# Patient Record
Sex: MALE | Race: ASIAN | Employment: UNEMPLOYED | URBAN - METROPOLITAN AREA
[De-identification: names, ages, dates, MRNs, and addresses within clinical notes are randomized per-mention and may not be internally consistent; named-entity substitution may affect disease eponyms.]

---

## 2017-05-19 PROBLEM — R07.9 CHEST PAIN: Status: ACTIVE | Noted: 2017-05-19

## 2017-05-19 PROBLEM — E78.5 HYPERLIPIDEMIA: Status: ACTIVE | Noted: 2017-05-19

## 2017-05-19 PROBLEM — I10 HYPERTENSION: Status: ACTIVE | Noted: 2017-05-19

## 2017-05-19 PROBLEM — E11.9 DIABETES MELLITUS (HCC): Status: ACTIVE | Noted: 2017-05-19

## 2017-05-27 ENCOUNTER — HOSPITAL ENCOUNTER (OUTPATIENT)
Dept: OTHER | Age: 72
Discharge: HOME | End: 2017-05-27
Attending: FAMILY MEDICINE | Admitting: FAMILY MEDICINE

## 2017-05-27 LAB
A/G RATIO: 1.4 (ref 1.1–2.2)
ALBUMIN SERPL-MCNC: 4.4 G/DL (ref 3.4–5)
ALP BLD-CCNC: 117 U/L (ref 40–129)
ALT SERPL-CCNC: 18 U/L (ref 10–40)
ANION GAP SERPL CALCULATED.3IONS-SCNC: 15 MMOL/L (ref 3–16)
AST SERPL-CCNC: 26 U/L (ref 15–37)
BASOPHILS ABSOLUTE: 0.1 K/UL (ref 0–0.2)
BASOPHILS RELATIVE PERCENT: 0.8 %
BILIRUB SERPL-MCNC: 1 MG/DL (ref 0–1)
BUN BLDV-MCNC: 12 MG/DL (ref 7–20)
CALCIUM SERPL-MCNC: 9.6 MG/DL (ref 8.3–10.6)
CHLORIDE BLD-SCNC: 101 MMOL/L (ref 99–110)
CHOLESTEROL, TOTAL: 127 MG/DL (ref 0–199)
CO2: 25 MMOL/L (ref 21–32)
CREAT SERPL-MCNC: 1.1 MG/DL (ref 0.8–1.3)
EOSINOPHILS ABSOLUTE: 0.2 K/UL (ref 0–0.6)
EOSINOPHILS RELATIVE PERCENT: 2.9 %
GFR AFRICAN AMERICAN: >60
GFR NON-AFRICAN AMERICAN: >60
GLOBULIN: 3.2 G/DL
GLUCOSE BLD-MCNC: 145 MG/DL (ref 70–99)
HCT VFR BLD CALC: 48.2 % (ref 40.5–52.5)
HDLC SERPL-MCNC: 36 MG/DL (ref 40–60)
HEMOGLOBIN: 16.1 G/DL (ref 13.5–17.5)
LDL CHOLESTEROL CALCULATED: 66 MG/DL
LYMPHOCYTES ABSOLUTE: 2 K/UL (ref 1–5.1)
LYMPHOCYTES RELATIVE PERCENT: 27.8 %
MCH RBC QN AUTO: 28.4 PG (ref 26–34)
MCHC RBC AUTO-ENTMCNC: 33.3 G/DL (ref 31–36)
MCV RBC AUTO: 85.2 FL (ref 80–100)
MONOCYTES ABSOLUTE: 0.4 K/UL (ref 0–1.3)
MONOCYTES RELATIVE PERCENT: 5.3 %
NEUTROPHILS ABSOLUTE: 4.5 K/UL (ref 1.7–7.7)
NEUTROPHILS RELATIVE PERCENT: 63.2 %
PDW BLD-RTO: 12.8 % (ref 12.4–15.4)
PLATELET # BLD: 246 K/UL (ref 135–450)
PMV BLD AUTO: 8.5 FL (ref 5–10.5)
POTASSIUM SERPL-SCNC: 4.5 MMOL/L (ref 3.5–5.1)
RBC # BLD: 5.66 M/UL (ref 4.2–5.9)
SODIUM BLD-SCNC: 141 MMOL/L (ref 136–145)
T4 FREE: 1.5 NG/DL (ref 0.9–1.8)
TOTAL PROTEIN: 7.6 G/DL (ref 6.4–8.2)
TRIGL SERPL-MCNC: 126 MG/DL (ref 0–150)
TSH SERPL DL<=0.05 MIU/L-ACNC: 0.96 UIU/ML (ref 0.27–4.2)
VLDLC SERPL CALC-MCNC: 25 MG/DL
WBC # BLD: 7.1 K/UL (ref 4–11)

## 2017-05-28 LAB
ESTIMATED AVERAGE GLUCOSE: 188.6 MG/DL
HBA1C MFR BLD: 8.2 %
PROSTATE SPECIFIC ANTIGEN FREE: 0.1 NG/ML
PROSTATE SPECIFIC ANTIGEN PERCENT FREE: 20 %
PROSTATE SPECIFIC ANTIGEN: 0.5 NG/ML (ref 0–4)

## 2017-06-01 ENCOUNTER — OFFICE VISIT (OUTPATIENT)
Dept: CARDIOLOGY CLINIC | Age: 72
End: 2017-06-01

## 2017-06-01 VITALS
SYSTOLIC BLOOD PRESSURE: 128 MMHG | DIASTOLIC BLOOD PRESSURE: 78 MMHG | HEART RATE: 106 BPM | WEIGHT: 114 LBS | BODY MASS INDEX: 25.56 KG/M2

## 2017-06-01 DIAGNOSIS — R94.39 EQUIVOCAL STRESS TEST: ICD-10-CM

## 2017-06-01 DIAGNOSIS — R07.9 CHEST PAIN, UNSPECIFIED TYPE: Primary | ICD-10-CM

## 2017-06-01 PROCEDURE — 99215 OFFICE O/P EST HI 40 MIN: CPT | Performed by: INTERNAL MEDICINE

## 2017-06-01 RX ORDER — METOPROLOL TARTRATE 50 MG/1
50 TABLET, FILM COATED ORAL SEE ADMIN INSTRUCTIONS
Qty: 2 TABLET | Refills: 0 | Status: SHIPPED | OUTPATIENT
Start: 2017-06-01 | End: 2017-06-01 | Stop reason: SDUPTHER

## 2017-06-01 RX ORDER — METOPROLOL TARTRATE 50 MG/1
50 TABLET, FILM COATED ORAL SEE ADMIN INSTRUCTIONS
Qty: 2 TABLET | Refills: 0 | Status: SHIPPED | OUTPATIENT
Start: 2017-06-01 | End: 2017-06-13 | Stop reason: ALTCHOICE

## 2017-06-12 ENCOUNTER — HOSPITAL ENCOUNTER (OUTPATIENT)
Dept: CT IMAGING | Age: 72
Discharge: HOME | End: 2017-06-12
Attending: INTERNAL MEDICINE | Admitting: INTERNAL MEDICINE

## 2017-06-12 VITALS
HEIGHT: 60 IN | DIASTOLIC BLOOD PRESSURE: 86 MMHG | TEMPERATURE: 97.4 F | BODY MASS INDEX: 22.38 KG/M2 | WEIGHT: 114 LBS | RESPIRATION RATE: 16 BRPM | SYSTOLIC BLOOD PRESSURE: 159 MMHG | HEART RATE: 57 BPM | OXYGEN SATURATION: 99 %

## 2017-06-12 DIAGNOSIS — R07.9 CHEST PAIN, UNSPECIFIED TYPE: ICD-10-CM

## 2017-06-12 DIAGNOSIS — R07.9 CHEST PAIN: ICD-10-CM

## 2017-06-12 DIAGNOSIS — R94.39 EQUIVOCAL STRESS TEST: ICD-10-CM

## 2017-06-12 LAB
GLUCOSE BLD-MCNC: 107 MG/DL (ref 70–99)
PERFORMED ON: ABNORMAL

## 2017-06-13 ENCOUNTER — OFFICE VISIT (OUTPATIENT)
Dept: CARDIOLOGY CLINIC | Age: 72
End: 2017-06-13

## 2017-06-13 VITALS
WEIGHT: 115 LBS | BODY MASS INDEX: 19.63 KG/M2 | SYSTOLIC BLOOD PRESSURE: 138 MMHG | DIASTOLIC BLOOD PRESSURE: 84 MMHG | HEART RATE: 66 BPM | HEIGHT: 64 IN

## 2017-06-13 DIAGNOSIS — E78.49 OTHER HYPERLIPIDEMIA: ICD-10-CM

## 2017-06-13 DIAGNOSIS — I10 ESSENTIAL HYPERTENSION: Primary | ICD-10-CM

## 2017-06-13 DIAGNOSIS — R10.9 ABDOMINAL PAIN, UNSPECIFIED LOCATION: ICD-10-CM

## 2017-06-13 DIAGNOSIS — R07.9 CHEST PAIN, UNSPECIFIED TYPE: ICD-10-CM

## 2017-06-13 PROCEDURE — 99214 OFFICE O/P EST MOD 30 MIN: CPT | Performed by: INTERNAL MEDICINE

## 2017-10-17 ENCOUNTER — HOSPITAL ENCOUNTER (OUTPATIENT)
Dept: ULTRASOUND IMAGING | Age: 72
Discharge: HOME | End: 2017-10-17
Attending: INTERNAL MEDICINE | Admitting: INTERNAL MEDICINE

## 2017-10-17 DIAGNOSIS — N28.9 DISORDER OF KIDNEY AND URETER: ICD-10-CM

## 2017-10-17 DIAGNOSIS — N28.9 RENAL INSUFFICIENCY: ICD-10-CM

## 2018-06-06 ENCOUNTER — PAT TELEPHONE (OUTPATIENT)
Dept: PREADMISSION TESTING | Age: 73
End: 2018-06-06

## 2018-06-06 VITALS — BODY MASS INDEX: 20.49 KG/M2 | WEIGHT: 120 LBS | HEIGHT: 64 IN

## 2018-06-06 RX ORDER — OMEPRAZOLE 40 MG/1
40 CAPSULE, DELAYED RELEASE ORAL DAILY
COMMUNITY
End: 2019-03-14 | Stop reason: SDUPTHER

## 2018-06-08 ENCOUNTER — HOSPITAL ENCOUNTER (OUTPATIENT)
Dept: ENDOSCOPY | Age: 73
Discharge: HOME | End: 2018-06-08
Attending: INTERNAL MEDICINE | Admitting: INTERNAL MEDICINE

## 2018-06-08 VITALS
HEART RATE: 57 BPM | TEMPERATURE: 97.3 F | HEIGHT: 64 IN | WEIGHT: 120 LBS | DIASTOLIC BLOOD PRESSURE: 85 MMHG | OXYGEN SATURATION: 97 % | BODY MASS INDEX: 20.49 KG/M2 | RESPIRATION RATE: 16 BRPM | SYSTOLIC BLOOD PRESSURE: 137 MMHG

## 2018-06-08 LAB
ANION GAP SERPL CALCULATED.3IONS-SCNC: 14 MMOL/L (ref 3–16)
BUN BLDV-MCNC: 10 MG/DL (ref 7–20)
CALCIUM SERPL-MCNC: 9 MG/DL (ref 8.3–10.6)
CHLORIDE BLD-SCNC: 102 MMOL/L (ref 99–110)
CO2: 24 MMOL/L (ref 21–32)
CREAT SERPL-MCNC: 1 MG/DL (ref 0.8–1.3)
GFR AFRICAN AMERICAN: >60
GFR NON-AFRICAN AMERICAN: >60
GLUCOSE BLD-MCNC: 154 MG/DL (ref 70–99)
GLUCOSE BLD-MCNC: 196 MG/DL (ref 70–99)
PERFORMED ON: ABNORMAL
POTASSIUM REFLEX MAGNESIUM: 5.3 MMOL/L (ref 3.5–5.1)
SODIUM BLD-SCNC: 140 MMOL/L (ref 136–145)

## 2018-06-08 RX ORDER — SODIUM CHLORIDE 0.9 % (FLUSH) 0.9 %
10 SYRINGE (ML) INJECTION EVERY 12 HOURS SCHEDULED
Status: DISCONTINUED | OUTPATIENT
Start: 2018-06-08 | End: 2018-06-09 | Stop reason: HOSPADM

## 2018-06-08 RX ORDER — SODIUM CHLORIDE 9 MG/ML
INJECTION, SOLUTION INTRAVENOUS CONTINUOUS
Status: DISCONTINUED | OUTPATIENT
Start: 2018-06-08 | End: 2018-06-09 | Stop reason: HOSPADM

## 2018-06-08 RX ORDER — SODIUM CHLORIDE 0.9 % (FLUSH) 0.9 %
10 SYRINGE (ML) INJECTION PRN
Status: DISCONTINUED | OUTPATIENT
Start: 2018-06-08 | End: 2018-06-09 | Stop reason: HOSPADM

## 2018-06-08 ASSESSMENT — PAIN SCALES - GENERAL
PAINLEVEL_OUTOF10: 0
PAINLEVEL_OUTOF10: 0

## 2018-06-08 ASSESSMENT — PAIN - FUNCTIONAL ASSESSMENT: PAIN_FUNCTIONAL_ASSESSMENT: 0-10

## 2018-06-08 ASSESSMENT — ENCOUNTER SYMPTOMS: SHORTNESS OF BREATH: 0

## 2018-06-15 ENCOUNTER — HOSPITAL ENCOUNTER (OUTPATIENT)
Dept: CT IMAGING | Age: 73
Discharge: HOME | End: 2018-06-15
Attending: INTERNAL MEDICINE | Admitting: INTERNAL MEDICINE

## 2018-06-15 DIAGNOSIS — R59.9 ENLARGED LYMPH NODES: ICD-10-CM

## 2018-06-15 DIAGNOSIS — R59.9 ENLARGED LYMPH NODE: ICD-10-CM

## 2018-06-21 ENCOUNTER — TELEPHONE (OUTPATIENT)
Dept: PULMONOLOGY | Age: 73
End: 2018-06-21

## 2018-07-09 ENCOUNTER — OFFICE VISIT (OUTPATIENT)
Dept: PULMONOLOGY | Age: 73
End: 2018-07-09

## 2018-07-09 VITALS
TEMPERATURE: 97.8 F | SYSTOLIC BLOOD PRESSURE: 136 MMHG | OXYGEN SATURATION: 97 % | WEIGHT: 122 LBS | BODY MASS INDEX: 20.83 KG/M2 | DIASTOLIC BLOOD PRESSURE: 80 MMHG | HEART RATE: 84 BPM | HEIGHT: 64 IN | RESPIRATION RATE: 16 BRPM

## 2018-07-09 DIAGNOSIS — Z22.7 TB LUNG, LATENT: ICD-10-CM

## 2018-07-09 DIAGNOSIS — R59.0 MEDIASTINAL ADENOPATHY: Primary | ICD-10-CM

## 2018-07-09 PROCEDURE — 99204 OFFICE O/P NEW MOD 45 MIN: CPT | Performed by: INTERNAL MEDICINE

## 2018-07-09 RX ORDER — TAMSULOSIN HYDROCHLORIDE 0.4 MG/1
0.4 CAPSULE ORAL NIGHTLY
Status: ON HOLD | COMMUNITY
End: 2020-10-16 | Stop reason: SDUPTHER

## 2018-07-09 NOTE — PROGRESS NOTES
PATIENT IS SEEN AT THE REQUEST OF DR. Garzon for adenopathy    Chief complaint  This is a 68y.o. year old male  who comes to see me with a chief complaint of   Chief Complaint   Patient presents with    Other     NP referred by Dr Alize Alexis for lymphadenopathy    Other     Whole body does not feel well weakness       HPI  Here with a cc of adenopathy. His son is present but I used [employer] interpretor services #[id number]    He says he is here for chest pain and his whole body does not feel well. He has not been well for over 1 year. He is vague about that and does not give details. Recently had CT Ab and EGD for GERD. CT ab showed abdominal adenopathy so dedicated CT Chest was completed last month. He is here for those findings but did not know that. He does not necessarily get SOB. Does have chest pains and GERD that gets better with PPI. Occupation:  Previous farmer    Pets: Fish    Hobbies/Exposures: Farming    TB Exposure:  + PPD when he came to [facility] 8 years ago. Treated with medication for unknown time    Lung Procedures:  None      Past Medical History:   Diagnosis Date    Diabetes mellitus ([address]     GERD (gastroesophageal reflux disease)     Hyperlipidemia     Hypertension     Kidney stone        Past Surgical History:   Procedure Laterality Date    APPENDECTOMY      CHOLECYSTECTOMY      UPPER GASTROINTESTINAL ENDOSCOPY  06/08/2018       Social History     Social History    Marital status:      Spouse name: elissa    Number of children: 8    Years of education: N/A     Occupational History    farmer      Social History Main Topics    Smoking status: Never Smoker    Smokeless tobacco: Never Used    Alcohol use No    Drug use: No    Sexual activity: Yes      Comment: frank     Other Topics Concern    Not on file     Social History Narrative    No narrative on file       History reviewed. No pertinent family history.       Current Outpatient Prescriptions:    tamsulosin (FLOMAX) 0.4 MG capsule, Take 0.4 mg by mouth daily, Disp: , Rfl:     omeprazole (PRILOSEC) 40 MG delayed release capsule, Take 40 mg by mouth daily, Disp: , Rfl:     gabapentin (NEURONTIN) 100 MG capsule, Take 100 mg by mouth 3 times daily. , Disp: , Rfl:     insulin glargine (LANTUS) 100 UNIT/ML injection vial, Inject 22 Units into the skin nightly, Disp: 1 vial, Rfl: 3    losartan (COZAAR) 25 MG tablet, Take 25 mg by mouth daily, Disp: , Rfl:     Cholecalciferol (VITAMIN D) 2000 UNITS CAPS capsule, Take by mouth, Disp: , Rfl:     simvastatin (ZOCOR) 40 MG tablet, Take 40 mg by mouth nightly, Disp: , Rfl:     insulin lispro (HUMALOG) 100 UNIT/ML injection vial, Inject 5 Units into the skin 3 times daily (before meals), Disp: 10 mL, Rfl: 0    clopidogrel (PLAVIX) 75 MG tablet, Take 1 tablet by mouth daily, Disp: 30 tablet, Rfl: 0      ROS:  GENERAL:  Fatigued, does not feel \"himself\"  HEENT:  No double vision, blurry vision, or difficulty swallowing  HEART:  Chest pain  LUNGS:  Rare SOB, cough at times  ABDOMEN:  Heart burn  GENITOURINARY:  No increased frequency, no blood in urine  EXTREMITIES:  No swelling, no lesions  NEURO:  No numbness or tingling, no seizures  SKIN:  No new rashes, no changes in hair or nails  LYMPH:  No masses, no swelling neck, armpits, or groin    PHYSICAL EXAM:  Vitals:    07/09/18 1154   BP: 136/80   Pulse: 84   Resp: 16   Temp: 97.8 °F (36.6 °C)   SpO2: 97%       GENERAL:  Well nourished, alert, appears stated age, no distress  HEENT:  No scleral icterus, no conjunctival irritation  NECK:  No thyromegaly, no bruits  LYMPH:  No cervical or supraclavicular adenopathy  HEART:  Regular rate and rhythm, no murmurs  LUNGS:  Clear bilaterally   ABDOMEN:  No distention, no organomegaly  EXTREMITIES:  No edema, no digital clubbing  NEURO:  No localizing deficits, CN II-XII intact    Pulmonary Function Testing  None    Chest imaging from 6/2018 is reviewed.   My interpretation is

## 2019-01-24 ENCOUNTER — APPOINTMENT (OUTPATIENT)
Dept: CT IMAGING | Age: 74
End: 2019-01-24
Payer: MEDICAID

## 2019-01-24 ENCOUNTER — HOSPITAL ENCOUNTER (EMERGENCY)
Age: 74
Discharge: HOME | End: 2019-01-24
Attending: EMERGENCY MEDICINE
Payer: MEDICAID

## 2019-01-24 VITALS
TEMPERATURE: 98.3 F | OXYGEN SATURATION: 97 % | WEIGHT: 126.76 LBS | BODY MASS INDEX: 21.64 KG/M2 | RESPIRATION RATE: 16 BRPM | HEIGHT: 64 IN | SYSTOLIC BLOOD PRESSURE: 114 MMHG | DIASTOLIC BLOOD PRESSURE: 77 MMHG | HEART RATE: 93 BPM

## 2019-01-24 DIAGNOSIS — K40.90 INGUINAL HERNIA WITHOUT OBSTRUCTION OR GANGRENE, RECURRENCE NOT SPECIFIED, UNSPECIFIED LATERALITY: Primary | ICD-10-CM

## 2019-01-24 DIAGNOSIS — K20.90 ESOPHAGITIS: ICD-10-CM

## 2019-01-24 LAB
ALBUMIN SERPL-MCNC: 4.1 G/DL (ref 3.4–5)
ALP BLD-CCNC: 133 U/L (ref 40–129)
ALT SERPL-CCNC: 20 U/L (ref 10–40)
ANION GAP SERPL CALCULATED.3IONS-SCNC: 14 MMOL/L (ref 3–16)
AST SERPL-CCNC: 27 U/L (ref 15–37)
BASOPHILS ABSOLUTE: 0 K/UL (ref 0–0.2)
BASOPHILS RELATIVE PERCENT: 0.3 %
BILIRUB SERPL-MCNC: 1.2 MG/DL (ref 0–1)
BILIRUBIN DIRECT: <0.2 MG/DL (ref 0–0.3)
BILIRUBIN URINE: NEGATIVE
BILIRUBIN, INDIRECT: ABNORMAL MG/DL (ref 0–1)
BLOOD, URINE: NEGATIVE
BUN BLDV-MCNC: 16 MG/DL (ref 7–20)
CALCIUM SERPL-MCNC: 9.1 MG/DL (ref 8.3–10.6)
CHLORIDE BLD-SCNC: 102 MMOL/L (ref 99–110)
CLARITY: CLEAR
CO2: 24 MMOL/L (ref 21–32)
COLOR: YELLOW
CREAT SERPL-MCNC: 1.2 MG/DL (ref 0.8–1.3)
EOSINOPHILS ABSOLUTE: 0.3 K/UL (ref 0–0.6)
EOSINOPHILS RELATIVE PERCENT: 2.5 %
GFR AFRICAN AMERICAN: >60
GFR NON-AFRICAN AMERICAN: 59
GLUCOSE BLD-MCNC: 241 MG/DL (ref 70–99)
GLUCOSE URINE: 250 MG/DL
HCT VFR BLD CALC: 44.2 % (ref 40.5–52.5)
HEMOGLOBIN: 15.1 G/DL (ref 13.5–17.5)
KETONES, URINE: ABNORMAL MG/DL
LACTIC ACID: 1.1 MMOL/L (ref 0.4–2)
LEUKOCYTE ESTERASE, URINE: NEGATIVE
LYMPHOCYTES ABSOLUTE: 1.7 K/UL (ref 1–5.1)
LYMPHOCYTES RELATIVE PERCENT: 13.2 %
MCH RBC QN AUTO: 28.2 PG (ref 26–34)
MCHC RBC AUTO-ENTMCNC: 34.2 G/DL (ref 31–36)
MCV RBC AUTO: 82.6 FL (ref 80–100)
MICROSCOPIC EXAMINATION: ABNORMAL
MONOCYTES ABSOLUTE: 0.8 K/UL (ref 0–1.3)
MONOCYTES RELATIVE PERCENT: 6.4 %
NEUTROPHILS ABSOLUTE: 9.8 K/UL (ref 1.7–7.7)
NEUTROPHILS RELATIVE PERCENT: 77.6 %
NITRITE, URINE: NEGATIVE
PDW BLD-RTO: 13.1 % (ref 12.4–15.4)
PH UA: 7
PLATELET # BLD: 187 K/UL (ref 135–450)
PMV BLD AUTO: 8.5 FL (ref 5–10.5)
POTASSIUM REFLEX MAGNESIUM: 4.3 MMOL/L (ref 3.5–5.1)
PROTEIN UA: NEGATIVE MG/DL
RBC # BLD: 5.35 M/UL (ref 4.2–5.9)
SODIUM BLD-SCNC: 140 MMOL/L (ref 136–145)
SPECIFIC GRAVITY UA: >1.03
TOTAL PROTEIN: 7.2 G/DL (ref 6.4–8.2)
TROPONIN: <0.01 NG/ML
URINE REFLEX TO CULTURE: ABNORMAL
URINE TYPE: ABNORMAL
UROBILINOGEN, URINE: 0.2 E.U./DL
WBC # BLD: 12.6 K/UL (ref 4–11)

## 2019-01-24 PROCEDURE — 85025 COMPLETE CBC W/AUTO DIFF WBC: CPT

## 2019-01-24 PROCEDURE — 96374 THER/PROPH/DIAG INJ IV PUSH: CPT

## 2019-01-24 PROCEDURE — 2580000003 HC RX 258: Performed by: EMERGENCY MEDICINE

## 2019-01-24 PROCEDURE — 71260 CT THORAX DX C+: CPT

## 2019-01-24 PROCEDURE — 80048 BASIC METABOLIC PNL TOTAL CA: CPT

## 2019-01-24 PROCEDURE — 80076 HEPATIC FUNCTION PANEL: CPT

## 2019-01-24 PROCEDURE — 96361 HYDRATE IV INFUSION ADD-ON: CPT

## 2019-01-24 PROCEDURE — 84484 ASSAY OF TROPONIN QUANT: CPT

## 2019-01-24 PROCEDURE — 96375 TX/PRO/DX INJ NEW DRUG ADDON: CPT

## 2019-01-24 PROCEDURE — 81003 URINALYSIS AUTO W/O SCOPE: CPT

## 2019-01-24 PROCEDURE — 6360000004 HC RX CONTRAST MEDICATION: Performed by: EMERGENCY MEDICINE

## 2019-01-24 PROCEDURE — 83605 ASSAY OF LACTIC ACID: CPT

## 2019-01-24 PROCEDURE — 93005 ELECTROCARDIOGRAM TRACING: CPT | Performed by: EMERGENCY MEDICINE

## 2019-01-24 PROCEDURE — 74177 CT ABD & PELVIS W/CONTRAST: CPT

## 2019-01-24 PROCEDURE — 6360000002 HC RX W HCPCS: Performed by: EMERGENCY MEDICINE

## 2019-01-24 PROCEDURE — 99284 EMERGENCY DEPT VISIT MOD MDM: CPT

## 2019-01-24 RX ORDER — MORPHINE SULFATE 2 MG/ML
4 INJECTION, SOLUTION INTRAMUSCULAR; INTRAVENOUS ONCE
Status: COMPLETED | OUTPATIENT
Start: 2019-01-24 | End: 2019-01-24

## 2019-01-24 RX ORDER — PANTOPRAZOLE SODIUM 20 MG/1
40 TABLET, DELAYED RELEASE ORAL DAILY
Qty: 30 TABLET | Refills: 0 | Status: SHIPPED | OUTPATIENT
Start: 2019-01-24 | End: 2019-02-22

## 2019-01-24 RX ORDER — 0.9 % SODIUM CHLORIDE 0.9 %
1000 INTRAVENOUS SOLUTION INTRAVENOUS ONCE
Status: COMPLETED | OUTPATIENT
Start: 2019-01-24 | End: 2019-01-24

## 2019-01-24 RX ORDER — ONDANSETRON 2 MG/ML
4 INJECTION INTRAMUSCULAR; INTRAVENOUS ONCE
Status: COMPLETED | OUTPATIENT
Start: 2019-01-24 | End: 2019-01-24

## 2019-01-24 RX ADMIN — ONDANSETRON 4 MG: 2 INJECTION INTRAMUSCULAR; INTRAVENOUS at 05:52

## 2019-01-24 RX ADMIN — IOPAMIDOL 75 ML: 755 INJECTION, SOLUTION INTRAVENOUS at 06:21

## 2019-01-24 RX ADMIN — SODIUM CHLORIDE 1000 ML: 9 INJECTION, SOLUTION INTRAVENOUS at 05:52

## 2019-01-24 RX ADMIN — MORPHINE SULFATE 4 MG: 2 INJECTION, SOLUTION INTRAMUSCULAR; INTRAVENOUS at 05:16

## 2019-01-24 ASSESSMENT — ENCOUNTER SYMPTOMS
STRIDOR: 0
SHORTNESS OF BREATH: 0
BACK PAIN: 0
VOMITING: 0
ANAL BLEEDING: 0
PHOTOPHOBIA: 0
CHEST TIGHTNESS: 0
COLOR CHANGE: 0
RECTAL PAIN: 0
ABDOMINAL DISTENTION: 0
COUGH: 0
APNEA: 0
EYE PAIN: 0
BLOOD IN STOOL: 0
NAUSEA: 0
WHEEZING: 0
ABDOMINAL PAIN: 1
CHOKING: 0
CONSTIPATION: 0
EYE REDNESS: 0
EYE ITCHING: 0
EYE DISCHARGE: 0
DIARRHEA: 0

## 2019-01-24 ASSESSMENT — PAIN SCALES - GENERAL
PAINLEVEL_OUTOF10: 10

## 2019-01-25 LAB
EKG ATRIAL RATE: 91 BPM
EKG DIAGNOSIS: NORMAL
EKG P AXIS: 56 DEGREES
EKG P-R INTERVAL: 172 MS
EKG Q-T INTERVAL: 360 MS
EKG QRS DURATION: 108 MS
EKG QTC CALCULATION (BAZETT): 442 MS
EKG R AXIS: -67 DEGREES
EKG T AXIS: 86 DEGREES
EKG VENTRICULAR RATE: 91 BPM

## 2019-01-25 PROCEDURE — 93010 ELECTROCARDIOGRAM REPORT: CPT | Performed by: INTERNAL MEDICINE

## 2019-01-29 ENCOUNTER — OFFICE VISIT (OUTPATIENT)
Dept: SURGERY | Age: 74
End: 2019-01-29
Payer: MEDICAID

## 2019-01-29 VITALS
WEIGHT: 124 LBS | BODY MASS INDEX: 21.28 KG/M2 | HEART RATE: 78 BPM | DIASTOLIC BLOOD PRESSURE: 79 MMHG | SYSTOLIC BLOOD PRESSURE: 151 MMHG

## 2019-01-29 DIAGNOSIS — K40.20 NON-RECURRENT BILATERAL INGUINAL HERNIA WITHOUT OBSTRUCTION OR GANGRENE: Primary | ICD-10-CM

## 2019-01-29 PROCEDURE — 99203 OFFICE O/P NEW LOW 30 MIN: CPT | Performed by: SURGERY

## 2019-01-31 LAB
AVERAGE GLUCOSE: NORMAL
HBA1C MFR BLD: 10.5 %

## 2019-02-22 RX ORDER — FAMOTIDINE 10 MG
TABLET ORAL
COMMUNITY
End: 2019-02-22

## 2019-02-22 RX ORDER — ATORVASTATIN CALCIUM 40 MG/1
40 TABLET, FILM COATED ORAL NIGHTLY
Status: ON HOLD | COMMUNITY
End: 2020-04-25 | Stop reason: ALTCHOICE

## 2019-02-27 ENCOUNTER — ANESTHESIA EVENT (OUTPATIENT)
Dept: ENDOSCOPY | Age: 74
End: 2019-02-27
Payer: MEDICAID

## 2019-02-28 ENCOUNTER — HOSPITAL ENCOUNTER (OUTPATIENT)
Age: 74
Setting detail: OUTPATIENT SURGERY
Discharge: HOME | End: 2019-02-28
Attending: INTERNAL MEDICINE | Admitting: INTERNAL MEDICINE
Payer: MEDICAID

## 2019-02-28 ENCOUNTER — ANESTHESIA (OUTPATIENT)
Dept: ENDOSCOPY | Age: 74
End: 2019-02-28
Payer: MEDICAID

## 2019-02-28 VITALS
WEIGHT: 124 LBS | DIASTOLIC BLOOD PRESSURE: 81 MMHG | RESPIRATION RATE: 18 BRPM | HEART RATE: 61 BPM | OXYGEN SATURATION: 98 % | BODY MASS INDEX: 21.17 KG/M2 | SYSTOLIC BLOOD PRESSURE: 152 MMHG | TEMPERATURE: 96.9 F | HEIGHT: 64 IN

## 2019-02-28 VITALS
RESPIRATION RATE: 19 BRPM | DIASTOLIC BLOOD PRESSURE: 68 MMHG | OXYGEN SATURATION: 99 % | SYSTOLIC BLOOD PRESSURE: 123 MMHG

## 2019-02-28 LAB
GLUCOSE BLD-MCNC: 108 MG/DL (ref 70–99)
GLUCOSE BLD-MCNC: 119 MG/DL (ref 70–99)
PERFORMED ON: ABNORMAL
PERFORMED ON: ABNORMAL

## 2019-02-28 PROCEDURE — 88312 SPECIAL STAINS GROUP 1: CPT

## 2019-02-28 PROCEDURE — 2500000003 HC RX 250 WO HCPCS

## 2019-02-28 PROCEDURE — 7100000001 HC PACU RECOVERY - ADDTL 15 MIN: Performed by: INTERNAL MEDICINE

## 2019-02-28 PROCEDURE — 7100000000 HC PACU RECOVERY - FIRST 15 MIN: Performed by: INTERNAL MEDICINE

## 2019-02-28 PROCEDURE — 3609020800 HC EGD W/EUS FNA: Performed by: INTERNAL MEDICINE

## 2019-02-28 PROCEDURE — 88342 IMHCHEM/IMCYTCHM 1ST ANTB: CPT

## 2019-02-28 PROCEDURE — 6360000002 HC RX W HCPCS

## 2019-02-28 PROCEDURE — 88177 CYTP FNA EVAL EA ADDL: CPT

## 2019-02-28 PROCEDURE — 2709999900 HC NON-CHARGEABLE SUPPLY: Performed by: INTERNAL MEDICINE

## 2019-02-28 PROCEDURE — 7100000011 HC PHASE II RECOVERY - ADDTL 15 MIN: Performed by: INTERNAL MEDICINE

## 2019-02-28 PROCEDURE — 7100000010 HC PHASE II RECOVERY - FIRST 15 MIN: Performed by: INTERNAL MEDICINE

## 2019-02-28 PROCEDURE — 3700000001 HC ADD 15 MINUTES (ANESTHESIA): Performed by: INTERNAL MEDICINE

## 2019-02-28 PROCEDURE — 88172 CYTP DX EVAL FNA 1ST EA SITE: CPT

## 2019-02-28 PROCEDURE — 88305 TISSUE EXAM BY PATHOLOGIST: CPT

## 2019-02-28 PROCEDURE — 88173 CYTOPATH EVAL FNA REPORT: CPT

## 2019-02-28 PROCEDURE — 3700000000 HC ANESTHESIA ATTENDED CARE: Performed by: INTERNAL MEDICINE

## 2019-02-28 PROCEDURE — 2720000010 HC SURG SUPPLY STERILE: Performed by: INTERNAL MEDICINE

## 2019-02-28 PROCEDURE — 2580000003 HC RX 258: Performed by: ANESTHESIOLOGY

## 2019-02-28 RX ORDER — SODIUM CHLORIDE 0.9 % (FLUSH) 0.9 %
10 SYRINGE (ML) INJECTION EVERY 12 HOURS SCHEDULED
Status: DISCONTINUED | OUTPATIENT
Start: 2019-02-28 | End: 2019-02-28 | Stop reason: HOSPADM

## 2019-02-28 RX ORDER — PROPOFOL 10 MG/ML
INJECTION, EMULSION INTRAVENOUS CONTINUOUS PRN
Status: DISCONTINUED | OUTPATIENT
Start: 2019-02-28 | End: 2019-02-28 | Stop reason: SDUPTHER

## 2019-02-28 RX ORDER — LIDOCAINE HYDROCHLORIDE 20 MG/ML
INJECTION, SOLUTION INFILTRATION; PERINEURAL PRN
Status: DISCONTINUED | OUTPATIENT
Start: 2019-02-28 | End: 2019-02-28 | Stop reason: SDUPTHER

## 2019-02-28 RX ORDER — OXYCODONE HYDROCHLORIDE AND ACETAMINOPHEN 5; 325 MG/1; MG/1
2 TABLET ORAL PRN
Status: DISCONTINUED | OUTPATIENT
Start: 2019-02-28 | End: 2019-02-28 | Stop reason: HOSPADM

## 2019-02-28 RX ORDER — MEPERIDINE HYDROCHLORIDE 25 MG/ML
12.5 INJECTION INTRAMUSCULAR; INTRAVENOUS; SUBCUTANEOUS EVERY 5 MIN PRN
Status: DISCONTINUED | OUTPATIENT
Start: 2019-02-28 | End: 2019-02-28 | Stop reason: HOSPADM

## 2019-02-28 RX ORDER — PROPOFOL 10 MG/ML
INJECTION, EMULSION INTRAVENOUS PRN
Status: DISCONTINUED | OUTPATIENT
Start: 2019-02-28 | End: 2019-02-28 | Stop reason: SDUPTHER

## 2019-02-28 RX ORDER — SODIUM CHLORIDE 9 MG/ML
INJECTION, SOLUTION INTRAVENOUS CONTINUOUS
Status: DISCONTINUED | OUTPATIENT
Start: 2019-02-28 | End: 2019-02-28 | Stop reason: HOSPADM

## 2019-02-28 RX ORDER — OXYCODONE HYDROCHLORIDE AND ACETAMINOPHEN 5; 325 MG/1; MG/1
1 TABLET ORAL PRN
Status: DISCONTINUED | OUTPATIENT
Start: 2019-02-28 | End: 2019-02-28 | Stop reason: HOSPADM

## 2019-02-28 RX ORDER — ONDANSETRON 2 MG/ML
4 INJECTION INTRAMUSCULAR; INTRAVENOUS
Status: DISCONTINUED | OUTPATIENT
Start: 2019-02-28 | End: 2019-02-28 | Stop reason: HOSPADM

## 2019-02-28 RX ORDER — SODIUM CHLORIDE 0.9 % (FLUSH) 0.9 %
10 SYRINGE (ML) INJECTION PRN
Status: DISCONTINUED | OUTPATIENT
Start: 2019-02-28 | End: 2019-02-28 | Stop reason: HOSPADM

## 2019-02-28 RX ORDER — FENTANYL CITRATE 50 UG/ML
50 INJECTION, SOLUTION INTRAMUSCULAR; INTRAVENOUS EVERY 5 MIN PRN
Status: DISCONTINUED | OUTPATIENT
Start: 2019-02-28 | End: 2019-02-28 | Stop reason: HOSPADM

## 2019-02-28 RX ORDER — MORPHINE SULFATE 2 MG/ML
2 INJECTION, SOLUTION INTRAMUSCULAR; INTRAVENOUS EVERY 5 MIN PRN
Status: DISCONTINUED | OUTPATIENT
Start: 2019-02-28 | End: 2019-02-28 | Stop reason: HOSPADM

## 2019-02-28 RX ORDER — FENTANYL CITRATE 50 UG/ML
25 INJECTION, SOLUTION INTRAMUSCULAR; INTRAVENOUS EVERY 5 MIN PRN
Status: DISCONTINUED | OUTPATIENT
Start: 2019-02-28 | End: 2019-02-28 | Stop reason: HOSPADM

## 2019-02-28 RX ORDER — MORPHINE SULFATE 2 MG/ML
1 INJECTION, SOLUTION INTRAMUSCULAR; INTRAVENOUS EVERY 5 MIN PRN
Status: DISCONTINUED | OUTPATIENT
Start: 2019-02-28 | End: 2019-02-28 | Stop reason: HOSPADM

## 2019-02-28 RX ADMIN — PROPOFOL 50 MG: 10 INJECTION, EMULSION INTRAVENOUS at 15:25

## 2019-02-28 RX ADMIN — SODIUM CHLORIDE: 9 INJECTION, SOLUTION INTRAVENOUS at 14:26

## 2019-02-28 RX ADMIN — LIDOCAINE HYDROCHLORIDE 40 MG: 20 INJECTION, SOLUTION INFILTRATION; PERINEURAL at 15:20

## 2019-02-28 RX ADMIN — PROPOFOL 140 MCG/KG/MIN: 10 INJECTION, EMULSION INTRAVENOUS at 15:20

## 2019-02-28 RX ADMIN — PROPOFOL 50 MG: 10 INJECTION, EMULSION INTRAVENOUS at 15:20

## 2019-02-28 ASSESSMENT — PAIN SCALES - GENERAL
PAINLEVEL_OUTOF10: 0
PAINLEVEL_OUTOF10: 0

## 2019-02-28 ASSESSMENT — PULMONARY FUNCTION TESTS
PIF_VALUE: 0
PIF_VALUE: 37
PIF_VALUE: 0
PIF_VALUE: 1
PIF_VALUE: 0
PIF_VALUE: 1
PIF_VALUE: 0

## 2019-02-28 ASSESSMENT — LIFESTYLE VARIABLES: SMOKING_STATUS: 0

## 2019-02-28 ASSESSMENT — PAIN - FUNCTIONAL ASSESSMENT: PAIN_FUNCTIONAL_ASSESSMENT: 0-10

## 2019-03-14 ENCOUNTER — APPOINTMENT (OUTPATIENT)
Dept: GENERAL RADIOLOGY | Age: 74
End: 2019-03-14
Payer: MEDICAID

## 2019-03-14 ENCOUNTER — HOSPITAL ENCOUNTER (EMERGENCY)
Age: 74
Discharge: HOME | End: 2019-03-14
Payer: MEDICAID

## 2019-03-14 ENCOUNTER — APPOINTMENT (OUTPATIENT)
Dept: CT IMAGING | Age: 74
End: 2019-03-14
Payer: MEDICAID

## 2019-03-14 VITALS
SYSTOLIC BLOOD PRESSURE: 129 MMHG | HEIGHT: 64 IN | OXYGEN SATURATION: 95 % | HEART RATE: 84 BPM | WEIGHT: 129.41 LBS | DIASTOLIC BLOOD PRESSURE: 87 MMHG | TEMPERATURE: 97.7 F | RESPIRATION RATE: 20 BRPM | BODY MASS INDEX: 22.09 KG/M2

## 2019-03-14 DIAGNOSIS — K44.9 HIATAL HERNIA WITH GERD AND ESOPHAGITIS: Primary | ICD-10-CM

## 2019-03-14 DIAGNOSIS — K21.00 HIATAL HERNIA WITH GERD AND ESOPHAGITIS: Primary | ICD-10-CM

## 2019-03-14 LAB
A/G RATIO: 1.3 (ref 1.1–2.2)
ALBUMIN SERPL-MCNC: 3.8 G/DL (ref 3.4–5)
ALP BLD-CCNC: 146 U/L (ref 40–129)
ALT SERPL-CCNC: 13 U/L (ref 10–40)
ANION GAP SERPL CALCULATED.3IONS-SCNC: 14 MMOL/L (ref 3–16)
AST SERPL-CCNC: 19 U/L (ref 15–37)
BASOPHILS ABSOLUTE: 0.1 K/UL (ref 0–0.2)
BASOPHILS RELATIVE PERCENT: 1.1 %
BILIRUB SERPL-MCNC: 1.1 MG/DL (ref 0–1)
BILIRUBIN URINE: NEGATIVE
BLOOD, URINE: NEGATIVE
BUN BLDV-MCNC: 10 MG/DL (ref 7–20)
CALCIUM SERPL-MCNC: 8.9 MG/DL (ref 8.3–10.6)
CHLORIDE BLD-SCNC: 102 MMOL/L (ref 99–110)
CLARITY: CLEAR
CO2: 22 MMOL/L (ref 21–32)
COLOR: YELLOW
CREAT SERPL-MCNC: 1.1 MG/DL (ref 0.8–1.3)
EKG ATRIAL RATE: 86 BPM
EKG DIAGNOSIS: NORMAL
EKG P AXIS: 57 DEGREES
EKG P-R INTERVAL: 180 MS
EKG Q-T INTERVAL: 362 MS
EKG QRS DURATION: 112 MS
EKG QTC CALCULATION (BAZETT): 433 MS
EKG R AXIS: -66 DEGREES
EKG T AXIS: 71 DEGREES
EKG VENTRICULAR RATE: 86 BPM
EOSINOPHILS ABSOLUTE: 0.4 K/UL (ref 0–0.6)
EOSINOPHILS RELATIVE PERCENT: 4.2 %
GFR AFRICAN AMERICAN: >60
GFR NON-AFRICAN AMERICAN: >60
GLOBULIN: 2.9 G/DL
GLUCOSE BLD-MCNC: 178 MG/DL (ref 70–99)
GLUCOSE URINE: NEGATIVE MG/DL
HCT VFR BLD CALC: 39.5 % (ref 40.5–52.5)
HEMOGLOBIN: 13.4 G/DL (ref 13.5–17.5)
KETONES, URINE: NEGATIVE MG/DL
LEUKOCYTE ESTERASE, URINE: NEGATIVE
LIPASE: 34 U/L (ref 13–60)
LYMPHOCYTES ABSOLUTE: 2.6 K/UL (ref 1–5.1)
LYMPHOCYTES RELATIVE PERCENT: 26.9 %
MCH RBC QN AUTO: 27.8 PG (ref 26–34)
MCHC RBC AUTO-ENTMCNC: 34 G/DL (ref 31–36)
MCV RBC AUTO: 81.8 FL (ref 80–100)
MICROSCOPIC EXAMINATION: NORMAL
MONOCYTES ABSOLUTE: 0.8 K/UL (ref 0–1.3)
MONOCYTES RELATIVE PERCENT: 8.5 %
NEUTROPHILS ABSOLUTE: 5.7 K/UL (ref 1.7–7.7)
NEUTROPHILS RELATIVE PERCENT: 59.3 %
NITRITE, URINE: NEGATIVE
PDW BLD-RTO: 13.5 % (ref 12.4–15.4)
PH UA: 5.5 (ref 5–8)
PLATELET # BLD: 239 K/UL (ref 135–450)
PMV BLD AUTO: 7.4 FL (ref 5–10.5)
POTASSIUM REFLEX MAGNESIUM: 4 MMOL/L (ref 3.5–5.1)
PRO-BNP: 64 PG/ML (ref 0–124)
PROTEIN UA: NEGATIVE MG/DL
RBC # BLD: 4.83 M/UL (ref 4.2–5.9)
SODIUM BLD-SCNC: 138 MMOL/L (ref 136–145)
SPECIFIC GRAVITY UA: 1.01 (ref 1–1.03)
TOTAL PROTEIN: 6.7 G/DL (ref 6.4–8.2)
TROPONIN: <0.01 NG/ML
URINE REFLEX TO CULTURE: NORMAL
URINE TYPE: NORMAL
UROBILINOGEN, URINE: 0.2 E.U./DL
WBC # BLD: 9.6 K/UL (ref 4–11)

## 2019-03-14 PROCEDURE — 96374 THER/PROPH/DIAG INJ IV PUSH: CPT

## 2019-03-14 PROCEDURE — 96375 TX/PRO/DX INJ NEW DRUG ADDON: CPT

## 2019-03-14 PROCEDURE — 93010 ELECTROCARDIOGRAM REPORT: CPT | Performed by: INTERNAL MEDICINE

## 2019-03-14 PROCEDURE — 71045 X-RAY EXAM CHEST 1 VIEW: CPT

## 2019-03-14 PROCEDURE — 96361 HYDRATE IV INFUSION ADD-ON: CPT

## 2019-03-14 PROCEDURE — 71260 CT THORAX DX C+: CPT

## 2019-03-14 PROCEDURE — 81003 URINALYSIS AUTO W/O SCOPE: CPT

## 2019-03-14 PROCEDURE — 84484 ASSAY OF TROPONIN QUANT: CPT

## 2019-03-14 PROCEDURE — 6360000004 HC RX CONTRAST MEDICATION: Performed by: NURSE PRACTITIONER

## 2019-03-14 PROCEDURE — 85025 COMPLETE CBC W/AUTO DIFF WBC: CPT

## 2019-03-14 PROCEDURE — 6360000002 HC RX W HCPCS: Performed by: EMERGENCY MEDICINE

## 2019-03-14 PROCEDURE — 83690 ASSAY OF LIPASE: CPT

## 2019-03-14 PROCEDURE — 99284 EMERGENCY DEPT VISIT MOD MDM: CPT

## 2019-03-14 PROCEDURE — 93005 ELECTROCARDIOGRAM TRACING: CPT | Performed by: EMERGENCY MEDICINE

## 2019-03-14 PROCEDURE — 2580000003 HC RX 258: Performed by: EMERGENCY MEDICINE

## 2019-03-14 PROCEDURE — 83880 ASSAY OF NATRIURETIC PEPTIDE: CPT

## 2019-03-14 PROCEDURE — 80053 COMPREHEN METABOLIC PANEL: CPT

## 2019-03-14 RX ORDER — SODIUM CHLORIDE 9 MG/ML
INJECTION, SOLUTION INTRAVENOUS CONTINUOUS
Status: DISCONTINUED | OUTPATIENT
Start: 2019-03-14 | End: 2019-03-14 | Stop reason: HOSPADM

## 2019-03-14 RX ORDER — SUCRALFATE 1 G/1
1 TABLET ORAL ONCE
Status: DISCONTINUED | OUTPATIENT
Start: 2019-03-14 | End: 2019-03-14 | Stop reason: HOSPADM

## 2019-03-14 RX ORDER — ONDANSETRON 2 MG/ML
4 INJECTION INTRAMUSCULAR; INTRAVENOUS ONCE
Status: COMPLETED | OUTPATIENT
Start: 2019-03-14 | End: 2019-03-14

## 2019-03-14 RX ORDER — SUCRALFATE 1 G/1
1 TABLET ORAL 4 TIMES DAILY
Qty: 120 TABLET | Refills: 0 | Status: ON HOLD | OUTPATIENT
Start: 2019-03-14 | End: 2020-04-25 | Stop reason: ALTCHOICE

## 2019-03-14 RX ORDER — OMEPRAZOLE 40 MG/1
40 CAPSULE, DELAYED RELEASE ORAL 2 TIMES DAILY
Qty: 60 CAPSULE | Refills: 0 | Status: ON HOLD | OUTPATIENT
Start: 2019-03-14 | End: 2020-04-25 | Stop reason: ALTCHOICE

## 2019-03-14 RX ADMIN — ONDANSETRON 4 MG: 2 INJECTION INTRAMUSCULAR; INTRAVENOUS at 05:38

## 2019-03-14 RX ADMIN — IOPAMIDOL 75 ML: 755 INJECTION, SOLUTION INTRAVENOUS at 06:45

## 2019-03-14 RX ADMIN — HYDROMORPHONE HYDROCHLORIDE 0.5 MG: 1 INJECTION, SOLUTION INTRAMUSCULAR; INTRAVENOUS; SUBCUTANEOUS at 05:38

## 2019-03-14 RX ADMIN — SODIUM CHLORIDE: 9 INJECTION, SOLUTION INTRAVENOUS at 05:38

## 2019-03-14 ASSESSMENT — PAIN DESCRIPTION - PAIN TYPE: TYPE: ACUTE PAIN

## 2019-03-14 ASSESSMENT — PAIN - FUNCTIONAL ASSESSMENT: PAIN_FUNCTIONAL_ASSESSMENT: 0-10

## 2019-03-14 ASSESSMENT — PAIN SCALES - GENERAL
PAINLEVEL_OUTOF10: 10
PAINLEVEL_OUTOF10: 10
PAINLEVEL_OUTOF10: 6
PAINLEVEL_OUTOF10: 6

## 2019-03-14 ASSESSMENT — PAIN DESCRIPTION - DESCRIPTORS: DESCRIPTORS: ACHING

## 2019-03-14 ASSESSMENT — PAIN DESCRIPTION - FREQUENCY: FREQUENCY: CONTINUOUS

## 2019-03-14 ASSESSMENT — PAIN DESCRIPTION - LOCATION: LOCATION: ABDOMEN

## 2019-03-14 ASSESSMENT — PAIN DESCRIPTION - ORIENTATION: ORIENTATION: RIGHT

## 2019-03-16 ENCOUNTER — APPOINTMENT (OUTPATIENT)
Dept: CT IMAGING | Age: 74
End: 2019-03-16
Payer: MEDICAID

## 2019-03-16 ENCOUNTER — HOSPITAL ENCOUNTER (EMERGENCY)
Age: 74
Discharge: HOME | End: 2019-03-17
Payer: MEDICAID

## 2019-03-16 DIAGNOSIS — K59.00 CONSTIPATION, UNSPECIFIED CONSTIPATION TYPE: Primary | ICD-10-CM

## 2019-03-16 PROCEDURE — 74176 CT ABD & PELVIS W/O CONTRAST: CPT

## 2019-03-16 PROCEDURE — 99284 EMERGENCY DEPT VISIT MOD MDM: CPT

## 2019-03-16 ASSESSMENT — PAIN DESCRIPTION - PAIN TYPE
TYPE: ACUTE PAIN;CHRONIC PAIN
TYPE: ACUTE PAIN

## 2019-03-16 ASSESSMENT — PAIN DESCRIPTION - DESCRIPTORS
DESCRIPTORS: SHARP
DESCRIPTORS: CRAMPING;DISCOMFORT

## 2019-03-16 ASSESSMENT — PAIN DESCRIPTION - FREQUENCY
FREQUENCY: CONTINUOUS
FREQUENCY: CONTINUOUS

## 2019-03-16 ASSESSMENT — PAIN SCALES - GENERAL
PAINLEVEL_OUTOF10: 6
PAINLEVEL_OUTOF10: 10

## 2019-03-16 ASSESSMENT — PAIN DESCRIPTION - ORIENTATION: ORIENTATION: OTHER (COMMENT)

## 2019-03-16 ASSESSMENT — PAIN DESCRIPTION - LOCATION
LOCATION: ABDOMEN
LOCATION: ABDOMEN

## 2019-03-16 ASSESSMENT — PAIN DESCRIPTION - ONSET: ONSET: ON-GOING

## 2019-03-16 ASSESSMENT — PAIN DESCRIPTION - PROGRESSION: CLINICAL_PROGRESSION: NOT CHANGED

## 2019-03-16 ASSESSMENT — PAIN - FUNCTIONAL ASSESSMENT: PAIN_FUNCTIONAL_ASSESSMENT: PREVENTS OR INTERFERES SOME ACTIVE ACTIVITIES AND ADLS

## 2019-03-17 VITALS
RESPIRATION RATE: 16 BRPM | HEIGHT: 64 IN | BODY MASS INDEX: 20.44 KG/M2 | OXYGEN SATURATION: 98 % | WEIGHT: 119.71 LBS | DIASTOLIC BLOOD PRESSURE: 84 MMHG | SYSTOLIC BLOOD PRESSURE: 129 MMHG | TEMPERATURE: 98 F | HEART RATE: 81 BPM

## 2019-03-17 LAB
A/G RATIO: 1.1 (ref 1.1–2.2)
ALBUMIN SERPL-MCNC: 4 G/DL (ref 3.4–5)
ALP BLD-CCNC: 168 U/L (ref 40–129)
ALT SERPL-CCNC: 12 U/L (ref 10–40)
ANION GAP SERPL CALCULATED.3IONS-SCNC: 15 MMOL/L (ref 3–16)
AST SERPL-CCNC: 18 U/L (ref 15–37)
BASOPHILS ABSOLUTE: 0.1 K/UL (ref 0–0.2)
BASOPHILS RELATIVE PERCENT: 0.5 %
BILIRUB SERPL-MCNC: 0.8 MG/DL (ref 0–1)
BUN BLDV-MCNC: 15 MG/DL (ref 7–20)
CALCIUM SERPL-MCNC: 9.9 MG/DL (ref 8.3–10.6)
CHLORIDE BLD-SCNC: 97 MMOL/L (ref 99–110)
CO2: 24 MMOL/L (ref 21–32)
CREAT SERPL-MCNC: 1.2 MG/DL (ref 0.8–1.3)
EOSINOPHILS ABSOLUTE: 0.1 K/UL (ref 0–0.6)
EOSINOPHILS RELATIVE PERCENT: 1.1 %
GFR AFRICAN AMERICAN: >60
GFR NON-AFRICAN AMERICAN: 59
GLOBULIN: 3.5 G/DL
GLUCOSE BLD-MCNC: 298 MG/DL (ref 70–99)
HCT VFR BLD CALC: 40 % (ref 40.5–52.5)
HEMOGLOBIN: 13.9 G/DL (ref 13.5–17.5)
LIPASE: 34 U/L (ref 13–60)
LYMPHOCYTES ABSOLUTE: 1.5 K/UL (ref 1–5.1)
LYMPHOCYTES RELATIVE PERCENT: 13.3 %
MCH RBC QN AUTO: 28 PG (ref 26–34)
MCHC RBC AUTO-ENTMCNC: 34.7 G/DL (ref 31–36)
MCV RBC AUTO: 80.8 FL (ref 80–100)
MONOCYTES ABSOLUTE: 0.6 K/UL (ref 0–1.3)
MONOCYTES RELATIVE PERCENT: 5.5 %
NEUTROPHILS ABSOLUTE: 9.1 K/UL (ref 1.7–7.7)
NEUTROPHILS RELATIVE PERCENT: 79.6 %
PDW BLD-RTO: 13.5 % (ref 12.4–15.4)
PLATELET # BLD: 276 K/UL (ref 135–450)
PMV BLD AUTO: 7.4 FL (ref 5–10.5)
POTASSIUM SERPL-SCNC: 4.2 MMOL/L (ref 3.5–5.1)
RBC # BLD: 4.95 M/UL (ref 4.2–5.9)
SODIUM BLD-SCNC: 136 MMOL/L (ref 136–145)
TOTAL PROTEIN: 7.5 G/DL (ref 6.4–8.2)
WBC # BLD: 11.5 K/UL (ref 4–11)

## 2019-03-17 PROCEDURE — 85025 COMPLETE CBC W/AUTO DIFF WBC: CPT

## 2019-03-17 PROCEDURE — 80053 COMPREHEN METABOLIC PANEL: CPT

## 2019-03-17 PROCEDURE — 83690 ASSAY OF LIPASE: CPT

## 2019-03-17 RX ORDER — POLYETHYLENE GLYCOL 3350 17 G/17G
17 POWDER, FOR SOLUTION ORAL DAILY
Qty: 1 BOTTLE | Refills: 0 | Status: SHIPPED | OUTPATIENT
Start: 2019-03-17 | End: 2019-03-22

## 2019-03-17 RX ORDER — DOCUSATE SODIUM 100 MG/1
100 CAPSULE, LIQUID FILLED ORAL 2 TIMES DAILY PRN
Qty: 20 CAPSULE | Refills: 0 | Status: SHIPPED | OUTPATIENT
Start: 2019-03-17 | End: 2019-03-27

## 2019-03-17 ASSESSMENT — ENCOUNTER SYMPTOMS
CONSTIPATION: 1
SHORTNESS OF BREATH: 0
ABDOMINAL DISTENTION: 0
COLOR CHANGE: 0
BACK PAIN: 0
VOMITING: 0
ABDOMINAL PAIN: 1
NAUSEA: 0

## 2019-03-17 ASSESSMENT — PAIN SCALES - GENERAL
PAINLEVEL_OUTOF10: 5
PAINLEVEL_OUTOF10: 6

## 2019-03-17 ASSESSMENT — PAIN DESCRIPTION - ORIENTATION: ORIENTATION: LOWER

## 2019-03-17 ASSESSMENT — PAIN DESCRIPTION - ONSET
ONSET: ON-GOING
ONSET: ON-GOING

## 2019-03-17 ASSESSMENT — PAIN DESCRIPTION - DESCRIPTORS
DESCRIPTORS: CRAMPING;DISCOMFORT
DESCRIPTORS: DISCOMFORT

## 2019-03-17 ASSESSMENT — PAIN DESCRIPTION - PAIN TYPE
TYPE: ACUTE PAIN;CHRONIC PAIN
TYPE: ACUTE PAIN;CHRONIC PAIN

## 2019-03-17 ASSESSMENT — PAIN DESCRIPTION - PROGRESSION: CLINICAL_PROGRESSION: NOT CHANGED

## 2019-03-17 ASSESSMENT — PAIN DESCRIPTION - LOCATION
LOCATION: ABDOMEN
LOCATION: ABDOMEN

## 2019-03-17 ASSESSMENT — PAIN DESCRIPTION - FREQUENCY
FREQUENCY: CONTINUOUS
FREQUENCY: CONTINUOUS

## 2019-03-17 ASSESSMENT — PAIN - FUNCTIONAL ASSESSMENT: PAIN_FUNCTIONAL_ASSESSMENT: PREVENTS OR INTERFERES SOME ACTIVE ACTIVITIES AND ADLS

## 2019-04-22 ENCOUNTER — ANESTHESIA EVENT (OUTPATIENT)
Dept: ENDOSCOPY | Age: 74
End: 2019-04-22
Payer: MEDICAID

## 2019-04-22 NOTE — PROGRESS NOTES
Pt called ,no answer, unable to leave voice message for MW PAT interview.  Electronically signed by Awilda Brito RN on 4/22/19 at 10:20 AM

## 2019-04-23 ENCOUNTER — ANESTHESIA (OUTPATIENT)
Dept: ENDOSCOPY | Age: 74
End: 2019-04-23
Payer: MEDICAID

## 2019-04-23 ENCOUNTER — HOSPITAL ENCOUNTER (OUTPATIENT)
Age: 74
Setting detail: OUTPATIENT SURGERY
Discharge: HOME | End: 2019-04-23
Attending: INTERNAL MEDICINE | Admitting: INTERNAL MEDICINE
Payer: MEDICAID

## 2019-04-23 VITALS
TEMPERATURE: 96.8 F | RESPIRATION RATE: 16 BRPM | SYSTOLIC BLOOD PRESSURE: 150 MMHG | OXYGEN SATURATION: 94 % | DIASTOLIC BLOOD PRESSURE: 92 MMHG | HEART RATE: 81 BPM

## 2019-04-23 VITALS
OXYGEN SATURATION: 99 % | SYSTOLIC BLOOD PRESSURE: 131 MMHG | RESPIRATION RATE: 20 BRPM | DIASTOLIC BLOOD PRESSURE: 76 MMHG

## 2019-04-23 DIAGNOSIS — K22.89 ESOPHAGEAL THICKENING: ICD-10-CM

## 2019-04-23 LAB
GLUCOSE BLD-MCNC: 156 MG/DL (ref 70–99)
GLUCOSE BLD-MCNC: 173 MG/DL (ref 70–99)
PERFORMED ON: ABNORMAL
PERFORMED ON: ABNORMAL

## 2019-04-23 PROCEDURE — 88341 IMHCHEM/IMCYTCHM EA ADD ANTB: CPT

## 2019-04-23 PROCEDURE — 2500000003 HC RX 250 WO HCPCS: Performed by: NURSE ANESTHETIST, CERTIFIED REGISTERED

## 2019-04-23 PROCEDURE — 88342 IMHCHEM/IMCYTCHM 1ST ANTB: CPT

## 2019-04-23 PROCEDURE — C1726 CATH, BAL DIL, NON-VASCULAR: HCPCS | Performed by: INTERNAL MEDICINE

## 2019-04-23 PROCEDURE — 7100000001 HC PACU RECOVERY - ADDTL 15 MIN: Performed by: INTERNAL MEDICINE

## 2019-04-23 PROCEDURE — 7100000010 HC PHASE II RECOVERY - FIRST 15 MIN: Performed by: INTERNAL MEDICINE

## 2019-04-23 PROCEDURE — 88173 CYTOPATH EVAL FNA REPORT: CPT

## 2019-04-23 PROCEDURE — 88172 CYTP DX EVAL FNA 1ST EA SITE: CPT

## 2019-04-23 PROCEDURE — 88177 CYTP FNA EVAL EA ADDL: CPT

## 2019-04-23 PROCEDURE — 6360000002 HC RX W HCPCS: Performed by: ANESTHESIOLOGY

## 2019-04-23 PROCEDURE — 88312 SPECIAL STAINS GROUP 1: CPT

## 2019-04-23 PROCEDURE — 2720000010 HC SURG SUPPLY STERILE: Performed by: INTERNAL MEDICINE

## 2019-04-23 PROCEDURE — 7100000011 HC PHASE II RECOVERY - ADDTL 15 MIN: Performed by: INTERNAL MEDICINE

## 2019-04-23 PROCEDURE — 2580000003 HC RX 258: Performed by: ANESTHESIOLOGY

## 2019-04-23 PROCEDURE — 6360000002 HC RX W HCPCS: Performed by: NURSE ANESTHETIST, CERTIFIED REGISTERED

## 2019-04-23 PROCEDURE — 3609020800 HC EGD W/EUS FNA: Performed by: INTERNAL MEDICINE

## 2019-04-23 PROCEDURE — 2709999900 HC NON-CHARGEABLE SUPPLY: Performed by: INTERNAL MEDICINE

## 2019-04-23 PROCEDURE — 3700000000 HC ANESTHESIA ATTENDED CARE: Performed by: INTERNAL MEDICINE

## 2019-04-23 PROCEDURE — 3609012400 HC EGD TRANSORAL BIOPSY SINGLE/MULTIPLE: Performed by: INTERNAL MEDICINE

## 2019-04-23 PROCEDURE — 3700000001 HC ADD 15 MINUTES (ANESTHESIA): Performed by: INTERNAL MEDICINE

## 2019-04-23 PROCEDURE — 88305 TISSUE EXAM BY PATHOLOGIST: CPT

## 2019-04-23 PROCEDURE — 7100000000 HC PACU RECOVERY - FIRST 15 MIN: Performed by: INTERNAL MEDICINE

## 2019-04-23 RX ORDER — FENTANYL CITRATE 50 UG/ML
50 INJECTION, SOLUTION INTRAMUSCULAR; INTRAVENOUS EVERY 5 MIN PRN
Status: DISCONTINUED | OUTPATIENT
Start: 2019-04-23 | End: 2019-04-23 | Stop reason: HOSPADM

## 2019-04-23 RX ORDER — PROPOFOL 10 MG/ML
INJECTION, EMULSION INTRAVENOUS CONTINUOUS PRN
Status: DISCONTINUED | OUTPATIENT
Start: 2019-04-23 | End: 2019-04-23 | Stop reason: SDUPTHER

## 2019-04-23 RX ORDER — LIDOCAINE HYDROCHLORIDE 20 MG/ML
INJECTION, SOLUTION EPIDURAL; INFILTRATION; INTRACAUDAL; PERINEURAL PRN
Status: DISCONTINUED | OUTPATIENT
Start: 2019-04-23 | End: 2019-04-23 | Stop reason: SDUPTHER

## 2019-04-23 RX ORDER — MORPHINE SULFATE 2 MG/ML
2 INJECTION, SOLUTION INTRAMUSCULAR; INTRAVENOUS EVERY 5 MIN PRN
Status: DISCONTINUED | OUTPATIENT
Start: 2019-04-23 | End: 2019-04-23 | Stop reason: HOSPADM

## 2019-04-23 RX ORDER — SODIUM CHLORIDE 9 MG/ML
INJECTION, SOLUTION INTRAVENOUS CONTINUOUS
Status: DISCONTINUED | OUTPATIENT
Start: 2019-04-23 | End: 2019-04-23 | Stop reason: HOSPADM

## 2019-04-23 RX ORDER — MORPHINE SULFATE 2 MG/ML
1 INJECTION, SOLUTION INTRAMUSCULAR; INTRAVENOUS EVERY 5 MIN PRN
Status: DISCONTINUED | OUTPATIENT
Start: 2019-04-23 | End: 2019-04-23 | Stop reason: HOSPADM

## 2019-04-23 RX ORDER — SODIUM CHLORIDE 0.9 % (FLUSH) 0.9 %
10 SYRINGE (ML) INJECTION EVERY 12 HOURS SCHEDULED
Status: DISCONTINUED | OUTPATIENT
Start: 2019-04-23 | End: 2019-04-23 | Stop reason: HOSPADM

## 2019-04-23 RX ORDER — MEPERIDINE HYDROCHLORIDE 25 MG/ML
12.5 INJECTION INTRAMUSCULAR; INTRAVENOUS; SUBCUTANEOUS EVERY 5 MIN PRN
Status: DISCONTINUED | OUTPATIENT
Start: 2019-04-23 | End: 2019-04-23 | Stop reason: HOSPADM

## 2019-04-23 RX ORDER — FENTANYL CITRATE 50 UG/ML
25 INJECTION, SOLUTION INTRAMUSCULAR; INTRAVENOUS EVERY 5 MIN PRN
Status: DISCONTINUED | OUTPATIENT
Start: 2019-04-23 | End: 2019-04-23 | Stop reason: HOSPADM

## 2019-04-23 RX ORDER — OXYCODONE HYDROCHLORIDE AND ACETAMINOPHEN 5; 325 MG/1; MG/1
1 TABLET ORAL PRN
Status: DISCONTINUED | OUTPATIENT
Start: 2019-04-23 | End: 2019-04-23 | Stop reason: HOSPADM

## 2019-04-23 RX ORDER — SODIUM CHLORIDE 0.9 % (FLUSH) 0.9 %
10 SYRINGE (ML) INJECTION PRN
Status: DISCONTINUED | OUTPATIENT
Start: 2019-04-23 | End: 2019-04-23 | Stop reason: HOSPADM

## 2019-04-23 RX ORDER — ONDANSETRON 2 MG/ML
4 INJECTION INTRAMUSCULAR; INTRAVENOUS
Status: DISCONTINUED | OUTPATIENT
Start: 2019-04-23 | End: 2019-04-23 | Stop reason: HOSPADM

## 2019-04-23 RX ORDER — OXYCODONE HYDROCHLORIDE AND ACETAMINOPHEN 5; 325 MG/1; MG/1
2 TABLET ORAL PRN
Status: DISCONTINUED | OUTPATIENT
Start: 2019-04-23 | End: 2019-04-23 | Stop reason: HOSPADM

## 2019-04-23 RX ADMIN — SODIUM CHLORIDE: 9 INJECTION, SOLUTION INTRAVENOUS at 15:45

## 2019-04-23 RX ADMIN — FENTANYL CITRATE 50 MCG: 50 INJECTION, SOLUTION INTRAMUSCULAR; INTRAVENOUS at 17:50

## 2019-04-23 RX ADMIN — SODIUM CHLORIDE: 9 INJECTION, SOLUTION INTRAVENOUS at 16:04

## 2019-04-23 RX ADMIN — LIDOCAINE HYDROCHLORIDE 40 MG: 20 INJECTION, SOLUTION EPIDURAL; INFILTRATION; INTRACAUDAL; PERINEURAL at 16:14

## 2019-04-23 RX ADMIN — PROPOFOL 180 MCG/KG/MIN: 10 INJECTION, EMULSION INTRAVENOUS at 16:15

## 2019-04-23 ASSESSMENT — PULMONARY FUNCTION TESTS
PIF_VALUE: 0
PIF_VALUE: 1
PIF_VALUE: 0

## 2019-04-23 ASSESSMENT — PAIN DESCRIPTION - ONSET: ONSET: AWAKENED FROM SLEEP

## 2019-04-23 ASSESSMENT — PAIN DESCRIPTION - ORIENTATION: ORIENTATION: RIGHT

## 2019-04-23 ASSESSMENT — PAIN DESCRIPTION - FREQUENCY: FREQUENCY: CONTINUOUS

## 2019-04-23 ASSESSMENT — PAIN DESCRIPTION - LOCATION: LOCATION: CHEST

## 2019-04-23 ASSESSMENT — PAIN SCALES - GENERAL
PAINLEVEL_OUTOF10: 0
PAINLEVEL_OUTOF10: 10

## 2019-04-23 ASSESSMENT — PAIN - FUNCTIONAL ASSESSMENT
PAIN_FUNCTIONAL_ASSESSMENT: FACES
PAIN_FUNCTIONAL_ASSESSMENT: PREVENTS OR INTERFERES SOME ACTIVE ACTIVITIES AND ADLS

## 2019-04-23 ASSESSMENT — PAIN DESCRIPTION - DESCRIPTORS
DESCRIPTORS: ACHING
DESCRIPTORS: CONSTANT;SHARP;ACHING

## 2019-04-23 ASSESSMENT — PAIN DESCRIPTION - PAIN TYPE: TYPE: CHRONIC PAIN

## 2019-04-23 ASSESSMENT — LIFESTYLE VARIABLES: SMOKING_STATUS: 0

## 2019-04-23 NOTE — ANESTHESIA POSTPROCEDURE EVALUATION
Department of Anesthesiology  Postprocedure Note    Patient: Jean-Paul Felder  MRN: [id number]  YOB: 1945  Date of evaluation: 4/23/2019  Time:  5:55 PM     Procedure Summary     Date:  04/23/19 Room / Location:  [facility] ENDO 04 / [facility] ENDOSCOPY    Anesthesia Start:  1604 Anesthesia Stop:  1714    Procedures:       EGD BIOPSY (N/A )      EGD W/EUS FNA (N/A ) Diagnosis:       Esophageal thickening      (ESOPHAGEAL THICKENING)    Surgeon:  Sharon Adams MD Responsible Provider:  Carlos Rivero MD    Anesthesia Type:  TIVA ASA Status:  3          Anesthesia Type: TIVA    Elizabeth Phase I: Elizabeth Score: 9    Elizabeth Phase II:      Last vitals: Reviewed and per EMR flowsheets.        Anesthesia Post Evaluation    Patient location during evaluation: bedside  Patient participation: complete - patient participated (w/ interpreteur)  Level of consciousness: awake  Airway patency: patent  Nausea & Vomiting: no nausea  Complications: no  Cardiovascular status: hemodynamically stable  Respiratory status: acceptable  Hydration status: euvolemic

## 2019-04-23 NOTE — H&P
[address] [place] [address]   Pre-operative History and Physical    Patient: Angela December  : 1945  Acct#:     HISTORY OF PRESENT ILLNESS:    The patient is a 76 y.o. male who presents with worsening esophageal thickening and dysphagia for EUS and EGD. He has a history of renal insufficiency, HTN, HLP, GERD, Gallstones, DM, stroke, cholecystectomy, appendectomy. [facility] is on Plavix.  I had done EUS 2019 for esophageal thickening and a periesophageal lymph node and performed FNA on a 9mm hypoechoic lymph node that showed benign lymphoid tissue and brown pigments in macrophages associated with fibrosis.  GMS and AFB stains negative.  Consistent with reactive lymph node.  EGD was normal without esophageal thickening.  Labs 3/2019 showed a normal renal panel, normal LFTs and lipase.  CBC 11.5, 13.9/40, 276. CT without contrast of the abdomen for Abdominal pain showed a large amount of stool in the rectal vault and stable lymph node and esophageal thickening.  He gets discomfort in the upper abdomen and lower chest.  Food feels like it gets stuck in the lower esophagus.  Water gets stuck at times as well.  Discomfort is across the upper abdomen.  Feels like a burning discomfort.  Moderate in severity.  Eating makes it worse.  Has constipation which is controlled on miralax.  No coughing with swallowing.  Has lost 5 lbs with this.  Dr. Falguni Multani had prescribed a medrol dose pack for costochondritis which resolved his symptoms completely but then the day after he came off steroids his symptoms came right back.    CT with contrast 3/14/2019 showed severe mucosal thickening of the distal esophagus with a small hiatal hernia with increasing lymphadenopathy at the GE junction.  Lungs are clear mild centrilobular emphysema.  Stable left inguinal hernia containing a small bowel loop.  Given ongoing symptoms        Past Medical History:        Diagnosis Date    Cerebral artery occlusion with cerebral infarction ([facility]     Diabetes mellitus ([facility]     Gallstone     GERD (gastroesophageal reflux disease)     Hyperlipidemia     Hypertension     Renal insufficiency       Past Surgical History:        Procedure Laterality Date    APPENDECTOMY      CHOLECYSTECTOMY      UPPER GASTROINTESTINAL ENDOSCOPY  06/08/2018    UPPER GASTROINTESTINAL ENDOSCOPY N/A 2/28/2019    EGD W/EUS FNA performed by Margot Zavala MD at [facility]      Medications Prior to Admission:   No current facility-administered medications on file prior to encounter. Current Outpatient Medications on File Prior to Encounter   Medication Sig Dispense Refill    sucralfate (CARAFATE) 1 GM tablet Take 1 tablet by mouth 4 times daily 120 tablet 0    omeprazole (PRILOSEC) 40 MG delayed release capsule Take 1 capsule by mouth 2 times daily 60 capsule 0    atorvastatin (LIPITOR) 40 MG tablet Take 40 mg by mouth nightly      tamsulosin (FLOMAX) 0.4 MG capsule Take 0.4 mg by mouth nightly       gabapentin (NEURONTIN) 100 MG capsule Take 100 mg by mouth nightly. Usman Isidroa insulin glargine (LANTUS) 100 UNIT/ML injection vial Inject 22 Units into the skin nightly 1 vial 3    losartan (COZAAR) 25 MG tablet Take 25 mg by mouth daily      Cholecalciferol (VITAMIN D) 2000 UNITS CAPS capsule Take by mouth      insulin lispro (HUMALOG) 100 UNIT/ML injection vial Inject 5 Units into the skin 3 times daily (before meals) 10 mL 0    clopidogrel (PLAVIX) 75 MG tablet Take 1 tablet by mouth daily 30 tablet 0        Allergies:  Patient has no known allergies.     Social History:   Social History     Socioeconomic History    Marital status:      Spouse name: Lynne Rae Number of children: 8    Years of education: Not on file    Highest education level: Not on file   Occupational History    Occupation: farmer   Social Needs    Financial resource strain: Not on file    Food insecurity:     Worry: Not on file     Inability: Not on file   [address] [place] [address] [place]

## 2019-04-23 NOTE — ANESTHESIA PRE PROCEDURE
Department of Anesthesiology  Preprocedure Note       Name:  Nadja Aguilar   Age:  76 y.o.  :  1945                                          MRN:  [id number]         Date:  2019      Surgeon: Jona Stuart):  Enrique Ulloa MD    Procedure: EGD ESOPHAGOGASTRODUODENOSCOPY WITH ENDOSCOPIC ULTRASOUND OF ESOPHAGUS (N/A )    Medications prior to admission:   Prior to Admission medications    Medication Sig Start Date End Date Taking? Authorizing Provider   sucralfate (CARAFATE) 1 GM tablet Take 1 tablet by mouth 4 times daily 3/14/19 4/23/19 Yes CHANDANA Silva CNP   omeprazole (PRILOSEC) 40 MG delayed release capsule Take 1 capsule by mouth 2 times daily 3/14/19 4/23/19 Yes CHANDANA Silva CNP   atorvastatin (LIPITOR) 40 MG tablet Take 40 mg by mouth nightly   Yes Historical Provider, MD   tamsulosin (FLOMAX) 0.4 MG capsule Take 0.4 mg by mouth nightly    Yes Historical Provider, MD   gabapentin (NEURONTIN) 100 MG capsule Take 100 mg by mouth nightly.  .   Yes Historical Provider, MD   insulin glargine (LANTUS) 100 UNIT/ML injection vial Inject 22 Units into the skin nightly 17  Yes Adebayo Luong MD   losartan (COZAAR) 25 MG tablet Take 25 mg by mouth daily   Yes Historical Provider, MD   Cholecalciferol (VITAMIN D) 2000 UNITS CAPS capsule Take by mouth   Yes Historical Provider, MD   insulin lispro (HUMALOG) 100 UNIT/ML injection vial Inject 5 Units into the skin 3 times daily (before meals) 5/3/16  Yes MAYO Curran   clopidogrel (PLAVIX) 75 MG tablet Take 1 tablet by mouth daily 5/3/16   MAYO Curran       Current medications:    Current Facility-Administered Medications   Medication Dose Route Frequency Provider Last Rate Last Dose    0.9 % sodium chloride infusion   Intravenous Continuous Sal Bal MD        sodium chloride flush 0.9 % injection 10 mL  10 mL Intravenous 2 times per day Sal Bal MD        sodium chloride flush 0.9 % injection 10 mL  10 CRNA.              This pre-anesthesia assessment may be used as a history and physical.    DOS STAFF ADDENDUM:    Pt seen and examined, chart reviewed (including anesthesia, drug and allergy history). No interval changes to history and physical examination. Anesthetic plan, risks, benefits, alternatives, and personnel involved discussed with patient. Patient verbalized an understanding and agrees to proceed.   Joyce Bernal MD  April 23, 2019  3:38 PM      Wm Borden MD   4/23/2019

## 2019-04-23 NOTE — PROGRESS NOTES
Pt c/o chest pain, points to the R side of chest, when asked if he had this pain before the procedure he said yes, its the same as before the procedure.  at bedside. Dr. Svetlana Salmeron notified, no new orders. Vss. Iv infusing.

## 2019-04-23 NOTE — OP NOTE
[address] [place] [address]  Endoscopy Note    Patient: Gary Prasad   : 1945  Acct#:     Procedure: Endoscopic ultrasound with FNA  EGD with biopsy    Date: 2019    Surgeon:  Marilia Moon MD    Referring Physician:  Dr. Dick Velasco    Anesthesia:  MAC per Anesthesia. Indications: This is a 76y.o. year old male who presents today with worsening esophageal thickening and dysphagia for EUS and EGD. He has a history of renal insufficiency, HTN, HLP, GERD, Gallstones, DM, stroke, cholecystectomy, appendectomy. [facility] is on Plavix.  I had done EUS 2019 for esophageal thickening and a periesophageal lymph node and performed FNA on a 9mm hypoechoic lymph node that showed benign lymphoid tissue and brown pigments in macrophages associated with fibrosis.  GMS and AFB stains negative.  Consistent with reactive lymph node.  EGD was normal without esophageal thickening.  Labs 3/2019 showed a normal renal panel, normal LFTs and lipase.  CBC 11.5, 13.9/40, 276.  CT without contrast of the abdomen for Abdominal pain showed a large amount of stool in the rectal vault and stable lymph node and esophageal thickening.  He gets discomfort in the upper abdomen and lower chest.  Food feels like it gets stuck in the lower esophagus.  Water gets stuck at times as well.  Discomfort is across the upper abdomen.  Feels like a burning discomfort.  Moderate in severity.  Eating makes it worse.  Has constipation which is controlled on miralax.  No coughing with swallowing.  Has lost 5 lbs with this.  Dr. Nimesh Alvarado had prescribed a medrol dose pack for costochondritis which resolved his symptoms completely but then the day after he came off steroids his symptoms came right back.    CT with contrast 3/14/2019 showed severe mucosal thickening of the distal esophagus with a small hiatal hernia with increasing lymphadenopathy at the GE junction.  Lungs are clear mild centrilobular emphysema.  Stable left inguinal hernia containing a small bowel loop. Given ongoing symptoms  Consent: Risks, benefits, and alternatives were explained and informed consent was obtained. Monitoring:  Patient was monitored with continuous pulse oximetry, telemetry, and intermittent blood pressures. Details of the Procedure: The patient was then taken to the endoscopy suite. A time-out was performed. The patient and staff were in agreement as to the correct patient and procedure. The above anesthesia was administered by the anesthesia department. The patient was placed in the left lateral position. The Olympus videoendoscope was placed in the patient's mouth and under direct visualization passed into the esophagus and advanced without difficulty to the 2nd portion of the duodenum. Views were good, patient toleration was good. Retroflexion was performed in the stomach. Findings:  1. The esophagus showed extrinsic compression in the distal esophagus. There was a focal ulcerated area cold biopsied (after the EUS). Otherwise overlying mucosa was normal.  No mucosal mass lesion. Z-line was normal without evidence of Neil's esophagus or reflux esophagitis. 2.  SMall sliding hiatal hernia. 3.  Normal stomach and duodenum. Next, the radial followed by the curvilinear array echoendoscope were advanced without difficulty to stomach. Endosonographic views were good, patient toleration was good. Findings: There was a 1.55 x 1.50 cm hypoechoic mass that was poorly demarcated and mixed echogenicity and appeared to arise from the muscularis propria (or from the periesophageal space invading into the wall of the esophagus. There were multiple surrounding lymph nodes. No celiac nodes and normal left adrenal and liver. Using doppler ultrasound to find a vessel-free path into the mass, a 22 G Acquire needle was passed under ultrasound guidance directly into the mass. 6 passes were made.   Slides were made and specimen was also sent in

## 2019-05-23 ENCOUNTER — OFFICE VISIT (OUTPATIENT)
Dept: INFECTIOUS DISEASES | Age: 74
End: 2019-05-23
Payer: MEDICAID

## 2019-05-23 VITALS
BODY MASS INDEX: 20.25 KG/M2 | WEIGHT: 118 LBS | OXYGEN SATURATION: 98 % | HEART RATE: 78 BPM | DIASTOLIC BLOOD PRESSURE: 88 MMHG | SYSTOLIC BLOOD PRESSURE: 134 MMHG

## 2019-05-23 DIAGNOSIS — I10 ESSENTIAL HYPERTENSION: ICD-10-CM

## 2019-05-23 DIAGNOSIS — R10.84 GENERALIZED ABDOMINAL PAIN: ICD-10-CM

## 2019-05-23 DIAGNOSIS — R63.4 UNEXPLAINED WEIGHT LOSS: ICD-10-CM

## 2019-05-23 DIAGNOSIS — Z86.73 HISTORY OF ARTERIAL ISCHEMIC STROKE: ICD-10-CM

## 2019-05-23 DIAGNOSIS — R59.9 GRANULOMATOUS ADENOPATHY: ICD-10-CM

## 2019-05-23 DIAGNOSIS — E78.49 OTHER HYPERLIPIDEMIA: ICD-10-CM

## 2019-05-23 DIAGNOSIS — R59.0 MEDIASTINAL LYMPHADENOPATHY: Primary | ICD-10-CM

## 2019-05-23 DIAGNOSIS — K20.90 ESOPHAGITIS: ICD-10-CM

## 2019-05-23 DIAGNOSIS — E11.8 TYPE 2 DIABETES MELLITUS WITH COMPLICATION, UNSPECIFIED WHETHER LONG TERM INSULIN USE: ICD-10-CM

## 2019-05-23 PROCEDURE — 99205 OFFICE O/P NEW HI 60 MIN: CPT | Performed by: INTERNAL MEDICINE

## 2019-05-23 ASSESSMENT — ENCOUNTER SYMPTOMS
DIARRHEA: 0
EYE DISCHARGE: 0
CONSTIPATION: 0
ABDOMINAL PAIN: 1
SHORTNESS OF BREATH: 0
EYE REDNESS: 0
SORE THROAT: 0
NAUSEA: 0
RHINORRHEA: 0
BACK PAIN: 0
COUGH: 0
WHEEZING: 0
TROUBLE SWALLOWING: 0

## 2019-05-23 NOTE — PROGRESS NOTES
insulin glargine (LANTUS) 100 UNIT/ML injection vial Inject 22 Units into the skin nightly 1 vial 3    losartan (COZAAR) 25 MG tablet Take 25 mg by mouth daily      Cholecalciferol (VITAMIN D) 2000 UNITS CAPS capsule Take by mouth      insulin lispro (HUMALOG) 100 UNIT/ML injection vial Inject 5 Units into the skin 3 times daily (before meals) 10 mL 0    clopidogrel (PLAVIX) 75 MG tablet Take 1 tablet by mouth daily 30 tablet 0     No current facility-administered medications for this visit. Family history: All family history was reviewed by me today    Family History   Problem Relation Age of Onset    No Known Problems Mother     No Known Problems Father        No family history of immunocompromising or autoimmune conditions. No h/o TBin in family or contacts    Social History:  Allsocial and epidemiologic history was reviewed and updated be me in detail today.     Social History     Socioeconomic History    Marital status:      Spouse name: Henok Torres Number of children: 8    Years of education: Not on file    Highest education level: Not on file   Occupational History    Occupation: farmer   Social Needs    Financial resource strain: Not on file    Food insecurity:     Worry: Not on file     Inability: Not on file   [employer] needs:     Medical: Not on file     Non-medical: Not on file   Tobacco Use    Smoking status: Never Smoker    Smokeless tobacco: Never Used   Substance and Sexual Activity    Alcohol use: No    Drug use: No    Sexual activity: Yes     Comment: frank   Lifestyle    Physical activity:     Days per week: Not on file     Minutes per session: Not on file    Stress: Not on file   Relationships    Social connections:     Talks on phone: Not on file     Gets together: Not on file     Attends Nondenominational service: Not on file     Active member of club or organization: Not on file     Attends meetings of clubs or organizations: Not on file     Relationship status: Not on file    Intimate partner violence:     Fear of current or ex partner: Not on file     Emotionally abused: Not on file     Physically abused: Not on file     Forced sexual activity: Not on file   Other Topics Concern    Not on file   Social History Narrative    Not on file       Immunizations: All immunizations were reviewed by me in detail. There is no immunization history on file for this patient. REVIEW OF SYSTEMS:      Review of Systems   Constitutional: Positive for chills (Occasional chills at home but no fever) and unexpected weight change (has lost 6 pounds of weight). Negative for diaphoresis and fever. HENT: Negative for ear discharge, ear pain, rhinorrhea, sore throat and trouble swallowing. Eyes: Negative for discharge and redness. Respiratory: Negative for cough, shortness of breath and wheezing. Cardiovascular: Negative for chest pain and leg swelling. Gastrointestinal: Positive for abdominal pain (Pain in the right upper quadrant area). Negative for constipation, diarrhea and nausea. Difficulty swallowing at times   Endocrine: Negative for polyuria. Genitourinary: Negative for dysuria, flank pain, frequency, hematuria and urgency. Musculoskeletal: Negative for back pain and myalgias. Skin: Negative for rash. Neurological: Negative for dizziness, seizures and headaches. Hematological: Does not bruise/bleed easily. Psychiatric/Behavioral: Negative for hallucinations and suicidal ideas. All other systems reviewed and are negative. PHYSICAL EXAM:      Vitals:    05/23/19 1006   BP: 134/88   Pulse: 78   SpO2: 98%       Physical Exam   Constitutional: He is oriented to person, place, and time. He appears well-developed. HENT:   Head: Normocephalic and atraumatic. Mouth/Throat: Oropharynx is clear and moist. No oropharyngeal exudate. Eyes: Pupils are equal, round, and reactive to light.  Conjunctivae and EOM are normal. Right eye 24 03/17/2019    BUN 15 03/17/2019    CREATININE 1.2 03/17/2019    GLUCOSE 298 03/17/2019    CALCIUM 9.9 03/17/2019        Hepatic Function Panel:   Lab Results   Component Value Date    ALKPHOS 168 03/17/2019    ALT 12 03/17/2019    AST 18 03/17/2019    PROT 7.5 03/17/2019    BILITOT 0.8 03/17/2019    BILIDIR <0.2 01/24/2019    IBILI see below 01/24/2019    LABALBU 4.0 03/17/2019       Last CK: No results found for: CKTOTAL    Last ESR:  No results found for: SEDRATE    Last CRP:  No results found for: CRP    1. Imaging: All pertinent images and reports for the current visit were reviewed by me during this visit. Outside records:    Labs, Microbiology, Radiology and pertinentresults from Care everywhere, if available, were reviewed as a part of the consultation. Allergies: All allergies data was reviewed by me during this visit  Patient has no known allergies. Microbiology:   All available micro data was reviewed by me during this visit        Problem list:       Patient Active Problem List   Diagnosis Code    Chest pain R07.9    Hypertension I10    Hyperlipidemia E78.5    Diabetes mellitus ([address] E11.9    Unexplained weight loss R63.4    Esophagitis K20.9    Granulomatous adenopathy R59.9    Mediastinal lymphadenopathy R59.0    History of arterial ischemic stroke Z86.73         IMPRESSION:    The patient is a 76 y. o.old male who  has a past medical history of Cerebral artery occlusion with cerebral infarction ([address] Diabetes mellitus ([address] Gallstone, GERD (gastroesophageal reflux disease), Hyperlipidemia, Hypertension, and Renal insufficiency. was seen today for following problems:    1. Mediastinal lymphadenopathy    2. Granulomatous adenopathy    3. Esophagitis    4. Essential hypertension    5. Type 2 diabetes mellitus with complication, unspecified whether long term insulin use (HCC)    6. Other hyperlipidemia    7. Unexplained weight loss    8.  History of arterial ischemic stroke    9. Generalized abdominal pain        Discussion:      I reviewed all of his medical records from [facility] and from care everywhere in detail. I also reviewed all of his medical records from [address] and have summarized above    AFB and GMS stains as well as immunohistochemical staining for CMV and EBV were negative and biopsies of the paraesophageal lymph node and the esophageal mass. However, as these are not definitive, will get extensive workup to rule out adenomatous causes of the esophageal and paraesophageal lymph nodes. The patient is an immigrant from [place] and speaks very little [place]. 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 He immigrated to the [address] [place] [address] [facility] around 11 years ago. He denies any long-term coughing or sputum production. He has been a nonsmoker throughout his life    Infectious causes of systemic granulomas include mycobacterium infection including Mycobacterium tuberculosis, nontuberculous mycobacteria, fungal infection including yeast infections like histoplasmosis, Coccidioides, cryptococcosis, blastomycosis etc. for molds like Aspergillus. The patient had a CT scan of the chest abdomen and pelvis with contrast on 3/14/19 which did not show any cavitated lung disease. Mediastinal lymphadenopathy was noted      RECOMMENDATIONS:      Diagnostic Workup:    · Will order baseline CBC and CMP  · Will order baseline sed rate and CRP  · Will order serum cryptococcal antigen, Coccidioides antigen, urine and serum histoplasma antigen  · Will order fungal serologies by immunodiffusion and complement fixation  · Will order blood cultures for acid-fast wrestling and fungi  · Will order QuantiFERON Gold  · Check baseline HIV screen  · Will order serum Aspergillus galactomannan  · Will order Ace level which can be elevated in sarcoidosis  · Continue to follow fever curve at home.     Antimicrobials:    · No indication of any empiric antibiotics or antifungals at this time  · Will follow-up on above lab results. · As most of these labs will be sent to the national reference lab in [address] [place] [address]), results will take several weeks to come back  · If above workup is negative and there is still suspicion for an infectious cause,  further microbiological workup will need to be done on fresh lymph node tissue  · Will see him back in around 4 weeks      Labs ordered today inEPIC:    Orders Placed This Encounter   Procedures    Quantiferon, Incubated     Standing Status:   Future     Standing Expiration Date:   5/23/2020    AFB CULTURE, BLOOD     Standing Status:   Future     Standing Expiration Date:   5/23/2020    FUNGUS CULTURE, BLOOD     Please process for two weeks     Standing Status:   Future     Standing Expiration Date:   5/23/2020    CBC WITH AUTO DIFFERENTIAL     Standing Status:   Future     Standing Expiration Date:   5/23/2020    COMPREHENSIVE METABOLIC PANEL     Standing Status:   Future     Standing Expiration Date:   5/23/2020    FUNGAL ANTIBODIES BY ID     Standing Status:   Future     Standing Expiration Date:   5/23/2020    FUNGAL ANTIBODIES QUANT BY CF     Standing Status:   Future     Standing Expiration Date:   5/23/2020    HISTOPLASMA ANTIGEN, SERUM     Standing Status:   Future     Standing Expiration Date:   5/23/2020    HISTOPLASMA ANTIGEN, URINE     Standing Status:   Future     Standing Expiration Date:   5/23/2020    ANGIOTENSIN CONVERTING ENZYME     Standing Status:   Future     Standing Expiration Date:   5/23/2020    Cryptococcus Antigen, Serum     Standing Status:   Future     Standing Expiration Date:   5/23/2020   Alexia Amble MISCELLANEOUS SENDOUT 1     Standing Status:   Future     Standing Expiration Date:   5/23/2020     Order Specific Question:   Specify Req.  Test (1 Test/Order)     Answer:   DARON test code: [id number]    SEDIMENTATION RATE     Standing Status:   Future     Standing Expiration Date: 5/23/2020    C-REACTIVE PROTEIN     Standing Status:   Future     Standing Expiration Date:   5/23/2020    ASPERGILLUS GALACTOMANNAN AG ASSAY     Standing Status:   Future     Standing Expiration Date:   5/23/2020    HIV Screen     Standing Status:   Future     Standing Expiration Date:   5/23/2020       Diabetes mellitus education and counseling:    Patient was educated in detail about the importance of keeping diabetes under control to improve the cure rate of infection and prevent future infections. Patient was advised to check blood glucose level regularly and to stay compliant with the diabetes medications. Patient was advised to the trim the toe nails carefully, wear shoes or slippers at all times and check both feet everyday before going to bed to look for any cuts, blisters, swelling or redness. Importance of washing the feet everyday with soap and water and keeping them dry, and seeking prompt medical attention in case of a non-healing wound or ulcer on the feet was also highlighted. Patient education and counseling:    · The patient was educated in detail about the side-effects of variousantibiotics and things to watch for like new rashes, lip swelling, severe reaction, worsening diarrhea, break through fever etc.  · Patient was instructed to stop antibiotics and callour office if these problems were to occur, or go to nearest ER if experiencing severe symptoms. · Discussed patient's condition and what to expect. All of the patient's questions wereaddressed in a satisfactory manner and patient verbalized understanding all instructions. Follow-up:    · Follow-up with me inID clinic in 4  Weeks. TIME SPENTTODAY:     - Spent over 62 minutes on visit (including interval history, physical exam, review of data including labs, cultures, imaging, development and implementation of treatmentplan and coordination of care). - Over 50% of time spent with pt counseling and education.     Please

## 2019-05-28 DIAGNOSIS — K20.90 ESOPHAGITIS: ICD-10-CM

## 2019-05-28 DIAGNOSIS — R59.9 GRANULOMATOUS ADENOPATHY: ICD-10-CM

## 2019-05-28 DIAGNOSIS — R59.0 MEDIASTINAL LYMPHADENOPATHY: ICD-10-CM

## 2019-05-28 LAB
A/G RATIO: 1.3 (ref 1.1–2.2)
ALBUMIN SERPL-MCNC: 3.9 G/DL (ref 3.4–5)
ALP BLD-CCNC: 146 U/L (ref 40–129)
ALT SERPL-CCNC: 12 U/L (ref 10–40)
ANION GAP SERPL CALCULATED.3IONS-SCNC: 11 MMOL/L (ref 3–16)
AST SERPL-CCNC: 20 U/L (ref 15–37)
BASOPHILS ABSOLUTE: 0.1 K/UL (ref 0–0.2)
BASOPHILS RELATIVE PERCENT: 1.1 %
BILIRUB SERPL-MCNC: 0.8 MG/DL (ref 0–1)
BUN BLDV-MCNC: 9 MG/DL (ref 7–20)
C-REACTIVE PROTEIN: 1.4 MG/L (ref 0–5.1)
CALCIUM SERPL-MCNC: 10.2 MG/DL (ref 8.3–10.6)
CHLORIDE BLD-SCNC: 101 MMOL/L (ref 99–110)
CO2: 24 MMOL/L (ref 21–32)
CREAT SERPL-MCNC: 1 MG/DL (ref 0.8–1.3)
EOSINOPHILS ABSOLUTE: 0.8 K/UL (ref 0–0.6)
EOSINOPHILS RELATIVE PERCENT: 9.4 %
GFR AFRICAN AMERICAN: >60
GFR NON-AFRICAN AMERICAN: >60
GLOBULIN: 3 G/DL
GLUCOSE BLD-MCNC: 150 MG/DL (ref 70–99)
HCT VFR BLD CALC: 42.1 % (ref 40.5–52.5)
HEMOGLOBIN: 14.6 G/DL (ref 13.5–17.5)
HIV AG/AB: NORMAL
HIV ANTIGEN: NORMAL
HIV-1 ANTIBODY: NORMAL
HIV-2 AB: NORMAL
LYMPHOCYTES ABSOLUTE: 2.3 K/UL (ref 1–5.1)
LYMPHOCYTES RELATIVE PERCENT: 27.4 %
MCH RBC QN AUTO: 28 PG (ref 26–34)
MCHC RBC AUTO-ENTMCNC: 34.6 G/DL (ref 31–36)
MCV RBC AUTO: 80.9 FL (ref 80–100)
MONOCYTES ABSOLUTE: 0.5 K/UL (ref 0–1.3)
MONOCYTES RELATIVE PERCENT: 6.2 %
NEUTROPHILS ABSOLUTE: 4.6 K/UL (ref 1.7–7.7)
NEUTROPHILS RELATIVE PERCENT: 55.9 %
PDW BLD-RTO: 14.9 % (ref 12.4–15.4)
PLATELET # BLD: 244 K/UL (ref 135–450)
PMV BLD AUTO: 7.7 FL (ref 5–10.5)
POTASSIUM SERPL-SCNC: 4.5 MMOL/L (ref 3.5–5.1)
RBC # BLD: 5.21 M/UL (ref 4.2–5.9)
SEDIMENTATION RATE, ERYTHROCYTE: 7 MM/HR (ref 0–20)
SODIUM BLD-SCNC: 136 MMOL/L (ref 136–145)
TOTAL PROTEIN: 6.9 G/DL (ref 6.4–8.2)
WBC # BLD: 8.3 K/UL (ref 4–11)

## 2019-05-29 LAB
CRYPTOCOCCAL ANTIGEN: NEGATIVE
HISTOPLASMA ANTIGEN URINE INTERP: NOT DETECTED
HISTOPLASMA ANTIGEN URINE: NOT DETECTED NG/ML

## 2019-05-30 ENCOUNTER — TELEPHONE (OUTPATIENT)
Dept: INFECTIOUS DISEASES | Age: 74
End: 2019-05-30

## 2019-05-30 LAB
HISTOPLASMA ANTIGEN, SERUM: <2 U/ML
HISTOPLASMA INTERPETATION: NEGATIVE
QUANTI TB GOLD PLUS: POSITIVE
QUANTI TB1 MINUS NIL: 2.23 IU/ML (ref 0–0.34)
QUANTI TB2 MINUS NIL: 1.42 IU/ML (ref 0–0.34)
QUANTIFERON MITOGEN: 9.12 IU/ML
QUANTIFERON NIL: 0.09 IU/ML

## 2019-05-31 LAB — ANGIOTENSIN CONVERTING ENZYME: 101 U/L (ref 9–67)

## 2019-05-31 NOTE — TELEPHONE ENCOUNTER
He has positive QuantiFERON test.  Will like to get daily AFB sputum smears and cultures for 4 days, as he has mediastinal lymphadenopathy and granulomas. Please check if he can go to [address] [place] [address] for AFB smears and cultures. I think they have the ability to do that as an outpatient. I'm not sure if [facility] has the ability to do those as an outpatient. Thanks.

## 2019-06-01 LAB
ASPERGILLUS ANTIBODY ID: NORMAL
BLASTOMYCES ANTIBODY ID: NORMAL
COCCIDIOIDES ANTIBODY ID: NORMAL
HISTOPLASMA ABS, ID: NORMAL

## 2019-06-02 LAB
ASPERGILLUS ANTIBODY CF: NORMAL
BLASTOMYCES AB BY EIA, SERUM: 0.9 IV
COCCIDIOIDES ANTIBODY CF: NORMAL
HISTOPLASMA ANTIBODY MYCELIAL CF: NORMAL
HISTOPLASMA ANTIBODY YEAST CF: NORMAL

## 2019-06-07 LAB — MISCELLANEOUS LAB TEST ORDER: NORMAL

## 2019-06-20 ENCOUNTER — OFFICE VISIT (OUTPATIENT)
Dept: INFECTIOUS DISEASES | Age: 74
End: 2019-06-20
Payer: MEDICAID

## 2019-06-20 VITALS
WEIGHT: 120 LBS | SYSTOLIC BLOOD PRESSURE: 150 MMHG | BODY MASS INDEX: 20.49 KG/M2 | DIASTOLIC BLOOD PRESSURE: 80 MMHG | TEMPERATURE: 97.6 F | HEIGHT: 64 IN

## 2019-06-20 DIAGNOSIS — Z86.73 HISTORY OF ARTERIAL ISCHEMIC STROKE: ICD-10-CM

## 2019-06-20 DIAGNOSIS — R59.9 GRANULOMATOUS ADENOPATHY: ICD-10-CM

## 2019-06-20 DIAGNOSIS — E11.8 TYPE 2 DIABETES MELLITUS WITH COMPLICATION, UNSPECIFIED WHETHER LONG TERM INSULIN USE: ICD-10-CM

## 2019-06-20 DIAGNOSIS — Z71.89 ENCOUNTER FOR MEDICATION COUNSELING: ICD-10-CM

## 2019-06-20 DIAGNOSIS — Z71.89 DIABETES EDUCATION, ENCOUNTER FOR: ICD-10-CM

## 2019-06-20 DIAGNOSIS — Z22.7 LTBI (LATENT TUBERCULOSIS INFECTION): ICD-10-CM

## 2019-06-20 DIAGNOSIS — I10 ESSENTIAL HYPERTENSION: ICD-10-CM

## 2019-06-20 DIAGNOSIS — E78.49 OTHER HYPERLIPIDEMIA: ICD-10-CM

## 2019-06-20 DIAGNOSIS — R59.0 MEDIASTINAL LYMPHADENOPATHY: Primary | ICD-10-CM

## 2019-06-20 PROCEDURE — 99214 OFFICE O/P EST MOD 30 MIN: CPT | Performed by: INTERNAL MEDICINE

## 2019-06-20 RX ORDER — MULTIVITAMIN WITH IRON
100 TABLET ORAL DAILY
Qty: 30 TABLET | Refills: 8 | Status: SHIPPED | OUTPATIENT
Start: 2019-06-20 | End: 2019-12-19 | Stop reason: ALTCHOICE

## 2019-06-20 RX ORDER — ISONIAZID 300 MG/1
300 TABLET ORAL DAILY
Qty: 30 TABLET | Refills: 8 | Status: SHIPPED | OUTPATIENT
Start: 2019-06-20 | End: 2019-12-19 | Stop reason: ALTCHOICE

## 2019-06-20 ASSESSMENT — ENCOUNTER SYMPTOMS
TROUBLE SWALLOWING: 0
EYE REDNESS: 0
CONSTIPATION: 0
DIARRHEA: 0
ABDOMINAL PAIN: 0
RHINORRHEA: 0
EYE DISCHARGE: 0
BACK PAIN: 0
NAUSEA: 0
SORE THROAT: 0
WHEEZING: 0
COUGH: 0
SHORTNESS OF BREATH: 0

## 2019-06-20 NOTE — PROGRESS NOTES
Infectious Diseases Oupatient Follow-up Note      Reason for Visit:               Paraesophageal lymphadenopathy  Primary Care Physician:  Chelo Andrew  History Obtained From:   Patient , Medical Records     CHIEF COMPLAINT:    Chief Complaint   Patient presents with    Follow-up     1 mos fu pt is doig well denies and complaints       INTERVAL HISTORY: Colin Briones is a 76 y.o. male patient,who was seen today in ID clinic for follow-up. History was obtained from chart review and the patient. The patient was seen today for above mentioned complaints. The patient is an immigrant from [place] who speaks very little Annamaria Spear. 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 He was seen by me in the office on 5/23/2019 for mediastinal lymphadenopathy and esophageal wall thickening. He had endoscopic biopsies of the paraesophageal lymph nodes on 2/28/2019 which only showed fibrotic changes on pathology and microbiological work-up remain negative. He had another fine-needle aspiration of paraesophageal lymph node done on 4/23/2019 that showed granulomatous inflammation with the dark pigment laden macrophages and noncaseating granulomas. GMS and AFB stains were negative. The patient was referred to my clinic for infection work-up. Past Medical History:   Past medical and surgical history was reviewed by me during this visit in detail.     Past Medical History:   Diagnosis Date    Cerebral artery occlusion with cerebral infarction ([address]     Diabetes mellitus ([address]     Gallstone     GERD (gastroesophageal reflux disease)     Hyperlipidemia     Hypertension     Renal insufficiency        Past Surgical History:    Past Surgical History:   Procedure Laterality Date    APPENDECTOMY      CHOLECYSTECTOMY      UPPER GASTROINTESTINAL ENDOSCOPY  06/08/2018    UPPER GASTROINTESTINAL ENDOSCOPY N/A 2/28/2019    EGD W/EUS FNA performed by Kia Galvan MD at [facility] 4/23/2019    EGD BIOPSY performed by Kia Galvan MD at [facility] 4/23/2019    EGD W/EUS FNA performed by Kia Galvan MD at [address] [place] [address]       Current Medications: All medications were reviewed by me during this visit  Current Outpatient Medications   Medication Sig Dispense Refill    isoniazid (NYDRAZID) 300 MG tablet Take 1 tablet by mouth daily 30 tablet 8    vitamin B-6 (PYRIDOXINE) 100 MG tablet Take 1 tablet by mouth daily 30 tablet 8    sucralfate (CARAFATE) 1 GM tablet Take 1 tablet by mouth 4 times daily 120 tablet 0    omeprazole (PRILOSEC) 40 MG delayed release capsule Take 1 capsule by mouth 2 times daily 60 capsule 0    atorvastatin (LIPITOR) 40 MG tablet Take 40 mg by mouth nightly      tamsulosin (FLOMAX) 0.4 MG capsule Take 0.4 mg by mouth nightly       gabapentin (NEURONTIN) 100 MG capsule Take 100 mg by mouth nightly. Daron Baer insulin glargine (LANTUS) 100 UNIT/ML injection vial Inject 22 Units into the skin nightly 1 vial 3    losartan (COZAAR) 25 MG tablet Take 25 mg by mouth daily      Cholecalciferol (VITAMIN D) 2000 UNITS CAPS capsule Take by mouth      insulin lispro (HUMALOG) 100 UNIT/ML injection vial Inject 5 Units into the skin 3 times daily (before meals) 10 mL 0    clopidogrel (PLAVIX) 75 MG tablet Take 1 tablet by mouth daily 30 tablet 0     No current facility-administered medications for this visit. Family history: All family history was reviewed by me today    Family History   Problem Relation Age of Onset    No Known Problems Mother     No Known Problems Father        No family history of immunocompromising or autoimmune conditions. No h/o TB in in family or contacts    SocialHistory:  All social and epidemiologic history was reviewed and updated be me in detail today.     Social History Socioeconomic History    Marital status:      Spouse name: Dino Cardoso Number of children: 8    Years of education: None    Highest education level: None   Occupational History    Occupation: farmer   Social Needs    Financial resource strain: None    Food insecurity:     Worry: None     Inability: None    Transportation needs:     Medical: None     Non-medical: None   Tobacco Use    Smoking status: Never Smoker    Smokeless tobacco: Never Used   Substance and Sexual Activity    Alcohol use: No    Drug use: No    Sexual activity: Yes     Comment: frank   Lifestyle    Physical activity:     Days per week: None     Minutes per session: None    Stress: None   Relationships    Social connections:     Talks on phone: None     Gets together: None     Attends Islam service: None     Active member of club or organization: None     Attends meetings of clubs or organizations: None     Relationship status: None    Intimate partner violence:     Fear of current or ex partner: None     Emotionally abused: None     Physically abused: None     Forced sexual activity: None   Other Topics Concern    None   Social History Narrative    None         REVIEW OF SYSTEMS:      Review of Systems   Constitutional: Negative for chills, diaphoresis and fever. HENT: Negative for ear discharge, ear pain, rhinorrhea, sore throat and trouble swallowing. Eyes: Negative for discharge and redness. Respiratory: Negative for cough, shortness of breath and wheezing. Cardiovascular: Negative for chest pain and leg swelling. Gastrointestinal: Negative for abdominal pain, constipation, diarrhea and nausea. Endocrine: Negative for polyuria. Genitourinary: Negative for dysuria, flank pain, frequency, hematuria and urgency. Musculoskeletal: Negative for back pain and myalgias. Skin: Negative for rash. Neurological: Negative for dizziness, seizures and headaches.    Hematological: Does not bruise/bleed easily. Psychiatric/Behavioral: Negative for hallucinations and suicidal ideas. All other systems reviewed and are negative. PHYSICAL EXAM:      Vitals:    06/20/19 1040   BP: (!) 150/80   Temp: 97.6 °F (36.4 °C)       Physical Exam   Constitutional: He is oriented to person, place, and time. He appears well-developed. HENT:   Head: Normocephalic and atraumatic. Mouth/Throat: Oropharynx is clear and moist. No oropharyngeal exudate. Eyes: Pupils are equal, round, and reactive to light. Conjunctivae and EOM are normal. Right eye exhibits no discharge. Left eye exhibits no discharge. No scleral icterus. Neck: Normal range of motion. Neck supple. Cardiovascular: Normal rate and regular rhythm. Exam reveals no friction rub. No murmur heard. Pulmonary/Chest: No stridor. No respiratory distress. He has no wheezes. He has no rales. Abdominal: Soft. Bowel sounds are normal. There is no tenderness. There is no rebound and no guarding. Musculoskeletal: Normal range of motion. He exhibits no edema or tenderness. Lymphadenopathy:     He has no cervical adenopathy. He has no axillary adenopathy. Neurological: He is alert and oriented to person, place, and time. He exhibits normal muscle tone. Skin: Skin is warm and dry. No rash noted. He is not diaphoretic. No erythema. Psychiatric: He has a normal mood and affect. His behavior is normal.   Nursing note and vitals reviewed.            DATA:  All available lab data wasreviewed by me during this visit    CBC:  Lab Results   Component Value Date    WBC 8.3 05/28/2019    HGB 14.6 05/28/2019    HCT 42.1 05/28/2019    MCV 80.9 05/28/2019     05/28/2019    LYMPHOPCT 27.4 05/28/2019    RBC 5.21 05/28/2019    MCH 28.0 05/28/2019    MCHC 34.6 05/28/2019    RDW 14.9 05/28/2019       Last BMP:  Lab Results   Component Value Date     06/25/2019    K 4.6 06/25/2019    K 4.0 03/14/2019     06/25/2019    CO2 21 06/25/2019    BUN 12 06/25/2019    CREATININE 1.1 06/25/2019    GLUCOSE 198 06/25/2019    CALCIUM 9.4 06/25/2019        Hepatic Function Panel:  Lab Results   Component Value Date    ALKPHOS 146 05/28/2019    ALT 12 05/28/2019    AST 20 05/28/2019    PROT 6.9 05/28/2019    BILITOT 0.8 05/28/2019    BILIDIR <0.2 01/24/2019    IBILI see below 01/24/2019    LABALBU 4.3 06/25/2019       Last CK: No results found for: CKTOTAL    Last ESR:  Lab Results   Component Value Date    SEDRATE 7 05/28/2019       Last CRP:  Lab Results   Component Value Date    CRP 1.4 05/28/2019         Imaging: All pertinent images and reports for the current visit were reviewed by me during this visit. Outside records:    Labs, Microbiology,Radiology and pertinent results from Care everywhere, if available, were reviewed as a part of the consultation. Microbiology:       All available micro data was reviewed by me during this visit    Results for Nate Sanchez (MRN [id number]) as of 6/20/2019 11:05   Ref. Range 5/28/2019 09:03   Quantiferon TB Minus NIL Unknown POSITIVE   Quantiferon TB1 Minus NIL Latest Ref Range: 0.00 - 0.34 IU/mL 2.23 (H)   Quantiferon TB2 Minus NIL Latest Ref Range: 0.00 - 0.34 IU/mL 1.42 (H)   QuantiFERON Mitogen Latest Units: IU/mL 9.12   QuantiFERON Nil Latest Units: IU/mL 0.09       Allergies and immunizations:       Known drug Allergies: All allergies data was reviewed by me during this visit  Patient has no known allergies. Immunizations: All immunizations were reviewed byme in detail. There is no immunization history on file for this patient.     Problem list:       Patient Active Problem List   Diagnosis Code    Chest pain R07.9    Hypertension I10    Hyperlipidemia E78.5    Diabetes mellitus ([address] E11.9    Unexplained weight loss R63.4    Esophagitis K20.9    Granulomatous adenopathy R59.9    Mediastinal lymphadenopathy R59.0    History of arterial ischemic stroke Z86.73         IMPRESSION:    The patient 9 months Daily Preferred treatment for:   Persons living with HIV   Children aged 3-10   Pregnant Women (with pyridoxine/vitamin B6 supplements)     Twice weekly* Preferred treatment for:   Pregnant Women (with pyridoxine/vitamin B6 supplements)   Isoniazid 6 months Daily      Twice weekly*    Isoniazid and Rifapentine 3 months Once weekly* Treatment for Persons 12 years or older  Not recommended for persons who are:   Younger than 3years old,   Living with HIV/AIDS taking antiretroviral treatment,   Presumed infected with INH or RIF-resistant M. tuberculosis, and   Women who are pregnant or expect to become pregnant within the 12-week regimen. Rifampin 4 months Daily      *Use Directly Observed Therapy (DOT)    Due to the reports of severe liver injury and deaths, CDC now recommends against the use of combination of rifampin (RIF) and pyrazinamide (PZA) for the treatment of latent TB infection. RECOMMENDATIONS:      Diagnostic Workup:    · Continue to follow fever curve  at home      Antimicrobials:    · After a long discussion with the patient and his son, he decided to go with the 9-month oral isoniazid based therapy  · Will start oral isoniazid 300 mg daily  · We will order oral vitamin B6 100 mg daily  · Discussed the importance of taking oral vitamin B6  · He will stay on above antibiotic for 9 months for latent tuberculosis infection treatment  · No evidence of infectious etiology of mediastinal lymphadenopathy and paraesophageal lymph nodes.   If still there is a concern, will recommend more biopsies for tissue cultures and surgical pathology        Medications ordered today in EPIC:    Orders Placed This Encounter   Medications    isoniazid (NYDRAZID) 300 MG tablet     Sig: Take 1 tablet by mouth daily     Dispense:  30 tablet     Refill:  8    vitamin B-6 (PYRIDOXINE) 100 MG tablet     Sig: Take 1 tablet by mouth daily     Dispense:  30 tablet     Refill:  8       Drug Monitoring:    · Monitor for antibiotic toxicity as follows: CBC and CMP every 3 months  · Fax above results to [phone]. Patient education and counseling:    · The patient was educated in detail about the side-effects of various antibiotics and things to watch for like new rashes, lip swelling, severereaction, worsening diarrhea, break through fever etc.  · Patient was instructed to stop antibiotics and call our office if these problems were to occur, or go to nearest ER ifexperiencing severe symptoms. · Discussed patient's condition and what to expect. All of the patient's questions were addressed in a satisfactory manner and patient verbalizedunderstanding all instructions. Diabetes mellitus education and counseling:    Patient was educated in detail about the importance of keeping diabetes under control to improve the cure rate of infection and prevent future infections. Patient was advised to check blood glucose level regularly and to stay compliant with the diabetes medications. Patient was advised to the trim the toe nails carefully, wear shoes or slippers at all times and check both feet everyday before going to bed to look for any cuts, blisters, swelling or redness. Importance of washing the feet everyday with soap and water and keeping them dry, and seeking prompt medical attention in case of a non-healing wound or ulcer on the feet was also highlighted. Follow-up:    · Follow-up with me in ID clinic in 3 months      TIME SPENT TODAY:    - Spent over 25 minutes on visit (including interval history, physical exam, review of data including labs,cultures, imaging, development and implementation of treatment plan and coordination of care). - Over 50% of time spent with pt counseling andeducation. Please note that this chart was generated using Dragon dictation software.  Although every effort was made to ensure the accuracy of this automatedtranscription, some errors in transcription may have occurred inadvertently. If you may need any clarification, please do not hesitate to contact me through [facility] or at the phone number provided below with my electronicsignature. Thankyou for involving me inthe care of your patient. If you have any additional questions, please do not hesitate to contact me.     Julia Felder MD, MPH  9/26/2019, 10:52 AM  [facility] Infectious Disease   Office: [phone]  Fax: [phone]  Tuesday AM clinic:   [address] [place] [address], [facility] [address]  Thursday AM clinic:Marina Strauss, [facility]

## 2019-07-01 LAB — CULTURE, FUNGUS BLOOD: NORMAL

## 2019-07-16 LAB — CULTURE, AFB BLOOD: NORMAL

## 2019-09-26 ENCOUNTER — OFFICE VISIT (OUTPATIENT)
Dept: INFECTIOUS DISEASES | Age: 74
End: 2019-09-26
Payer: MEDICAID

## 2019-09-26 VITALS
OXYGEN SATURATION: 96 % | RESPIRATION RATE: 16 BRPM | HEIGHT: 64 IN | BODY MASS INDEX: 20.83 KG/M2 | HEART RATE: 80 BPM | SYSTOLIC BLOOD PRESSURE: 136 MMHG | WEIGHT: 122 LBS | DIASTOLIC BLOOD PRESSURE: 80 MMHG

## 2019-09-26 DIAGNOSIS — R59.9 GRANULOMATOUS ADENOPATHY: ICD-10-CM

## 2019-09-26 DIAGNOSIS — I10 ESSENTIAL HYPERTENSION: ICD-10-CM

## 2019-09-26 DIAGNOSIS — E78.49 OTHER HYPERLIPIDEMIA: ICD-10-CM

## 2019-09-26 DIAGNOSIS — E11.8 TYPE 2 DIABETES MELLITUS WITH COMPLICATION, UNSPECIFIED WHETHER LONG TERM INSULIN USE: ICD-10-CM

## 2019-09-26 DIAGNOSIS — Z71.89 ENCOUNTER FOR MEDICATION COUNSELING: ICD-10-CM

## 2019-09-26 DIAGNOSIS — Z22.7 LTBI (LATENT TUBERCULOSIS INFECTION): ICD-10-CM

## 2019-09-26 DIAGNOSIS — Z71.89 DIABETES EDUCATION, ENCOUNTER FOR: ICD-10-CM

## 2019-09-26 DIAGNOSIS — Z86.73 HISTORY OF ARTERIAL ISCHEMIC STROKE: ICD-10-CM

## 2019-09-26 DIAGNOSIS — R59.0 MEDIASTINAL LYMPHADENOPATHY: Primary | ICD-10-CM

## 2019-09-26 DIAGNOSIS — Z51.81 THERAPEUTIC DRUG MONITORING: ICD-10-CM

## 2019-09-26 PROCEDURE — 99214 OFFICE O/P EST MOD 30 MIN: CPT | Performed by: INTERNAL MEDICINE

## 2019-09-26 ASSESSMENT — ENCOUNTER SYMPTOMS
NAUSEA: 0
COUGH: 0
RHINORRHEA: 0
BACK PAIN: 0
SHORTNESS OF BREATH: 0
EYE REDNESS: 0
CONSTIPATION: 0
SORE THROAT: 0
WHEEZING: 0
TROUBLE SWALLOWING: 0
DIARRHEA: 0
EYE DISCHARGE: 0
ABDOMINAL PAIN: 0

## 2019-10-15 DIAGNOSIS — R59.0 MEDIASTINAL LYMPHADENOPATHY: ICD-10-CM

## 2019-10-15 DIAGNOSIS — Z51.81 THERAPEUTIC DRUG MONITORING: ICD-10-CM

## 2019-10-15 LAB
BASOPHILS ABSOLUTE: 0.1 K/UL (ref 0–0.2)
BASOPHILS RELATIVE PERCENT: 0.7 %
EOSINOPHILS ABSOLUTE: 0.6 K/UL (ref 0–0.6)
EOSINOPHILS RELATIVE PERCENT: 7.5 %
HCT VFR BLD CALC: 43.6 % (ref 40.5–52.5)
HEMOGLOBIN: 14.6 G/DL (ref 13.5–17.5)
LYMPHOCYTES ABSOLUTE: 2.1 K/UL (ref 1–5.1)
LYMPHOCYTES RELATIVE PERCENT: 24.6 %
MCH RBC QN AUTO: 26.7 PG (ref 26–34)
MCHC RBC AUTO-ENTMCNC: 33.5 G/DL (ref 31–36)
MCV RBC AUTO: 79.8 FL (ref 80–100)
MONOCYTES ABSOLUTE: 0.5 K/UL (ref 0–1.3)
MONOCYTES RELATIVE PERCENT: 5.3 %
NEUTROPHILS ABSOLUTE: 5.4 K/UL (ref 1.7–7.7)
NEUTROPHILS RELATIVE PERCENT: 61.9 %
PDW BLD-RTO: 13.8 % (ref 12.4–15.4)
PLATELET # BLD: 230 K/UL (ref 135–450)
PMV BLD AUTO: 7.8 FL (ref 5–10.5)
RBC # BLD: 5.47 M/UL (ref 4.2–5.9)
WBC # BLD: 8.7 K/UL (ref 4–11)

## 2019-10-18 LAB
MISCELLANEOUS LAB TEST ORDER: NORMAL
MISCELLANEOUS LAB TEST RESULT: NORMAL

## 2019-10-20 LAB — BRUCELLA ABS TOTAL: NORMAL

## 2019-10-25 LAB
Lab: NORMAL
REPORT: NORMAL
THIS TEST SENT TO: NORMAL

## 2019-12-19 ENCOUNTER — OFFICE VISIT (OUTPATIENT)
Dept: INFECTIOUS DISEASES | Age: 74
End: 2019-12-19
Payer: MEDICAID

## 2019-12-19 VITALS
OXYGEN SATURATION: 98 % | RESPIRATION RATE: 18 BRPM | BODY MASS INDEX: 21.68 KG/M2 | WEIGHT: 127 LBS | DIASTOLIC BLOOD PRESSURE: 80 MMHG | SYSTOLIC BLOOD PRESSURE: 148 MMHG | HEART RATE: 89 BPM | HEIGHT: 64 IN

## 2019-12-19 DIAGNOSIS — R10.84 GENERALIZED ABDOMINAL PAIN: ICD-10-CM

## 2019-12-19 DIAGNOSIS — I10 ESSENTIAL HYPERTENSION: ICD-10-CM

## 2019-12-19 DIAGNOSIS — R59.9 GRANULOMATOUS ADENOPATHY: ICD-10-CM

## 2019-12-19 DIAGNOSIS — R59.0 MEDIASTINAL LYMPHADENOPATHY: Primary | ICD-10-CM

## 2019-12-19 DIAGNOSIS — E78.49 OTHER HYPERLIPIDEMIA: ICD-10-CM

## 2019-12-19 DIAGNOSIS — Z86.73 HISTORY OF ARTERIAL ISCHEMIC STROKE: ICD-10-CM

## 2019-12-19 DIAGNOSIS — Z51.81 THERAPEUTIC DRUG MONITORING: ICD-10-CM

## 2019-12-19 DIAGNOSIS — Z22.7 LTBI (LATENT TUBERCULOSIS INFECTION): ICD-10-CM

## 2019-12-19 DIAGNOSIS — R63.4 UNEXPLAINED WEIGHT LOSS: ICD-10-CM

## 2019-12-19 DIAGNOSIS — Z71.89 DIABETES EDUCATION, ENCOUNTER FOR: ICD-10-CM

## 2019-12-19 DIAGNOSIS — Z71.89 ENCOUNTER FOR MEDICATION COUNSELING: ICD-10-CM

## 2019-12-19 PROCEDURE — 99211 OFF/OP EST MAY X REQ PHY/QHP: CPT | Performed by: INTERNAL MEDICINE

## 2019-12-19 ASSESSMENT — ENCOUNTER SYMPTOMS
EYE DISCHARGE: 0
COUGH: 0
NAUSEA: 0
SHORTNESS OF BREATH: 0
WHEEZING: 0
BACK PAIN: 0
TROUBLE SWALLOWING: 0
DIARRHEA: 0
ABDOMINAL PAIN: 0
CONSTIPATION: 0
SORE THROAT: 0
EYE REDNESS: 0
RHINORRHEA: 0

## 2019-12-23 ENCOUNTER — HOSPITAL ENCOUNTER (OUTPATIENT)
Dept: CT IMAGING | Age: 74
Discharge: HOME | End: 2019-12-23
Payer: MEDICAID

## 2019-12-23 DIAGNOSIS — R59.0 MEDIASTINAL LYMPHADENOPATHY: ICD-10-CM

## 2019-12-23 DIAGNOSIS — R59.9 GRANULOMATOUS ADENOPATHY: ICD-10-CM

## 2019-12-23 LAB
GFR AFRICAN AMERICAN: >60
GFR NON-AFRICAN AMERICAN: 54
PERFORMED ON: ABNORMAL
POC CREATININE: 1.3 MG/DL (ref 0.8–1.3)
POC SAMPLE TYPE: ABNORMAL

## 2019-12-23 PROCEDURE — 6360000004 HC RX CONTRAST MEDICATION: Performed by: INTERNAL MEDICINE

## 2019-12-23 PROCEDURE — 82565 ASSAY OF CREATININE: CPT

## 2019-12-23 PROCEDURE — 74177 CT ABD & PELVIS W/CONTRAST: CPT

## 2019-12-23 RX ADMIN — IOHEXOL 50 ML: 240 INJECTION, SOLUTION INTRATHECAL; INTRAVASCULAR; INTRAVENOUS; ORAL at 11:39

## 2019-12-23 RX ADMIN — IOPAMIDOL 75 ML: 755 INJECTION, SOLUTION INTRAVENOUS at 11:39

## 2019-12-27 LAB
ASPERGILLUS GALACTO AG: NEGATIVE
ASPERGILLUS GALACTO INDEX: 0.15
HISTOPLASMA ANTIGEN, SERUM: 0.5 NG/ML
HISTOPLASMA INTERPETATION: DETECTED

## 2020-01-09 ENCOUNTER — OFFICE VISIT (OUTPATIENT)
Dept: INFECTIOUS DISEASES | Age: 75
End: 2020-01-09
Payer: MEDICAID

## 2020-01-09 VITALS
SYSTOLIC BLOOD PRESSURE: 118 MMHG | DIASTOLIC BLOOD PRESSURE: 66 MMHG | OXYGEN SATURATION: 96 % | WEIGHT: 128 LBS | HEIGHT: 64 IN | HEART RATE: 82 BPM | RESPIRATION RATE: 16 BRPM | BODY MASS INDEX: 21.85 KG/M2

## 2020-01-09 PROCEDURE — 99214 OFFICE O/P EST MOD 30 MIN: CPT | Performed by: INTERNAL MEDICINE

## 2020-01-09 ASSESSMENT — ENCOUNTER SYMPTOMS
EYE REDNESS: 0
SORE THROAT: 0
DIARRHEA: 0
CONSTIPATION: 0
EYE DISCHARGE: 0
COUGH: 0
NAUSEA: 0
BACK PAIN: 0
RHINORRHEA: 0
WHEEZING: 0
TROUBLE SWALLOWING: 0
SHORTNESS OF BREATH: 0
ABDOMINAL PAIN: 0

## 2020-01-09 NOTE — PROGRESS NOTES
Infectious Diseases Oupatient Follow-up Note      Reason for Visit:               Mediastinal lymphadenopathy  Primary Care Physician:  Eva Art  History Obtained From:   Patient , Medical Records     CHIEF COMPLAINT:    Chief Complaint   Patient presents with    Follow-up     Mediastinal lymphadenopathy       INTERVAL HISTORY: Mikaela Malin is a 76 y.o. male patient,who was seen today in ID clinic for follow-up. History was obtained from chart review and the patient. The patient was seen today for above mentioned complaints. He is an immigrant from [place] and is following up with me for unexplained mediastinal lymphadenopathy. 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 The patient had an endoscopic paraesophageal lymph node biopsy done on 2/20/2019 which showed fibrotic changes. Case work-up remained negative. Repeat fine-needle aspiration on 4/23/2019 of the paraesophageal lymph node showed granulomatous inflammation with noncaseating granulomas and pigmented macrophages. The patient had a QuantiFERON test done which came back positive and was started on treatment with oral isoniazid on 6/20/2019. HIV screen was negative. I had stopped his isoniazid after total of 6 months on 12/19/2019. He comes today for follow-up of mediastinal lymphadenopathy. Past Medical History:   Past medical and surgical history was reviewed by me during this visit in detail.     Past Medical History:   Diagnosis Date    Cerebral artery occlusion with cerebral infarction ([address]     Diabetes mellitus ([address]     Gallstone     GERD (gastroesophageal reflux disease)     Hyperlipidemia     Hypertension     Renal insufficiency        Past Surgical History:    Past Surgical History:   Procedure Laterality Date    APPENDECTOMY      CHOLECYSTECTOMY      UPPER GASTROINTESTINAL ENDOSCOPY  06/08/2018    UPPER GASTROINTESTINAL ENDOSCOPY N/A 2/28/2019    EGD W/EUS FNA performed by Anabela White MD at [address] [email or web address] 4/23/2019    EGD BIOPSY performed by Anabela White MD at [address] [email or web address] N/A 4/23/2019    EGD W/EUS FNA performed by Anabela White MD at [address] [facility]       Current Medications: All medications were reviewed by me during this visit  Current Outpatient Medications   Medication Sig Dispense Refill    atorvastatin (LIPITOR) 40 MG tablet Take 40 mg by mouth nightly      tamsulosin (FLOMAX) 0.4 MG capsule Take 0.4 mg by mouth nightly       gabapentin (NEURONTIN) 100 MG capsule Take 100 mg by mouth nightly. Donnalee Safer insulin glargine (LANTUS) 100 UNIT/ML injection vial Inject 22 Units into the skin nightly (Patient taking differently: Inject 25 Units into the skin nightly ) 1 vial 3    losartan (COZAAR) 25 MG tablet Take 25 mg by mouth daily      Cholecalciferol (VITAMIN D) 2000 UNITS CAPS capsule Take by mouth      insulin lispro (HUMALOG) 100 UNIT/ML injection vial Inject 5 Units into the skin 3 times daily (before meals) 10 mL 0    clopidogrel (PLAVIX) 75 MG tablet Take 1 tablet by mouth daily 30 tablet 0    sucralfate (CARAFATE) 1 GM tablet Take 1 tablet by mouth 4 times daily 120 tablet 0    omeprazole (PRILOSEC) 40 MG delayed release capsule Take 1 capsule by mouth 2 times daily 60 capsule 0     No current facility-administered medications for this visit. Family history: All family history was reviewed by me today    Family History   Problem Relation Age of Onset    No Known Problems Mother     No Known Problems Father        No family history of immunocompromising or autoimmune conditions. No h/o TB in in family or contacts    REVIEW OF SYSTEMS:      Review of Systems   Constitutional: Negative for chills, diaphoresis and fever. HENT: Negative for ear discharge, ear pain, rhinorrhea, sore throat and trouble swallowing.     Eyes: is soft. Tenderness: There is no tenderness. There is no right CVA tenderness, left CVA tenderness, guarding or rebound. Musculoskeletal: Normal range of motion. General: No swelling or tenderness. Lymphadenopathy:      Cervical: No cervical adenopathy. Skin:     General: Skin is warm and dry. Coloration: Skin is not jaundiced. Findings: No erythema or rash. Neurological:      General: No focal deficit present. Mental Status: He is alert and oriented to person, place, and time. Mental status is at baseline. Motor: No abnormal muscle tone. Psychiatric:         Mood and Affect: Mood normal.         Behavior: Behavior normal.         Thought Content: Thought content normal.             DATA:  All available lab data wasreviewed by me during this visit    CBC:  Lab Results   Component Value Date    WBC 8.7 10/15/2019    HGB 14.6 10/15/2019    HCT 43.6 10/15/2019    MCV 79.8 (L) 10/15/2019     10/15/2019    LYMPHOPCT 24.6 10/15/2019    RBC 5.47 10/15/2019    MCH 26.7 10/15/2019    MCHC 33.5 10/15/2019    RDW 13.8 10/15/2019       Last BMP:  Lab Results   Component Value Date     06/25/2019    K 4.6 06/25/2019    K 4.0 03/14/2019     06/25/2019    CO2 21 06/25/2019    BUN 12 06/25/2019    CREATININE 1.3 12/23/2019    CREATININE 1.1 06/25/2019    GLUCOSE 198 06/25/2019    CALCIUM 9.4 06/25/2019        Hepatic Function Panel:  Lab Results   Component Value Date    ALKPHOS 146 05/28/2019    ALT 12 05/28/2019    AST 20 05/28/2019    PROT 6.9 05/28/2019    BILITOT 0.8 05/28/2019    BILIDIR <0.2 01/24/2019    IBILI see below 01/24/2019    LABALBU 4.3 06/25/2019       Last CK: No results found for: CKTOTAL    Last ESR:  Lab Results   Component Value Date    SEDRATE 7 05/28/2019       Last CRP:  Lab Results   Component Value Date    CRP 1.4 05/28/2019         Imaging:     All pertinent images and reports for the current visit were reviewed by me during this negative. Interestingly, serum histoplasma antigen that was done on 12/23/2019 came back positive     CT scan of chest abdomen and pelvis with IV contrast was done on 12/23/2019. It showed that the hilar and mediastinal lymph node had resolved. He still has small paraesophageal lymph nodes. He had a urine histoplasma antigen done on 5/28/2019 and that was negative. He also had negative histoplasma serology at that time.     Other than paraesophageal lymph nodes, the patient does not have any other signs or symptoms to suggest histoplasma infection    RECOMMENDATIONS:      Diagnostic Workup:    · Will order serum and urine histoplasma antigens  · We will order fungal serology by immunodiffusion and complement fixation  · Will order serum Coccidioides antigen, which can have a cross activity with histoplasma  · Will order 2D echo to assess baseline cardiac function in case patient has to be started on itraconazole in the future  · Continue to follow fever curve  at home      Antimicrobials:    · Since the patient does not have any significant symptoms or new lung nodules etc. on the CT scan of the chest, will hold off on starting any antifungals at this time  · Will follow-up on the above test results  · We will call the patient back in 6 weeks and based on above test results, will make a further plan  · Continue to watch for any new fever, worsening shortness of breath, loss of appetite, loss of weight, lymphadenopathy etc.      Labs ordered today in EPIC:    Orders Placed This Encounter   Procedures    HISTOPLASMA ANTIGEN, SERUM     Standing Status:   Future     Standing Expiration Date:   1/9/2021    HISTOPLASMA ANTIGEN, URINE     Standing Status:   Future     Standing Expiration Date:   1/9/2021    FUNGAL ANTIBODIES BY ID     Standing Status:   Future     Standing Expiration Date:   1/9/2021    FUNGAL ANTIBODIES QUANT BY CF     Standing Status:   Future     Standing Expiration Date:   1/9/2021   Bedelia Fruits

## 2020-01-15 DIAGNOSIS — B39.4: ICD-10-CM

## 2020-01-16 ENCOUNTER — HOSPITAL ENCOUNTER (OUTPATIENT)
Dept: NON INVASIVE DIAGNOSTICS | Age: 75
Discharge: HOME | End: 2020-01-16
Payer: MEDICAID

## 2020-01-16 LAB
LV EF: 58 %
LVEF MODALITY: NORMAL

## 2020-01-16 PROCEDURE — 93306 TTE W/DOPPLER COMPLETE: CPT

## 2020-01-17 LAB
HISTOPLASMA ANTIGEN URINE INTERP: NOT DETECTED
HISTOPLASMA ANTIGEN URINE: NOT DETECTED NG/ML
HISTOPLASMA ANTIGEN, SERUM: NOT DETECTED
HISTOPLASMA INTERPETATION: NOT DETECTED

## 2020-01-18 LAB
ASPERGILLUS ANTIBODY CF: ABNORMAL
BLASTOMYCES AB BY EIA, SERUM: 1 IV
COCCIDIOIDES ANTIBODY CF: ABNORMAL
HISTOPLASMA ANTIBODY MYCELIAL CF: ABNORMAL
HISTOPLASMA ANTIBODY YEAST CF: ABNORMAL

## 2020-01-20 ENCOUNTER — TELEPHONE (OUTPATIENT)
Dept: INFECTIOUS DISEASES | Age: 75
End: 2020-01-20

## 2020-01-20 NOTE — TELEPHONE ENCOUNTER
Attempted to call patient to reschedule appt for 1/30/20. However, there was no answer and no v/m picked up. Will attempt again.

## 2020-01-20 NOTE — TELEPHONE ENCOUNTER
----- Message from Ted Damian MD sent at 1/18/2020 11:13 AM EST -----  Please pre-pone her clinic appointment to Jan 30, if possible.     Thanks!    ----- Message -----  From: Padmini Dumont Incoming Lab Results From Soft (Epic Adt)  Sent: 1/17/2020   3:47 PM EST  To: Ted Damian MD

## 2020-01-21 LAB
COCCIDIOIDES AG EIA: NORMAL
COCCIDIOIDES AG-SOURCE: NORMAL

## 2020-01-27 LAB — CULTURE, FUNGUS BLOOD: NORMAL

## 2020-02-04 LAB — CULTURE, AFB BLOOD: NORMAL

## 2020-02-27 ENCOUNTER — APPOINTMENT (OUTPATIENT)
Dept: CT IMAGING | Age: 75
End: 2020-02-27
Payer: MEDICAID

## 2020-02-27 ENCOUNTER — HOSPITAL ENCOUNTER (EMERGENCY)
Age: 75
Discharge: HOME | End: 2020-02-27
Payer: MEDICAID

## 2020-02-27 ENCOUNTER — APPOINTMENT (OUTPATIENT)
Dept: GENERAL RADIOLOGY | Age: 75
End: 2020-02-27
Payer: MEDICAID

## 2020-02-27 VITALS
BODY MASS INDEX: 22.39 KG/M2 | HEART RATE: 83 BPM | SYSTOLIC BLOOD PRESSURE: 156 MMHG | OXYGEN SATURATION: 98 % | TEMPERATURE: 98.6 F | WEIGHT: 130.51 LBS | DIASTOLIC BLOOD PRESSURE: 99 MMHG | RESPIRATION RATE: 17 BRPM

## 2020-02-27 LAB
A/G RATIO: 1.3 (ref 1.1–2.2)
ALBUMIN SERPL-MCNC: 3.8 G/DL (ref 3.4–5)
ALP BLD-CCNC: 127 U/L (ref 40–129)
ALT SERPL-CCNC: 16 U/L (ref 10–40)
ANION GAP SERPL CALCULATED.3IONS-SCNC: 16 MMOL/L (ref 3–16)
AST SERPL-CCNC: 21 U/L (ref 15–37)
BASOPHILS ABSOLUTE: 0.1 K/UL (ref 0–0.2)
BASOPHILS RELATIVE PERCENT: 1.8 %
BILIRUB SERPL-MCNC: 0.7 MG/DL (ref 0–1)
BILIRUBIN URINE: NEGATIVE
BLOOD, URINE: NEGATIVE
BUN BLDV-MCNC: 9 MG/DL (ref 7–20)
C-REACTIVE PROTEIN: 31.1 MG/L (ref 0–5.1)
CALCIUM SERPL-MCNC: 8.9 MG/DL (ref 8.3–10.6)
CHLORIDE BLD-SCNC: 99 MMOL/L (ref 99–110)
CLARITY: CLEAR
CO2: 20 MMOL/L (ref 21–32)
COLOR: YELLOW
CREAT SERPL-MCNC: 1.2 MG/DL (ref 0.8–1.3)
EOSINOPHILS ABSOLUTE: 0.3 K/UL (ref 0–0.6)
EOSINOPHILS RELATIVE PERCENT: 4.5 %
GFR AFRICAN AMERICAN: >60
GFR NON-AFRICAN AMERICAN: 59
GLOBULIN: 3 G/DL
GLUCOSE BLD-MCNC: 291 MG/DL (ref 70–99)
GLUCOSE URINE: 500 MG/DL
HCT VFR BLD CALC: 40.6 % (ref 40.5–52.5)
HEMOGLOBIN: 13.5 G/DL (ref 13.5–17.5)
KETONES, URINE: NEGATIVE MG/DL
LEUKOCYTE ESTERASE, URINE: NEGATIVE
LYMPHOCYTES ABSOLUTE: 1.9 K/UL (ref 1–5.1)
LYMPHOCYTES RELATIVE PERCENT: 25.8 %
MCH RBC QN AUTO: 25.5 PG (ref 26–34)
MCHC RBC AUTO-ENTMCNC: 33.4 G/DL (ref 31–36)
MCV RBC AUTO: 76.5 FL (ref 80–100)
MICROSCOPIC EXAMINATION: ABNORMAL
MONOCYTES ABSOLUTE: 0.4 K/UL (ref 0–1.3)
MONOCYTES RELATIVE PERCENT: 5.6 %
NEUTROPHILS ABSOLUTE: 4.5 K/UL (ref 1.7–7.7)
NEUTROPHILS RELATIVE PERCENT: 62.3 %
NITRITE, URINE: NEGATIVE
PDW BLD-RTO: 13.6 % (ref 12.4–15.4)
PH UA: 6 (ref 5–8)
PLATELET # BLD: 238 K/UL (ref 135–450)
PMV BLD AUTO: 7.2 FL (ref 5–10.5)
POTASSIUM REFLEX MAGNESIUM: 4.2 MMOL/L (ref 3.5–5.1)
PRO-BNP: 69 PG/ML (ref 0–449)
PROTEIN UA: NEGATIVE MG/DL
RBC # BLD: 5.31 M/UL (ref 4.2–5.9)
SEDIMENTATION RATE, ERYTHROCYTE: 10 MM/HR (ref 0–20)
SODIUM BLD-SCNC: 135 MMOL/L (ref 136–145)
SPECIFIC GRAVITY UA: 1.01 (ref 1–1.03)
TOTAL PROTEIN: 6.8 G/DL (ref 6.4–8.2)
TROPONIN: <0.01 NG/ML
URINE REFLEX TO CULTURE: ABNORMAL
URINE TYPE: ABNORMAL
UROBILINOGEN, URINE: 0.2 E.U./DL
WBC # BLD: 7.2 K/UL (ref 4–11)

## 2020-02-27 PROCEDURE — 85652 RBC SED RATE AUTOMATED: CPT

## 2020-02-27 PROCEDURE — 85025 COMPLETE CBC W/AUTO DIFF WBC: CPT

## 2020-02-27 PROCEDURE — 83880 ASSAY OF NATRIURETIC PEPTIDE: CPT

## 2020-02-27 PROCEDURE — 72128 CT CHEST SPINE W/O DYE: CPT

## 2020-02-27 PROCEDURE — 71045 X-RAY EXAM CHEST 1 VIEW: CPT

## 2020-02-27 PROCEDURE — 86140 C-REACTIVE PROTEIN: CPT

## 2020-02-27 PROCEDURE — 99285 EMERGENCY DEPT VISIT HI MDM: CPT

## 2020-02-27 PROCEDURE — 84484 ASSAY OF TROPONIN QUANT: CPT

## 2020-02-27 PROCEDURE — 80053 COMPREHEN METABOLIC PANEL: CPT

## 2020-02-27 PROCEDURE — 72131 CT LUMBAR SPINE W/O DYE: CPT

## 2020-02-27 PROCEDURE — 81003 URINALYSIS AUTO W/O SCOPE: CPT

## 2020-02-27 PROCEDURE — 93005 ELECTROCARDIOGRAM TRACING: CPT | Performed by: EMERGENCY MEDICINE

## 2020-02-27 ASSESSMENT — PAIN DESCRIPTION - PAIN TYPE: TYPE: ACUTE PAIN

## 2020-02-27 ASSESSMENT — PAIN DESCRIPTION - DESCRIPTORS: DESCRIPTORS: DISCOMFORT

## 2020-02-27 ASSESSMENT — PAIN SCALES - GENERAL: PAINLEVEL_OUTOF10: 10

## 2020-02-27 ASSESSMENT — PAIN DESCRIPTION - LOCATION: LOCATION: ABDOMEN;BACK

## 2020-02-27 NOTE — CARE COORDINATION
SIDRA consult:  Patient is from [address].  Patient's grandson present in room, asking for assistance in the home. Family members have to work and patient and his wife need help at home. Patient lives with his son and his family, his wife also lives with them. Patient is active with [employer], discussed with grandson- adult day program where patient and his wife could go during the day while family is at work. Printed out information about area Adult Day program, and encouraged Grandson to contact [employer]  to assist with services. SIDRA will also reach out to [employer]. [phone]. [facility] [phone] [place] [facility]  to reach out to family.

## 2020-02-27 NOTE — ED NOTES
Bed: B-06  Expected date:   Expected time:   Means of arrival:   Comments:  Hold for [facility], [address] [place] [address]  02/27/20 1127

## 2020-02-27 NOTE — ED PROVIDER NOTES
pain and neck stiffness. Neurological:  Negative for dizziness, weakness, light-headedness, numbness and headaches. Except as noted above the remainder of the review of systems was reviewed and negative. PAST MEDICAL HISTORY         Diagnosis Date    [facility]bral [facility] occlusion [facility]     Diabetes mellitus ([facility]     Gallstone     GERD (gastroesophageal reflux disease)     Hyperlipidemia     Hypertension     Renal insufficiency        SURGICAL HISTORY           Procedure Laterality Date    APPENDECTOMY      CHOLECYSTECTOMY      UPPER GASTROINTESTINAL ENDOSCOPY  06/08/2018    UPPER GASTROINTESTINAL ENDOSCOPY N/A 2/28/2019    EGD W/EUS FNA performed by Shanice Ko MD at [address] [place] [address] 4/23/2019    EGD BIOPSY performed by Shanice Ko MD at [facility] 4/23/2019    EGD W/EUS FNA performed by Shanice Ko MD at [address] [place] [address]       Previous Medications    ATORVASTATIN (LIPITOR) 40 MG TABLET    Take 40 mg by mouth nightly    CHOLECALCIFEROL (VITAMIN D) 2000 UNITS CAPS CAPSULE    Take by mouth    CLOPIDOGREL (PLAVIX) 75 MG TABLET    Take 1 tablet by mouth daily    GABAPENTIN (NEURONTIN) 100 MG CAPSULE    Take 100 mg by mouth nightly. .    INSULIN GLARGINE (LANTUS) 100 UNIT/ML INJECTION VIAL    Inject 22 Units into the skin nightly    INSULIN LISPRO (HUMALOG) 100 UNIT/ML INJECTION VIAL    Inject 5 Units into the skin 3 times daily (before meals)    LOSARTAN (COZAAR) 25 MG TABLET    Take 25 mg by mouth daily    OMEPRAZOLE (PRILOSEC) 40 MG DELAYED RELEASE CAPSULE    Take 1 capsule by mouth 2 times daily    SUCRALFATE (CARAFATE) 1 GM TABLET    Take 1 tablet by mouth 4 times daily    TAMSULOSIN (FLOMAX) 0.4 MG CAPSULE    Take 0.4 mg by mouth nightly        ALLERGIES     Patient has no known allergies.     FAMILY HISTORY           Problem Relation Age of Onset  No Known Problems Mother     No Known Problems Father      Family Status   Relation Name Status    Mother  (Not Specified)    Father  (Not Specified)        SOCIAL HISTORY      reports that he has never smoked. He has never used smokeless tobacco. He reports that he does not drink alcohol or use drugs. PHYSICAL EXAM    (up to 7 for level 4, 8 or more for level 5)     ED Triage Vitals [02/27/20 1050]   BP Temp Temp src Pulse Resp SpO2 Height Weight   (!) 149/84 98.6 °F (37 °C) -- 102 18 94 % -- 130 lb 8.2 oz (59.2 kg)       Constitutional:  Appearing well-developed and well-nourished. No distress. HENT:  Normocephalic and atraumatic. Cardiovascular:  Normal rate, regular rhythm, normal heart sounds and intact distal pulses. Pulmonary/Chest:  Effort normal and breath sounds normal. No respiratory distress. Musculoskeletal:  Normal range of motion. No edema exhibited. Neurological:  Oriented to person, place, and time. No cranial nerve deficit. Skin:  Skin is warm and dry. Not diaphoretic. Psychiatric:  Normal mood, affect, behavior, judgment and thought content. DIAGNOSTIC RESULTS     RADIOLOGY:     Interpretation per the Radiologist below, if available at the time of this note:    CT THORACIC SPINE WO CONTRAST   Final Result   No acute thoracic or lumbar spine trauma. Mild degenerate change. Fat density identified with central canal lumbar spine. This could represent   lipomatosis infiltration of the filum terminale. Please correlate exam   findings. MRI lumbar spine could be beneficial for further characterization   if this changes patient management. CT LUMBAR SPINE WO CONTRAST   Final Result   No acute thoracic or lumbar spine trauma. Mild degenerate change. Fat density identified with central canal lumbar spine. This could represent   lipomatosis infiltration of the filum terminale. Please correlate exam   findings.   MRI lumbar spine could be beneficial for further characterization   if this changes patient management. XR CHEST PORTABLE   Preliminary Result   1. Basilar atelectasis, otherwise, no acute cardiopulmonary disease. LABS:  Labs Reviewed   CBC WITH AUTO DIFFERENTIAL - Abnormal; Notable for the following components:       Result Value    MCV 76.5 (*)     MCH 25.5 (*)     All other components within normal limits    Narrative:     Performed at:  [facility]  [address], [place] [address]   Phone [phone]   COMPREHENSIVE METABOLIC PANEL W/ REFLEX TO MG FOR LOW K - Abnormal; Notable for the following components:    Sodium 135 (*)     CO2 20 (*)     Glucose 291 (*)     GFR Non- 59 (*)     All other components within normal limits    Narrative:     Performed at:  [facility]  [address] [place] [address]   Phone [phone]   URINE RT REFLEX TO CULTURE - Abnormal; Notable for the following components:    Glucose, Ur 500 (*)     All other components within normal limits    Narrative:     Performed at:  [facility]  [address] [facility] [place] [address]   Phone [phone]   C-REACTIVE PROTEIN - Abnormal; Notable for the following components:    CRP 31.1 (*)     All other components within normal limits    Narrative:     Performed at:  [facility]  [address], [place] [address]   Phone [phone]   TROPONIN    Narrative:     Performed at:  [facility]  [address] [place] [address] [facility] [place] [address]   Phone [phone]   BRAIN NATRIURETIC PEPTIDE    Narrative:     Performed at:  [facility]  [facility] [address] [place] [address]   Phone [phone]   SEDIMENTATION RATE       All other labs were within normal range or not returned as of this dictation.     EMERGENCY DEPARTMENT COURSE and DIFFERENTIAL DIAGNOSIS/MDM:   Vitals:    Vitals:    02/27/20 1050 02/27/20 1202 02/27/20 1231 02/27/20 1347   BP: (!) 149/84 (!) 155/96 (!) 136/92 135/89   Pulse: 102 97 90 90   Resp: 18 16 19 21   Temp: 98.6 °F (37 °C)      SpO2: 94% 98% 94% 95%   Weight: 130 lb 8.2 oz (59.2 kg)          The patient's condition in the ED was good, the patient was afebrile and nontoxic in appearance, and the patient's physical exam was unremarkable. No neurolgical deficits, no recent trauma. Patient is diabetic, glucose of 291. No acute abnormalities identified in workup. There was no indication for hospitalization or further workup. Patient and grandson were visited in the ED by social work. See separate note. Patient will be discharged in the company of his grandson with instructions to follow up with family doctor as needed. The patient verbalized understanding and agreement with this plan of care. The patient was advised to return to the emergency department if symptoms should significantly worsen or if new and concerning symptoms should appear. I estimate there is LOW risk for ABDOMINAL AORTIC ANEURYSM, CAUDA EQUINA SYNDROME, EPIDURAL MASS LESION, OR CORD COMPRESSION, thus I consider the discharge disposition reasonable. PROCEDURES:  None    FINAL IMPRESSION      1. Chronic low back pain without sciatica, unspecified back pain laterality    2.  Decreased activities of daily living (ADL)          DISPOSITION/PLAN   DISPOSITION Decision To Discharge 02/27/2020 03:24:58 PM      PATIENT REFERRED TO:  Hugh [facility]. [address] [id number] [facility] Patricia Galindo  [phone]    Call   As needed, For follow-up care      DISCHARGE MEDICATIONS:  New Prescriptions    No medications on file       (Please note that portions of this note were completed with a voice recognition program.  Efforts were made to edit the dictations but occasionally words are mis-transcribed.)    MAYO Tiwari Ahmet [place]  02/27/20 7069

## 2020-02-28 LAB
EKG ATRIAL RATE: 98 BPM
EKG DIAGNOSIS: NORMAL
EKG P AXIS: 17 DEGREES
EKG P-R INTERVAL: 184 MS
EKG Q-T INTERVAL: 340 MS
EKG QRS DURATION: 110 MS
EKG QTC CALCULATION (BAZETT): 434 MS
EKG R AXIS: -55 DEGREES
EKG T AXIS: 78 DEGREES
EKG VENTRICULAR RATE: 98 BPM

## 2020-02-28 PROCEDURE — 93010 ELECTROCARDIOGRAM REPORT: CPT | Performed by: INTERNAL MEDICINE

## 2020-04-24 ENCOUNTER — APPOINTMENT (OUTPATIENT)
Dept: CT IMAGING | Age: 75
DRG: 241 | End: 2020-04-24
Payer: MEDICAID

## 2020-04-24 ENCOUNTER — HOSPITAL ENCOUNTER (INPATIENT)
Age: 75
LOS: 6 days | Discharge: HOME | DRG: 241 | End: 2020-05-01
Attending: STUDENT IN AN ORGANIZED HEALTH CARE EDUCATION/TRAINING PROGRAM | Admitting: INTERNAL MEDICINE
Payer: MEDICAID

## 2020-04-24 ENCOUNTER — APPOINTMENT (OUTPATIENT)
Dept: GENERAL RADIOLOGY | Age: 75
DRG: 241 | End: 2020-04-24
Payer: MEDICAID

## 2020-04-24 LAB
A/G RATIO: 1.3 (ref 1.1–2.2)
ALBUMIN SERPL-MCNC: 3.7 G/DL (ref 3.4–5)
ALP BLD-CCNC: 116 U/L (ref 40–129)
ALT SERPL-CCNC: 19 U/L (ref 10–40)
ANION GAP SERPL CALCULATED.3IONS-SCNC: 14 MMOL/L (ref 3–16)
AST SERPL-CCNC: 35 U/L (ref 15–37)
BASE EXCESS VENOUS: 1.6 MMOL/L
BASOPHILS ABSOLUTE: 0.1 K/UL (ref 0–0.2)
BASOPHILS RELATIVE PERCENT: 1.4 %
BILIRUB SERPL-MCNC: 0.5 MG/DL (ref 0–1)
BILIRUBIN URINE: NEGATIVE
BLOOD, URINE: NEGATIVE
BUN BLDV-MCNC: 9 MG/DL (ref 7–20)
CALCIUM SERPL-MCNC: 8.8 MG/DL (ref 8.3–10.6)
CARBOXYHEMOGLOBIN: 0.9 %
CHLORIDE BLD-SCNC: 99 MMOL/L (ref 99–110)
CLARITY: CLEAR
CO2: 22 MMOL/L (ref 21–32)
COLOR: YELLOW
CREAT SERPL-MCNC: 1.2 MG/DL (ref 0.8–1.3)
EOSINOPHILS ABSOLUTE: 0.4 K/UL (ref 0–0.6)
EOSINOPHILS RELATIVE PERCENT: 5.8 %
GFR AFRICAN AMERICAN: >60
GFR NON-AFRICAN AMERICAN: 59
GLOBULIN: 2.8 G/DL
GLUCOSE BLD-MCNC: 379 MG/DL (ref 70–99)
GLUCOSE URINE: >=1000 MG/DL
HCO3 VENOUS: 26 MMOL/L (ref 23–29)
HCT VFR BLD CALC: 39.8 % (ref 40.5–52.5)
HEMOGLOBIN: 13 G/DL (ref 13.5–17.5)
KETONES, URINE: NEGATIVE MG/DL
LACTIC ACID: 2.1 MMOL/L (ref 0.4–2)
LEUKOCYTE ESTERASE, URINE: NEGATIVE
LIPASE: 39 U/L (ref 13–60)
LYMPHOCYTES ABSOLUTE: 2.5 K/UL (ref 1–5.1)
LYMPHOCYTES RELATIVE PERCENT: 37.3 %
MCH RBC QN AUTO: 25 PG (ref 26–34)
MCHC RBC AUTO-ENTMCNC: 32.6 G/DL (ref 31–36)
MCV RBC AUTO: 76.6 FL (ref 80–100)
METHEMOGLOBIN VENOUS: 1 %
MICROSCOPIC EXAMINATION: ABNORMAL
MONOCYTES ABSOLUTE: 0.5 K/UL (ref 0–1.3)
MONOCYTES RELATIVE PERCENT: 8 %
NEUTROPHILS ABSOLUTE: 3.2 K/UL (ref 1.7–7.7)
NEUTROPHILS RELATIVE PERCENT: 47.5 %
NITRITE, URINE: NEGATIVE
O2 CONTENT, VEN: 16 ML/DL
O2 SAT, VEN: 93 %
O2 THERAPY: ABNORMAL
PCO2, VEN: 38.9 MMHG (ref 40–50)
PDW BLD-RTO: 15.4 % (ref 12.4–15.4)
PH UA: 6 (ref 5–8)
PH VENOUS: 7.43 (ref 7.35–7.45)
PLATELET # BLD: 232 K/UL (ref 135–450)
PMV BLD AUTO: 7.2 FL (ref 5–10.5)
PO2, VEN: 60 MMHG
POTASSIUM REFLEX MAGNESIUM: 4.6 MMOL/L (ref 3.5–5.1)
PROTEIN UA: NEGATIVE MG/DL
RBC # BLD: 5.19 M/UL (ref 4.2–5.9)
SODIUM BLD-SCNC: 135 MMOL/L (ref 136–145)
SPECIFIC GRAVITY UA: 1.02 (ref 1–1.03)
TCO2 CALC VENOUS: 27 MMOL/L
TOTAL PROTEIN: 6.5 G/DL (ref 6.4–8.2)
TROPONIN: <0.01 NG/ML
URINE REFLEX TO CULTURE: ABNORMAL
URINE TYPE: ABNORMAL
UROBILINOGEN, URINE: 0.2 E.U./DL
WBC # BLD: 6.8 K/UL (ref 4–11)

## 2020-04-24 PROCEDURE — 84484 ASSAY OF TROPONIN QUANT: CPT

## 2020-04-24 PROCEDURE — 96375 TX/PRO/DX INJ NEW DRUG ADDON: CPT

## 2020-04-24 PROCEDURE — 6360000002 HC RX W HCPCS: Performed by: STUDENT IN AN ORGANIZED HEALTH CARE EDUCATION/TRAINING PROGRAM

## 2020-04-24 PROCEDURE — 74174 CTA ABD&PLVS W/CONTRAST: CPT

## 2020-04-24 PROCEDURE — 2580000003 HC RX 258: Performed by: STUDENT IN AN ORGANIZED HEALTH CARE EDUCATION/TRAINING PROGRAM

## 2020-04-24 PROCEDURE — 83605 ASSAY OF LACTIC ACID: CPT

## 2020-04-24 PROCEDURE — 3209999900 CT LUMBAR SPINE TRAUMA RECONSTRUCTION

## 2020-04-24 PROCEDURE — 81003 URINALYSIS AUTO W/O SCOPE: CPT

## 2020-04-24 PROCEDURE — 85025 COMPLETE CBC W/AUTO DIFF WBC: CPT

## 2020-04-24 PROCEDURE — 6360000004 HC RX CONTRAST MEDICATION: Performed by: STUDENT IN AN ORGANIZED HEALTH CARE EDUCATION/TRAINING PROGRAM

## 2020-04-24 PROCEDURE — 99285 EMERGENCY DEPT VISIT HI MDM: CPT

## 2020-04-24 PROCEDURE — 83036 HEMOGLOBIN GLYCOSYLATED A1C: CPT

## 2020-04-24 PROCEDURE — 96376 TX/PRO/DX INJ SAME DRUG ADON: CPT

## 2020-04-24 PROCEDURE — 80053 COMPREHEN METABOLIC PANEL: CPT

## 2020-04-24 PROCEDURE — 96374 THER/PROPH/DIAG INJ IV PUSH: CPT

## 2020-04-24 PROCEDURE — 83690 ASSAY OF LIPASE: CPT

## 2020-04-24 PROCEDURE — 82803 BLOOD GASES ANY COMBINATION: CPT

## 2020-04-24 PROCEDURE — 3209999900 CT THORACIC SPINE TRAUMA RECONSTRUCTION

## 2020-04-24 PROCEDURE — 71045 X-RAY EXAM CHEST 1 VIEW: CPT

## 2020-04-24 PROCEDURE — 93005 ELECTROCARDIOGRAM TRACING: CPT | Performed by: STUDENT IN AN ORGANIZED HEALTH CARE EDUCATION/TRAINING PROGRAM

## 2020-04-24 RX ORDER — ONDANSETRON 2 MG/ML
4 INJECTION INTRAMUSCULAR; INTRAVENOUS ONCE
Status: COMPLETED | OUTPATIENT
Start: 2020-04-24 | End: 2020-04-24

## 2020-04-24 RX ORDER — KETOROLAC TROMETHAMINE 30 MG/ML
15 INJECTION, SOLUTION INTRAMUSCULAR; INTRAVENOUS ONCE
Status: COMPLETED | OUTPATIENT
Start: 2020-04-24 | End: 2020-04-24

## 2020-04-24 RX ORDER — FENTANYL CITRATE 50 UG/ML
50 INJECTION, SOLUTION INTRAMUSCULAR; INTRAVENOUS ONCE
Status: COMPLETED | OUTPATIENT
Start: 2020-04-24 | End: 2020-04-24

## 2020-04-24 RX ORDER — 0.9 % SODIUM CHLORIDE 0.9 %
1000 INTRAVENOUS SOLUTION INTRAVENOUS ONCE
Status: COMPLETED | OUTPATIENT
Start: 2020-04-25 | End: 2020-04-25

## 2020-04-24 RX ORDER — 0.9 % SODIUM CHLORIDE 0.9 %
1000 INTRAVENOUS SOLUTION INTRAVENOUS ONCE
Status: COMPLETED | OUTPATIENT
Start: 2020-04-24 | End: 2020-04-24

## 2020-04-24 RX ADMIN — KETOROLAC TROMETHAMINE 15 MG: 30 INJECTION, SOLUTION INTRAMUSCULAR at 21:26

## 2020-04-24 RX ADMIN — FENTANYL CITRATE 50 MCG: 50 INJECTION INTRAMUSCULAR; INTRAVENOUS at 21:27

## 2020-04-24 RX ADMIN — SODIUM CHLORIDE 1000 ML: 9 INJECTION, SOLUTION INTRAVENOUS at 19:07

## 2020-04-24 RX ADMIN — IOPAMIDOL 75 ML: 755 INJECTION, SOLUTION INTRAVENOUS at 19:55

## 2020-04-24 RX ADMIN — FENTANYL CITRATE 50 MCG: 50 INJECTION, SOLUTION INTRAMUSCULAR; INTRAVENOUS at 19:10

## 2020-04-24 RX ADMIN — ONDANSETRON 4 MG: 2 INJECTION INTRAMUSCULAR; INTRAVENOUS at 19:09

## 2020-04-24 ASSESSMENT — PAIN SCALES - GENERAL
PAINLEVEL_OUTOF10: 10
PAINLEVEL_OUTOF10: 7
PAINLEVEL_OUTOF10: 6
PAINLEVEL_OUTOF10: 7
PAINLEVEL_OUTOF10: 7
PAINLEVEL_OUTOF10: 10

## 2020-04-24 ASSESSMENT — PAIN DESCRIPTION - FREQUENCY: FREQUENCY: CONTINUOUS

## 2020-04-24 ASSESSMENT — PAIN DESCRIPTION - LOCATION: LOCATION: ABDOMEN

## 2020-04-24 ASSESSMENT — PAIN DESCRIPTION - DESCRIPTORS: DESCRIPTORS: SHOOTING;SHARP

## 2020-04-24 ASSESSMENT — PAIN DESCRIPTION - PAIN TYPE: TYPE: ACUTE PAIN

## 2020-04-24 NOTE — ED PROVIDER NOTES
Occupational History    Occupation: farmer   Social Needs    Financial resource strain: None    Food insecurity     Worry: None     Inability: None    Transportation needs     Medical: None     Non-medical: None   Tobacco Use    Smoking status: Never Smoker    Smokeless tobacco: Never Used   Substance and Sexual Activity    Alcohol use: No    Drug use: No    Sexual activity: Yes     Comment: frank   Lifestyle    Physical activity     Days per week: None     Minutes per session: None    Stress: None   Relationships    Social connections     Talks on phone: None     Gets together: None     Attends Jain service: None     Active member of club or organization: None     Attends meetings of clubs or organizations: None     Relationship status: None    Intimate partner violence     Fear of current or ex partner: None     Emotionally abused: None     Physically abused: None     Forced sexual activity: None   Other Topics Concern    None   Social History Narrative    None        Review of Systems   10 total systems reviewed and found to be negative unless otherwise noted in HPI     Physical Exam   BP (!) 162/106   Pulse 71   Temp 98 °F (36.7 °C) (Temporal)   Resp 16   SpO2 95%      CONSTITUTIONAL: In acute distress  HEAD: atraumatic, normocephalic   EYES: PERRL, No injection, discharge or scleral icterus. ENT: Moist mucous membranes. NECK: Normal ROM, NO LAD   CARDIOVASCULAR: Regular rate and rhythm. No murmurs or gallop. PULMONARY/CHEST: Airway patent. No retractions. Breath sounds clear with good air entry bilaterally. ABDOMEN: Soft, distended but significant tenderness diffusely abdomen especially in the epigastrium with some guarding. SKIN: Acyanotic, warm, dry   MUSCULOSKELETAL: No swelling, tenderness or deformity   NEUROLOGICAL: Awake and oriented x 3. Pulses intact. Grossly nonfocal   Nursing note and vitals reviewed.      ED Course & Medical Decision Making   Medications   0.9 % Procedures    ASSESSMENT AND PLAN:  Patricia Garcia is a 76 y.o. male w with history of diabetes, hypertension, stroke in the past gallbladder surgery, and PUD since this evening accompanied by daughter with significant abdominal pain that radiates to the back. On exam patient appeared to be in discomfort and abdomen very tender to palpation. But hemodynamic stable. Labs with normal lipase, troponins, UA but mildly elevated lactate of 2.1. CT chest abdomen pelvic including lumbar/thoracic recons no acute findings. Despite, negative work-up with multiple doses of pain medication patient continues to complain of severe pain and is very tender to palpation. I did consult surgery because of my concern for ischemia but given the normal findings with normal lacated surgery is not impressed. At this time I will admit patient for serial abdominal exams and surgery follow-up in the morning. Specialist was consulted and patient was admitted to their service for further treatment. I did obtain a CT of the head just because patient was not acting himself which was negative any acute findings. He was also given Protonix and GI cocktail for possible ulcer. ClINICAL IMPRESSION:  1. Abdominal pain, epigastric    2. PUD (peptic ulcer disease)        DISPOSITION    Hospitalist admit   -Findings and recommendations explained to patient, and daughter. They expressed understanding and agreed with the plan. Margarito Jolley MD (electronically signed)  4/25/2020  _________________________________________________________________________________________  _________________________________________________________________________________________  This record is transcribed utilizing voice recognition technology. There are inherent limitations in this technology. In addition, there may be limitations in editing of this report. If there are any discrepancies, please contact me directly.         Margarito Jolley MD  04/25/20

## 2020-04-25 ENCOUNTER — APPOINTMENT (OUTPATIENT)
Dept: CT IMAGING | Age: 75
DRG: 241 | End: 2020-04-25
Payer: MEDICAID

## 2020-04-25 PROBLEM — R10.9 ABDOMINAL PAIN: Status: ACTIVE | Noted: 2020-04-25

## 2020-04-25 LAB
ANION GAP SERPL CALCULATED.3IONS-SCNC: 12 MMOL/L (ref 3–16)
BUN BLDV-MCNC: 9 MG/DL (ref 7–20)
CALCIUM SERPL-MCNC: 8.5 MG/DL (ref 8.3–10.6)
CHLORIDE BLD-SCNC: 108 MMOL/L (ref 99–110)
CO2: 23 MMOL/L (ref 21–32)
CREAT SERPL-MCNC: 1.3 MG/DL (ref 0.8–1.3)
EKG ATRIAL RATE: 69 BPM
EKG ATRIAL RATE: 94 BPM
EKG DIAGNOSIS: NORMAL
EKG DIAGNOSIS: NORMAL
EKG P AXIS: 36 DEGREES
EKG P AXIS: 9 DEGREES
EKG P-R INTERVAL: 180 MS
EKG P-R INTERVAL: 202 MS
EKG Q-T INTERVAL: 350 MS
EKG Q-T INTERVAL: 388 MS
EKG QRS DURATION: 114 MS
EKG QRS DURATION: 134 MS
EKG QTC CALCULATION (BAZETT): 415 MS
EKG QTC CALCULATION (BAZETT): 437 MS
EKG R AXIS: -44 DEGREES
EKG R AXIS: -53 DEGREES
EKG T AXIS: 43 DEGREES
EKG T AXIS: 62 DEGREES
EKG VENTRICULAR RATE: 69 BPM
EKG VENTRICULAR RATE: 94 BPM
ESTIMATED AVERAGE GLUCOSE: 208.7 MG/DL
FERRITIN: 36.9 NG/ML (ref 30–400)
GFR AFRICAN AMERICAN: >60
GFR NON-AFRICAN AMERICAN: 54
GLUCOSE BLD-MCNC: 106 MG/DL (ref 70–99)
GLUCOSE BLD-MCNC: 130 MG/DL (ref 70–99)
GLUCOSE BLD-MCNC: 132 MG/DL (ref 70–99)
GLUCOSE BLD-MCNC: 141 MG/DL (ref 70–99)
GLUCOSE BLD-MCNC: 159 MG/DL (ref 70–99)
GLUCOSE BLD-MCNC: 197 MG/DL (ref 70–99)
HBA1C MFR BLD: 8.9 %
IRON SATURATION: 16 % (ref 20–50)
IRON: 58 UG/DL (ref 59–158)
LACTIC ACID: 1.1 MMOL/L (ref 0.4–2)
PERFORMED ON: ABNORMAL
POTASSIUM REFLEX MAGNESIUM: 4.5 MMOL/L (ref 3.5–5.1)
SODIUM BLD-SCNC: 143 MMOL/L (ref 136–145)
T4 FREE: 1.1 NG/DL (ref 0.9–1.8)
TOTAL IRON BINDING CAPACITY: 362 UG/DL (ref 260–445)
TSH SERPL DL<=0.05 MIU/L-ACNC: 0.82 UIU/ML (ref 0.27–4.2)

## 2020-04-25 PROCEDURE — 93005 ELECTROCARDIOGRAM TRACING: CPT | Performed by: STUDENT IN AN ORGANIZED HEALTH CARE EDUCATION/TRAINING PROGRAM

## 2020-04-25 PROCEDURE — 83540 ASSAY OF IRON: CPT

## 2020-04-25 PROCEDURE — 2580000003 HC RX 258: Performed by: NURSE PRACTITIONER

## 2020-04-25 PROCEDURE — 80048 BASIC METABOLIC PNL TOTAL CA: CPT

## 2020-04-25 PROCEDURE — 83550 IRON BINDING TEST: CPT

## 2020-04-25 PROCEDURE — 6370000000 HC RX 637 (ALT 250 FOR IP): Performed by: INTERNAL MEDICINE

## 2020-04-25 PROCEDURE — 6370000000 HC RX 637 (ALT 250 FOR IP): Performed by: NURSE PRACTITIONER

## 2020-04-25 PROCEDURE — 6360000002 HC RX W HCPCS: Performed by: STUDENT IN AN ORGANIZED HEALTH CARE EDUCATION/TRAINING PROGRAM

## 2020-04-25 PROCEDURE — 70450 CT HEAD/BRAIN W/O DYE: CPT

## 2020-04-25 PROCEDURE — 83605 ASSAY OF LACTIC ACID: CPT

## 2020-04-25 PROCEDURE — 6360000002 HC RX W HCPCS: Performed by: NURSE PRACTITIONER

## 2020-04-25 PROCEDURE — 94760 N-INVAS EAR/PLS OXIMETRY 1: CPT

## 2020-04-25 PROCEDURE — 6370000000 HC RX 637 (ALT 250 FOR IP): Performed by: STUDENT IN AN ORGANIZED HEALTH CARE EDUCATION/TRAINING PROGRAM

## 2020-04-25 PROCEDURE — 82728 ASSAY OF FERRITIN: CPT

## 2020-04-25 PROCEDURE — 84443 ASSAY THYROID STIM HORMONE: CPT

## 2020-04-25 PROCEDURE — 96375 TX/PRO/DX INJ NEW DRUG ADDON: CPT

## 2020-04-25 PROCEDURE — 36415 COLL VENOUS BLD VENIPUNCTURE: CPT

## 2020-04-25 PROCEDURE — 99223 1ST HOSP IP/OBS HIGH 75: CPT | Performed by: SURGERY

## 2020-04-25 PROCEDURE — 1200000000 HC SEMI PRIVATE

## 2020-04-25 PROCEDURE — 6360000002 HC RX W HCPCS: Performed by: INTERNAL MEDICINE

## 2020-04-25 PROCEDURE — 84439 ASSAY OF FREE THYROXINE: CPT

## 2020-04-25 PROCEDURE — 2500000003 HC RX 250 WO HCPCS: Performed by: STUDENT IN AN ORGANIZED HEALTH CARE EDUCATION/TRAINING PROGRAM

## 2020-04-25 PROCEDURE — 2580000003 HC RX 258: Performed by: STUDENT IN AN ORGANIZED HEALTH CARE EDUCATION/TRAINING PROGRAM

## 2020-04-25 PROCEDURE — 93010 ELECTROCARDIOGRAM REPORT: CPT | Performed by: INTERNAL MEDICINE

## 2020-04-25 RX ORDER — INSULIN LISPRO 100 [IU]/ML
14 INJECTION, SOLUTION INTRAVENOUS; SUBCUTANEOUS 2 TIMES DAILY WITH MEALS
Status: ON HOLD | COMMUNITY
Start: 2019-01-15 | End: 2020-09-25 | Stop reason: SDUPTHER

## 2020-04-25 RX ORDER — SODIUM CHLORIDE 0.9 % (FLUSH) 0.9 %
10 SYRINGE (ML) INJECTION PRN
Status: DISCONTINUED | OUTPATIENT
Start: 2020-04-25 | End: 2020-05-01 | Stop reason: HOSPADM

## 2020-04-25 RX ORDER — DEXTROSE MONOHYDRATE 25 G/50ML
12.5 INJECTION, SOLUTION INTRAVENOUS PRN
Status: DISCONTINUED | OUTPATIENT
Start: 2020-04-25 | End: 2020-05-01 | Stop reason: HOSPADM

## 2020-04-25 RX ORDER — ATORVASTATIN CALCIUM 40 MG/1
40 TABLET, FILM COATED ORAL NIGHTLY
Status: DISCONTINUED | OUTPATIENT
Start: 2020-04-25 | End: 2020-05-01 | Stop reason: HOSPADM

## 2020-04-25 RX ORDER — POLYETHYLENE GLYCOL 3350 17 G/17G
17 POWDER, FOR SOLUTION ORAL 3 TIMES DAILY
Status: DISCONTINUED | OUTPATIENT
Start: 2020-04-25 | End: 2020-04-30

## 2020-04-25 RX ORDER — LOSARTAN POTASSIUM 25 MG/1
25 TABLET ORAL DAILY
Status: DISCONTINUED | OUTPATIENT
Start: 2020-04-25 | End: 2020-04-26

## 2020-04-25 RX ORDER — POLYETHYLENE GLYCOL 3350 17 G/17G
17 POWDER, FOR SOLUTION ORAL DAILY PRN
Status: DISCONTINUED | OUTPATIENT
Start: 2020-04-25 | End: 2020-04-25

## 2020-04-25 RX ORDER — SODIUM CHLORIDE 0.9 % (FLUSH) 0.9 %
10 SYRINGE (ML) INJECTION EVERY 12 HOURS SCHEDULED
Status: DISCONTINUED | OUTPATIENT
Start: 2020-04-25 | End: 2020-05-01 | Stop reason: HOSPADM

## 2020-04-25 RX ORDER — POLYETHYLENE GLYCOL 3350 17 G/17G
17 POWDER, FOR SOLUTION ORAL DAILY
COMMUNITY

## 2020-04-25 RX ORDER — OMEPRAZOLE 40 MG/1
40 CAPSULE, DELAYED RELEASE ORAL DAILY
Status: ON HOLD | COMMUNITY
Start: 2019-01-03 | End: 2020-05-01 | Stop reason: HOSPADM

## 2020-04-25 RX ORDER — INSULIN GLARGINE 100 [IU]/ML
25 INJECTION, SOLUTION SUBCUTANEOUS NIGHTLY
Status: DISCONTINUED | OUTPATIENT
Start: 2020-04-25 | End: 2020-04-30

## 2020-04-25 RX ORDER — PROMETHAZINE HYDROCHLORIDE 25 MG/1
12.5 TABLET ORAL EVERY 6 HOURS PRN
Status: DISCONTINUED | OUTPATIENT
Start: 2020-04-25 | End: 2020-05-01 | Stop reason: HOSPADM

## 2020-04-25 RX ORDER — NICOTINE POLACRILEX 4 MG
15 LOZENGE BUCCAL PRN
Status: DISCONTINUED | OUTPATIENT
Start: 2020-04-25 | End: 2020-05-01 | Stop reason: HOSPADM

## 2020-04-25 RX ORDER — ONDANSETRON 2 MG/ML
4 INJECTION INTRAMUSCULAR; INTRAVENOUS EVERY 6 HOURS PRN
Status: DISCONTINUED | OUTPATIENT
Start: 2020-04-25 | End: 2020-05-01 | Stop reason: HOSPADM

## 2020-04-25 RX ORDER — ACETAMINOPHEN 650 MG/1
650 SUPPOSITORY RECTAL EVERY 6 HOURS PRN
Status: DISCONTINUED | OUTPATIENT
Start: 2020-04-25 | End: 2020-05-01 | Stop reason: HOSPADM

## 2020-04-25 RX ORDER — MELATONIN
2000 DAILY
Status: ON HOLD | COMMUNITY
Start: 2020-02-01 | End: 2020-05-01 | Stop reason: HOSPADM

## 2020-04-25 RX ORDER — DEXTROSE MONOHYDRATE 50 MG/ML
100 INJECTION, SOLUTION INTRAVENOUS PRN
Status: DISCONTINUED | OUTPATIENT
Start: 2020-04-25 | End: 2020-05-01 | Stop reason: HOSPADM

## 2020-04-25 RX ORDER — DULOXETIN HYDROCHLORIDE 30 MG/1
30 CAPSULE, DELAYED RELEASE ORAL DAILY
Status: ON HOLD | COMMUNITY
Start: 2020-02-06 | End: 2020-10-16 | Stop reason: SDUPTHER

## 2020-04-25 RX ORDER — SODIUM CHLORIDE, SODIUM LACTATE, POTASSIUM CHLORIDE, CALCIUM CHLORIDE 600; 310; 30; 20 MG/100ML; MG/100ML; MG/100ML; MG/100ML
INJECTION, SOLUTION INTRAVENOUS CONTINUOUS
Status: DISCONTINUED | OUTPATIENT
Start: 2020-04-25 | End: 2020-04-30

## 2020-04-25 RX ORDER — PANTOPRAZOLE SODIUM 40 MG/1
40 TABLET, DELAYED RELEASE ORAL
Status: DISCONTINUED | OUTPATIENT
Start: 2020-04-25 | End: 2020-05-01 | Stop reason: HOSPADM

## 2020-04-25 RX ORDER — PANTOPRAZOLE SODIUM 40 MG/1
40 TABLET, DELAYED RELEASE ORAL
Status: DISCONTINUED | OUTPATIENT
Start: 2020-04-25 | End: 2020-04-25

## 2020-04-25 RX ORDER — ROSUVASTATIN CALCIUM 10 MG/1
10 TABLET, COATED ORAL DAILY
Status: ON HOLD | COMMUNITY
Start: 2020-02-10 | End: 2020-10-16 | Stop reason: SDUPTHER

## 2020-04-25 RX ORDER — INSULIN GLARGINE 100 [IU]/ML
25 INJECTION, SOLUTION SUBCUTANEOUS ONCE
Status: COMPLETED | OUTPATIENT
Start: 2020-04-25 | End: 2020-04-25

## 2020-04-25 RX ORDER — SUCRALFATE 1 G/10ML
10 SUSPENSION ORAL 4 TIMES DAILY
Status: ON HOLD | COMMUNITY
Start: 2020-02-25 | End: 2020-09-22 | Stop reason: ALTCHOICE

## 2020-04-25 RX ORDER — INSULIN GLARGINE 100 [IU]/ML
25 INJECTION, SOLUTION SUBCUTANEOUS DAILY
Status: ON HOLD | COMMUNITY
Start: 2020-01-20 | End: 2020-05-01 | Stop reason: HOSPADM

## 2020-04-25 RX ORDER — ACETAMINOPHEN 325 MG/1
650 TABLET ORAL EVERY 6 HOURS PRN
Status: DISCONTINUED | OUTPATIENT
Start: 2020-04-25 | End: 2020-05-01 | Stop reason: HOSPADM

## 2020-04-25 RX ORDER — METOCLOPRAMIDE HYDROCHLORIDE 5 MG/ML
5 INJECTION INTRAMUSCULAR; INTRAVENOUS EVERY 6 HOURS PRN
Status: DISCONTINUED | OUTPATIENT
Start: 2020-04-25 | End: 2020-05-01 | Stop reason: HOSPADM

## 2020-04-25 RX ADMIN — METHYLNALTREXONE BROMIDE 8 MG: 12 INJECTION, SOLUTION SUBCUTANEOUS at 12:50

## 2020-04-25 RX ADMIN — POLYETHYLENE GLYCOL 3350 17 G: 17 POWDER, FOR SOLUTION ORAL at 12:38

## 2020-04-25 RX ADMIN — LIDOCAINE HYDROCHLORIDE: 20 SOLUTION ORAL; TOPICAL at 00:20

## 2020-04-25 RX ADMIN — HYDROMORPHONE HYDROCHLORIDE 0.5 MG: 1 INJECTION, SOLUTION INTRAMUSCULAR; INTRAVENOUS; SUBCUTANEOUS at 00:24

## 2020-04-25 RX ADMIN — LOSARTAN POTASSIUM 25 MG: 25 TABLET ORAL at 10:48

## 2020-04-25 RX ADMIN — SODIUM CHLORIDE 1000 ML: 9 INJECTION, SOLUTION INTRAVENOUS at 00:21

## 2020-04-25 RX ADMIN — INSULIN GLARGINE 25 UNITS: 100 INJECTION, SOLUTION SUBCUTANEOUS at 22:46

## 2020-04-25 RX ADMIN — SODIUM CHLORIDE, POTASSIUM CHLORIDE, SODIUM LACTATE AND CALCIUM CHLORIDE: 600; 310; 30; 20 INJECTION, SOLUTION INTRAVENOUS at 03:06

## 2020-04-25 RX ADMIN — HYDROMORPHONE HYDROCHLORIDE 0.25 MG: 1 INJECTION, SOLUTION INTRAMUSCULAR; INTRAVENOUS; SUBCUTANEOUS at 10:42

## 2020-04-25 RX ADMIN — SODIUM CHLORIDE, PRESERVATIVE FREE 10 ML: 5 INJECTION INTRAVENOUS at 22:46

## 2020-04-25 RX ADMIN — POLYETHYLENE GLYCOL 3350 17 G: 17 POWDER, FOR SOLUTION ORAL at 22:46

## 2020-04-25 RX ADMIN — ENOXAPARIN SODIUM 40 MG: 40 INJECTION SUBCUTANEOUS at 10:48

## 2020-04-25 RX ADMIN — FAMOTIDINE 20 MG: 10 INJECTION INTRAVENOUS at 00:21

## 2020-04-25 RX ADMIN — PANTOPRAZOLE SODIUM 40 MG: 40 TABLET, DELAYED RELEASE ORAL at 15:39

## 2020-04-25 RX ADMIN — SODIUM CHLORIDE, POTASSIUM CHLORIDE, SODIUM LACTATE AND CALCIUM CHLORIDE: 600; 310; 30; 20 INJECTION, SOLUTION INTRAVENOUS at 14:40

## 2020-04-25 RX ADMIN — INSULIN GLARGINE 25 UNITS: 100 INJECTION, SOLUTION SUBCUTANEOUS at 03:04

## 2020-04-25 RX ADMIN — ATORVASTATIN CALCIUM 40 MG: 40 TABLET, FILM COATED ORAL at 22:46

## 2020-04-25 RX ADMIN — SODIUM CHLORIDE, PRESERVATIVE FREE 10 ML: 5 INJECTION INTRAVENOUS at 10:50

## 2020-04-25 RX ADMIN — LIDOCAINE HYDROCHLORIDE: 20 SOLUTION ORAL; TOPICAL at 12:37

## 2020-04-25 RX ADMIN — ONDANSETRON 4 MG: 2 INJECTION INTRAMUSCULAR; INTRAVENOUS at 03:04

## 2020-04-25 RX ADMIN — HYDROMORPHONE HYDROCHLORIDE 0.5 MG: 1 INJECTION, SOLUTION INTRAMUSCULAR; INTRAVENOUS; SUBCUTANEOUS at 03:04

## 2020-04-25 RX ADMIN — HYDROMORPHONE HYDROCHLORIDE 0.5 MG: 1 INJECTION, SOLUTION INTRAMUSCULAR; INTRAVENOUS; SUBCUTANEOUS at 18:10

## 2020-04-25 ASSESSMENT — PAIN DESCRIPTION - FREQUENCY
FREQUENCY: CONTINUOUS

## 2020-04-25 ASSESSMENT — PAIN SCALES - GENERAL
PAINLEVEL_OUTOF10: 5
PAINLEVEL_OUTOF10: 0
PAINLEVEL_OUTOF10: 7
PAINLEVEL_OUTOF10: 4
PAINLEVEL_OUTOF10: 8
PAINLEVEL_OUTOF10: 8
PAINLEVEL_OUTOF10: 3
PAINLEVEL_OUTOF10: 6

## 2020-04-25 ASSESSMENT — PAIN - FUNCTIONAL ASSESSMENT
PAIN_FUNCTIONAL_ASSESSMENT: ACTIVITIES ARE NOT PREVENTED
PAIN_FUNCTIONAL_ASSESSMENT: PREVENTS OR INTERFERES SOME ACTIVE ACTIVITIES AND ADLS
PAIN_FUNCTIONAL_ASSESSMENT: PREVENTS OR INTERFERES SOME ACTIVE ACTIVITIES AND ADLS

## 2020-04-25 ASSESSMENT — PAIN DESCRIPTION - ONSET
ONSET: ON-GOING
ONSET: ON-GOING

## 2020-04-25 ASSESSMENT — PAIN DESCRIPTION - PAIN TYPE
TYPE: ACUTE PAIN

## 2020-04-25 ASSESSMENT — PAIN DESCRIPTION - PROGRESSION
CLINICAL_PROGRESSION: GRADUALLY IMPROVING
CLINICAL_PROGRESSION: NOT CHANGED
CLINICAL_PROGRESSION: NOT CHANGED

## 2020-04-25 ASSESSMENT — PAIN DESCRIPTION - DESCRIPTORS
DESCRIPTORS: ACHING
DESCRIPTORS: CONSTANT
DESCRIPTORS: ACHING

## 2020-04-25 ASSESSMENT — PAIN DESCRIPTION - LOCATION
LOCATION: ABDOMEN;CHEST;BACK
LOCATION: ABDOMEN
LOCATION: ABDOMEN

## 2020-04-25 ASSESSMENT — PAIN DESCRIPTION - ORIENTATION
ORIENTATION: LEFT;MID
ORIENTATION: MID;LEFT

## 2020-04-25 NOTE — CONSULTS
clopidogrel (PLAVIX) 75 MG tablet Take 1 tablet by mouth daily 5/3/16   Babak Diop PA-C    Scheduled Meds:   atorvastatin  40 mg Oral Nightly    insulin glargine  25 Units Subcutaneous Nightly    losartan  25 mg Oral Daily    sodium chloride flush  10 mL Intravenous 2 times per day    enoxaparin  40 mg Subcutaneous Daily    pantoprazole  40 mg Oral BID AC     Continuous Infusions:   lactated ringers 100 mL/hr at 04/25/20 0306    dextrose       PRN Meds:.sodium chloride flush, acetaminophen **OR** acetaminophen, polyethylene glycol, promethazine **OR** ondansetron, HYDROmorphone **OR** HYDROmorphone, glucose, dextrose, glucagon (rDNA), dextrose, GI cocktail    Allergies  has No Known Allergies. Family History  Reviewed, non contribtory  family history includes No Known Problems in his father and mother. Social History  Reviewed, non contributory   reports that he has never smoked. He has never used smokeless tobacco. He reports that he does not drink alcohol or use drugs. Review of Systems:  General Denies any fever or chills  HEENT Denies any diplopia, tinnitus or vertigo  Resp Denies any shortness of breath, cough or wheezing  Cardiac Denies any chest pain, palpitations, claudication or edema  GI Denies any melena, hematochezia, hematemesis or pyrosis   Denies any frequency, urgency, hesitancy or incontinence  Heme Denies bruising or bleeding easily  Endocrine Denies any history of diabetes or thyroid disease  Neuro Denies any focal motor or sensory deficits    OBJECTIVE:   VITALS:  weight is 136 lb 11 oz (62 kg). His axillary temperature is 97.5 °F (36.4 °C). His blood pressure is 152/90 (abnormal) and his pulse is 68. His respiration is 17 and oxygen saturation is 92%. CONSTITUTIONAL: Alert and oriented times 3, no acute distress and cooperative to examination with proper mood and affect. SKIN: Skin color, texture, turgor normal. No rashes or lesions.   LYMPH: no cervical nodes, no

## 2020-04-25 NOTE — CONSULTS
last 72 hours. No results for input(s): PTT in the last 72 hours. No results for input(s): OCCULTBLD in the last 72 hours. Imaging Studies:    CT Head WO Contrast   Final Result   No acute intracranial abnormality. Generalized atrophy and chronic ischemic changes as above including a remote   left occipital infarct. CT LUMBAR SPINE TRAUMA RECONSTRUCTION   Final Result   Unremarkable non-contrast CT of the thoracic and lumbar spine. CT THORACIC SPINE TRAUMA RECONSTRUCTION   Final Result   Unremarkable non-contrast CT of the thoracic and lumbar spine. CTA CHEST ABDOMEN PELVIS W CONTRAST   Final Result   1. No acute aortic pathology. Mild atherosclerotic plaque with no evidence   of dissection or aneurysm. 2. No acute bowel pathology. No significant inflammatory changes or evidence   of obstruction. No CT evidence of appendicitis or diverticulitis. 3. No acute pulmonary findings. XR CHEST PORTABLE   Final Result   No acute abnormality identified. Assessment:   76 y.o. [place] male who is seen in consult for abdominal pain and constipation. Patient with a PMH diabetes, CVA on plavix, hyperlipidemia, hypertension, prior hx of positive quantiferon (s/p 6 months of INH) and positive histoplasmosis antigen in 12/2019. Wing Gault 1) Abdominal pain- patient's work-up has included lab work which has not demonstrated any evidence leukocytosis, pancreatitis or elevated liver enzymes. He was on Carafate and PPI bid at home. The patient had no evidence of small bowel obstruction, but did have significant right > left sided stool burden noted. There is no obvious diverticulitis or mass lesions noted in the colon. The patient has not had a previous colonoscopy. The patient has been taking twice daily PPI as well as Carafate. There is a significant amount of food noted in the stomach but no evidence of obstruction. ? Gastroparesis. There is no evidence of vascular stenosis or anything to suggest mesenteric ischemia. The patient has had a prior EGD in 4/2019 as well as endoscopic ultrasound performed to evaluate a nonspecific paraesophageal lymph node which was a benign granuloma. Prior endoscopy was without peptic ulcer disease. Differentials for the patient's abdominal pain could include denovo gastritis vs possible gastroparesis vs constipation vs early diverticulitis. No suggestion of SBO/LBO or mesenteric ischemia. Recommendations:   1) Would start the patient on an aggressive bowel regimen with Relistor x 1 and Miralax tid. Possible enema if no imporvement  2) The patient may need a colonoscopy and possible repeat EGD if pain persisted. 3) Would discontinue Carafate. 4) Would continue the patient on twice daily PPI for the time being. 5) Would monitor the patient's stool output. 6) Reglan prn for nausea and vomiting for possible gastroparesis. Check HgbA1C  7) Diet as tolerated      Thank you for allowing me to participate in the care of your patient. Please feel free to contact me with any questions or concerns.      Roberto Braga, DO  [address]

## 2020-04-25 NOTE — H&P
Hospital Medicine History & Physical      PCP: Mahesh Palma    Date of Admission: 4/24/2020    Date of Service: Pt seen/examined on 4/25/2020 and Admitted to Inpatient with expected LOS greater than two midnights due to medical therapy. Chief Complaint: Abdominal pain      History Of Present Illness:      76 y.o. male history of diabetes, hypertension who presented with epigastric pain. Patient does not speak [place]. History taken from chart. He described the pain is radiating to his back. He states she has concomitant chest pain with shortness of breath. He had an episode of vomiting. He denies diarrhea. In ER, he had work-up that included CTA chest and abdomen and pelvis. These tests were unremarkable. He also had CT thoracic/lumbar which did not show acute findings. Lab work was pertinent for lactic acid of 2.1 and glucose 379. Troponin was negative. He has been restless and confused. He was placed on 1:1 sitter    Past Medical History:          Diagnosis Date    Cerebral artery occlusion with cerebral infarction ([address]     Diabetes mellitus ([address]     Gallstone     GERD (gastroesophageal reflux disease)     Hyperlipidemia     Hypertension     Renal insufficiency        Past Surgical History:          Procedure Laterality Date    APPENDECTOMY      CHOLECYSTECTOMY      UPPER GASTROINTESTINAL ENDOSCOPY  06/08/2018    UPPER GASTROINTESTINAL ENDOSCOPY N/A 2/28/2019    EGD W/EUS FNA performed by Sahara Pool MD at [address] [place] [address] 4/23/2019    EGD BIOPSY performed by Sahara Pool MD at [address] [place] [address] N/A 4/23/2019    EGD W/EUS FNA performed by Sahara Pool MD at [address] [place] [address]       Medications Prior to Admission:      Prior to Admission medications    Medication Sig Start Date End Date Taking?  Authorizing Provider   rosuvastatin (CRESTOR) 10 MG tablet Take 10 mg by mouth daily 2/10/20 PERFORMED:    BP (!) 171/94   Pulse 74   Temp 97.5 °F (36.4 °C) (Axillary)   Resp 14   Ht 5' 4\" (1.626 m)   Wt 136 lb 11 oz (62 kg)   SpO2 92%   BMI 23.46 kg/m²     General appearance:  No apparent distress, appears stated age and cooperative. Elderly. HEENT:  Normal cephalic, atraumatic without obvious deformity. Pupils equal, round, and reactive to light. Extra ocular muscles intact. Conjunctivae/corneas clear. Neck: Supple, with full range of motion. No jugular venous distention. Trachea midline. Respiratory:  Normal respiratory effort. Clear to auscultation, bilaterally without Rales/Wheezes/Rhonchi. Cardiovascular:  Regular rate and rhythm with normal S1/S2 without murmurs, rubs or gallops. Abdomen: Soft, non-tender, non-distended with normal bowel sounds. Musculoskeletal:  No clubbing, cyanosis or edema bilaterally. Full range of motion without deformity. Skin: Skin color, texture, turgor normal.  No rashes or lesions. Neurologic:  grossly non-focal.      Labs:     Recent Labs     04/24/20 1858   WBC 6.8   HGB 13.0*   HCT 39.8*        Recent Labs     04/24/20 1858 04/25/20  1056   * 143   K 4.6 4.5   CL 99 108   CO2 22 23   BUN 9 9   CREATININE 1.2 1.3   CALCIUM 8.8 8.5     Recent Labs     04/24/20 1858   AST 35   ALT 19   BILITOT 0.5   ALKPHOS 116     No results for input(s): INR in the last 72 hours. Recent Labs     04/24/20 1858   TROPONINI <0.01       Urinalysis:      Lab Results   Component Value Date    NITRU Negative 04/24/2020    WBCUA 0 06/25/2019    RBCUA 1 06/25/2019    BLOODU Negative 04/24/2020    SPECGRAV 1.024 04/24/2020    GLUCOSEU >=1000 04/24/2020       Radiology:     EKG: Normal sinus rhythm with left fascicular block at 69 bpm.  Unchanged from previous EKGs. CT Head WO Contrast   Final Result   No acute intracranial abnormality. Generalized atrophy and chronic ischemic changes as above including a remote   left occipital infarct.          CT LUMBAR SPINE TRAUMA RECONSTRUCTION   Final Result   Unremarkable non-contrast CT of the thoracic and lumbar spine. CT THORACIC SPINE TRAUMA RECONSTRUCTION   Final Result   Unremarkable non-contrast CT of the thoracic and lumbar spine. CTA CHEST ABDOMEN PELVIS W CONTRAST   Final Result   1. No acute aortic pathology. Mild atherosclerotic plaque with no evidence   of dissection or aneurysm. 2. No acute bowel pathology. No significant inflammatory changes or evidence   of obstruction. No CT evidence of appendicitis or diverticulitis. 3. No acute pulmonary findings. XR CHEST PORTABLE   Final Result   No acute abnormality identified. ASSESSMENT:    Active Hospital Problems    Diagnosis Date Noted    Abdominal pain [R10.9] 04/25/2020    Hypertension [I10] 05/19/2017    Hyperlipidemia [E78.5] 05/19/2017    Diabetes mellitus ([facility] [E11.9] 05/19/2017         PLAN:    1. Epigastric abdominal pain-CT abdomen/pelvis unremarkable. ? Recurrence of esophageal disease. Patient had EGD with EUS on 4/23/2019 that showed extensive compression in the distal esophagus and small hiatal hernia. CT chest did not show any dissection. EKG did not show any new ST changes and troponin is negative. Less likely cardiac origin.  -Increase PPI to twice daily  -Maalox as needed  -Continue pain control. Will avoid NSAIDs.  -Continue IV fluids and antiemetics  -Continue n.p.o. status  -Consult gastroenterology  -Appreciate Gen Surgery consultation    2. Lactic acidosis-unclear etiology. Patient was actually hypertensive events of bowel ischemia on CAT scan. Lactic acid is now trended down with IV hydration. 3.  Uncontrolled diabetes/diabetes type 2-continue Lantus. I will add insulin coverage. 4.  Hypertension-likely exacerbated by pain. Patient received morning dose of antihypertensive. Monitor next blood pressure readings. 5.  Mild CKD stage II-we will monitor renal function closely. Patient received IV contrast and losartan. With repeat labs. 6. Delirium-probably due to pain medications. Will space out dilaudid.     DVT Prophylaxis: Lovenox  Diet: DIET FULL LIQUID;  Code Status: Full Code    PT/OT Eval Status: Not needed    Dispo -home in 1 to 2 days when pain is controlled       Caron Major MD

## 2020-04-25 NOTE — PROGRESS NOTES
Medication Reconciliation    List of medications patient is currently taking is complete. Source of information: 1. [facility]                                      2. EPIC records      Allergies  Patient has no known allergies. Notes regarding home medications:   1. Unable to confirm medications with patient. Spoke with [facility] to obtain list of meds that have been recently filled. This matches with list from [facility].

## 2020-04-26 LAB
ANION GAP SERPL CALCULATED.3IONS-SCNC: 13 MMOL/L (ref 3–16)
BASOPHILS ABSOLUTE: 0.1 K/UL (ref 0–0.2)
BASOPHILS RELATIVE PERCENT: 0.9 %
BUN BLDV-MCNC: 8 MG/DL (ref 7–20)
C-REACTIVE PROTEIN: 13.5 MG/L (ref 0–5.1)
CALCIUM SERPL-MCNC: 9.1 MG/DL (ref 8.3–10.6)
CHLORIDE BLD-SCNC: 101 MMOL/L (ref 99–110)
CO2: 23 MMOL/L (ref 21–32)
CREAT SERPL-MCNC: 1.3 MG/DL (ref 0.8–1.3)
EOSINOPHILS ABSOLUTE: 0.3 K/UL (ref 0–0.6)
EOSINOPHILS RELATIVE PERCENT: 4.2 %
GFR AFRICAN AMERICAN: >60
GFR NON-AFRICAN AMERICAN: 54
GLUCOSE BLD-MCNC: 236 MG/DL (ref 70–99)
GLUCOSE BLD-MCNC: 278 MG/DL (ref 70–99)
GLUCOSE BLD-MCNC: 77 MG/DL (ref 70–99)
GLUCOSE BLD-MCNC: 79 MG/DL (ref 70–99)
GLUCOSE BLD-MCNC: 91 MG/DL (ref 70–99)
HCT VFR BLD CALC: 39.6 % (ref 40.5–52.5)
HEMOGLOBIN: 13.1 G/DL (ref 13.5–17.5)
LYMPHOCYTES ABSOLUTE: 1.5 K/UL (ref 1–5.1)
LYMPHOCYTES RELATIVE PERCENT: 23.4 %
MCH RBC QN AUTO: 25.3 PG (ref 26–34)
MCHC RBC AUTO-ENTMCNC: 33.2 G/DL (ref 31–36)
MCV RBC AUTO: 76.2 FL (ref 80–100)
MONOCYTES ABSOLUTE: 0.4 K/UL (ref 0–1.3)
MONOCYTES RELATIVE PERCENT: 6.7 %
NEUTROPHILS ABSOLUTE: 4.1 K/UL (ref 1.7–7.7)
NEUTROPHILS RELATIVE PERCENT: 64.8 %
PDW BLD-RTO: 15.4 % (ref 12.4–15.4)
PERFORMED ON: ABNORMAL
PERFORMED ON: ABNORMAL
PERFORMED ON: NORMAL
PERFORMED ON: NORMAL
PLATELET # BLD: 218 K/UL (ref 135–450)
PMV BLD AUTO: 6.9 FL (ref 5–10.5)
POTASSIUM REFLEX MAGNESIUM: 3.7 MMOL/L (ref 3.5–5.1)
RBC # BLD: 5.2 M/UL (ref 4.2–5.9)
SODIUM BLD-SCNC: 137 MMOL/L (ref 136–145)
WBC # BLD: 6.3 K/UL (ref 4–11)

## 2020-04-26 PROCEDURE — 85025 COMPLETE CBC W/AUTO DIFF WBC: CPT

## 2020-04-26 PROCEDURE — 6360000002 HC RX W HCPCS: Performed by: NURSE PRACTITIONER

## 2020-04-26 PROCEDURE — 6370000000 HC RX 637 (ALT 250 FOR IP): Performed by: INTERNAL MEDICINE

## 2020-04-26 PROCEDURE — 2580000003 HC RX 258: Performed by: NURSE PRACTITIONER

## 2020-04-26 PROCEDURE — 86140 C-REACTIVE PROTEIN: CPT

## 2020-04-26 PROCEDURE — 6370000000 HC RX 637 (ALT 250 FOR IP): Performed by: NURSE PRACTITIONER

## 2020-04-26 PROCEDURE — 6360000002 HC RX W HCPCS: Performed by: INTERNAL MEDICINE

## 2020-04-26 PROCEDURE — 80048 BASIC METABOLIC PNL TOTAL CA: CPT

## 2020-04-26 PROCEDURE — 1200000000 HC SEMI PRIVATE

## 2020-04-26 PROCEDURE — 94760 N-INVAS EAR/PLS OXIMETRY 1: CPT

## 2020-04-26 PROCEDURE — 36415 COLL VENOUS BLD VENIPUNCTURE: CPT

## 2020-04-26 RX ORDER — LOSARTAN POTASSIUM 25 MG/1
25 TABLET ORAL ONCE
Status: COMPLETED | OUTPATIENT
Start: 2020-04-26 | End: 2020-04-26

## 2020-04-26 RX ORDER — HYDRALAZINE HYDROCHLORIDE 20 MG/ML
5 INJECTION INTRAMUSCULAR; INTRAVENOUS ONCE
Status: COMPLETED | OUTPATIENT
Start: 2020-04-26 | End: 2020-04-26

## 2020-04-26 RX ORDER — LOSARTAN POTASSIUM 50 MG/1
50 TABLET ORAL DAILY
Status: DISCONTINUED | OUTPATIENT
Start: 2020-04-27 | End: 2020-04-27

## 2020-04-26 RX ORDER — HYDRALAZINE HYDROCHLORIDE 20 MG/ML
5 INJECTION INTRAMUSCULAR; INTRAVENOUS EVERY 6 HOURS PRN
Status: DISCONTINUED | OUTPATIENT
Start: 2020-04-26 | End: 2020-04-27

## 2020-04-26 RX ADMIN — HYDRALAZINE HYDROCHLORIDE 5 MG: 20 INJECTION INTRAMUSCULAR; INTRAVENOUS at 16:01

## 2020-04-26 RX ADMIN — POLYETHYLENE GLYCOL 3350 17 G: 17 POWDER, FOR SOLUTION ORAL at 08:58

## 2020-04-26 RX ADMIN — LOSARTAN POTASSIUM 25 MG: 25 TABLET ORAL at 11:11

## 2020-04-26 RX ADMIN — POLYETHYLENE GLYCOL 3350 17 G: 17 POWDER, FOR SOLUTION ORAL at 22:18

## 2020-04-26 RX ADMIN — INSULIN GLARGINE 25 UNITS: 100 INJECTION, SOLUTION SUBCUTANEOUS at 22:20

## 2020-04-26 RX ADMIN — POLYETHYLENE GLYCOL 3350 17 G: 17 POWDER, FOR SOLUTION ORAL at 15:33

## 2020-04-26 RX ADMIN — PANTOPRAZOLE SODIUM 40 MG: 40 TABLET, DELAYED RELEASE ORAL at 15:33

## 2020-04-26 RX ADMIN — ENOXAPARIN SODIUM 40 MG: 40 INJECTION SUBCUTANEOUS at 08:58

## 2020-04-26 RX ADMIN — PANTOPRAZOLE SODIUM 40 MG: 40 TABLET, DELAYED RELEASE ORAL at 06:56

## 2020-04-26 RX ADMIN — ACETAMINOPHEN 650 MG: 325 TABLET, FILM COATED ORAL at 08:58

## 2020-04-26 RX ADMIN — SODIUM CHLORIDE, POTASSIUM CHLORIDE, SODIUM LACTATE AND CALCIUM CHLORIDE: 600; 310; 30; 20 INJECTION, SOLUTION INTRAVENOUS at 10:10

## 2020-04-26 RX ADMIN — ATORVASTATIN CALCIUM 40 MG: 40 TABLET, FILM COATED ORAL at 22:18

## 2020-04-26 RX ADMIN — HYDRALAZINE HYDROCHLORIDE 5 MG: 20 INJECTION INTRAMUSCULAR; INTRAVENOUS at 18:57

## 2020-04-26 RX ADMIN — ACETAMINOPHEN 650 MG: 325 TABLET, FILM COATED ORAL at 22:28

## 2020-04-26 RX ADMIN — LOSARTAN POTASSIUM 25 MG: 25 TABLET ORAL at 08:58

## 2020-04-26 ASSESSMENT — PAIN DESCRIPTION - LOCATION: LOCATION: ABDOMEN

## 2020-04-26 ASSESSMENT — PAIN SCALES - GENERAL
PAINLEVEL_OUTOF10: 0
PAINLEVEL_OUTOF10: 3
PAINLEVEL_OUTOF10: 4

## 2020-04-26 ASSESSMENT — PAIN DESCRIPTION - DESCRIPTORS: DESCRIPTORS: DISCOMFORT

## 2020-04-26 ASSESSMENT — PAIN DESCRIPTION - ORIENTATION: ORIENTATION: MID

## 2020-04-26 ASSESSMENT — PAIN DESCRIPTION - PROGRESSION: CLINICAL_PROGRESSION: NOT CHANGED

## 2020-04-26 ASSESSMENT — PAIN DESCRIPTION - ONSET: ONSET: ON-GOING

## 2020-04-26 ASSESSMENT — PAIN DESCRIPTION - PAIN TYPE: TYPE: ACUTE PAIN

## 2020-04-26 ASSESSMENT — PAIN DESCRIPTION - FREQUENCY: FREQUENCY: INTERMITTENT

## 2020-04-26 NOTE — PROGRESS NOTES
diverticulitis. 3. No acute pulmonary findings. XR CHEST PORTABLE   Final Result   No acute abnormality identified. Assessment/Plan:    Active Hospital Problems    Diagnosis    Abdominal pain [R10.9]    Hypertension [I10]    Hyperlipidemia [E78.5]    Diabetes mellitus ([facility] [address] [E11.9]     1. Epigastric abdominal pain-CT abdomen/pelvis unremarkable. ? Recurrence of esophageal diseas versus constipation versus gastroparesis e. Patient had EGD with EUS on 4/23/2019 that showed extensive compression in the distal esophagus and small hiatal hernia. CT chest did not show any dissection. EKG did not show any new ST changes and troponin is negative. Less likely cardiac origin.  -Continue PPI   -Maalox as needed  -Continue pain control.    -Continue IV fluids and antiemetics  -Patient is on full liquid diet  -Appreciate gastroenterology recommendations. Status post Relistor. The patient to get soapsuds enema and aggressive bowel regimen. Colonoscopy and EGD possibly early next week to screen for PUD and ischemic colitis. Recommendations to use Reglan as needed. -Appreciate Gen Surgery consultation. Recommendations noted.     2.  Lactic acidosis-unclear etiology. Patient was actually hypertensive events of bowel ischemia on CAT scan. Lactic acid is now trended down with IV hydration.     3. Uncontrolled diabetes/diabetes type 2-continue Lantus. I will add insulin coverage.     4. Hypertension-likely exacerbated by pain. BP is extremely elevated, I will increase losartan to 50 mg daily. I will also add hydralazine as needed.     5.  Mild CKD stage II/III-creatinine stable at 1.3.    6. Delirium-probably due to pain medications. I will adjust opiate doses as needed. Continue 1:1 sitter    Attempted to contact patient's family. I left voicemails.     DVT Prophylaxis: Lovenox  Diet: DIET FULL LIQUID;  Code Status: Full Code    PT/OT Eval Status: Not needed    Dispo -home in 1 to 2 days when pain is controlled and delirium improves    Apple Kline MD

## 2020-04-26 NOTE — PROGRESS NOTES
Gastroenterology Progress Note    James Perez is a 76 y.o. male patient. Hospitalization Day:1    SUBJECTIVE: Son is at bedside this morning, and he acted as . The patient reports some slight improvement of his abdominal pain. The son states that the patient has had his abdominal distention chronically over several years. The pain had worsened over the past several days. Still no bowel movements reported by the patient. No nausea or vomiting reported by the son or the patient at home or in the hospital.  He has had no fevers. He is otherwise without complaints    ROS:  Gastrointestinal ROS: positive for - abdominal pain and constipation    Physical    VITALS:  BP (!) 193/90   Pulse 87   Temp 97.5 °F (36.4 °C) (Oral)   Resp 20   Ht 5' 4\" (1.626 m)   Wt 132 lb 7.9 oz (60.1 kg)   SpO2 93%   BMI 22.74 kg/m²   TEMPERATURE:  Current - Temp: 97.5 °F (36.4 °C); Max - Temp  Av.5 °F (36.4 °C)  Min: 97.5 °F (36.4 °C)  Max: 97.5 °F (36.4 °C)    General:  Alert and oriented,  No apparent distress  Skin- without jaundice  Eyes: anicteric sclera  Cardiac: RRR, Nl s1s2, without murmurs  Lungs CTA Bilaterally, normal effort  Abdomen soft, + TTP with some mild guarding in the left upper quadrant. No rebound noted. No HSM, Bowel sounds normal  Ext: without edema  Neuro: no asterixis     Data    Data Review:    Recent Labs     20  0640   WBC 6.8 6.3   HGB 13.0* 13.1*   HCT 39.8* 39.6*   MCV 76.6* 76.2*    218     Recent Labs     20  1056 20  0640   * 143 137   K 4.6 4.5 3.7   CL 99 108 101   CO2 22 23 23   BUN 9 9 8   CREATININE 1.2 1.3 1.3     Recent Labs     20  185   AST 35   ALT 19   BILITOT 0.5   ALKPHOS 116     Recent Labs     20  185   LIPASE 39.0     No results for input(s): PROTIME, INR in the last 72 hours. Radiology Review:    CT Head WO Contrast   Final Result   No acute intracranial abnormality. Generalized atrophy and chronic ischemic changes as above including a remote   left occipital infarct. CT LUMBAR SPINE TRAUMA RECONSTRUCTION   Final Result   Unremarkable non-contrast CT of the thoracic and lumbar spine. CT THORACIC SPINE TRAUMA RECONSTRUCTION   Final Result   Unremarkable non-contrast CT of the thoracic and lumbar spine. CTA CHEST ABDOMEN PELVIS W CONTRAST   Final Result   1. No acute aortic pathology. Mild atherosclerotic plaque with no evidence   of dissection or aneurysm. 2. No acute bowel pathology. No significant inflammatory changes or evidence   of obstruction. No CT evidence of appendicitis or diverticulitis. 3. No acute pulmonary findings. XR CHEST PORTABLE   Final Result   No acute abnormality identified. ASSESSMENT: 76 y.o. Turk[place] male who is seen in consult for abdominal pain and constipation. Patient with a PMH diabetes, CVA on plavix, hyperlipidemia, hypertension, prior hx of of paraesophageal lymphadenopathy with negative workup (granuloma noted on biopsies). Prior positive quantiferon (s/p 6 months of INH finished in 12/2019) and positive histoplasmosis antigen in 12/2019.     1) Left upper quadrant pain with constipation -stable. The patient's work-up this admission has included lab work which has not demonstrated any evidence leukocytosis, pancreatitis or elevated liver enzymes. Thyroid normal. He was on Carafate and PPI bid at home. He has poorly controlled DM with A1C of 8.9. No vomiting, but ? Gastroparesis and constipation. The patient had no evidence of small bowel obstruction, but did have significant right > left sided stool burden noted. There is no obvious diverticulitis or colitis or mass lesions noted in the colon. The patient has not had a previous colonoscopy. The patient has reportedly been taking twice daily PPI as well as Carafate.   There was a moderate amount of food noted in the stomach on CT but no

## 2020-04-27 LAB
ANION GAP SERPL CALCULATED.3IONS-SCNC: 16 MMOL/L (ref 3–16)
BASOPHILS ABSOLUTE: 0 K/UL (ref 0–0.2)
BASOPHILS RELATIVE PERCENT: 0.7 %
BUN BLDV-MCNC: 10 MG/DL (ref 7–20)
CALCIUM SERPL-MCNC: 9.6 MG/DL (ref 8.3–10.6)
CHLORIDE BLD-SCNC: 102 MMOL/L (ref 99–110)
CO2: 22 MMOL/L (ref 21–32)
CREAT SERPL-MCNC: 1.3 MG/DL (ref 0.8–1.3)
EOSINOPHILS ABSOLUTE: 0.3 K/UL (ref 0–0.6)
EOSINOPHILS RELATIVE PERCENT: 4.4 %
GFR AFRICAN AMERICAN: >60
GFR NON-AFRICAN AMERICAN: 54
GLUCOSE BLD-MCNC: 121 MG/DL (ref 70–99)
GLUCOSE BLD-MCNC: 122 MG/DL (ref 70–99)
GLUCOSE BLD-MCNC: 155 MG/DL (ref 70–99)
GLUCOSE BLD-MCNC: 214 MG/DL (ref 70–99)
GLUCOSE BLD-MCNC: 237 MG/DL (ref 70–99)
HCT VFR BLD CALC: 44.2 % (ref 40.5–52.5)
HEMOGLOBIN: 14.5 G/DL (ref 13.5–17.5)
LYMPHOCYTES ABSOLUTE: 2.3 K/UL (ref 1–5.1)
LYMPHOCYTES RELATIVE PERCENT: 37.9 %
MAGNESIUM: 1.9 MG/DL (ref 1.8–2.4)
MCH RBC QN AUTO: 25 PG (ref 26–34)
MCHC RBC AUTO-ENTMCNC: 32.8 G/DL (ref 31–36)
MCV RBC AUTO: 76.2 FL (ref 80–100)
MONOCYTES ABSOLUTE: 0.6 K/UL (ref 0–1.3)
MONOCYTES RELATIVE PERCENT: 9.8 %
NEUTROPHILS ABSOLUTE: 2.9 K/UL (ref 1.7–7.7)
NEUTROPHILS RELATIVE PERCENT: 47.2 %
PDW BLD-RTO: 15.4 % (ref 12.4–15.4)
PERFORMED ON: ABNORMAL
PLATELET # BLD: 234 K/UL (ref 135–450)
PMV BLD AUTO: 6.9 FL (ref 5–10.5)
POTASSIUM REFLEX MAGNESIUM: 3.5 MMOL/L (ref 3.5–5.1)
RBC # BLD: 5.8 M/UL (ref 4.2–5.9)
SODIUM BLD-SCNC: 140 MMOL/L (ref 136–145)
WBC # BLD: 6.2 K/UL (ref 4–11)

## 2020-04-27 PROCEDURE — 6360000002 HC RX W HCPCS: Performed by: NURSE PRACTITIONER

## 2020-04-27 PROCEDURE — 94760 N-INVAS EAR/PLS OXIMETRY 1: CPT

## 2020-04-27 PROCEDURE — 6360000002 HC RX W HCPCS: Performed by: INTERNAL MEDICINE

## 2020-04-27 PROCEDURE — 2580000003 HC RX 258: Performed by: NURSE PRACTITIONER

## 2020-04-27 PROCEDURE — 6370000000 HC RX 637 (ALT 250 FOR IP): Performed by: NURSE PRACTITIONER

## 2020-04-27 PROCEDURE — 6370000000 HC RX 637 (ALT 250 FOR IP): Performed by: INTERNAL MEDICINE

## 2020-04-27 PROCEDURE — 80048 BASIC METABOLIC PNL TOTAL CA: CPT

## 2020-04-27 PROCEDURE — 2580000003 HC RX 258: Performed by: INTERNAL MEDICINE

## 2020-04-27 PROCEDURE — 85025 COMPLETE CBC W/AUTO DIFF WBC: CPT

## 2020-04-27 PROCEDURE — 83735 ASSAY OF MAGNESIUM: CPT

## 2020-04-27 PROCEDURE — 36415 COLL VENOUS BLD VENIPUNCTURE: CPT

## 2020-04-27 PROCEDURE — 1200000000 HC SEMI PRIVATE

## 2020-04-27 RX ORDER — LOSARTAN POTASSIUM 50 MG/1
50 TABLET ORAL ONCE
Status: COMPLETED | OUTPATIENT
Start: 2020-04-27 | End: 2020-04-27

## 2020-04-27 RX ORDER — LOSARTAN POTASSIUM 100 MG/1
100 TABLET ORAL DAILY
Status: DISCONTINUED | OUTPATIENT
Start: 2020-04-28 | End: 2020-05-01 | Stop reason: HOSPADM

## 2020-04-27 RX ORDER — HYDROCODONE BITARTRATE AND ACETAMINOPHEN 5; 325 MG/1; MG/1
1 TABLET ORAL EVERY 6 HOURS PRN
Status: DISCONTINUED | OUTPATIENT
Start: 2020-04-27 | End: 2020-04-30

## 2020-04-27 RX ORDER — MORPHINE SULFATE 2 MG/ML
2 INJECTION, SOLUTION INTRAMUSCULAR; INTRAVENOUS EVERY 4 HOURS PRN
Status: DISCONTINUED | OUTPATIENT
Start: 2020-04-27 | End: 2020-05-01 | Stop reason: HOSPADM

## 2020-04-27 RX ORDER — HYDRALAZINE HYDROCHLORIDE 20 MG/ML
10 INJECTION INTRAMUSCULAR; INTRAVENOUS EVERY 6 HOURS PRN
Status: DISCONTINUED | OUTPATIENT
Start: 2020-04-27 | End: 2020-05-01 | Stop reason: HOSPADM

## 2020-04-27 RX ADMIN — SODIUM CHLORIDE, POTASSIUM CHLORIDE, SODIUM LACTATE AND CALCIUM CHLORIDE: 600; 310; 30; 20 INJECTION, SOLUTION INTRAVENOUS at 04:39

## 2020-04-27 RX ADMIN — HYDRALAZINE HYDROCHLORIDE 10 MG: 20 INJECTION INTRAMUSCULAR; INTRAVENOUS at 20:12

## 2020-04-27 RX ADMIN — POLYETHYLENE GLYCOL 3350 17 G: 17 POWDER, FOR SOLUTION ORAL at 08:40

## 2020-04-27 RX ADMIN — PANTOPRAZOLE SODIUM 40 MG: 40 TABLET, DELAYED RELEASE ORAL at 08:40

## 2020-04-27 RX ADMIN — METOCLOPRAMIDE HYDROCHLORIDE 5 MG: 5 INJECTION INTRAMUSCULAR; INTRAVENOUS at 20:13

## 2020-04-27 RX ADMIN — POLYETHYLENE GLYCOL 3350 17 G: 17 POWDER, FOR SOLUTION ORAL at 17:14

## 2020-04-27 RX ADMIN — PANTOPRAZOLE SODIUM 40 MG: 40 TABLET, DELAYED RELEASE ORAL at 17:49

## 2020-04-27 RX ADMIN — INSULIN GLARGINE 25 UNITS: 100 INJECTION, SOLUTION SUBCUTANEOUS at 20:12

## 2020-04-27 RX ADMIN — Medication 10 ML: at 04:39

## 2020-04-27 RX ADMIN — HYDRALAZINE HYDROCHLORIDE 5 MG: 20 INJECTION INTRAMUSCULAR; INTRAVENOUS at 04:40

## 2020-04-27 RX ADMIN — METOCLOPRAMIDE HYDROCHLORIDE 5 MG: 5 INJECTION INTRAMUSCULAR; INTRAVENOUS at 12:24

## 2020-04-27 RX ADMIN — MORPHINE SULFATE 2 MG: 2 INJECTION, SOLUTION INTRAMUSCULAR; INTRAVENOUS at 17:14

## 2020-04-27 RX ADMIN — ATORVASTATIN CALCIUM 40 MG: 40 TABLET, FILM COATED ORAL at 20:12

## 2020-04-27 RX ADMIN — LOSARTAN POTASSIUM 50 MG: 50 TABLET, FILM COATED ORAL at 08:40

## 2020-04-27 RX ADMIN — ONDANSETRON 4 MG: 2 INJECTION INTRAMUSCULAR; INTRAVENOUS at 16:51

## 2020-04-27 RX ADMIN — ACETAMINOPHEN 650 MG: 325 TABLET, FILM COATED ORAL at 12:24

## 2020-04-27 RX ADMIN — SODIUM CHLORIDE, POTASSIUM CHLORIDE, SODIUM LACTATE AND CALCIUM CHLORIDE: 600; 310; 30; 20 INJECTION, SOLUTION INTRAVENOUS at 17:49

## 2020-04-27 RX ADMIN — ENOXAPARIN SODIUM 40 MG: 40 INJECTION SUBCUTANEOUS at 08:40

## 2020-04-27 RX ADMIN — LOSARTAN POTASSIUM 50 MG: 50 TABLET, FILM COATED ORAL at 12:24

## 2020-04-27 RX ADMIN — HYDROCODONE BITARTRATE AND ACETAMINOPHEN 1 TABLET: 5; 325 TABLET ORAL at 18:55

## 2020-04-27 ASSESSMENT — PAIN DESCRIPTION - LOCATION: LOCATION: ABDOMEN

## 2020-04-27 ASSESSMENT — PAIN - FUNCTIONAL ASSESSMENT: PAIN_FUNCTIONAL_ASSESSMENT: ACTIVITIES ARE NOT PREVENTED

## 2020-04-27 ASSESSMENT — PAIN SCALES - GENERAL
PAINLEVEL_OUTOF10: 7
PAINLEVEL_OUTOF10: 7
PAINLEVEL_OUTOF10: 4
PAINLEVEL_OUTOF10: 4
PAINLEVEL_OUTOF10: 0

## 2020-04-27 ASSESSMENT — PAIN DESCRIPTION - PAIN TYPE: TYPE: ACUTE PAIN

## 2020-04-27 NOTE — PROGRESS NOTES
No CT evidence of appendicitis or diverticulitis. 3. No acute pulmonary findings. XR CHEST PORTABLE   Final Result   No acute abnormality identified. Assessment/Plan:    Active Hospital Problems    Diagnosis    Abdominal pain [R10.9]    Hypertension [I10]    Hyperlipidemia [E78.5]    Diabetes mellitus ([facility] [address] [E11.9]     1. Epigastric abdominal pain-CT abdomen/pelvis unremarkable. ? Recurrence of esophageal diseas versus constipation versus gastroparesis e. Patient had EGD with EUS on 4/23/2019 that showed extensive compression in the distal esophagus and small hiatal hernia. CT chest did not show any dissection. EKG did not show any new ST changes and troponin is negative. Less likely cardiac origin.  -Continue PPI   -Maalox as needed  -Continue pain control. He is having nausea with dilaudid. I will change to morphine and add oral norco.  -Continue IV fluids and antiemetics  -Patient is on full liquid diet  -Appreciate gastroenterology recommendations. Status post Relistor. The patient received soapsuds enema and aggressive bowel regimen. Colonoscopy and EGD possibly this week to screen for PUD and ischemic colitis. Recommendations to use Reglan as needed. He is having bloody stool,will alert GI. -Appreciate Gen Surgery consultation. Recommendations noted.     2.  Lactic acidosis-unclear etiology. Patient was actually hypertensive events of bowel ischemia on CAT scan. Lactic acidtrended down with IV hydration.     3. Uncontrolled diabetes/diabetes type 2-continue Lantus. Continue insulin coverage.     4. Hypertension-likely exacerbated by pain. BP is extremely elevated, I will increase losartan to 100 mg daily. Continue hydralazine IV as needed     5. Mild CKD stage II/III-creatinine stable at 1.3.    6. Delirium-probably due to pain medications. I will adjust opiate doses as needed. He is doing better. He is off sitter. Attempted to contact patient's family again.   I

## 2020-04-27 NOTE — PROGRESS NOTES
PCA reported that patient removed LEFT forearm IV. Gauze and tape applied. No complications noted. No secondary IV observed in RIGHT forearm despite flow-sheets. Will attempt to re-establish IV access when patient awakens.      Electronically signed by Mali Dewey RN on 4/27/2020 at 12:03 AM

## 2020-04-27 NOTE — PROGRESS NOTES
negative workup (granuloma noted on biopsies), prior positive quantiferon (s/p 6 months of INH finished in 12/2019) and prior positive histoplasmosis antigen (12/2019) who presented with:    1. Left upper quadrant pain with constipation -slightly improved. Pain 5/10 (from 8/10). Had 1 loose nonbloody BM overnight. Differential diagnosis includes: constipation versus gastroparesis versus viral gastroenteritis versus less likely early diverticulitis/ischemic colitis. CT showed no acute bowel pathology, no significant bowel inflammatory changes, obstruction. No evidence of pancreatitis. No stenosis of the celiac artery, SMA or LANDRY. Blood work unremarkable. TSH normal. Prior EGDs (6/18, 4/19) with no evidence of peptic ulcer disease, and prior EUS (4/19) to evaluate a nonspecific paraesophageal lymph node  showed a benign granuloma.        RECOMMENDATIONS:    Continue bowel regimen with MiraLAX 3 times daily  Monitor stool output. Continue PPI twice daily  Continue Reglan and antiemetics as needed for nausea possible related to gastroparesis  Diet as tolerated  Continue supportive care. Plan for outpatient EGD and colonoscopy    If you have any questions or need any further information, please feel free to contact us [phone]. Thank you for allowing us to participate in the care of Rodrigo Cox. The note was completed using Dragon voice recognition transcription. Every effort was made to ensure accuracy; however, inadvertent transcription errors may be present despite my best efforts to edit errors.     Lucien HUBER

## 2020-04-28 ENCOUNTER — APPOINTMENT (OUTPATIENT)
Dept: CT IMAGING | Age: 75
DRG: 241 | End: 2020-04-28
Payer: MEDICAID

## 2020-04-28 LAB
ANION GAP SERPL CALCULATED.3IONS-SCNC: 14 MMOL/L (ref 3–16)
BASOPHILS ABSOLUTE: 0.1 K/UL (ref 0–0.2)
BASOPHILS RELATIVE PERCENT: 1.1 %
BUN BLDV-MCNC: 9 MG/DL (ref 7–20)
CALCIUM SERPL-MCNC: 9.6 MG/DL (ref 8.3–10.6)
CHLORIDE BLD-SCNC: 102 MMOL/L (ref 99–110)
CO2: 24 MMOL/L (ref 21–32)
CREAT SERPL-MCNC: 1.4 MG/DL (ref 0.8–1.3)
EOSINOPHILS ABSOLUTE: 0.3 K/UL (ref 0–0.6)
EOSINOPHILS RELATIVE PERCENT: 4.2 %
GFR AFRICAN AMERICAN: 60
GFR NON-AFRICAN AMERICAN: 49
GLUCOSE BLD-MCNC: 104 MG/DL (ref 70–99)
GLUCOSE BLD-MCNC: 124 MG/DL (ref 70–99)
GLUCOSE BLD-MCNC: 138 MG/DL (ref 70–99)
GLUCOSE BLD-MCNC: 263 MG/DL (ref 70–99)
HCT VFR BLD CALC: 41.9 % (ref 40.5–52.5)
HEMOGLOBIN: 13.6 G/DL (ref 13.5–17.5)
LYMPHOCYTES ABSOLUTE: 2.3 K/UL (ref 1–5.1)
LYMPHOCYTES RELATIVE PERCENT: 29.1 %
MCH RBC QN AUTO: 24.9 PG (ref 26–34)
MCHC RBC AUTO-ENTMCNC: 32.4 G/DL (ref 31–36)
MCV RBC AUTO: 76.6 FL (ref 80–100)
MONOCYTES ABSOLUTE: 0.8 K/UL (ref 0–1.3)
MONOCYTES RELATIVE PERCENT: 10.4 %
NEUTROPHILS ABSOLUTE: 4.4 K/UL (ref 1.7–7.7)
NEUTROPHILS RELATIVE PERCENT: 55.2 %
PDW BLD-RTO: 15.6 % (ref 12.4–15.4)
PERFORMED ON: ABNORMAL
PLATELET # BLD: 241 K/UL (ref 135–450)
PMV BLD AUTO: 6.9 FL (ref 5–10.5)
POTASSIUM REFLEX MAGNESIUM: 3.6 MMOL/L (ref 3.5–5.1)
RBC # BLD: 5.46 M/UL (ref 4.2–5.9)
SARS-COV-2, NAAT: NOT DETECTED
SODIUM BLD-SCNC: 140 MMOL/L (ref 136–145)
WBC # BLD: 7.9 K/UL (ref 4–11)

## 2020-04-28 PROCEDURE — 6360000004 HC RX CONTRAST MEDICATION

## 2020-04-28 PROCEDURE — 6360000002 HC RX W HCPCS: Performed by: INTERNAL MEDICINE

## 2020-04-28 PROCEDURE — 2580000003 HC RX 258: Performed by: INTERNAL MEDICINE

## 2020-04-28 PROCEDURE — 6360000002 HC RX W HCPCS: Performed by: NURSE PRACTITIONER

## 2020-04-28 PROCEDURE — 2580000003 HC RX 258: Performed by: NURSE PRACTITIONER

## 2020-04-28 PROCEDURE — 36415 COLL VENOUS BLD VENIPUNCTURE: CPT

## 2020-04-28 PROCEDURE — 74177 CT ABD & PELVIS W/CONTRAST: CPT

## 2020-04-28 PROCEDURE — U0002 COVID-19 LAB TEST NON-CDC: HCPCS

## 2020-04-28 PROCEDURE — 94760 N-INVAS EAR/PLS OXIMETRY 1: CPT

## 2020-04-28 PROCEDURE — 6360000004 HC RX CONTRAST MEDICATION: Performed by: INTERNAL MEDICINE

## 2020-04-28 PROCEDURE — 80048 BASIC METABOLIC PNL TOTAL CA: CPT

## 2020-04-28 PROCEDURE — 6370000000 HC RX 637 (ALT 250 FOR IP): Performed by: INTERNAL MEDICINE

## 2020-04-28 PROCEDURE — 6370000000 HC RX 637 (ALT 250 FOR IP): Performed by: NURSE PRACTITIONER

## 2020-04-28 PROCEDURE — 85025 COMPLETE CBC W/AUTO DIFF WBC: CPT

## 2020-04-28 PROCEDURE — 1200000000 HC SEMI PRIVATE

## 2020-04-28 RX ADMIN — INSULIN GLARGINE 25 UNITS: 100 INJECTION, SOLUTION SUBCUTANEOUS at 21:34

## 2020-04-28 RX ADMIN — IOHEXOL: 240 INJECTION, SOLUTION INTRATHECAL; INTRAVASCULAR; INTRAVENOUS; ORAL at 17:58

## 2020-04-28 RX ADMIN — ENOXAPARIN SODIUM 40 MG: 40 INJECTION SUBCUTANEOUS at 08:09

## 2020-04-28 RX ADMIN — IOHEXOL 50 ML: 240 INJECTION, SOLUTION INTRATHECAL; INTRAVASCULAR; INTRAVENOUS; ORAL at 08:31

## 2020-04-28 RX ADMIN — MORPHINE SULFATE 2 MG: 2 INJECTION, SOLUTION INTRAMUSCULAR; INTRAVENOUS at 13:53

## 2020-04-28 RX ADMIN — PROMETHAZINE HYDROCHLORIDE 12.5 MG: 25 INJECTION INTRAMUSCULAR; INTRAVENOUS at 09:45

## 2020-04-28 RX ADMIN — POLYETHYLENE GLYCOL 3350 17 G: 17 POWDER, FOR SOLUTION ORAL at 08:09

## 2020-04-28 RX ADMIN — ATORVASTATIN CALCIUM 40 MG: 40 TABLET, FILM COATED ORAL at 21:32

## 2020-04-28 RX ADMIN — HYDROCODONE BITARTRATE AND ACETAMINOPHEN 1 TABLET: 5; 325 TABLET ORAL at 18:53

## 2020-04-28 RX ADMIN — POLYETHYLENE GLYCOL 3350 17 G: 17 POWDER, FOR SOLUTION ORAL at 21:34

## 2020-04-28 RX ADMIN — SODIUM CHLORIDE, POTASSIUM CHLORIDE, SODIUM LACTATE AND CALCIUM CHLORIDE: 600; 310; 30; 20 INJECTION, SOLUTION INTRAVENOUS at 21:32

## 2020-04-28 RX ADMIN — IOPAMIDOL 75 ML: 755 INJECTION, SOLUTION INTRAVENOUS at 11:50

## 2020-04-28 RX ADMIN — LOSARTAN POTASSIUM 100 MG: 100 TABLET, FILM COATED ORAL at 08:08

## 2020-04-28 RX ADMIN — HYDROCODONE BITARTRATE AND ACETAMINOPHEN 1 TABLET: 5; 325 TABLET ORAL at 08:09

## 2020-04-28 RX ADMIN — PANTOPRAZOLE SODIUM 40 MG: 40 TABLET, DELAYED RELEASE ORAL at 18:53

## 2020-04-28 RX ADMIN — POLYETHYLENE GLYCOL 3350 17 G: 17 POWDER, FOR SOLUTION ORAL at 14:00

## 2020-04-28 RX ADMIN — ONDANSETRON 4 MG: 2 INJECTION INTRAMUSCULAR; INTRAVENOUS at 08:08

## 2020-04-28 ASSESSMENT — PAIN DESCRIPTION - PAIN TYPE
TYPE: ACUTE PAIN
TYPE: ACUTE PAIN

## 2020-04-28 ASSESSMENT — PAIN SCALES - GENERAL
PAINLEVEL_OUTOF10: 7
PAINLEVEL_OUTOF10: 6
PAINLEVEL_OUTOF10: 0
PAINLEVEL_OUTOF10: 7
PAINLEVEL_OUTOF10: 7

## 2020-04-28 ASSESSMENT — PAIN DESCRIPTION - FREQUENCY
FREQUENCY: CONTINUOUS
FREQUENCY: CONTINUOUS

## 2020-04-28 ASSESSMENT — PAIN DESCRIPTION - ORIENTATION
ORIENTATION: LEFT;MID;UPPER
ORIENTATION: LEFT;MID;UPPER

## 2020-04-28 ASSESSMENT — PAIN DESCRIPTION - PROGRESSION
CLINICAL_PROGRESSION: GRADUALLY IMPROVING
CLINICAL_PROGRESSION: GRADUALLY IMPROVING

## 2020-04-28 ASSESSMENT — PAIN DESCRIPTION - DESCRIPTORS
DESCRIPTORS: SHOOTING;SHARP
DESCRIPTORS: SHOOTING;SHARP

## 2020-04-28 ASSESSMENT — PAIN - FUNCTIONAL ASSESSMENT
PAIN_FUNCTIONAL_ASSESSMENT: PREVENTS OR INTERFERES SOME ACTIVE ACTIVITIES AND ADLS
PAIN_FUNCTIONAL_ASSESSMENT: PREVENTS OR INTERFERES SOME ACTIVE ACTIVITIES AND ADLS

## 2020-04-28 ASSESSMENT — PAIN DESCRIPTION - LOCATION
LOCATION: ABDOMEN
LOCATION: ABDOMEN

## 2020-04-28 ASSESSMENT — PAIN DESCRIPTION - ONSET
ONSET: ON-GOING
ONSET: ON-GOING

## 2020-04-28 NOTE — PROGRESS NOTES
motion. No jugular venous distention. Trachea midline. Respiratory:  Normal respiratory effort. Clear to auscultation, bilaterally without Rales/Wheezes/Rhonchi. Cardiovascular:  Regular rate and rhythm with normal S1/S2 without murmurs, rubs or gallops. Abdomen: Soft, tender in the left upper quadrant, non-distended with normal bowel sounds. Musculoskeletal:  No clubbing, cyanosis or edema bilaterally. Full range of motion without deformity. Skin: Skin color, texture, turgor normal.  No rashes or lesions. Neurologic:  grossly non-focal.      Labs:   Recent Labs     04/26/20  0640 04/27/20  0731 04/28/20  0619   WBC 6.3 6.2 7.9   HGB 13.1* 14.5 13.6   HCT 39.6* 44.2 41.9    234 241     Recent Labs     04/26/20  0640 04/27/20  0731 04/28/20  0619    140 140   K 3.7 3.5 3.6    102 102   CO2 23 22 24   BUN 8 10 9   CREATININE 1.3 1.3 1.4*   CALCIUM 9.1 9.6 9.6     No results for input(s): AST, ALT, BILIDIR, BILITOT, ALKPHOS in the last 72 hours. No results for input(s): INR in the last 72 hours. No results for input(s): Marcha Justin in the last 72 hours. Urinalysis:      Lab Results   Component Value Date    NITRU Negative 04/24/2020    WBCUA 0 06/25/2019    RBCUA 1 06/25/2019    BLOODU Negative 04/24/2020    SPECGRAV 1.024 04/24/2020    GLUCOSEU >=1000 04/24/2020       Radiology:  CT ABDOMEN PELVIS W IV CONTRAST Additional Contrast? Oral   Final Result   There is mild wall thickening in the region the pylorus which is likely   related to peristalsis given the absence of adjacent inflammatory changes. Otherwise unremarkable CT abdomen and pelvis. CT Head WO Contrast   Final Result   No acute intracranial abnormality. Generalized atrophy and chronic ischemic changes as above including a remote   left occipital infarct. CT LUMBAR SPINE TRAUMA RECONSTRUCTION   Final Result   Unremarkable non-contrast CT of the thoracic and lumbar spine.          CT THORACIC SPINE

## 2020-04-28 NOTE — PROGRESS NOTES
12/2019) and prior positive histoplasmosis antigen (12/2019) who presented with:    1. Left upper quadrant pain, nausea, vomiting, constipation: Had BM yesterday after stool softeners and enemas. Blood noted with stool. Patient with persistent abdominal pain and vomiting x1 yesterday. Differential diagnosis includes: constipation with outlet bleeding versus viral gastroenteritis versus early diverticulitis/ischemic colitis verse gastroparesis. CTA showed no stenosis of the celiac artery, SMA or LANDRY. Blood work unremarkable. TSH normal. Prior EGDs (6/18, 4/19) with no evidence of peptic ulcer disease, and prior EUS (4/19) to evaluate a nonspecific paraesophageal lymph node  showed a benign granuloma.        RECOMMENDATIONS:    We will order CT A/P with contrast  Continue PPI twice daily  Continue Reglan and antiemetics as needed for nausea possible related to gastroparesis  Diet as tolerated  Continue supportive care. Likely benefit from repeat EGD and colonoscopy at some point, inpatient versus outpatient pending clinical course and CT findings    If you have any questions or need any further information, please feel free to contact us [phone]. Thank you for allowing us to participate in the care of Rodrigo Cox. The note was completed using Dragon voice recognition transcription. Every effort was made to ensure accuracy; however, inadvertent transcription errors may be present despite my best efforts to edit errors.     Burna Dakin PA

## 2020-04-29 ENCOUNTER — ANESTHESIA (OUTPATIENT)
Dept: ENDOSCOPY | Age: 75
DRG: 241 | End: 2020-04-29
Payer: MEDICAID

## 2020-04-29 ENCOUNTER — ANESTHESIA EVENT (OUTPATIENT)
Dept: ENDOSCOPY | Age: 75
DRG: 241 | End: 2020-04-29
Payer: MEDICAID

## 2020-04-29 VITALS
OXYGEN SATURATION: 98 % | RESPIRATION RATE: 19 BRPM | SYSTOLIC BLOOD PRESSURE: 156 MMHG | DIASTOLIC BLOOD PRESSURE: 78 MMHG

## 2020-04-29 LAB
ANION GAP SERPL CALCULATED.3IONS-SCNC: 14 MMOL/L (ref 3–16)
BASOPHILS ABSOLUTE: 0 K/UL (ref 0–0.2)
BASOPHILS RELATIVE PERCENT: 0.6 %
BILIRUBIN URINE: NEGATIVE
BLOOD, URINE: NEGATIVE
BUN BLDV-MCNC: 6 MG/DL (ref 7–20)
CALCIUM SERPL-MCNC: 9 MG/DL (ref 8.3–10.6)
CHLORIDE BLD-SCNC: 104 MMOL/L (ref 99–110)
CLARITY: CLEAR
CO2: 22 MMOL/L (ref 21–32)
COLOR: YELLOW
CREAT SERPL-MCNC: 1.2 MG/DL (ref 0.8–1.3)
EOSINOPHILS ABSOLUTE: 0.2 K/UL (ref 0–0.6)
EOSINOPHILS RELATIVE PERCENT: 3.4 %
GFR AFRICAN AMERICAN: >60
GFR NON-AFRICAN AMERICAN: 59
GLUCOSE BLD-MCNC: 105 MG/DL (ref 70–99)
GLUCOSE BLD-MCNC: 106 MG/DL (ref 70–99)
GLUCOSE BLD-MCNC: 108 MG/DL (ref 70–99)
GLUCOSE BLD-MCNC: 221 MG/DL (ref 70–99)
GLUCOSE BLD-MCNC: 89 MG/DL (ref 70–99)
GLUCOSE BLD-MCNC: 95 MG/DL (ref 70–99)
GLUCOSE URINE: NEGATIVE MG/DL
HCT VFR BLD CALC: 37.8 % (ref 40.5–52.5)
HEMOGLOBIN: 12.6 G/DL (ref 13.5–17.5)
KETONES, URINE: ABNORMAL MG/DL
LEUKOCYTE ESTERASE, URINE: NEGATIVE
LYMPHOCYTES ABSOLUTE: 1.6 K/UL (ref 1–5.1)
LYMPHOCYTES RELATIVE PERCENT: 23.8 %
MAGNESIUM: 1.7 MG/DL (ref 1.8–2.4)
MCH RBC QN AUTO: 25.5 PG (ref 26–34)
MCHC RBC AUTO-ENTMCNC: 33.3 G/DL (ref 31–36)
MCV RBC AUTO: 76.6 FL (ref 80–100)
MICROSCOPIC EXAMINATION: ABNORMAL
MONOCYTES ABSOLUTE: 0.6 K/UL (ref 0–1.3)
MONOCYTES RELATIVE PERCENT: 8.4 %
NEUTROPHILS ABSOLUTE: 4.3 K/UL (ref 1.7–7.7)
NEUTROPHILS RELATIVE PERCENT: 63.8 %
NITRITE, URINE: NEGATIVE
PDW BLD-RTO: 15.5 % (ref 12.4–15.4)
PERFORMED ON: ABNORMAL
PERFORMED ON: NORMAL
PERFORMED ON: NORMAL
PH UA: 7 (ref 5–8)
PLATELET # BLD: 205 K/UL (ref 135–450)
PMV BLD AUTO: 7 FL (ref 5–10.5)
POTASSIUM REFLEX MAGNESIUM: 3.5 MMOL/L (ref 3.5–5.1)
PROTEIN UA: NEGATIVE MG/DL
RBC # BLD: 4.94 M/UL (ref 4.2–5.9)
SODIUM BLD-SCNC: 140 MMOL/L (ref 136–145)
SPECIFIC GRAVITY UA: 1.01 (ref 1–1.03)
URINE TYPE: ABNORMAL
UROBILINOGEN, URINE: 0.2 E.U./DL
WBC # BLD: 6.7 K/UL (ref 4–11)

## 2020-04-29 PROCEDURE — 2580000003 HC RX 258: Performed by: INTERNAL MEDICINE

## 2020-04-29 PROCEDURE — 7100000000 HC PACU RECOVERY - FIRST 15 MIN: Performed by: INTERNAL MEDICINE

## 2020-04-29 PROCEDURE — 6360000002 HC RX W HCPCS: Performed by: INTERNAL MEDICINE

## 2020-04-29 PROCEDURE — 2709999900 HC NON-CHARGEABLE SUPPLY: Performed by: INTERNAL MEDICINE

## 2020-04-29 PROCEDURE — 80048 BASIC METABOLIC PNL TOTAL CA: CPT

## 2020-04-29 PROCEDURE — 2500000003 HC RX 250 WO HCPCS: Performed by: NURSE ANESTHETIST, CERTIFIED REGISTERED

## 2020-04-29 PROCEDURE — 6370000000 HC RX 637 (ALT 250 FOR IP): Performed by: INTERNAL MEDICINE

## 2020-04-29 PROCEDURE — 81003 URINALYSIS AUTO W/O SCOPE: CPT

## 2020-04-29 PROCEDURE — 3609012400 HC EGD TRANSORAL BIOPSY SINGLE/MULTIPLE: Performed by: INTERNAL MEDICINE

## 2020-04-29 PROCEDURE — 3700000001 HC ADD 15 MINUTES (ANESTHESIA): Performed by: INTERNAL MEDICINE

## 2020-04-29 PROCEDURE — 88305 TISSUE EXAM BY PATHOLOGIST: CPT

## 2020-04-29 PROCEDURE — 85025 COMPLETE CBC W/AUTO DIFF WBC: CPT

## 2020-04-29 PROCEDURE — 1200000000 HC SEMI PRIVATE

## 2020-04-29 PROCEDURE — 7100000001 HC PACU RECOVERY - ADDTL 15 MIN: Performed by: INTERNAL MEDICINE

## 2020-04-29 PROCEDURE — 3700000000 HC ANESTHESIA ATTENDED CARE: Performed by: INTERNAL MEDICINE

## 2020-04-29 PROCEDURE — 2580000003 HC RX 258: Performed by: NURSE ANESTHETIST, CERTIFIED REGISTERED

## 2020-04-29 PROCEDURE — 6360000002 HC RX W HCPCS: Performed by: NURSE PRACTITIONER

## 2020-04-29 PROCEDURE — 83735 ASSAY OF MAGNESIUM: CPT

## 2020-04-29 PROCEDURE — 6360000002 HC RX W HCPCS: Performed by: NURSE ANESTHETIST, CERTIFIED REGISTERED

## 2020-04-29 PROCEDURE — 0DB68ZX EXCISION OF STOMACH, VIA NATURAL OR ARTIFICIAL OPENING ENDOSCOPIC, DIAGNOSTIC: ICD-10-PCS | Performed by: INTERNAL MEDICINE

## 2020-04-29 RX ORDER — SIMETHICONE 80 MG
80 TABLET,CHEWABLE ORAL 4 TIMES DAILY
Status: DISCONTINUED | OUTPATIENT
Start: 2020-04-29 | End: 2020-04-29

## 2020-04-29 RX ORDER — SIMETHICONE 80 MG
80 TABLET,CHEWABLE ORAL 4 TIMES DAILY
Status: COMPLETED | OUTPATIENT
Start: 2020-04-29 | End: 2020-04-29

## 2020-04-29 RX ORDER — ONDANSETRON 2 MG/ML
4 INJECTION INTRAMUSCULAR; INTRAVENOUS
Status: DISCONTINUED | OUTPATIENT
Start: 2020-04-29 | End: 2020-04-29

## 2020-04-29 RX ORDER — SODIUM CHLORIDE 9 MG/ML
INJECTION, SOLUTION INTRAVENOUS CONTINUOUS PRN
Status: DISCONTINUED | OUTPATIENT
Start: 2020-04-29 | End: 2020-04-29 | Stop reason: SDUPTHER

## 2020-04-29 RX ORDER — PROPOFOL 10 MG/ML
INJECTION, EMULSION INTRAVENOUS PRN
Status: DISCONTINUED | OUTPATIENT
Start: 2020-04-29 | End: 2020-04-29 | Stop reason: SDUPTHER

## 2020-04-29 RX ORDER — PROPOFOL 10 MG/ML
INJECTION, EMULSION INTRAVENOUS CONTINUOUS PRN
Status: DISCONTINUED | OUTPATIENT
Start: 2020-04-29 | End: 2020-04-29 | Stop reason: SDUPTHER

## 2020-04-29 RX ORDER — LIDOCAINE HYDROCHLORIDE 20 MG/ML
INJECTION, SOLUTION EPIDURAL; INFILTRATION; INTRACAUDAL; PERINEURAL PRN
Status: DISCONTINUED | OUTPATIENT
Start: 2020-04-29 | End: 2020-04-29 | Stop reason: SDUPTHER

## 2020-04-29 RX ADMIN — HYDROCODONE BITARTRATE AND ACETAMINOPHEN 1 TABLET: 5; 325 TABLET ORAL at 10:35

## 2020-04-29 RX ADMIN — SODIUM CHLORIDE: 9 INJECTION, SOLUTION INTRAVENOUS at 08:51

## 2020-04-29 RX ADMIN — ONDANSETRON 4 MG: 2 INJECTION INTRAMUSCULAR; INTRAVENOUS at 02:39

## 2020-04-29 RX ADMIN — HYDROCODONE BITARTRATE AND ACETAMINOPHEN 1 TABLET: 5; 325 TABLET ORAL at 02:02

## 2020-04-29 RX ADMIN — PROPOFOL 20 MG: 10 INJECTION, EMULSION INTRAVENOUS at 09:34

## 2020-04-29 RX ADMIN — SODIUM CHLORIDE, PRESERVATIVE FREE 10 ML: 5 INJECTION INTRAVENOUS at 21:25

## 2020-04-29 RX ADMIN — LIDOCAINE HYDROCHLORIDE 50 MG: 20 INJECTION, SOLUTION EPIDURAL; INFILTRATION; INTRACAUDAL; PERINEURAL at 09:29

## 2020-04-29 RX ADMIN — SIMETHICONE CHEW TAB 80 MG 80 MG: 80 TABLET ORAL at 14:52

## 2020-04-29 RX ADMIN — PROPOFOL 30 MG: 10 INJECTION, EMULSION INTRAVENOUS at 09:38

## 2020-04-29 RX ADMIN — PANTOPRAZOLE SODIUM 40 MG: 40 TABLET, DELAYED RELEASE ORAL at 14:35

## 2020-04-29 RX ADMIN — LOSARTAN POTASSIUM 100 MG: 100 TABLET, FILM COATED ORAL at 10:35

## 2020-04-29 RX ADMIN — ONDANSETRON 4 MG: 2 INJECTION INTRAMUSCULAR; INTRAVENOUS at 21:18

## 2020-04-29 RX ADMIN — MORPHINE SULFATE 2 MG: 2 INJECTION, SOLUTION INTRAMUSCULAR; INTRAVENOUS at 21:22

## 2020-04-29 RX ADMIN — HYDROCODONE BITARTRATE AND ACETAMINOPHEN 1 TABLET: 5; 325 TABLET ORAL at 16:42

## 2020-04-29 RX ADMIN — POLYETHYLENE GLYCOL 3350 17 G: 17 POWDER, FOR SOLUTION ORAL at 10:34

## 2020-04-29 RX ADMIN — SIMETHICONE CHEW TAB 80 MG 80 MG: 80 TABLET ORAL at 21:24

## 2020-04-29 RX ADMIN — HYDRALAZINE HYDROCHLORIDE 10 MG: 20 INJECTION INTRAMUSCULAR; INTRAVENOUS at 00:43

## 2020-04-29 RX ADMIN — PROPOFOL 50 MG: 10 INJECTION, EMULSION INTRAVENOUS at 09:31

## 2020-04-29 RX ADMIN — POLYETHYLENE GLYCOL 3350 17 G: 17 POWDER, FOR SOLUTION ORAL at 14:35

## 2020-04-29 RX ADMIN — PROPOFOL 140 MCG/KG/MIN: 10 INJECTION, EMULSION INTRAVENOUS at 09:31

## 2020-04-29 ASSESSMENT — PULMONARY FUNCTION TESTS
PIF_VALUE: 0
PIF_VALUE: 1
PIF_VALUE: 0

## 2020-04-29 ASSESSMENT — PAIN DESCRIPTION - LOCATION
LOCATION: ABDOMEN

## 2020-04-29 ASSESSMENT — PAIN DESCRIPTION - PAIN TYPE
TYPE: ACUTE PAIN

## 2020-04-29 ASSESSMENT — PAIN - FUNCTIONAL ASSESSMENT
PAIN_FUNCTIONAL_ASSESSMENT: 0-10
PAIN_FUNCTIONAL_ASSESSMENT: ACTIVITIES ARE NOT PREVENTED

## 2020-04-29 ASSESSMENT — PAIN SCALES - GENERAL
PAINLEVEL_OUTOF10: 0
PAINLEVEL_OUTOF10: 3
PAINLEVEL_OUTOF10: 8
PAINLEVEL_OUTOF10: 0
PAINLEVEL_OUTOF10: 10
PAINLEVEL_OUTOF10: 0
PAINLEVEL_OUTOF10: 8
PAINLEVEL_OUTOF10: 2
PAINLEVEL_OUTOF10: 0

## 2020-04-29 ASSESSMENT — PAIN DESCRIPTION - DESCRIPTORS
DESCRIPTORS: SHARP
DESCRIPTORS: SHARP
DESCRIPTORS: DISCOMFORT;CRAMPING

## 2020-04-29 ASSESSMENT — PAIN DESCRIPTION - ORIENTATION: ORIENTATION: MID

## 2020-04-29 ASSESSMENT — ENCOUNTER SYMPTOMS: SHORTNESS OF BREATH: 0

## 2020-04-29 ASSESSMENT — PAIN DESCRIPTION - FREQUENCY: FREQUENCY: INTERMITTENT

## 2020-04-29 ASSESSMENT — PAIN DESCRIPTION - PROGRESSION: CLINICAL_PROGRESSION: GRADUALLY WORSENING

## 2020-04-29 ASSESSMENT — PAIN DESCRIPTION - ONSET: ONSET: GRADUAL

## 2020-04-29 NOTE — PROGRESS NOTES
with full range of motion. No jugular venous distention. Trachea midline. Respiratory:  Normal respiratory effort. Clear to auscultation, bilaterally without Rales/Wheezes/Rhonchi. Cardiovascular:  Regular rate and rhythm with normal S1/S2 without murmurs, rubs or gallops. Abdomen: Soft, tender in the left upper quadrant, non-distended with normal bowel sounds. Musculoskeletal:  No clubbing, cyanosis or edema bilaterally. Full range of motion without deformity. Skin: Skin color, texture, turgor normal.  No rashes or lesions. Neurologic:  grossly non-focal.      Labs:   Recent Labs     04/27/20  0731 04/28/20  0619 04/29/20  0720   WBC 6.2 7.9 6.7   HGB 14.5 13.6 12.6*   HCT 44.2 41.9 37.8*    241 205     Recent Labs     04/27/20  0731 04/28/20  0619 04/29/20  0720    140 140   K 3.5 3.6 3.5    102 104   CO2 22 24 22   BUN 10 9 6*   CREATININE 1.3 1.4* 1.2   CALCIUM 9.6 9.6 9.0     No results for input(s): AST, ALT, BILIDIR, BILITOT, ALKPHOS in the last 72 hours. No results for input(s): INR in the last 72 hours. No results for input(s): Williemae Brooks in the last 72 hours. Urinalysis:      Lab Results   Component Value Date    NITRU Negative 04/24/2020    WBCUA 0 06/25/2019    RBCUA 1 06/25/2019    BLOODU Negative 04/24/2020    SPECGRAV 1.024 04/24/2020    GLUCOSEU >=1000 04/24/2020       Radiology:  CT ABDOMEN PELVIS W IV CONTRAST Additional Contrast? Oral   Final Result   There is mild wall thickening in the region the pylorus which is likely   related to peristalsis given the absence of adjacent inflammatory changes. Otherwise unremarkable CT abdomen and pelvis. CT Head WO Contrast   Final Result   No acute intracranial abnormality. Generalized atrophy and chronic ischemic changes as above including a remote   left occipital infarct. CT LUMBAR SPINE TRAUMA RECONSTRUCTION   Final Result   Unremarkable non-contrast CT of the thoracic and lumbar spine.

## 2020-04-29 NOTE — PROGRESS NOTES
Handoff report given to RN for [phone]. Pt appears comfortable. VSS. Pt ready for transfer to floor.

## 2020-04-29 NOTE — ANESTHESIA PRE PROCEDURE
tablet 100 mg  100 mg Oral Daily Dyan Chacko MD   100 mg at 04/28/20 2303    hydrALAZINE (APRESOLINE) injection 10 mg  10 mg Intravenous Q6H PRN Dyan Chacko MD   10 mg at 04/29/20 0043    morphine (PF) injection 2 mg  2 mg Intravenous Q4H PRN Dyan Chacko MD   2 mg at 04/28/20 1353    HYDROcodone-acetaminophen (NORCO) 5-325 MG per tablet 1 tablet  1 tablet Oral Q6H PRN Dyan Chacko MD   1 tablet at 04/29/20 0202    promethazine (PHENERGAN) 12.5mg in sodium chloride 0.9% 50 mL IVPB 12.5 mg  12.5 mg Intravenous Q6H PRN Rafael Velasco APRN - CNP   Stopped at 04/28/20 1034    atorvastatin (LIPITOR) tablet 40 mg  40 mg Oral Nightly Christine Hissett, APRN - CNP   40 mg at 04/28/20 2132    insulin glargine (LANTUS) injection vial 25 Units  25 Units Subcutaneous Nightly Christine Hissett, APRN - CNP   25 Units at 04/28/20 2134    sodium chloride flush 0.9 % injection 10 mL  10 mL Intravenous 2 times per day [place], APRN - CNP   10 mL at 04/25/20 2246    sodium chloride flush 0.9 % injection 10 mL  10 mL Intravenous PRN Christine Hissett, APRN - CNP   10 mL at 04/27/20 0439    acetaminophen (TYLENOL) tablet 650 mg  650 mg Oral Q6H PRN Christine Hissett, APRN - CNP   650 mg at 04/27/20 1224    Or    acetaminophen (TYLENOL) suppository 650 mg  650 mg Rectal Q6H PRN Christine Hissett, APRN - CNP        promethazine (PHENERGAN) tablet 12.5 mg  12.5 mg Oral Q6H PRN Christine Hissett, APRN - CNP        Or    ondansetron (ZOFRAN) injection 4 mg  4 mg Intravenous Q6H PRN Christine Hissett, APRN - CNP   4 mg at 04/29/20 0239    enoxaparin (LOVENOX) injection 40 mg  40 mg Subcutaneous Daily Christine Hissett, APRN - CNP   40 mg at 04/28/20 0809    lactated ringers infusion   Intravenous Continuous Jabulani Sidile, MD 75 mL/hr at 04/28/20 3915      glucose (GLUTOSE) 40 % oral gel 15 g  15 g Oral PRN Christine Mi, APRN - CNP        dextrose 50 % IV solution  12.5 g Intravenous PRN Christine Mi, APRN - CNP        glucagon

## 2020-04-30 LAB
BASOPHILS ABSOLUTE: 0.1 K/UL (ref 0–0.2)
BASOPHILS RELATIVE PERCENT: 0.7 %
EOSINOPHILS ABSOLUTE: 0.2 K/UL (ref 0–0.6)
EOSINOPHILS RELATIVE PERCENT: 2.3 %
GLUCOSE BLD-MCNC: 135 MG/DL (ref 70–99)
GLUCOSE BLD-MCNC: 145 MG/DL (ref 70–99)
GLUCOSE BLD-MCNC: 157 MG/DL (ref 70–99)
GLUCOSE BLD-MCNC: 205 MG/DL (ref 70–99)
GLUCOSE BLD-MCNC: 220 MG/DL (ref 70–99)
GLUCOSE BLD-MCNC: 65 MG/DL (ref 70–99)
GLUCOSE BLD-MCNC: 67 MG/DL (ref 70–99)
HCT VFR BLD CALC: 39.7 % (ref 40.5–52.5)
HEMOGLOBIN: 13.1 G/DL (ref 13.5–17.5)
LYMPHOCYTES ABSOLUTE: 2.1 K/UL (ref 1–5.1)
LYMPHOCYTES RELATIVE PERCENT: 22.6 %
MCH RBC QN AUTO: 25.4 PG (ref 26–34)
MCHC RBC AUTO-ENTMCNC: 33 G/DL (ref 31–36)
MCV RBC AUTO: 77 FL (ref 80–100)
MONOCYTES ABSOLUTE: 0.7 K/UL (ref 0–1.3)
MONOCYTES RELATIVE PERCENT: 7 %
NEUTROPHILS ABSOLUTE: 6.3 K/UL (ref 1.7–7.7)
NEUTROPHILS RELATIVE PERCENT: 67.4 %
PDW BLD-RTO: 15.6 % (ref 12.4–15.4)
PERFORMED ON: ABNORMAL
PLATELET # BLD: 215 K/UL (ref 135–450)
PMV BLD AUTO: 7.2 FL (ref 5–10.5)
RBC # BLD: 5.16 M/UL (ref 4.2–5.9)
WBC # BLD: 9.4 K/UL (ref 4–11)
WHITE BLOOD CELLS (WBC), STOOL: NORMAL

## 2020-04-30 PROCEDURE — 6370000000 HC RX 637 (ALT 250 FOR IP): Performed by: NURSE PRACTITIONER

## 2020-04-30 PROCEDURE — 83630 LACTOFERRIN FECAL (QUAL): CPT

## 2020-04-30 PROCEDURE — 97116 GAIT TRAINING THERAPY: CPT

## 2020-04-30 PROCEDURE — 97530 THERAPEUTIC ACTIVITIES: CPT

## 2020-04-30 PROCEDURE — 2580000003 HC RX 258: Performed by: INTERNAL MEDICINE

## 2020-04-30 PROCEDURE — 6370000000 HC RX 637 (ALT 250 FOR IP): Performed by: INTERNAL MEDICINE

## 2020-04-30 PROCEDURE — 97166 OT EVAL MOD COMPLEX 45 MIN: CPT

## 2020-04-30 PROCEDURE — 36415 COLL VENOUS BLD VENIPUNCTURE: CPT

## 2020-04-30 PROCEDURE — 97162 PT EVAL MOD COMPLEX 30 MIN: CPT

## 2020-04-30 PROCEDURE — 6360000002 HC RX W HCPCS: Performed by: INTERNAL MEDICINE

## 2020-04-30 PROCEDURE — 1200000000 HC SEMI PRIVATE

## 2020-04-30 PROCEDURE — 85025 COMPLETE CBC W/AUTO DIFF WBC: CPT

## 2020-04-30 RX ORDER — HYDROCODONE BITARTRATE AND ACETAMINOPHEN 5; 325 MG/1; MG/1
2 TABLET ORAL EVERY 6 HOURS PRN
Status: DISCONTINUED | OUTPATIENT
Start: 2020-04-30 | End: 2020-05-01 | Stop reason: HOSPADM

## 2020-04-30 RX ORDER — INSULIN GLARGINE 100 [IU]/ML
6 INJECTION, SOLUTION SUBCUTANEOUS NIGHTLY
Status: DISCONTINUED | OUTPATIENT
Start: 2020-04-30 | End: 2020-05-01 | Stop reason: HOSPADM

## 2020-04-30 RX ORDER — INSULIN GLARGINE 100 [IU]/ML
12 INJECTION, SOLUTION SUBCUTANEOUS NIGHTLY
Status: DISCONTINUED | OUTPATIENT
Start: 2020-04-30 | End: 2020-04-30

## 2020-04-30 RX ADMIN — SODIUM CHLORIDE, PRESERVATIVE FREE 10 ML: 5 INJECTION INTRAVENOUS at 08:01

## 2020-04-30 RX ADMIN — INSULIN GLARGINE 6 UNITS: 100 INJECTION, SOLUTION SUBCUTANEOUS at 21:19

## 2020-04-30 RX ADMIN — ATORVASTATIN CALCIUM 40 MG: 40 TABLET, FILM COATED ORAL at 21:19

## 2020-04-30 RX ADMIN — POLYETHYLENE GLYCOL 3350 17 G: 17 POWDER, FOR SOLUTION ORAL at 08:01

## 2020-04-30 RX ADMIN — POLYETHYLENE GLYCOL 3350 17 G: 17 POWDER, FOR SOLUTION ORAL at 12:54

## 2020-04-30 RX ADMIN — HYDROCODONE BITARTRATE AND ACETAMINOPHEN 2 TABLET: 5; 325 TABLET ORAL at 12:54

## 2020-04-30 RX ADMIN — ENOXAPARIN SODIUM 40 MG: 40 INJECTION SUBCUTANEOUS at 01:16

## 2020-04-30 RX ADMIN — SODIUM CHLORIDE, PRESERVATIVE FREE 10 ML: 5 INJECTION INTRAVENOUS at 22:41

## 2020-04-30 RX ADMIN — INSULIN LISPRO 2 UNITS: 100 INJECTION, SOLUTION INTRAVENOUS; SUBCUTANEOUS at 12:41

## 2020-04-30 RX ADMIN — INSULIN GLARGINE 12 UNITS: 100 INJECTION, SOLUTION SUBCUTANEOUS at 01:17

## 2020-04-30 RX ADMIN — PANTOPRAZOLE SODIUM 40 MG: 40 TABLET, DELAYED RELEASE ORAL at 08:03

## 2020-04-30 RX ADMIN — METOCLOPRAMIDE HYDROCHLORIDE 5 MG: 5 INJECTION INTRAMUSCULAR; INTRAVENOUS at 08:01

## 2020-04-30 RX ADMIN — DEXTROSE 15 G: 15 GEL ORAL at 08:06

## 2020-04-30 RX ADMIN — LOSARTAN POTASSIUM 100 MG: 100 TABLET, FILM COATED ORAL at 08:01

## 2020-04-30 RX ADMIN — HYDROCODONE BITARTRATE AND ACETAMINOPHEN 2 TABLET: 5; 325 TABLET ORAL at 19:43

## 2020-04-30 RX ADMIN — ONDANSETRON 4 MG: 2 INJECTION INTRAMUSCULAR; INTRAVENOUS at 17:11

## 2020-04-30 RX ADMIN — ENOXAPARIN SODIUM 40 MG: 40 INJECTION SUBCUTANEOUS at 08:01

## 2020-04-30 RX ADMIN — METOCLOPRAMIDE HYDROCHLORIDE 5 MG: 5 INJECTION INTRAMUSCULAR; INTRAVENOUS at 18:50

## 2020-04-30 RX ADMIN — POLYETHYLENE GLYCOL-3350 AND ELECTROLYTES 4000 ML: 236; 6.74; 5.86; 2.97; 22.74 POWDER, FOR SOLUTION ORAL at 16:20

## 2020-04-30 RX ADMIN — PANTOPRAZOLE SODIUM 40 MG: 40 TABLET, DELAYED RELEASE ORAL at 17:00

## 2020-04-30 ASSESSMENT — PAIN DESCRIPTION - PAIN TYPE
TYPE: ACUTE PAIN

## 2020-04-30 ASSESSMENT — PAIN DESCRIPTION - DESCRIPTORS
DESCRIPTORS: SHARP;DISCOMFORT
DESCRIPTORS: SHARP;DISCOMFORT
DESCRIPTORS: ACHING
DESCRIPTORS: DISCOMFORT;ACHING
DESCRIPTORS: ACHING;SHARP

## 2020-04-30 ASSESSMENT — PAIN DESCRIPTION - PROGRESSION
CLINICAL_PROGRESSION: GRADUALLY IMPROVING
CLINICAL_PROGRESSION: GRADUALLY WORSENING
CLINICAL_PROGRESSION: GRADUALLY WORSENING
CLINICAL_PROGRESSION: NOT CHANGED

## 2020-04-30 ASSESSMENT — PAIN SCALES - GENERAL
PAINLEVEL_OUTOF10: 4
PAINLEVEL_OUTOF10: 5
PAINLEVEL_OUTOF10: 4
PAINLEVEL_OUTOF10: 6
PAINLEVEL_OUTOF10: 7
PAINLEVEL_OUTOF10: 7

## 2020-04-30 ASSESSMENT — PAIN DESCRIPTION - LOCATION
LOCATION: ABDOMEN

## 2020-04-30 ASSESSMENT — PAIN DESCRIPTION - ONSET
ONSET: ON-GOING
ONSET: ON-GOING
ONSET: GRADUAL
ONSET: GRADUAL
ONSET: ON-GOING

## 2020-04-30 ASSESSMENT — PAIN DESCRIPTION - ORIENTATION
ORIENTATION: MID

## 2020-04-30 ASSESSMENT — PAIN DESCRIPTION - FREQUENCY
FREQUENCY: INTERMITTENT

## 2020-04-30 NOTE — PROGRESS NOTES
Pt resting in bed with family at the bedside. Oriented to self only. Continues to complain of nausea and abdominal pain. No emesis noted. Administered PRN Zofran and Morphine per order. Andrew Story NP Hospitalist on call notified of pts decreased oral intake and blood sugar of 157. Obtained an order for a lower dose of Lantus. No signs or symptoms of distress noted. Fall precautions in place. Will continue to monitor.

## 2020-04-30 NOTE — PROGRESS NOTES
Pt vomited x 1 small amount, emesis green/clear. Will continue to monitor.   Electronically signed by Paul Cho RN on 4/30/2020 at 10:35 AM

## 2020-04-30 NOTE — PROGRESS NOTES
Hospitalist Progress Note      PCP: Marybel Vaca    Date of Admission: 4/24/2020    Hospital Course: 57-year-old male history of diabetes, hypertension who presented with epigastric pain he has a history of esophageal thickening. He was admitted for pain control. He has episodes of delirium. He does not speak [place]. History has to be taken from  phone or daughter. Subjective:   No overnight events. Patient is complaining of nausea and abdominal pain overnight. He has had decreased oral intake. He was hypoglycemic. Medications:  Reviewed    Infusion Medications    dextrose       Scheduled Medications    insulin glargine  6 Units Subcutaneous Nightly    insulin lispro  0-6 Units Subcutaneous TID WC    insulin lispro  0-3 Units Subcutaneous Nightly    losartan  100 mg Oral Daily    atorvastatin  40 mg Oral Nightly    sodium chloride flush  10 mL Intravenous 2 times per day    enoxaparin  40 mg Subcutaneous Daily    pantoprazole  40 mg Oral BID AC    polyethylene glycol  17 g Oral TID     PRN Meds: iohexol, hydrALAZINE, morphine, HYDROcodone 5 mg - acetaminophen, promethazine, sodium chloride flush, acetaminophen **OR** acetaminophen, promethazine **OR** ondansetron, glucose, dextrose, glucagon (rDNA), dextrose, GI cocktail, metoclopramide      Intake/Output Summary (Last 24 hours) at 4/30/2020 1220  Last data filed at 4/30/2020 1033  Gross per 24 hour   Intake 1090 ml   Output 325 ml   Net 765 ml       Physical Exam Performed:    BP (!) 159/90   Pulse 90   Temp 93.2 °F (34 °C) (Axillary)   Resp 16   Ht 5' 4\" (1.626 m)   Wt 133 lb 6.1 oz (60.5 kg)   SpO2 95%   BMI 22.89 kg/m²     General appearance:  No apparent distress, appears stated age and cooperative. Elderly. HEENT:  Normal cephalic, atraumatic without obvious deformity. Pupils equal, round, and reactive to light. Extra ocular muscles intact. Conjunctivae/corneas clear.   Neck: Supple, with full range of

## 2020-04-30 NOTE — PROGRESS NOTES
Pt did not tolerate golytely prep. After fisrt cup pt started vomitting. Pt medicated with prn zofran given to the pt as ordered.  Electronically signed by Catherine Mcmanus RN on 4/30/2020 at 5:23 PM

## 2020-04-30 NOTE — PROGRESS NOTES
AROM: unable to test formally due to language barrier  AROM LLE (degrees)  LLE AROM : WFL  LLE General AROM: unable to test formally due to language barrier  Strength RLE  Comment: unable to test formally due to language barrier but appears functional  Strength LLE  Comment: unable to test formally due to language barrier but appears functional        Bed mobility  Supine to Sit: Stand by assistance(with HOB elelvated)  Sit to Supine: Contact guard assistance(climbed into the bed with a little assist from his dgt)  Transfers  Sit to Stand: Minimal Assistance  Stand to sit: Minimal Assistance  Ambulation  Ambulation?: Yes  Ambulation 1  Surface: level tile  Device: Single point cane  Assistance: Contact guard assistance;Minimal assistance  Quality of Gait: flexed posture but able to advance B feet on his own, moderate pace, appears limited by pain  Distance: 100 feet x 2  Stairs/Curb  Stairs?: No     Balance  Posture: Fair  Sitting - Static: Good  Standing - Static: Fair  Standing - Dynamic: Fair(with cane)        Plan   Plan  Times per week: 2-3x/week  Current Treatment Recommendations: Functional Mobility Training  Safety Devices  Type of devices: Call light within reach, Bed alarm in place, Gait belt, Patient at risk for falls, Left in bed, Nurse notified(Merry RN notified)      AM-PAC Score  AM-PAC Inpatient Mobility Raw Score : 17 (04/30/20 1037)  AM-PAC Inpatient T-Scale Score : 42.13 (04/30/20 1037)  Mobility Inpatient CMS 0-100% Score: 50.57 (04/30/20 1037)  Mobility Inpatient CMS G-Code Modifier : CK (04/30/20 1037)          Goals  Short term goals  Time Frame for Short term goals: upon d/c  Short term goal 1: Bed mobility with SBA. Short term goal 2: Transfers sit <> stand with CGA. Short term goal 3: Ambulate with  fet with CGA/SBA.   Patient Goals   Patient goals : the pt unable to state a personal therapy goal due to decreased cog and language barrier       Therapy Time   Individual Concurrent

## 2020-04-30 NOTE — PROGRESS NOTES
Spoke with on call GI MD, pt is to drink the prep slowly and see. Pt instructed to drink prep slowly after nausea subsides.  Electronically signed by Ami Hoff RN on 4/30/2020 at 5:36 PM

## 2020-04-30 NOTE — PROGRESS NOTES
INPATIENT CONSULTATION:    IDENTIFYING DATA/REASON FOR CONSULTATION   PATIENT:  Ravindra Ayers  MRN:  [id number]  ADMIT DATE: 4/24/2020  TIME OF EVALUATION: 4/30/2020 2:02 PM  HOSPITAL STAY:   LOS: 5 days   CONSULTING PHYSICIAN: Dyan Chacko MD   REASON FOR CONSULTATION: Abdominal pain, constipation    Subjective:    Daughter at bedside. She speaks [place] and assists with interpretation/history. Patient ordered EGD yesterday, with mild antral gastritis, biopsies negative for H. pylori. The patient continues to have 2-3 green, nonbloody bowel movements daily, with persistent left-sided abdominal pain. Pain is worse on palpation, movement and when coughing. When the patient is at rest, his pain is controlled.     MEDICATIONS   SCHEDULED:  insulin glargine, 6 Units, Nightly  insulin lispro, 0-6 Units, TID WC  insulin lispro, 0-3 Units, Nightly  polyethylene glycol, 4,000 mL, Once  losartan, 100 mg, Daily  atorvastatin, 40 mg, Nightly  sodium chloride flush, 10 mL, 2 times per day  enoxaparin, 40 mg, Daily  pantoprazole, 40 mg, BID AC      FLUIDS/DRIPS:     dextrose       PRNs: HYDROcodone 5 mg - acetaminophen, 2 tablet, Q6H PRN  iohexol, 50 mL, ONCE PRN  hydrALAZINE, 10 mg, Q6H PRN  morphine, 2 mg, Q4H PRN  promethazine, 12.5 mg, Q6H PRN  sodium chloride flush, 10 mL, PRN  acetaminophen, 650 mg, Q6H PRN    Or  acetaminophen, 650 mg, Q6H PRN  promethazine, 12.5 mg, Q6H PRN    Or  ondansetron, 4 mg, Q6H PRN  glucose, 15 g, PRN  dextrose, 12.5 g, PRN  glucagon (rDNA), 1 mg, PRN  dextrose, 100 mL/hr, PRN  GI cocktail, , TID PRN  metoclopramide, 5 mg, Q6H PRN      ALLERGIES:  No Known Allergies      PHYSICAL EXAM     Vitals:    04/29/20 1815 04/29/20 1959 04/30/20 0027 04/30/20 0639   BP: (!) 181/82 (!) 162/93 132/73 (!) 159/90   Pulse: 102 96 92 90   Resp: 12 16 16 16   Temp: 98.1 °F (36.7 °C) 97.5 °F (36.4 °C) 98.7 °F (37.1 °C) 93.2 °F (34 °C)   TempSrc: Oral Oral Oral Axillary   SpO2: 91% 94% 92% 95%   Weight: transcription. Every effort was made to ensure accuracy; however, inadvertent transcription errors may be present despite my best efforts to edit errors.     Kurtis Patel MD

## 2020-05-01 VITALS
OXYGEN SATURATION: 97 % | BODY MASS INDEX: 22.77 KG/M2 | HEIGHT: 64 IN | WEIGHT: 133.38 LBS | HEART RATE: 85 BPM | TEMPERATURE: 97.3 F | RESPIRATION RATE: 16 BRPM | DIASTOLIC BLOOD PRESSURE: 97 MMHG | SYSTOLIC BLOOD PRESSURE: 172 MMHG

## 2020-05-01 LAB
GLUCOSE BLD-MCNC: 104 MG/DL (ref 70–99)
GLUCOSE BLD-MCNC: 111 MG/DL (ref 70–99)
PERFORMED ON: ABNORMAL
PERFORMED ON: ABNORMAL

## 2020-05-01 PROCEDURE — 2580000003 HC RX 258: Performed by: INTERNAL MEDICINE

## 2020-05-01 PROCEDURE — 97116 GAIT TRAINING THERAPY: CPT

## 2020-05-01 PROCEDURE — 97530 THERAPEUTIC ACTIVITIES: CPT

## 2020-05-01 PROCEDURE — 6360000002 HC RX W HCPCS: Performed by: INTERNAL MEDICINE

## 2020-05-01 PROCEDURE — 6370000000 HC RX 637 (ALT 250 FOR IP): Performed by: INTERNAL MEDICINE

## 2020-05-01 RX ORDER — PANTOPRAZOLE SODIUM 40 MG/1
40 TABLET, DELAYED RELEASE ORAL
Qty: 30 TABLET | Refills: 3 | Status: SHIPPED | OUTPATIENT
Start: 2020-05-01 | End: 2020-09-22

## 2020-05-01 RX ORDER — INSULIN GLARGINE 100 [IU]/ML
6 INJECTION, SOLUTION SUBCUTANEOUS NIGHTLY
Qty: 1 VIAL | Refills: 3 | Status: SHIPPED | OUTPATIENT
Start: 2020-05-01 | End: 2020-09-22

## 2020-05-01 RX ORDER — HYDROCODONE BITARTRATE AND ACETAMINOPHEN 5; 325 MG/1; MG/1
2 TABLET ORAL EVERY 6 HOURS PRN
Qty: 6 TABLET | Refills: 0 | Status: SHIPPED | OUTPATIENT
Start: 2020-05-01 | End: 2020-05-02

## 2020-05-01 RX ORDER — LOSARTAN POTASSIUM 100 MG/1
100 TABLET ORAL DAILY
Qty: 30 TABLET | Refills: 3 | Status: ON HOLD | OUTPATIENT
Start: 2020-05-02 | End: 2020-09-25 | Stop reason: SDUPTHER

## 2020-05-01 RX ADMIN — SODIUM CHLORIDE, PRESERVATIVE FREE 10 ML: 5 INJECTION INTRAVENOUS at 09:24

## 2020-05-01 RX ADMIN — ENOXAPARIN SODIUM 40 MG: 40 INJECTION SUBCUTANEOUS at 09:21

## 2020-05-01 RX ADMIN — LOSARTAN POTASSIUM 100 MG: 100 TABLET, FILM COATED ORAL at 09:21

## 2020-05-01 NOTE — PROGRESS NOTES
diagnosis of PUD (peptic ulcer disease) was also pertinent to this visit. has a past medical history of Cerebral artery occlusion with cerebral infarction ([facility] [address] Diabetes mellitus ([facility] [address] Gallstone, GERD (gastroesophageal reflux disease), Hyperlipidemia, Hypertension, and Renal insufficiency. has a past surgical history that includes Appendectomy; Cholecystectomy; Upper gastrointestinal endoscopy (06/08/2018); Upper gastrointestinal endoscopy (N/A, 2/28/2019); Upper gastrointestinal endoscopy (N/A, 4/23/2019); Upper gastrointestinal endoscopy (N/A, 4/23/2019); and Upper gastrointestinal endoscopy (N/A, 4/29/2020). Restrictions  Restrictions/Precautions  Restrictions/Precautions: Fall Risk  Position Activity Restriction  Other position/activity restrictions: does not speak [place]     Social/Functional History  Lives With: Family(wife, son, grand children and other family members)  Type of Home: House(bi-level)  Home Layout: Bed/Bath upstairs  Home Access: Stairs to enter with rails  Entrance Stairs - Number of Steps: 3 ADELINE access from outside and then 4-5 steps in the house to bedroom level (the pt gets assist for these steps)  Entrance Stairs - Rails: Both  Bathroom Shower/Tub: Tub/Shower unit  Bathroom Toilet: Standard  Bathroom Equipment: Grab bars in [facility]: [address] Help From: Home health([facility] 2h/4d/wk)  ADL Assistance: Needs assistance(assist for shower from aide; assist for dressing and toileting; the pt could feed himself)  Homemaking Responsibilities: No  Ambulation Assistance: Needs assistance(uses a cane; walks short distances)  Transfer Assistance: Needs assistance(sleeps in regular bed)  Active : No  Patient's  Info: son  Additional Comments: above info per dgt who was in the room; she denies recent falls    Subjective   General  Chart Reviewed: Yes  Additional Pertinent Hx: The pt is a 75 yo male who does not speak [place] and has a h/o of delirium.

## 2020-05-01 NOTE — PROGRESS NOTES
95% 95% 95% 97%   Weight:   133 lb 6.1 oz (60.5 kg)    Height:           I/O last 3 completed shifts: In: 56 [P.O.:480; I.V.:10]  Out: -     Physical Exam:  General appearance: alert, cooperative, does not speak English  Eyes: Anicteric  Head: Normocephalic, without obvious abnormality  Lungs: clear to auscultation bilaterally, Normal Effort  Heart: regular rate and rhythm  Abdomen: soft, mildly distended, tender to surface palpation in LUQ and LLQ. Bowel sounds normal.   Extremities: atraumatic, no cyanosis or edema  Skin: warm and dry, no jaundice  Neuro: Grossly intact    LABS AND IMAGING   Laboratory   Recent Labs     04/29/20  0720 04/30/20  0715   WBC 6.7 9.4   HGB 12.6* 13.1*   HCT 37.8* 39.7*   MCV 76.6* 77.0*    215     Recent Labs     04/29/20  0720      K 3.5      CO2 22   BUN 6*   CREATININE 1.2     No results for input(s): AST, ALT, ALB, BILIDIR, BILITOT, ALKPHOS in the last 72 hours. No results for input(s): LIPASE, AMYLASE in the last 72 hours. No results for input(s): PROTIME, INR in the last 72 hours. Imaging  CT ABDOMEN PELVIS W IV CONTRAST Additional Contrast? Oral   Final Result   There is mild wall thickening in the region the pylorus which is likely   related to peristalsis given the absence of adjacent inflammatory changes. Otherwise unremarkable CT abdomen and pelvis. CT Head WO Contrast   Final Result   No acute intracranial abnormality. Generalized atrophy and chronic ischemic changes as above including a remote   left occipital infarct. CT LUMBAR SPINE TRAUMA RECONSTRUCTION   Final Result   Unremarkable non-contrast CT of the thoracic and lumbar spine. CT THORACIC SPINE TRAUMA RECONSTRUCTION   Final Result   Unremarkable non-contrast CT of the thoracic and lumbar spine. CTA CHEST ABDOMEN PELVIS W CONTRAST   Final Result   1. No acute aortic pathology.   Mild atherosclerotic plaque with no evidence   of dissection or for colonoscopy with bowel preparation over the weekend, or this can be arranged as an outpatient. The patient would like to pursue outpatient colonoscopy and discharge home today. RECOMMENDATIONS:    Outpatient colonoscopy  Ok from [facility] for discharge home    If you have any questions or need any further information, please feel free to contact us [phone]. Thank you for allowing us to participate in the care of Rodrigo Cox. The note was completed using Dragon voice recognition transcription. Every effort was made to ensure accuracy; however, inadvertent transcription errors may be present despite my best efforts to edit errors.     Barry Segal MD

## 2020-05-01 NOTE — PROGRESS NOTES
Patient alert, laying in bed, daughter at bedside. Patient c/o n/v, SOB, headache, dizziness, and ABD pain that increases with movement. Patient has not been able to tolerated colon prep for Colonoscopy tomorrow. Daughter states he vomits after each cup full. Half of prep is completed, and patient has had 3 BM's since initiating prep. Daughter states she does not think he will be able to tolerate anymore. Patient denies needs at this time. Non-slip socks on, call light within reach. Will continue to monitor pt needs.

## 2020-05-01 NOTE — DISCHARGE SUMMARY
Hospital Medicine Discharge Summary    Patient: Theodore Gutiérrez     Gender: male  : 1945   Age: 76 y.o. MRN: [id number]    Admitting Physician: Daren Ramírez MD  Discharge Physician: Fco Marie DO       Admit Date: 2020   Discharge Date:  2020    Disposition:  Home    Discharge Diagnoses: Active Hospital Problems    Diagnosis Date Noted    Abdominal pain [R10.9] 2020    Hypertension [I10] 2017    Hyperlipidemia [E78.5] 2017    Diabetes mellitus ([facility] [address] [E11.9] 2017       Follow-up appointments:  F/u with PCP in 1-2 weeks                                                F/u with GI as scheduled    Outpatient to do list: Outpatient Colonoscopy per GI    Condition at Discharge:  Stable    Hospital Course:   76 y.o. male history of diabetes, hypertension who presented with epigastric pain. Patient does not speak [place]. History taken from chart. He described the pain is radiating to his back. He states she has concomitant chest pain with shortness of breath. He had an episode of vomiting. He denies diarrhea.     In ER, he had work-up that included CTA chest and abdomen and pelvis. These tests were unremarkable. He also had CT thoracic/lumbar which did not show acute findings. Lab work was pertinent for lactic acid of 2.1 and glucose 379. Troponin was negative.     He has been restless and confused. He was placed on 1:1 sitter    Admit, GI, ABD pain improved slowly, Pt had a thorough work-up and no organic cause was found; A colonoscopy was aborted as Pt couldn't tolerate the prep; He elected to have an outpatient colonoscopy and was cleared for d/c per GI; d/c home in stable condition.     Discharge Medications:   Current Discharge Medication List        Current Discharge Medication List        Current Discharge Medication List      CONTINUE these medications which have NOT CHANGED    Details   rosuvastatin (CRESTOR) 10 MG tablet Take 10 mg by

## 2020-05-05 ENCOUNTER — OFFICE VISIT (OUTPATIENT)
Dept: PRIMARY CARE CLINIC | Age: 75
End: 2020-05-05

## 2020-05-07 ENCOUNTER — ANESTHESIA EVENT (OUTPATIENT)
Dept: ENDOSCOPY | Age: 75
End: 2020-05-07
Payer: MEDICAID

## 2020-05-08 ENCOUNTER — HOSPITAL ENCOUNTER (OUTPATIENT)
Age: 75
Setting detail: OUTPATIENT SURGERY
Discharge: HOME | End: 2020-05-08
Attending: INTERNAL MEDICINE | Admitting: INTERNAL MEDICINE
Payer: MEDICAID

## 2020-05-08 ENCOUNTER — ANESTHESIA (OUTPATIENT)
Dept: ENDOSCOPY | Age: 75
End: 2020-05-08
Payer: MEDICAID

## 2020-05-08 VITALS
DIASTOLIC BLOOD PRESSURE: 60 MMHG | SYSTOLIC BLOOD PRESSURE: 95 MMHG | OXYGEN SATURATION: 100 % | RESPIRATION RATE: 15 BRPM

## 2020-05-08 VITALS
HEIGHT: 64 IN | HEART RATE: 78 BPM | TEMPERATURE: 97.2 F | DIASTOLIC BLOOD PRESSURE: 92 MMHG | OXYGEN SATURATION: 97 % | BODY MASS INDEX: 22.71 KG/M2 | SYSTOLIC BLOOD PRESSURE: 151 MMHG | RESPIRATION RATE: 19 BRPM | WEIGHT: 133 LBS

## 2020-05-08 LAB
GLUCOSE BLD-MCNC: 191 MG/DL (ref 70–99)
GLUCOSE BLD-MCNC: 221 MG/DL (ref 70–99)
PERFORMED ON: ABNORMAL
PERFORMED ON: ABNORMAL

## 2020-05-08 PROCEDURE — 7100000011 HC PHASE II RECOVERY - ADDTL 15 MIN: Performed by: INTERNAL MEDICINE

## 2020-05-08 PROCEDURE — 88305 TISSUE EXAM BY PATHOLOGIST: CPT

## 2020-05-08 PROCEDURE — 2580000003 HC RX 258: Performed by: ANESTHESIOLOGY

## 2020-05-08 PROCEDURE — 7100000010 HC PHASE II RECOVERY - FIRST 15 MIN: Performed by: INTERNAL MEDICINE

## 2020-05-08 PROCEDURE — 2500000003 HC RX 250 WO HCPCS: Performed by: NURSE ANESTHETIST, CERTIFIED REGISTERED

## 2020-05-08 PROCEDURE — 7100000001 HC PACU RECOVERY - ADDTL 15 MIN: Performed by: INTERNAL MEDICINE

## 2020-05-08 PROCEDURE — 6360000002 HC RX W HCPCS: Performed by: NURSE ANESTHETIST, CERTIFIED REGISTERED

## 2020-05-08 PROCEDURE — 2720000010 HC SURG SUPPLY STERILE: Performed by: INTERNAL MEDICINE

## 2020-05-08 PROCEDURE — 2709999900 HC NON-CHARGEABLE SUPPLY: Performed by: INTERNAL MEDICINE

## 2020-05-08 PROCEDURE — 3609010600 HC COLONOSCOPY POLYPECTOMY SNARE/COLD BIOPSY: Performed by: INTERNAL MEDICINE

## 2020-05-08 PROCEDURE — 7100000000 HC PACU RECOVERY - FIRST 15 MIN: Performed by: INTERNAL MEDICINE

## 2020-05-08 PROCEDURE — 3700000001 HC ADD 15 MINUTES (ANESTHESIA): Performed by: INTERNAL MEDICINE

## 2020-05-08 PROCEDURE — 3700000000 HC ANESTHESIA ATTENDED CARE: Performed by: INTERNAL MEDICINE

## 2020-05-08 RX ORDER — PROPOFOL 10 MG/ML
INJECTION, EMULSION INTRAVENOUS CONTINUOUS PRN
Status: DISCONTINUED | OUTPATIENT
Start: 2020-05-08 | End: 2020-05-08 | Stop reason: SDUPTHER

## 2020-05-08 RX ORDER — SODIUM CHLORIDE 0.9 % (FLUSH) 0.9 %
10 SYRINGE (ML) INJECTION EVERY 12 HOURS SCHEDULED
Status: DISCONTINUED | OUTPATIENT
Start: 2020-05-08 | End: 2020-05-08 | Stop reason: HOSPADM

## 2020-05-08 RX ORDER — PROPOFOL 10 MG/ML
INJECTION, EMULSION INTRAVENOUS PRN
Status: DISCONTINUED | OUTPATIENT
Start: 2020-05-08 | End: 2020-05-08 | Stop reason: SDUPTHER

## 2020-05-08 RX ORDER — SODIUM CHLORIDE 0.9 % (FLUSH) 0.9 %
10 SYRINGE (ML) INJECTION PRN
Status: DISCONTINUED | OUTPATIENT
Start: 2020-05-08 | End: 2020-05-08 | Stop reason: HOSPADM

## 2020-05-08 RX ORDER — SODIUM CHLORIDE 9 MG/ML
INJECTION, SOLUTION INTRAVENOUS CONTINUOUS
Status: DISCONTINUED | OUTPATIENT
Start: 2020-05-08 | End: 2020-05-08 | Stop reason: HOSPADM

## 2020-05-08 RX ORDER — LIDOCAINE HYDROCHLORIDE 20 MG/ML
INJECTION, SOLUTION EPIDURAL; INFILTRATION; INTRACAUDAL; PERINEURAL PRN
Status: DISCONTINUED | OUTPATIENT
Start: 2020-05-08 | End: 2020-05-08 | Stop reason: SDUPTHER

## 2020-05-08 RX ADMIN — PROPOFOL 40 MG: 10 INJECTION, EMULSION INTRAVENOUS at 07:28

## 2020-05-08 RX ADMIN — LIDOCAINE HYDROCHLORIDE 60 MG: 20 INJECTION, SOLUTION EPIDURAL; INFILTRATION; INTRACAUDAL; PERINEURAL at 07:28

## 2020-05-08 RX ADMIN — PROPOFOL 300 MCG/KG/MIN: 10 INJECTION, EMULSION INTRAVENOUS at 07:28

## 2020-05-08 RX ADMIN — SODIUM CHLORIDE: 9 INJECTION, SOLUTION INTRAVENOUS at 07:01

## 2020-05-08 ASSESSMENT — PULMONARY FUNCTION TESTS
PIF_VALUE: 0
PIF_VALUE: 1
PIF_VALUE: 0

## 2020-05-08 ASSESSMENT — ENCOUNTER SYMPTOMS: SHORTNESS OF BREATH: 0

## 2020-05-08 ASSESSMENT — PAIN SCALES - GENERAL
PAINLEVEL_OUTOF10: 0

## 2020-05-08 ASSESSMENT — PAIN DESCRIPTION - DESCRIPTORS: DESCRIPTORS: ACHING

## 2020-05-08 ASSESSMENT — PAIN - FUNCTIONAL ASSESSMENT: PAIN_FUNCTIONAL_ASSESSMENT: 0-10

## 2020-05-08 NOTE — OP NOTE
Colonoscopy Procedure Note      Patient: Everardo Poe  : 1945  Acct#:     Procedure: Colonoscopy with polypectomy (cold snare)    Date:  2020    Surgeon:  Alexi Bell MD    Referring Physician:  Pebbles Villafuerte    Previous Colonoscopy: NO    Preoperative Diagnosis:  The patient is a 76 y.o. male  who presents for colonoscopy due to left sided abdominal pain of uncertain etiology, with hospitalization  with concern for constipation and ischemic colitis. Postoperative Diagnosis:    1. 5 mm transverse colon polyp, resected with cold snare  2. Fair-to-poor prep  3. Internal hemorrhoids    Consent:  The patient or their legal guardian has signed a consent, and is aware of the potential risks, benefits, alternatives, and potential complications of this procedure. These include, but are not limited to hemorrhage, bleeding, post procedural pain, perforation, phlebitis, aspiration, hypotension, hypoxia, cardiovascular events such as arryhthmia, and possibly death. Additionally, the possibility of missed colonic polyps and interval colon cancer was discussed in the consent. Anesthesia:  MAC    Procedure: An informed consent was obtained from the patient after explanation of indications, benefits, possible risks and complications of the procedure. The patient was then taken to the endoscopy suite, placed in the left lateral decubitus position, and the above IV anesthesia was administered. A digital rectal examination was performed and revealed negative without mass, lesions or tenderness. The Olympus video colonoscope was placed in the patient's rectum under digital direction and advanced to the cecum. The cecum was identified by characteristic anatomy and ballottment. The ileocecal valve was identified.      The preparation was fair-to-poor, with solid stool in the right colon precluding mucosal evaluation, with food particles repeatedly clogging the scope with

## 2020-05-08 NOTE — PROGRESS NOTES
at bedside. Patient forgetful and poor historian. Communicated with  to patient's son about home meds. Son does not believe patient takes plavix, so unsure of med. Anesthesia aware.
Arrives to pacu sedate, monitors placed, respirations easy
Preoperative Screening for Elective Surgery/Invasive Procedures While COVID-19 present in the community     Have you tested positive or have been told to self-isolate for COVID-19 like symptoms within the past 28 days? no   Do you currently have any of the following symptoms? no  o Fever >100.0 F or 99.9 F in immunocompromised patients? no  o New onset cough, shortness of breath or difficulty breathing?  o New onset sore throat, myalgia (muscle aches and pains), headache, loss of taste/smell or diarrhea? no   Have you had a potential exposure to COVID-19 within the past 14 days by:  o Close contact with a confirmed case? no  o Close contact with a healthcare worker,  or essential infrastructure worker (grocery store, [employer], gas station, public utilities or transportation)? Last 5/1/20 admitted to hospital  o Do you reside in a congregate setting such as; skilled nursing facility, adult home, correctional facility, homeless shelter or other institutional setting?no  o Have you had recent travel to a known COVID-19 hotspot? no    Indicate if the patient has a positive screen by answering yes to one or more of the above questions. Patients who test positive or screen positive prior to surgery or on the day of surgery should be evaluated in conjunction with the surgeon/proceduralist/anesthesiologist to determine the urgency of the procedure.
Pt out to car by w/c. Pt d/c home with family.
Pt remains sedate,  comes to bedside
polish on your fingers or toes      For your safety, please do not wear any jewelry or body piercing's on the day of surgery. All jewelry must be removed. If you have dentures, they will be removed before going to operating room. For your convenience, we will provide you with a container. If you wear contact lenses or glasses, they will be removed, please bring a case for them. If you have a living will and a durable power of  for healthcare, please bring in a copy. As part of our patient safety program to minimize surgical site infections, we ask you to do the following:    · Please notify your surgeon if you develop any illness between         now and the  day of your surgery. · This includes a cough, cold, fever, sore throat, nausea,         or vomiting, and diarrhea, etc.  ·  Please notify your surgeon if you experience dizziness, shortness         of breath or blurred vision between now and the time of your surgery. Do not shave your operative site 96 hours prior to surgery. For face and neck surgery, men may use an electric razor 48 hours   prior to surgery. You may shower the night before surgery or the morning of   your surgery with an antibacterial soap. You will need to bring a photo ID and insurance card    [facility] has an onsite pharmacy, would you like to utilize our pharmacy     If you will be staying overnight and use a C-pap machine, please bring   your C-pap to hospital     Our goal is to provide you with excellent care, therefore, visitors will be limited to two(2) in the room at a time so that we may focus on providing this care for you. Please contact pre-admission testing if you have any further questions.                  [facility] phone number:  [phone] PAT fax number:  [phone]  Please note these are generalized instructions for all surgical cases, you may be provided with more specific instructions according to your

## 2020-05-08 NOTE — ANESTHESIA PRE PROCEDURE
seizures and neuromuscular disease           GI/Hepatic/Renal:   (+) GERD:, renal disease:, bowel prep,      (-) PUD, hepatitis and liver disease       Endo/Other:    (+) DiabetesType II DM, , .    (-) hypothyroidism, hyperthyroidism               Abdominal:           Vascular:                                          Anesthesia Plan      MAC     ASA 3     (Interpretor used at bedside with patient and son)  Induction: intravenous. Anesthetic plan and risks discussed with patient and child/children. Plan discussed with CRNA. This pre-anesthesia assessment may be used as a history and physical.    DOS STAFF ADDENDUM:    Pt seen and examined, chart reviewed (including anesthesia, drug and allergy history). No interval changes to history and physical examination. Anesthetic plan, risks, benefits, alternatives, and personnel involved discussed with patient. Patient verbalized an understanding and agrees to proceed.       Alissa Maldonado MD  May 8, 2020  6:49 AM        Alissa Maldonado MD   5/8/2020

## 2020-05-12 NOTE — PROGRESS NOTES
Patient presented to [facility] drive up clinic for preop testing. Patient was swabbed and given information advising them to remain isolated until procedure date.

## 2020-08-26 ENCOUNTER — HOSPITAL ENCOUNTER (EMERGENCY)
Age: 75
Discharge: HOME | End: 2020-08-27
Payer: MEDICAID

## 2020-08-26 ENCOUNTER — APPOINTMENT (OUTPATIENT)
Dept: GENERAL RADIOLOGY | Age: 75
End: 2020-08-26
Payer: MEDICAID

## 2020-08-26 ENCOUNTER — APPOINTMENT (OUTPATIENT)
Dept: CT IMAGING | Age: 75
End: 2020-08-26
Payer: MEDICAID

## 2020-08-26 LAB
A/G RATIO: 1.4 (ref 1.1–2.2)
ALBUMIN SERPL-MCNC: 4.1 G/DL (ref 3.4–5)
ALP BLD-CCNC: 115 U/L (ref 40–129)
ALT SERPL-CCNC: 21 U/L (ref 10–40)
ANION GAP SERPL CALCULATED.3IONS-SCNC: 11 MMOL/L (ref 3–16)
AST SERPL-CCNC: 29 U/L (ref 15–37)
BASOPHILS ABSOLUTE: 0.1 K/UL (ref 0–0.2)
BASOPHILS RELATIVE PERCENT: 0.6 %
BILIRUB SERPL-MCNC: 0.6 MG/DL (ref 0–1)
BUN BLDV-MCNC: 13 MG/DL (ref 7–20)
CALCIUM SERPL-MCNC: 9.9 MG/DL (ref 8.3–10.6)
CHLORIDE BLD-SCNC: 99 MMOL/L (ref 99–110)
CO2: 22 MMOL/L (ref 21–32)
CREAT SERPL-MCNC: 1.3 MG/DL (ref 0.8–1.3)
EOSINOPHILS ABSOLUTE: 0.1 K/UL (ref 0–0.6)
EOSINOPHILS RELATIVE PERCENT: 1.1 %
GFR AFRICAN AMERICAN: >60
GFR NON-AFRICAN AMERICAN: 54
GLOBULIN: 3 G/DL
GLUCOSE BLD-MCNC: 219 MG/DL (ref 70–99)
HCT VFR BLD CALC: 42.7 % (ref 40.5–52.5)
HEMOGLOBIN: 14.4 G/DL (ref 13.5–17.5)
LIPASE: 32 U/L (ref 13–60)
LYMPHOCYTES ABSOLUTE: 2.4 K/UL (ref 1–5.1)
LYMPHOCYTES RELATIVE PERCENT: 26.2 %
MCH RBC QN AUTO: 25.7 PG (ref 26–34)
MCHC RBC AUTO-ENTMCNC: 33.7 G/DL (ref 31–36)
MCV RBC AUTO: 76.4 FL (ref 80–100)
MONOCYTES ABSOLUTE: 0.4 K/UL (ref 0–1.3)
MONOCYTES RELATIVE PERCENT: 4.7 %
NEUTROPHILS ABSOLUTE: 6.1 K/UL (ref 1.7–7.7)
NEUTROPHILS RELATIVE PERCENT: 67.4 %
PDW BLD-RTO: 14.9 % (ref 12.4–15.4)
PLATELET # BLD: 255 K/UL (ref 135–450)
PMV BLD AUTO: 7.7 FL (ref 5–10.5)
POTASSIUM SERPL-SCNC: 3.7 MMOL/L (ref 3.5–5.1)
PRO-BNP: 120 PG/ML (ref 0–449)
RBC # BLD: 5.59 M/UL (ref 4.2–5.9)
SODIUM BLD-SCNC: 132 MMOL/L (ref 136–145)
TOTAL PROTEIN: 7.1 G/DL (ref 6.4–8.2)
TROPONIN: <0.01 NG/ML
WBC # BLD: 9 K/UL (ref 4–11)

## 2020-08-26 PROCEDURE — 2500000003 HC RX 250 WO HCPCS: Performed by: PHYSICIAN ASSISTANT

## 2020-08-26 PROCEDURE — 70450 CT HEAD/BRAIN W/O DYE: CPT

## 2020-08-26 PROCEDURE — 96375 TX/PRO/DX INJ NEW DRUG ADDON: CPT

## 2020-08-26 PROCEDURE — 85025 COMPLETE CBC W/AUTO DIFF WBC: CPT

## 2020-08-26 PROCEDURE — 71045 X-RAY EXAM CHEST 1 VIEW: CPT

## 2020-08-26 PROCEDURE — 80053 COMPREHEN METABOLIC PANEL: CPT

## 2020-08-26 PROCEDURE — 83880 ASSAY OF NATRIURETIC PEPTIDE: CPT

## 2020-08-26 PROCEDURE — 83690 ASSAY OF LIPASE: CPT

## 2020-08-26 PROCEDURE — 96374 THER/PROPH/DIAG INJ IV PUSH: CPT

## 2020-08-26 PROCEDURE — 99285 EMERGENCY DEPT VISIT HI MDM: CPT

## 2020-08-26 PROCEDURE — 93005 ELECTROCARDIOGRAM TRACING: CPT | Performed by: PHYSICIAN ASSISTANT

## 2020-08-26 PROCEDURE — 2580000003 HC RX 258: Performed by: PHYSICIAN ASSISTANT

## 2020-08-26 PROCEDURE — 84484 ASSAY OF TROPONIN QUANT: CPT

## 2020-08-26 PROCEDURE — 6360000002 HC RX W HCPCS: Performed by: PHYSICIAN ASSISTANT

## 2020-08-26 RX ORDER — ACETAMINOPHEN 325 MG/1
650 TABLET ORAL ONCE
Status: COMPLETED | OUTPATIENT
Start: 2020-08-27 | End: 2020-08-27

## 2020-08-26 RX ORDER — DIPHENHYDRAMINE HYDROCHLORIDE 50 MG/ML
12.5 INJECTION INTRAMUSCULAR; INTRAVENOUS ONCE
Status: COMPLETED | OUTPATIENT
Start: 2020-08-26 | End: 2020-08-26

## 2020-08-26 RX ORDER — ONDANSETRON 4 MG/1
4 TABLET, ORALLY DISINTEGRATING ORAL EVERY 8 HOURS PRN
Qty: 10 TABLET | Refills: 0 | Status: ON HOLD | OUTPATIENT
Start: 2020-08-26 | End: 2020-10-16 | Stop reason: HOSPADM

## 2020-08-26 RX ORDER — METOCLOPRAMIDE HYDROCHLORIDE 5 MG/ML
5 INJECTION INTRAMUSCULAR; INTRAVENOUS ONCE
Status: COMPLETED | OUTPATIENT
Start: 2020-08-26 | End: 2020-08-26

## 2020-08-26 RX ORDER — AZITHROMYCIN 500 MG/1
500 TABLET, FILM COATED ORAL ONCE
Status: COMPLETED | OUTPATIENT
Start: 2020-08-27 | End: 2020-08-27

## 2020-08-26 RX ORDER — 0.9 % SODIUM CHLORIDE 0.9 %
500 INTRAVENOUS SOLUTION INTRAVENOUS ONCE
Status: COMPLETED | OUTPATIENT
Start: 2020-08-26 | End: 2020-08-27

## 2020-08-26 RX ORDER — AZITHROMYCIN 250 MG/1
TABLET, FILM COATED ORAL
Qty: 1 PACKET | Refills: 0 | Status: SHIPPED | OUTPATIENT
Start: 2020-08-26 | End: 2020-09-22

## 2020-08-26 RX ADMIN — METOCLOPRAMIDE HYDROCHLORIDE 5 MG: 5 INJECTION INTRAMUSCULAR; INTRAVENOUS at 23:34

## 2020-08-26 RX ADMIN — FAMOTIDINE 20 MG: 10 INJECTION, SOLUTION INTRAVENOUS at 23:34

## 2020-08-26 RX ADMIN — SODIUM CHLORIDE 500 ML: 9 INJECTION, SOLUTION INTRAVENOUS at 23:34

## 2020-08-26 RX ADMIN — DIPHENHYDRAMINE HYDROCHLORIDE 12.5 MG: 50 INJECTION, SOLUTION INTRAMUSCULAR; INTRAVENOUS at 23:34

## 2020-08-27 VITALS
OXYGEN SATURATION: 93 % | SYSTOLIC BLOOD PRESSURE: 134 MMHG | TEMPERATURE: 98.1 F | RESPIRATION RATE: 18 BRPM | DIASTOLIC BLOOD PRESSURE: 99 MMHG | HEART RATE: 76 BPM

## 2020-08-27 LAB
EKG ATRIAL RATE: 75 BPM
EKG DIAGNOSIS: NORMAL
EKG P AXIS: 22 DEGREES
EKG P-R INTERVAL: 192 MS
EKG Q-T INTERVAL: 372 MS
EKG QRS DURATION: 118 MS
EKG QTC CALCULATION (BAZETT): 415 MS
EKG R AXIS: -47 DEGREES
EKG T AXIS: 56 DEGREES
EKG VENTRICULAR RATE: 75 BPM
SARS-COV-2, NAA: NOT DETECTED

## 2020-08-27 PROCEDURE — U0003 INFECTIOUS AGENT DETECTION BY NUCLEIC ACID (DNA OR RNA); SEVERE ACUTE RESPIRATORY SYNDROME CORONAVIRUS 2 (SARS-COV-2) (CORONAVIRUS DISEASE [COVID-19]), AMPLIFIED PROBE TECHNIQUE, MAKING USE OF HIGH THROUGHPUT TECHNOLOGIES AS DESCRIBED BY CMS-2020-01-R: HCPCS

## 2020-08-27 PROCEDURE — 93010 ELECTROCARDIOGRAM REPORT: CPT | Performed by: INTERNAL MEDICINE

## 2020-08-27 PROCEDURE — 6370000000 HC RX 637 (ALT 250 FOR IP): Performed by: PHYSICIAN ASSISTANT

## 2020-08-27 RX ADMIN — AZITHROMYCIN 500 MG: 500 TABLET, FILM COATED ORAL at 00:35

## 2020-08-27 RX ADMIN — ACETAMINOPHEN 650 MG: 325 TABLET ORAL at 00:35

## 2020-08-27 ASSESSMENT — ENCOUNTER SYMPTOMS
COLOR CHANGE: 0
DIARRHEA: 0
COUGH: 1
ABDOMINAL PAIN: 0
NAUSEA: 1
SHORTNESS OF BREATH: 1
VOMITING: 1

## 2020-08-27 ASSESSMENT — PAIN SCALES - GENERAL: PAINLEVEL_OUTOF10: 0

## 2020-08-27 NOTE — ED NOTES
D/C: Order noted for d/c. Pt confirmed d/c paperwork and two prescriptions have correct name. Discharge and education instructions reviewed with patient. Teach-back successful. Pt verbalized understanding and signed d/c papers. Pt denied questions at this time. No acute distress noted. Patient instructed to follow-up as noted - return to emergency department if symptoms worsen. Patient verbalized understanding. Discharged per EDMD with discharge instructions. Pt discharged to private vehicle with grandson. Patient stable upon departure. Thanked patient for choosing [facility]) for care. Provider aware of patient pain at time of discharge.        Joceline Rueda RN  08/27/20 3951

## 2020-08-27 NOTE — ED NOTES
20 G IV Placed to top of R hand on first attempt, patient tolerated well  Labs obtained and sent  Patient to CT in bed     Williams Espinoza RN  08/26/20 0937

## 2020-08-27 NOTE — ED PROVIDER NOTES
EKG shows NSR LAFB LAD nonspecific ekg changes similiar to old EKGs           Chevy Luna MD  08/26/20 3174

## 2020-08-27 NOTE — ED PROVIDER NOTES
[address] [facility]      Pt Name: Ciic Mcguire  MRN: [id number]  [place] 1945  Date of evaluation: 8/26/2020  Provider: MAYO Fajardo    This patient was not seen and evaluated by the attending physician No att. providers found. CHIEF COMPLAINT       Chief Complaint   Patient presents with    Nausea     patient in by [facility] from for emesis, nausea and SOB x2 days       CRITICAL CARE TIME   I performed a total Critical Care time of  15 minutes, excluding separately reportable procedures. There was a high probability of clinically significant/life threatening deterioration in the patient's condition which required my urgent intervention. Not limited to multiple reexaminations, discussions with attending physician and consultants. HISTORY OF PRESENT ILLNESS  (Location/Symptom, Timing/Onset, Context/Setting, Quality, Duration, Modifying Factors, Severity.)   Cici Mcguire is a 76 y.o. male who presents to the emergency department via EMS from home where he lives with family. I spoke through [employer]  [id number]. The patient has had 2 to 3 days of shortness of breath, nausea, vomiting and headaches of body aches. No known sick contacts. No diarrhea. No fevers. History of diabetes, hypertension, hyperlipidemia, chronic kidney disease. Did not take anything at home for the symptoms. Does not believe he took his blood pressure medications today because he was vomiting. Nursing Notes were reviewed and I agree. REVIEW OF SYSTEMS    (2-9 systems for level 4, 10 or more for level 5)     Review of Systems   Constitutional: Positive for fatigue. Negative for fever. Respiratory: Positive for cough and shortness of breath. Cardiovascular: Negative for chest pain and leg swelling. Gastrointestinal: Positive for nausea and vomiting. Negative for abdominal pain and diarrhea. Musculoskeletal: Positive for myalgias. Negative for neck pain and neck stiffness. Skin: Negative for color change, rash and wound. Neurological: Positive for headaches. Negative for weakness and numbness. Psychiatric/Behavioral: Negative for agitation, behavioral problems and confusion. Except as noted above the remainder of the review of systems was reviewed and negative.        PAST MEDICAL HISTORY         Diagnosis Date    Cerebral artery occlusion with cerebral infarction ([facility]     Diabetes mellitus ([facility]     Gallstone     GERD (gastroesophageal reflux disease)     Hyperlipidemia     Hypertension     Renal insufficiency        SURGICAL HISTORY           Procedure Laterality Date    APPENDECTOMY      CHOLECYSTECTOMY      COLONOSCOPY N/A 5/8/2020    COLONOSCOPY POLYPECTOMY SNARE/COLD BIOPSY performed by Radha Gonzalez MD at [address] [facility] [facility]  06/08/2018    UPPER GASTROINTESTINAL ENDOSCOPY N/A 2/28/2019    EGD W/EUS FNA performed by Mallika Simmons MD at [address] [facility] 4/23/2019    EGD BIOPSY performed by Mallika Simmons MD at [address] [facility] 4/23/2019    EGD W/EUS FNA performed by Mallika Simmons MD at [address] [facility] 4/29/2020    EGD BIOPSY performed by Radha Gonzalez MD at [address]       Previous Medications    CARAFATE 1 GM/10ML SUSPENSION    Take 10 mLs by mouth 4 times daily    CLOPIDOGREL (PLAVIX) 75 MG TABLET    Take 1 tablet by mouth daily    DULOXETINE (CYMBALTA) 30 MG EXTENDED RELEASE CAPSULE    Take 30 mg by mouth daily    INSULIN GLARGINE (LANTUS) 100 UNIT/ML INJECTION VIAL    Inject 6 Units into the skin nightly    INSULIN LISPRO, 1 UNIT DIAL, 100 UNIT/ML SOPN    Inject 5-9 Units into the skin 3 times daily (with meals) 5 units with breakfast, 9 units with lunch, 9 units with dinner    LOSARTAN (COZAAR) 100 MG TABLET    Take 1 please refer to attending physician's note for complete EKG interpretation:    Rhythm: sinus rhythm   No evidence of acute ischemia or injury. RADIOLOGY:   Non-plain film images such as CT, Ultrasound and MRI are read by the radiologist. Plain radiographic images are visualized and preliminarily interpreted by MAYO Diez with the below findings:    Reviewed radiologist's interpretation. Interpretation per the Radiologist below, if available at the time of this note:    XR CHEST 1 VIEW   Final Result   Right basilar airspace disease, atelectasis and/or pneumonia         CT HEAD WO CONTRAST   Final Result   No acute intracranial abnormality.                LABS:  Labs Reviewed   CBC WITH AUTO DIFFERENTIAL - Abnormal; Notable for the following components:       Result Value    MCV 76.4 (*)     MCH 25.7 (*)     All other components within normal limits    Narrative:     Performed at:  [address] [facility] [employer] [address]   Phone [phone]   COMPREHENSIVE METABOLIC PANEL - Abnormal; Notable for the following components:    Sodium 132 (*)     Glucose 219 (*)     GFR Non- 54 (*)     All other components within normal limits    Narrative:     Performed at:  [address] [facility] [employer] [address]   Phone [phone]   LIPASE    Narrative:     Performed at:  [address] [facility] [employer] [address]   Phone [phone]   TROPONIN    Narrative:     Performed at:  [address] [facility]  [address] [place] [address] [employer] [address]   Phone [phone]   BRAIN NATRIURETIC PEPTIDE    Narrative:     Performed at:  [facility]  [address] [place] [address] [facility] [address]   Phone [phone]       All other labs were within normal range or not returned as of this dictation. EMERGENCY DEPARTMENT COURSE and DIFFERENTIAL DIAGNOSIS/MDM:   Vitals:    Vitals:    08/26/20 2303 08/26/20 2312 08/26/20 2317 08/26/20 2325   BP: (!) 176/101 (!) 174/93  (!) 170/91   Pulse:  79  73   Resp:  21 26   Temp:   97.8 °F (36.6 °C) 98.1 °F (36.7 °C)   TempSrc:    Oral   SpO2: 95%   94%     I discussed with Sylvester WAGNER Sharriscott and/or family the exam results, diagnosis, care, prognosis, reasons to return and the importance of follow up. Patient and/or family is in full agreement with plan and all questions have been answered. Specific discharge instructions explained, including reasons to return to the emergency department. Jonathon De Leon is well appearing, non-toxic, and afebrile at the time of discharge. I estimate there is LOW risk for PULMONARY EMBOLISM, ACUTE CORONARY SYNDROME, OR THORACIC AORTIC DISSECTION, thus I consider the discharge disposition reasonable. CONSULTS:  None    PROCEDURES:  Procedures      FINAL IMPRESSION      1. Pneumonia of right lower lobe due to infectious organism ([facility]    2. Non-intractable vomiting with nausea, unspecified vomiting type          DISPOSITION/PLAN   DISPOSITION Decision To Discharge 08/26/2020 11:57:13 PM      PATIENT REFERRED TO:  Zuhair Winchester [facility].  [address]  [phone]    Call in 1 day  For follow up      [address] Art[address] Meza:  New Prescriptions    AZITHROMYCIN (ZITHROMAX) 250 MG TABLET    2 po day 1, then 1 po days 2-5    ONDANSETRON (ZOFRAN ODT) 4 MG DISINTEGRATING TABLET    Take 1 tablet by mouth every 8 hours as needed for Nausea or Vomiting Let dissolve in mouth.        (Please note that portions of this note were completed with a voice recognition program.  Efforts were made to edit the dictations but occasionally words are mis-transcribed.)    Bossman [place]  08/27/20 6060

## 2020-09-22 ENCOUNTER — APPOINTMENT (OUTPATIENT)
Dept: CT IMAGING | Age: 75
DRG: 045 | End: 2020-09-22
Payer: MEDICAID

## 2020-09-22 ENCOUNTER — APPOINTMENT (OUTPATIENT)
Dept: GENERAL RADIOLOGY | Age: 75
DRG: 045 | End: 2020-09-22
Payer: MEDICAID

## 2020-09-22 ENCOUNTER — HOSPITAL ENCOUNTER (INPATIENT)
Age: 75
LOS: 3 days | Discharge: NURSING, REHAB OR LONG-TERM CARE | DRG: 045 | End: 2020-09-25
Attending: EMERGENCY MEDICINE | Admitting: INTERNAL MEDICINE
Payer: MEDICAID

## 2020-09-22 ENCOUNTER — APPOINTMENT (OUTPATIENT)
Dept: MRI IMAGING | Age: 75
DRG: 045 | End: 2020-09-22
Payer: MEDICAID

## 2020-09-22 PROBLEM — I63.9 ACUTE CEREBROVASCULAR ACCIDENT (HCC): Status: ACTIVE | Noted: 2020-09-22

## 2020-09-22 LAB
A/G RATIO: 1.5 (ref 1.1–2.2)
ALBUMIN SERPL-MCNC: 4.2 G/DL (ref 3.4–5)
ALP BLD-CCNC: 107 U/L (ref 40–129)
ALT SERPL-CCNC: 16 U/L (ref 10–40)
ANION GAP SERPL CALCULATED.3IONS-SCNC: 10 MMOL/L (ref 3–16)
AST SERPL-CCNC: 23 U/L (ref 15–37)
BASOPHILS ABSOLUTE: 0.1 K/UL (ref 0–0.2)
BASOPHILS RELATIVE PERCENT: 1.2 %
BILIRUB SERPL-MCNC: 0.7 MG/DL (ref 0–1)
BUN BLDV-MCNC: 17 MG/DL (ref 7–20)
CALCIUM SERPL-MCNC: 9.3 MG/DL (ref 8.3–10.6)
CHLORIDE BLD-SCNC: 103 MMOL/L (ref 99–110)
CO2: 24 MMOL/L (ref 21–32)
CREAT SERPL-MCNC: 1.4 MG/DL (ref 0.8–1.3)
EKG ATRIAL RATE: 110 BPM
EKG DIAGNOSIS: NORMAL
EKG P AXIS: 8 DEGREES
EKG P-R INTERVAL: 182 MS
EKG Q-T INTERVAL: 340 MS
EKG QRS DURATION: 102 MS
EKG QTC CALCULATION (BAZETT): 460 MS
EKG R AXIS: -53 DEGREES
EKG T AXIS: 84 DEGREES
EKG VENTRICULAR RATE: 110 BPM
EOSINOPHILS ABSOLUTE: 0.2 K/UL (ref 0–0.6)
EOSINOPHILS RELATIVE PERCENT: 2.2 %
GFR AFRICAN AMERICAN: 60
GFR NON-AFRICAN AMERICAN: 49
GLOBULIN: 2.8 G/DL
GLUCOSE BLD-MCNC: 103 MG/DL (ref 70–99)
GLUCOSE BLD-MCNC: 215 MG/DL (ref 70–99)
HCT VFR BLD CALC: 42.1 % (ref 40.5–52.5)
HEMOGLOBIN: 14 G/DL (ref 13.5–17.5)
INR BLD: 0.92 (ref 0.86–1.14)
LYMPHOCYTES ABSOLUTE: 2.2 K/UL (ref 1–5.1)
LYMPHOCYTES RELATIVE PERCENT: 29.1 %
MCH RBC QN AUTO: 25.3 PG (ref 26–34)
MCHC RBC AUTO-ENTMCNC: 33.3 G/DL (ref 31–36)
MCV RBC AUTO: 76 FL (ref 80–100)
MONOCYTES ABSOLUTE: 0.5 K/UL (ref 0–1.3)
MONOCYTES RELATIVE PERCENT: 7 %
NEUTROPHILS ABSOLUTE: 4.6 K/UL (ref 1.7–7.7)
NEUTROPHILS RELATIVE PERCENT: 60.5 %
PDW BLD-RTO: 15.3 % (ref 12.4–15.4)
PERFORMED ON: ABNORMAL
PLATELET # BLD: 249 K/UL (ref 135–450)
PMV BLD AUTO: 7.4 FL (ref 5–10.5)
POTASSIUM REFLEX MAGNESIUM: 3.9 MMOL/L (ref 3.5–5.1)
PROTHROMBIN TIME: 10.7 SEC (ref 10–13.2)
RBC # BLD: 5.53 M/UL (ref 4.2–5.9)
SEDIMENTATION RATE, ERYTHROCYTE: 7 MM/HR (ref 0–20)
SODIUM BLD-SCNC: 137 MMOL/L (ref 136–145)
TOTAL PROTEIN: 7 G/DL (ref 6.4–8.2)
TROPONIN: <0.01 NG/ML
TROPONIN: <0.01 NG/ML
WBC # BLD: 7.6 K/UL (ref 4–11)

## 2020-09-22 PROCEDURE — 36415 COLL VENOUS BLD VENIPUNCTURE: CPT

## 2020-09-22 PROCEDURE — 2580000003 HC RX 258: Performed by: INTERNAL MEDICINE

## 2020-09-22 PROCEDURE — 93005 ELECTROCARDIOGRAM TRACING: CPT | Performed by: EMERGENCY MEDICINE

## 2020-09-22 PROCEDURE — 70496 CT ANGIOGRAPHY HEAD: CPT

## 2020-09-22 PROCEDURE — 80053 COMPREHEN METABOLIC PANEL: CPT

## 2020-09-22 PROCEDURE — 93010 ELECTROCARDIOGRAM REPORT: CPT | Performed by: INTERNAL MEDICINE

## 2020-09-22 PROCEDURE — 70551 MRI BRAIN STEM W/O DYE: CPT

## 2020-09-22 PROCEDURE — 84484 ASSAY OF TROPONIN QUANT: CPT

## 2020-09-22 PROCEDURE — 70450 CT HEAD/BRAIN W/O DYE: CPT

## 2020-09-22 PROCEDURE — 85652 RBC SED RATE AUTOMATED: CPT

## 2020-09-22 PROCEDURE — 72100 X-RAY EXAM L-S SPINE 2/3 VWS: CPT

## 2020-09-22 PROCEDURE — 2580000003 HC RX 258: Performed by: EMERGENCY MEDICINE

## 2020-09-22 PROCEDURE — 6360000002 HC RX W HCPCS: Performed by: INTERNAL MEDICINE

## 2020-09-22 PROCEDURE — 6360000004 HC RX CONTRAST MEDICATION

## 2020-09-22 PROCEDURE — 71045 X-RAY EXAM CHEST 1 VIEW: CPT

## 2020-09-22 PROCEDURE — 85025 COMPLETE CBC W/AUTO DIFF WBC: CPT

## 2020-09-22 PROCEDURE — 1200000000 HC SEMI PRIVATE

## 2020-09-22 PROCEDURE — 85610 PROTHROMBIN TIME: CPT

## 2020-09-22 PROCEDURE — 6370000000 HC RX 637 (ALT 250 FOR IP): Performed by: INTERNAL MEDICINE

## 2020-09-22 PROCEDURE — 99285 EMERGENCY DEPT VISIT HI MDM: CPT

## 2020-09-22 PROCEDURE — 6370000000 HC RX 637 (ALT 250 FOR IP): Performed by: EMERGENCY MEDICINE

## 2020-09-22 RX ORDER — POLYETHYLENE GLYCOL 3350 17 G/17G
17 POWDER, FOR SOLUTION ORAL DAILY
Status: DISCONTINUED | OUTPATIENT
Start: 2020-09-23 | End: 2020-09-25 | Stop reason: HOSPADM

## 2020-09-22 RX ORDER — SODIUM CHLORIDE 0.9 % (FLUSH) 0.9 %
10 SYRINGE (ML) INJECTION PRN
Status: DISCONTINUED | OUTPATIENT
Start: 2020-09-22 | End: 2020-09-25 | Stop reason: HOSPADM

## 2020-09-22 RX ORDER — CLOPIDOGREL BISULFATE 75 MG/1
75 TABLET ORAL DAILY
Status: DISCONTINUED | OUTPATIENT
Start: 2020-09-23 | End: 2020-09-25 | Stop reason: HOSPADM

## 2020-09-22 RX ORDER — SUCRALFATE 1 G/1
1 TABLET ORAL EVERY 8 HOURS SCHEDULED
Status: DISCONTINUED | OUTPATIENT
Start: 2020-09-22 | End: 2020-09-25 | Stop reason: HOSPADM

## 2020-09-22 RX ORDER — ROSUVASTATIN CALCIUM 10 MG/1
10 TABLET, COATED ORAL DAILY
Status: DISCONTINUED | OUTPATIENT
Start: 2020-09-23 | End: 2020-09-25 | Stop reason: HOSPADM

## 2020-09-22 RX ORDER — ASPIRIN 81 MG/1
81 TABLET ORAL DAILY
Status: DISCONTINUED | OUTPATIENT
Start: 2020-09-22 | End: 2020-09-25 | Stop reason: HOSPADM

## 2020-09-22 RX ORDER — PANTOPRAZOLE SODIUM 40 MG/1
40 TABLET, DELAYED RELEASE ORAL
Status: DISCONTINUED | OUTPATIENT
Start: 2020-09-23 | End: 2020-09-25 | Stop reason: HOSPADM

## 2020-09-22 RX ORDER — DEXTROSE MONOHYDRATE 50 MG/ML
100 INJECTION, SOLUTION INTRAVENOUS PRN
Status: DISCONTINUED | OUTPATIENT
Start: 2020-09-22 | End: 2020-09-25 | Stop reason: HOSPADM

## 2020-09-22 RX ORDER — SODIUM CHLORIDE 0.9 % (FLUSH) 0.9 %
10 SYRINGE (ML) INJECTION EVERY 12 HOURS SCHEDULED
Status: DISCONTINUED | OUTPATIENT
Start: 2020-09-22 | End: 2020-09-25 | Stop reason: HOSPADM

## 2020-09-22 RX ORDER — PROMETHAZINE HYDROCHLORIDE 25 MG/1
12.5 TABLET ORAL EVERY 6 HOURS PRN
Status: DISCONTINUED | OUTPATIENT
Start: 2020-09-22 | End: 2020-09-25 | Stop reason: HOSPADM

## 2020-09-22 RX ORDER — ASPIRIN 325 MG
325 TABLET ORAL ONCE
Status: DISCONTINUED | OUTPATIENT
Start: 2020-09-22 | End: 2020-09-24

## 2020-09-22 RX ORDER — ASPIRIN 300 MG/1
300 SUPPOSITORY RECTAL DAILY
Status: DISCONTINUED | OUTPATIENT
Start: 2020-09-22 | End: 2020-09-25 | Stop reason: HOSPADM

## 2020-09-22 RX ORDER — DULOXETIN HYDROCHLORIDE 30 MG/1
30 CAPSULE, DELAYED RELEASE ORAL DAILY
Status: DISCONTINUED | OUTPATIENT
Start: 2020-09-23 | End: 2020-09-25 | Stop reason: HOSPADM

## 2020-09-22 RX ORDER — TAMSULOSIN HYDROCHLORIDE 0.4 MG/1
0.4 CAPSULE ORAL NIGHTLY
Status: DISCONTINUED | OUTPATIENT
Start: 2020-09-22 | End: 2020-09-25 | Stop reason: HOSPADM

## 2020-09-22 RX ORDER — ONDANSETRON 2 MG/ML
4 INJECTION INTRAMUSCULAR; INTRAVENOUS EVERY 6 HOURS PRN
Status: DISCONTINUED | OUTPATIENT
Start: 2020-09-22 | End: 2020-09-25 | Stop reason: HOSPADM

## 2020-09-22 RX ORDER — POLYETHYLENE GLYCOL 3350 17 G/17G
17 POWDER, FOR SOLUTION ORAL DAILY PRN
Status: DISCONTINUED | OUTPATIENT
Start: 2020-09-22 | End: 2020-09-25 | Stop reason: HOSPADM

## 2020-09-22 RX ORDER — LOSARTAN POTASSIUM 100 MG/1
100 TABLET ORAL DAILY
Status: DISCONTINUED | OUTPATIENT
Start: 2020-09-22 | End: 2020-09-24

## 2020-09-22 RX ORDER — OMEPRAZOLE 40 MG/1
40 CAPSULE, DELAYED RELEASE ORAL DAILY
COMMUNITY

## 2020-09-22 RX ORDER — INSULIN GLARGINE 100 [IU]/ML
15 INJECTION, SOLUTION SUBCUTANEOUS NIGHTLY
Status: DISCONTINUED | OUTPATIENT
Start: 2020-09-22 | End: 2020-09-23

## 2020-09-22 RX ORDER — POLYVINYL ALCOHOL 14 MG/ML
1 SOLUTION/ DROPS OPHTHALMIC PRN
Status: ON HOLD | COMMUNITY
End: 2020-09-22

## 2020-09-22 RX ORDER — 0.9 % SODIUM CHLORIDE 0.9 %
1000 INTRAVENOUS SOLUTION INTRAVENOUS ONCE
Status: COMPLETED | OUTPATIENT
Start: 2020-09-22 | End: 2020-09-22

## 2020-09-22 RX ORDER — DEXTROSE MONOHYDRATE 25 G/50ML
12.5 INJECTION, SOLUTION INTRAVENOUS PRN
Status: DISCONTINUED | OUTPATIENT
Start: 2020-09-22 | End: 2020-09-25 | Stop reason: HOSPADM

## 2020-09-22 RX ORDER — INSULIN GLARGINE 100 [IU]/ML
6 INJECTION, SOLUTION SUBCUTANEOUS NIGHTLY
Status: CANCELLED | OUTPATIENT
Start: 2020-09-22

## 2020-09-22 RX ORDER — NICOTINE POLACRILEX 4 MG
15 LOZENGE BUCCAL PRN
Status: DISCONTINUED | OUTPATIENT
Start: 2020-09-22 | End: 2020-09-25 | Stop reason: HOSPADM

## 2020-09-22 RX ORDER — ACETAMINOPHEN 500 MG
1000 TABLET ORAL 2 TIMES DAILY
COMMUNITY

## 2020-09-22 RX ORDER — INSULIN GLARGINE 100 [IU]/ML
30 INJECTION, SOLUTION SUBCUTANEOUS NIGHTLY
Status: ON HOLD | COMMUNITY
End: 2020-09-25 | Stop reason: SDUPTHER

## 2020-09-22 RX ADMIN — ENOXAPARIN SODIUM 40 MG: 40 INJECTION SUBCUTANEOUS at 20:04

## 2020-09-22 RX ADMIN — SODIUM CHLORIDE 1000 ML: 9 INJECTION, SOLUTION INTRAVENOUS at 11:42

## 2020-09-22 RX ADMIN — IOPAMIDOL 75 ML: 755 INJECTION, SOLUTION INTRAVENOUS at 12:27

## 2020-09-22 RX ADMIN — Medication 10 ML: at 20:04

## 2020-09-22 RX ADMIN — INSULIN GLARGINE 15 UNITS: 100 INJECTION, SOLUTION SUBCUTANEOUS at 22:58

## 2020-09-22 RX ADMIN — ASPIRIN 300 MG: 300 SUPPOSITORY RECTAL at 20:04

## 2020-09-22 ASSESSMENT — PAIN DESCRIPTION - DESCRIPTORS
DESCRIPTORS: DISCOMFORT
DESCRIPTORS: ACHING;DISCOMFORT

## 2020-09-22 ASSESSMENT — PAIN DESCRIPTION - PAIN TYPE
TYPE: ACUTE PAIN;CHRONIC PAIN
TYPE: ACUTE PAIN

## 2020-09-22 ASSESSMENT — PAIN DESCRIPTION - ORIENTATION
ORIENTATION: LOWER
ORIENTATION: LOWER

## 2020-09-22 ASSESSMENT — PAIN SCALES - GENERAL
PAINLEVEL_OUTOF10: 6
PAINLEVEL_OUTOF10: 10
PAINLEVEL_OUTOF10: 6

## 2020-09-22 ASSESSMENT — PAIN DESCRIPTION - LOCATION
LOCATION: BACK
LOCATION: BACK

## 2020-09-22 ASSESSMENT — PAIN DESCRIPTION - ONSET
ONSET: ON-GOING
ONSET: ON-GOING

## 2020-09-22 ASSESSMENT — PAIN DESCRIPTION - PROGRESSION
CLINICAL_PROGRESSION: GRADUALLY WORSENING
CLINICAL_PROGRESSION: GRADUALLY WORSENING

## 2020-09-22 NOTE — ED NOTES
ED SBAR report provider to RN. Patient to be transported to 5N via stretcher by RN. Patient transported with bedside cardiac monitor and with IV medications infusing. IV site clean, dry, and intact. MEWS score and pain assessed as 0 and documented. Updated patient and family on plan of care.     Bedside NIHSS done     Radha Baugh RN  09/22/20 5911

## 2020-09-22 NOTE — PROGRESS NOTES
NAME:  Chiquita Saucedo  YOB: 1945  MEDICAL RECORD NUMBER:  [id number]  TODAYS DATE:  9/22/2020    Discussed personal risk factors for Stroke /TIA with patient/family, and ways to reduce the risk for a recurrent stroke. Patient's personal risk factors which were identified are:     [] Alcohol Abuse: check with your physician before any alcohol consumption. [] Atrial fibrillation: may cause blood clots. [] Drug Abuse: Seek help, talk with your doctor  [] Clotting Disorder  [x] Diabetes  [x] Family history of stroke or heart disease  [x] High Blood Pressure/Hypertension: work with your physician. [x] High cholesterol: monitor cholesterol levels with your physician.   [] Overweight/Obesity: work with your physician for your ideal body weight.  [] Physical Inactivity: get regular exercise as directed by your physician. [x] Personal history of previous TIA or stroke  [x] Poor Diet; decrease salt (sodium) in your diet, follow diet directed by physician. [] Smoking: Cigarette/Cigar: stop smoking. Advised pt. that you can reduce your risk for stroke/TIA by modifying/controlling the risk factors that you have. Pt.advised to take the medications as prescribed, which will be detailed in the discharge instructions, and to not stop taking them without consulting their physician. In addition, pt. advised to maintain a healthy diet, exercise regularly and to not smoke. [facility]'s Stroke treatment and prevention, Managing your recovery  notebook  provided and/or reviewed  with patient/family. The notebook includes, but not limited to, sections addressing warning signs & symptoms of a stroke, which are: sudden numbness or weakness especially on one side of the body, sudden confusion, difficulty speaking or understanding, sudden changes in vision, sudden dizziness or loss of balance/ coordination, or sudden severe headache.   The need to call EMS (911) immediately if signs & symptoms occur is emphasized . The notebook also provides education on Stroke community resources and stroke advocacy. The need for follow-up after discharge was highlighted with patient/family with them being able to repeat understanding of the importance of this.       Electronically signed by Casandra Vaz RN on 9/22/2020 at 6:55 PM

## 2020-09-22 NOTE — PROGRESS NOTES
Medication Reconciliation    List of medications patient is currently taking is complete. Source of information: 1.  Conversation with patient's family at bedside                                      2. EPIC records      Allergies  Atorvastatin     Adriano Smith PharmD, BCPS  9/22/2020 5:11 PM

## 2020-09-22 NOTE — PROGRESS NOTES
4 Eyes Skin Assessment     The patient is being assess for  Admission    I agree that 2 RN's have performed a thorough Head to Toe Skin Assessment on the patient. ALL assessment sites listed below have been assessed. Areas assessed by both nurses:   [x]   Head, Face, and Ears   [x]   Shoulders, Back, and Chest  [x]   Arms, Elbows, and Hands   [x]   Coccyx, Sacrum, and IschIum  [x]   Legs, Feet, and Heels        Does the Patient have Skin Breakdown?   No         Gonzalez Prevention initiated:  No   Wound Care Orders initiated:  No      [facility] nurse consulted for Pressure Injury (Stage 3,4, Unstageable, DTI, NWPT, and Complex wounds), New and Established Ostomies:  No      Nurse 1 eSignature: Electronically signed by Jesus Mccoy RN on 9/22/20 at 7:01 PM EDT    **SHARE this note so that the co-signing nurse is able to place an eSignature**    Nurse 2 eSignature: Electronically signed by Christal Nolasco RN on 9/23/20 at 1:44 AM EDT

## 2020-09-22 NOTE — ED NOTES
Bed: B-05  Expected date: 9/22/20  Expected time: 10:58 AM  Means of arrival: [facility]  Comments:  Left sided weakness     Breanne Le RN  09/22/20 4477

## 2020-09-22 NOTE — PROGRESS NOTES
Pt settled into room 5260 from ER. Pt speaks Tobi Koroma so needs an .  phone In room to translate with and son as well is at bedside to help us. Admission questions answered by son as well. Fall contract signed by son and explained to pt. MRI checklist also done as well with son to help. Pt slightly hard to get a complete NIHSS on. Bed alarm in place as we used stedy x2 as patient is weak and has back pain which makes it harder for him to move and to lift his legs up. Fall bracelet applied and ID band.

## 2020-09-22 NOTE — ED PROVIDER NOTES
Emergency Department provider note    CHIEF COMPLAINT  Extremity Weakness (left side, fall yesterday, hx of stroke); Facial Droop (left side); and Numbness (bilateral upper and lower extremities )      HISTORY OF PRESENT ILLNESS  Malini Kessler is a 76 y.o. male  who presents to the ED after new onset of left-sided weakness as well as difficulty speaking. Reviewed both an  (#[id number]) as well as his son who is at the bedside. It appears that he may have had a fall last night around 9 PM.  His son reports that normally he is able to ambulate without assistance despite having a previous stroke however the patient states for the past week each not been able to walk and the son reports that it really been only since yesterday. He is also noticed when his father woke up this morning he noticed very slurred speech. Patient is complaining of lower back pain that is chronic. Denies fever,  malaise, shortness of breath, cough, abdominal pain, nausea, vomiting, diarrhea, headache, rash. No palliative/provocative factors. No other complaints, modifying factors or associated symptoms. I have reviewed the following from the nursing documentation.     Past Medical History:   Diagnosis Date    [facility]     Diabetes mellitus ([facility]     Gallstone     GERD (gastroesophageal reflux disease)     Hyperlipidemia     Hypertension     Renal insufficiency      Past Surgical History:   Procedure Laterality Date    APPENDECTOMY      CHOLECYSTECTOMY      COLONOSCOPY N/A 5/8/2020    COLONOSCOPY POLYPECTOMY SNARE/COLD BIOPSY performed by Williams Zapien MD at [address] [facility]  06/08/2018    UPPER GASTROINTESTINAL ENDOSCOPY N/A 2/28/2019    EGD W/EUS FNA performed by Roslyn Goldberg, MD at [address] [facility] 4/23/2019    EGD BIOPSY performed by Roslyn Goldberg, MD at [address] [facility] GASTROINTESTINAL ENDOSCOPY N/A 4/23/2019    EGD W/EUS FNA performed by Og Monterroso MD at [address] [email or web address] N/A 4/29/2020    EGD BIOPSY performed by Chris Shelby MD at [address] History   Problem Relation Age of Onset    No Known Problems Mother     No Known Problems Father      Social History     Socioeconomic History    Marital status:      Spouse name: Minda Fontana Number of children: 8    Years of education: Not on file    Highest education level: Not on file   Occupational History    Occupation: farmer   Social Needs    Financial resource strain: Not on file    Food insecurity     Worry: Not on file     Inability: Not on file   [employer] needs     Medical: Not on file     Non-medical: Not on file   Tobacco Use    Smoking status: Never Smoker    Smokeless tobacco: Never Used   Substance and Sexual Activity    Alcohol use: No    Drug use: No    Sexual activity: Yes     Comment: frank   Lifestyle    Physical activity     Days per week: Not on file     Minutes per session: Not on file    Stress: Not on file   Relationships    Social connections     Talks on phone: Not on file     Gets together: Not on file     Attends Muslim service: Not on file     Active member of club or organization: Not on file     Attends meetings of clubs or organizations: Not on file     Relationship status: Not on file    Intimate partner violence     Fear of current or ex partner: Not on file     Emotionally abused: Not on file     Physically abused: Not on file     Forced sexual activity: Not on file   Other Topics Concern    Not on file   Social History Narrative    Not on file     Current Facility-Administered Medications   Medication Dose Route Frequency Provider Last Rate Last Dose    0.9 % sodium chloride bolus  1,000 mL Intravenous Once Anabela Lamb  mL/hr at 09/22/20 1142 1,000 mL at 09/22/20 1142    aspirin tablet 325 mg  325 mg Oral Once Jamie Goltz, MD         Current Outpatient Medications   Medication Sig Dispense Refill    ondansetron (ZOFRAN ODT) 4 MG disintegrating tablet Take 1 tablet by mouth every 8 hours as needed for Nausea or Vomiting Let dissolve in mouth. 10 tablet 0    azithromycin (ZITHROMAX) 250 MG tablet 2 po day 1, then 1 po days 2-5 1 packet 0    insulin glargine (LANTUS) 100 UNIT/ML injection vial Inject 6 Units into the skin nightly 1 vial 3    losartan (COZAAR) 100 MG tablet Take 1 tablet by mouth daily 30 tablet 3    pantoprazole (PROTONIX) 40 MG tablet Take 1 tablet by mouth 2 times daily (before meals) 30 tablet 3    rosuvastatin (CRESTOR) 10 MG tablet Take 10 mg by mouth daily      CARAFATE 1 GM/10ML suspension Take 10 mLs by mouth 4 times daily      insulin lispro, 1 Unit Dial, 100 UNIT/ML SOPN Inject 5-9 Units into the skin 3 times daily (with meals) 5 units with breakfast, 9 units with lunch, 9 units with dinner      polyethylene glycol (MIRALAX) PACK packet Take 17 g by mouth daily      DULoxetine (CYMBALTA) 30 MG extended release capsule Take 30 mg by mouth daily      tamsulosin (FLOMAX) 0.4 MG capsule Take 0.4 mg by mouth nightly       clopidogrel (PLAVIX) 75 MG tablet Take 1 tablet by mouth daily 30 tablet 0     Allergies   Allergen Reactions    Atorvastatin Other (See Comments)     Myalgia -- stopped 11/2019 to see if pain improves. Possible PMR diagnosis as well. Will follow-up in 3 mo.        REVIEW OF SYSTEMS  Unless otherwise stated in this report or unable to obtain because of the patient's clinical or mental status as evidenced by the medical record, this patient's positive and negative responses for review of systems, constitutional, psych, eyes, ENT, cardiovascular, respiratory, gastrointestinal, neurological, genitourinary, musculoskeletal, integument systems and systems related to the presenting problem are either stated in the preceding paragraph or were not pertinent or effort against gravity   5b. Motor right arm: 0=No drift, limb holds 90 (or 45) degrees for full 10 seconds   6a. motor left leg: 3=No effort against gravity, limb falls   6b  Motor right leg:  3=No effort against gravity, limb falls   7. Limb Ataxia: 0=Absent   8. Sensory: 1=Mild to moderate sensory loss; patient feels pinprick is less sharp or is dull on the affected side; there is a loss of superficial pain with pinprick but patient is aware He is being touched   9. Best Language:  0=No aphasia, normal   10. Dysarthria: 1=Mild to moderate, patient slurs at least some words and at worst, can be understood with some difficulty   11. Extinction and Inattention: 0=No abnormality   12. Distal motor function: 0=Normal    Total:  13     Modified Amsterdam Score - Assessing Disability From Stroke    Score: 4 - Moderately severe disability; unable to walk without assistance and unable to attend to own bodily needs without assistance    As a note to the NIHSS, son reports that is normal for his father not to know his age and not to know what month it is    LABS  I have reviewed all labs for this visit.    Results for orders placed or performed during the hospital encounter of 09/22/20   CBC Auto Differential   Result Value Ref Range    WBC 7.6 4.0 - 11.0 K/uL    RBC 5.53 4.20 - 5.90 M/uL    Hemoglobin 14.0 13.5 - 17.5 g/dL    Hematocrit 42.1 40.5 - 52.5 %    MCV 76.0 (L) 80.0 - 100.0 fL    MCH 25.3 (L) 26.0 - 34.0 pg    MCHC 33.3 31.0 - 36.0 g/dL    RDW 15.3 12.4 - 15.4 %    Platelets 330 278 - 941 K/uL    MPV 7.4 5.0 - 10.5 fL    Neutrophils % 60.5 %    Lymphocytes % 29.1 %    Monocytes % 7.0 %    Eosinophils % 2.2 %    Basophils % 1.2 %    Neutrophils Absolute 4.6 1.7 - 7.7 K/uL    Lymphocytes Absolute 2.2 1.0 - 5.1 K/uL    Monocytes Absolute 0.5 0.0 - 1.3 K/uL    Eosinophils Absolute 0.2 0.0 - 0.6 K/uL    Basophils Absolute 0.1 0.0 - 0.2 K/uL   Comprehensive Metabolic Panel w/ Reflex to MG   Result Value Ref Range Sodium 137 136 - 145 mmol/L    Potassium reflex Magnesium 3.9 3.5 - 5.1 mmol/L    Chloride 103 99 - 110 mmol/L    CO2 24 21 - 32 mmol/L    Anion Gap 10 3 - 16    Glucose 215 (H) 70 - 99 mg/dL    BUN 17 7 - 20 mg/dL    CREATININE 1.4 (H) 0.8 - 1.3 mg/dL    GFR Non- 49 (A) >60    GFR  60 (A) >60    Calcium 9.3 8.3 - 10.6 mg/dL    Total Protein 7.0 6.4 - 8.2 g/dL    Alb 4.2 3.4 - 5.0 g/dL    Albumin/Globulin Ratio 1.5 1.1 - 2.2    Total Bilirubin 0.7 0.0 - 1.0 mg/dL    Alkaline Phosphatase 107 40 - 129 U/L    ALT 16 10 - 40 U/L    AST 23 15 - 37 U/L    Globulin 2.8 g/dL   Protime-INR   Result Value Ref Range    Protime 10.7 10.0 - 13.2 sec    INR 0.92 0.86 - 1.14   EKG 12 Lead   Result Value Ref Range    Ventricular Rate 110 BPM    Atrial Rate 110 BPM    P-R Interval 182 ms    QRS Duration 102 ms    Q-T Interval 340 ms    QTc Calculation (Bazett) 460 ms    P Axis 8 degrees    R Axis -53 degrees    T Axis 84 degrees    Diagnosis       Sinus tachycardiaLeft anterior fascicular blockLeft ventricular hypertrophy with repolarization abnormalityCannot rule out Septal infarct , age undeterminedAbnormal ECGWhen compared with ECG of 26-AUG-2020 23:23,No significant change was found       EKG  The Ekg interpreted by me shows  sinus tachycardia, pmiv=068 with a rate of 110  Axis is   Left axis deviation  QTc is  within an acceptable range  Intervals and Durations are unremarkable. ST Segments: no acute change and nonspecific changes  Delta waves, Brugada Syndrome, and Short [place] are not present. Prior EKG to compare with was available. No significant changes compared to prior EKG from Aug 26 2020      RADIOLOGY    CT HEAD WO CONTRAST   Final Result   Low-attenuation in the right aspect the son suspicious for acute ischemia. CTA HEAD NECK W CONTRAST   Preliminary Result   50% stenosis proximal left internal carotid artery. Severe stenosis proximal left posterior cerebral artery. XR CHEST PORTABLE   Final Result      No acute abnormality improved aeration lungs compared to the prior study               Reason for Delays:  [] Social/Scientology  [] Initial refusal of testing or treatment  [] Care team unable to identify eligibility  [] Hypertension requiring aggressive control with IV medications  [] Further diagnostic evaluation to confirm stroke for patients with hypoglycemia (blood glucose <50), seizures, or major metabolic disorders  [] Management of concomitant emergent/acute conditions such as cardiopulmonary arrest, respiratory failure (requiring intubation)  [] Investigational or experimental protocol for thrombolysis      t-PA NOT given due to the following EXCLUSION CRITERIA (only those checked):  [] Pregnancy  [x] Symptoms > 3 hours of onset  [] Minor or isolated neurological signs  [] Rapid improvement of stroke symptoms  [] Seizure at the same time of stroke symptoms  [] Active bleeding or acute trauma (fracture)  [] Presentation consistent with acute MI or post-MI pericarditis  [] Known intracranial neoplasm, AV malformation or aneurysm  [] CT evidence of intracranial hemorrhage  [] Any prior history of intracranial hemorrhage  [] Symptoms suggestive of subarachnoid hemorrhage (even if head CT normal)  [] Persistent hypertension (SBP>185 or DBP>110)  [] Glucose < 50 or > 400.   [x] Bleeding diathesis, including but not limited to:   -Platelets < 548,544   -Heparin within 48 hours with PTT > normal range   -Current or recent use of anticoagulants (dabagtran, rivaroxaban, or warfarin with     INR > 1.7)  [] Lumbar puncture in past 7 days  [] Arterial puncture at a noncompressible site in past 7 days  [] Major surgery in past 14 days  [] Gastrointestinal or urinary tract hemorrhage in past 21 days  [] Myocardial infarction in past 3 months  [] Stroke, intracranial surgery or serious head trauma in past 3 months    t-PA given with the following INCLUSION CRITERIA verified (only those checked):  [] Age 25 years or older  [] Clinical diagnosis of ischemic stroke causing measurable neurological deficit  [] Administration of t-PA can be initiated within 3 hours of onset of symptoms  [] A patient or family members who understand the potential risks and benefits:   Of every 100 patients treated with tPA:   72 will have the same outcome   32 will have a better outcome   3 will have a worse outcome (with 1 being severely disabled or fatal) due to t-PA    ED COURSE/MDM  Patient seen and evaluated. Old records reviewed. Labs and imaging reviewed and results discussed. Procedures/interventions/images ordered for this visit  Orders Placed This Encounter   Procedures    XR CHEST PORTABLE    CT HEAD WO CONTRAST    CTA HEAD NECK W CONTRAST    CBC Auto Differential    Comprehensive Metabolic Panel w/ Reflex to MG    Protime-INR    Diet NPO Effective Now    Swallow assessment by nursing before diet and oral medications started    NIH Stroke Scale (NIHSS)    Pharmacy to Dose: Other - See Comments: The pharmacist will review this patient's medication profile to evaluate IV medications and change all base solutions to 0.9% sodium chloride if possible based on compatibility and product availability. This. .. [address] [place] [address] to change base solutions.     Inpatient consult to Neurosurgery    Initiate Oxygen Therapy Protocol    EKG 12 Lead    Saline lock IV    Seizure precautions       Medications ordered for this visit  Orders Placed This Encounter   Medications    0.9 % sodium chloride bolus    aspirin tablet 325 mg    iopamidol (ISOVUE-370) 76 % injection 75 mL       ED course notes for this visit       Discussed with the NORMA from neurosurgery, he states at this time there is no surgical intervention recommended at at this time    Around 12:45 PM I went into the room to discuss the results of the CT, the patient is now moving his legs and seemed to appear to have more strength in his bilateral lower extremities. I spoke with Dr. Melia Birmingham. We thoroughly discussed the history, physical exam, laboratory and imaging studies, as well as, emergency department course. Based upon that discussion, we've decided to admit Gigi Alcaraz for further observation and evaluation of Sylvester Duval's CVA-like symptoms. As I have deemed necessary from their history, physical and studies, I have considered and evaluated Gigi Alcaraz for the following diagnoses:  DIABETES, INTRACRANIAL HEMORRHAGE, MENINGITIS, SUBARACHNOID HEMORRHAGE, SUBDURAL HEMATOMA, & STROKE. CLINICAL IMPRESSION  1. Cerebrovascular accident (CVA), unspecified mechanism ([facility] [address] [address]    2. Left-sided muscle weakness        Blood pressure (!) 140/90, pulse 101, temperature 97.7 °F (36.5 °C), resp. rate 20, weight 132 lb 15 oz (60.3 kg), SpO2 92 %. DISPOSITION  Pt is in stable condition upon Admit to med/surg floor. CRITICAL CARE TIME:  Please note, critical care time was at least 20 minutes, obtaining history, conducting a physical exam, performing and monitoring interventions, ordering, collecting and interpreting tests, and establishing medical decision-making and discussion with the patient and/or family, specifically for management of the presenting complaint and symptoms initially, direct bedside care, reevaluation, review of records, and consultation. There was a high probability of clinically significant life-threatening deterioration in the patient's condition, which required my urgent intervention. This time does not include separately billable procedures. Comment: Please note this report has been produced using speech recognition software and may contain errors related to that system including errors in grammar, punctuation, and spelling, as well as words and phrases that may be inappropriate. If there are any questions or concerns please feel free to contact the dictating provider for clarification.     Maryjo Cheadle, MD  [facility] [place], MD  09/22/20 [address] [facility], MD  09/22/20 0595

## 2020-09-22 NOTE — ED TRIAGE NOTES
Pt arrived to ED via EMS with complaints of left sided weakness, facial droop, and numbness. On initial assessment, pt's son states pt fell yesterday, did not noticed sx yesterday, yet woke up with them this morning. Pt has a hx of stroke. Pt has mildly slurred speech and left sided facial droop. Pt unable to lift either leg due to \"back pain\" which pt states they have had worsening over 1 week, yet the last 3-6 months it has been present. Pt unable to ambulate due to pain. Pt has weakness in LUE with a drift. [facility]  used to communicate with pt during assessment. VS noted and stable. Patient A&Ox4. Respirations easy and unlabored. Skin warm and dry and appropriate for ethnicity. No acute distress noted at this time.

## 2020-09-22 NOTE — H&P
Hospital Medicine History & Physical      PCP: Lamont Kraus    Date of Admission: 9/22/2020    Date of Service: Pt seen/examined on 9/22/2020 and Admitted to Inpatient     Chief Complaint: fall    History Of Present Illness: The patient is a 76 y.o. male who presents to [facility] with unsteady on feet. Pt speaks Portuguese, son at bedside helping getting history. According to son/pt, pt apparently sustained a fall yesterday and felt like he couldn't move his left side. Today he had difficulty ambulating using his lt side and hence son brought pt to the ER. Past Medical History:        Diagnosis Date    Cerebral artery occlusion with cerebral infarction ([facility]     Diabetes mellitus ([facility]     Gallstone     GERD (gastroesophageal reflux disease)     Hyperlipidemia     Hypertension     Renal insufficiency        Past Surgical History:        Procedure Laterality Date    APPENDECTOMY      CHOLECYSTECTOMY      COLONOSCOPY N/A 5/8/2020    COLONOSCOPY POLYPECTOMY SNARE/COLD BIOPSY performed by Pranav Somers MD at [address] [place] [address]  06/08/2018    UPPER GASTROINTESTINAL ENDOSCOPY N/A 2/28/2019    EGD W/EUS FNA performed by Keisha Mcmanus MD at [address] [place] [address] 4/23/2019    EGD BIOPSY performed by Keisha Mcmanus MD at [address] [place] [address] N/A 4/23/2019    EGD W/EUS FNA performed by Keisha Mcmanus MD at [address] [place] [address] 4/29/2020    EGD BIOPSY performed by Pranav Somers MD at [address] [place] [address]       Medications Prior to Admission:    Prior to Admission medications    Medication Sig Start Date End Date Taking?  Authorizing Provider   insulin glargine (LANTUS) 100 UNIT/ML injection vial Inject 30 Units into the skin nightly   Yes Historical Provider, MD   omeprazole (PRILOSEC) 40 MG delayed release capsule Take 40 mg by mouth daily   Yes Historical Provider, MD   vitamin D (CHOLECALCIFEROL) 25 MCG (1000 UT) TABS tablet Take 2,000 Units by mouth daily   Yes Historical Provider, MD   polyvinyl alcohol (LIQUIFILM TEARS) 1.4 % ophthalmic solution Place 1 drop into both eyes as needed for Dry Eyes   Yes Historical Provider, MD   acetaminophen (TYLENOL) 500 MG tablet Take 1,000 mg by mouth 2 times daily   Yes Historical Provider, MD   ondansetron (ZOFRAN ODT) 4 MG disintegrating tablet Take 1 tablet by mouth every 8 hours as needed for Nausea or Vomiting Let dissolve in mouth. 8/26/20  Yes MAYO Alejandre   losartan (COZAAR) 100 MG tablet Take 1 tablet by mouth daily 5/2/20  Yes Guillaume Hackett DO   rosuvastatin (CRESTOR) 10 MG tablet Take 10 mg by mouth daily 2/10/20  Yes Historical Provider, MD   CARAFATE 1 GM/10ML suspension Take 10 mLs by mouth 4 times daily 2/25/20  Yes Historical Provider, MD   insulin lispro, 1 Unit Dial, 100 UNIT/ML SOPN Inject 14 Units into the skin 2 times daily (with meals) 5 units with breakfast, 9 units with lunch, 9 units with dinner 1/15/19  Yes Historical Provider, MD   polyethylene glycol (MIRALAX) PACK packet Take 17 g by mouth daily   Yes Historical Provider, MD   DULoxetine (CYMBALTA) 30 MG extended release capsule Take 30 mg by mouth daily 2/6/20  Yes Historical Provider, MD   tamsulosin (FLOMAX) 0.4 MG capsule Take 0.4 mg by mouth nightly    Yes Historical Provider, MD   clopidogrel (PLAVIX) 75 MG tablet Take 1 tablet by mouth daily 5/3/16  Yes Jewell Ramirez PA-C       Allergies:  Atorvastatin    Social History:  The patient currently lives at home    TOBACCO:   reports that he has never smoked. He has never used smokeless tobacco.  ETOH:   reports no history of alcohol use. Family History:  Reviewed in detail and negative for DM, Early CAD, Cancer, CVA.  Positive as follows: Problem Relation Age of Onset    No Known Problems Mother     No Known Problems Father        REVIEW OF SYSTEMS:   Positive for lt sided weakness and as noted in the HPI. All other systems reviewed and negative. PHYSICAL EXAM:    BP (!) 140/90   Pulse 101   Temp 97.7 °F (36.5 °C)   Resp 20   Wt 132 lb 15 oz (60.3 kg)   SpO2 92%   BMI 22.82 kg/m²     General appearance: No apparent distress appears stated age and cooperative. HEENT Normal cephalic, atraumatic without obvious deformity. Pupils equal, round, and reactive to light. Extra ocular muscles intact. Conjunctivae/corneas clear. Neck: Supple, No jugular venous distention/bruits. Trachea midline without thyromegaly or adenopathy with full range of motion. Lungs: Clear to auscultation, bilaterally   Heart: Regular rate and rhythm with Normal S1/S2  Abdomen: Soft, non-tender or non-distended without rigidity or guarding and positive bowel sounds  Extremities: No clubbing, cyanosis, or edema bilaterally. Skin: Skin color, texture, turgor normal.  No rashes or lesions. Neurologic: Awake, left sided weakness with power 3/5, rt side 5/5, sensation intact  Mental status: Alert  Capillary Refill: Acceptable  < 3 seconds  Peripheral Pulses: +3 Easily felt, not easily obliterated with pressure      CXR:  I have reviewed the CXR with the following interpretation: clear    CBC   Recent Labs     09/22/20  1137   WBC 7.6   HGB 14.0   HCT 42.1         RENAL  Recent Labs     09/22/20  1137      K 3.9      CO2 24   BUN 17   CREATININE 1.4*     LFT'S  Recent Labs     09/22/20  1137   AST 23   ALT 16   BILITOT 0.7   ALKPHOS 107     COAG  Recent Labs     09/22/20  1137   INR 0.92     CARDIAC ENZYMES  No results for input(s): CKTOTAL, CKMB, CKMBINDEX, TROPONINI in the last 72 hours.     U/A:    Lab Results   Component Value Date    COLORU YELLOW 09/03/2020    WBCUA 4 09/03/2020    RBCUA 1 09/03/2020    CLARITYU Clear 09/03/2020    SPECGRAV 1.017 09/03/2020    LEUKOCYTESUR Negative 09/03/2020    BLOODU Negative 09/03/2020    GLUCOSEU 100 09/03/2020       ABG  No results found for: XJE8NJK, BEART, F7SUHOJZ, PHART, THGBART, LQA2LHE, PO2ART, HAR8UHD        Active Hospital Problems    Diagnosis Date Noted    Acute cerebrovascular accident [facility]) [I63.9] 09/22/2020         PHYSICIANS CERTIFICATION:    I certify that Melanie Anderson is expected to be hospitalized for > than 2 midnights based on the following assessment and plan:      ASSESSMENT/PLAN:    Acute CVA - with left sided weakness. CT head reviewed, check MRI brain, CTA head neck with stenosis of prox left ICA, neurology consulted. PT/OT eval. Already on plavix and statin. Pt has had h/o recent CVA    HTN - uncontrolled, permissive HTN. Cont home meds    DM II - fairly controlled, cont lantus/SSI  HLP - cont statin      DVT Prophylaxis: lovenox  Diet: Diet NPO Effective Now  Code Status: Prior  PT/OT Eval Status: ordered    Dispo - cont care       Aishwarya Tristan MD    Thank you Rober Bishop for the opportunity to be involved in this patient's care. If you have any questions or concerns please feel free to contact me at [phone].

## 2020-09-23 LAB
ANION GAP SERPL CALCULATED.3IONS-SCNC: 10 MMOL/L (ref 3–16)
BUN BLDV-MCNC: 12 MG/DL (ref 7–20)
CALCIUM SERPL-MCNC: 8.9 MG/DL (ref 8.3–10.6)
CHLORIDE BLD-SCNC: 107 MMOL/L (ref 99–110)
CHOLESTEROL, TOTAL: 105 MG/DL (ref 0–199)
CO2: 22 MMOL/L (ref 21–32)
CREAT SERPL-MCNC: 1.3 MG/DL (ref 0.8–1.3)
ESTIMATED AVERAGE GLUCOSE: 234.6 MG/DL
GFR AFRICAN AMERICAN: >60
GFR NON-AFRICAN AMERICAN: 54
GLUCOSE BLD-MCNC: 136 MG/DL (ref 70–99)
GLUCOSE BLD-MCNC: 190 MG/DL (ref 70–99)
GLUCOSE BLD-MCNC: 238 MG/DL (ref 70–99)
GLUCOSE BLD-MCNC: 80 MG/DL (ref 70–99)
GLUCOSE BLD-MCNC: 81 MG/DL (ref 70–99)
GLUCOSE BLD-MCNC: 82 MG/DL (ref 70–99)
GLUCOSE BLD-MCNC: 84 MG/DL (ref 70–99)
HBA1C MFR BLD: 9.8 %
HCT VFR BLD CALC: 39.9 % (ref 40.5–52.5)
HDLC SERPL-MCNC: 36 MG/DL (ref 40–60)
HEMOGLOBIN: 13.2 G/DL (ref 13.5–17.5)
LDL CHOLESTEROL CALCULATED: 43 MG/DL
MCH RBC QN AUTO: 25.4 PG (ref 26–34)
MCHC RBC AUTO-ENTMCNC: 33.1 G/DL (ref 31–36)
MCV RBC AUTO: 76.6 FL (ref 80–100)
PDW BLD-RTO: 15.4 % (ref 12.4–15.4)
PERFORMED ON: ABNORMAL
PERFORMED ON: NORMAL
PLATELET # BLD: 230 K/UL (ref 135–450)
PMV BLD AUTO: 7.5 FL (ref 5–10.5)
POTASSIUM REFLEX MAGNESIUM: 3.7 MMOL/L (ref 3.5–5.1)
RBC # BLD: 5.21 M/UL (ref 4.2–5.9)
SODIUM BLD-SCNC: 139 MMOL/L (ref 136–145)
TRIGL SERPL-MCNC: 130 MG/DL (ref 0–150)
VLDLC SERPL CALC-MCNC: 26 MG/DL
WBC # BLD: 6.7 K/UL (ref 4–11)

## 2020-09-23 PROCEDURE — 80061 LIPID PANEL: CPT

## 2020-09-23 PROCEDURE — 97530 THERAPEUTIC ACTIVITIES: CPT

## 2020-09-23 PROCEDURE — 97116 GAIT TRAINING THERAPY: CPT

## 2020-09-23 PROCEDURE — 80048 BASIC METABOLIC PNL TOTAL CA: CPT

## 2020-09-23 PROCEDURE — 92610 EVALUATE SWALLOWING FUNCTION: CPT

## 2020-09-23 PROCEDURE — 97162 PT EVAL MOD COMPLEX 30 MIN: CPT

## 2020-09-23 PROCEDURE — 36415 COLL VENOUS BLD VENIPUNCTURE: CPT

## 2020-09-23 PROCEDURE — 1200000000 HC SEMI PRIVATE

## 2020-09-23 PROCEDURE — 6370000000 HC RX 637 (ALT 250 FOR IP): Performed by: INTERNAL MEDICINE

## 2020-09-23 PROCEDURE — 2580000003 HC RX 258: Performed by: INTERNAL MEDICINE

## 2020-09-23 PROCEDURE — 83036 HEMOGLOBIN GLYCOSYLATED A1C: CPT

## 2020-09-23 PROCEDURE — 97166 OT EVAL MOD COMPLEX 45 MIN: CPT

## 2020-09-23 PROCEDURE — 97535 SELF CARE MNGMENT TRAINING: CPT

## 2020-09-23 PROCEDURE — 85027 COMPLETE CBC AUTOMATED: CPT

## 2020-09-23 PROCEDURE — 6360000002 HC RX W HCPCS: Performed by: INTERNAL MEDICINE

## 2020-09-23 RX ORDER — INSULIN GLARGINE 100 [IU]/ML
10 INJECTION, SOLUTION SUBCUTANEOUS NIGHTLY
Status: DISCONTINUED | OUTPATIENT
Start: 2020-09-23 | End: 2020-09-24

## 2020-09-23 RX ORDER — TRAMADOL HYDROCHLORIDE 50 MG/1
50 TABLET ORAL EVERY 6 HOURS PRN
Status: DISCONTINUED | OUTPATIENT
Start: 2020-09-23 | End: 2020-09-25 | Stop reason: HOSPADM

## 2020-09-23 RX ORDER — TRAMADOL HYDROCHLORIDE 50 MG/1
100 TABLET ORAL EVERY 6 HOURS PRN
Status: DISCONTINUED | OUTPATIENT
Start: 2020-09-23 | End: 2020-09-25 | Stop reason: HOSPADM

## 2020-09-23 RX ADMIN — ENOXAPARIN SODIUM 40 MG: 40 INJECTION SUBCUTANEOUS at 22:05

## 2020-09-23 RX ADMIN — ROSUVASTATIN CALCIUM 10 MG: 10 TABLET, FILM COATED ORAL at 12:37

## 2020-09-23 RX ADMIN — SUCRALFATE 1 G: 1 TABLET ORAL at 22:05

## 2020-09-23 RX ADMIN — INSULIN LISPRO 2 UNITS: 100 INJECTION, SOLUTION INTRAVENOUS; SUBCUTANEOUS at 17:02

## 2020-09-23 RX ADMIN — TAMSULOSIN HYDROCHLORIDE 0.4 MG: 0.4 CAPSULE ORAL at 22:05

## 2020-09-23 RX ADMIN — ASPIRIN 300 MG: 300 SUPPOSITORY RECTAL at 08:38

## 2020-09-23 RX ADMIN — PANTOPRAZOLE SODIUM 40 MG: 40 TABLET, DELAYED RELEASE ORAL at 16:59

## 2020-09-23 RX ADMIN — DULOXETINE HYDROCHLORIDE 30 MG: 30 CAPSULE, DELAYED RELEASE ORAL at 12:37

## 2020-09-23 RX ADMIN — SUCRALFATE 1 G: 1 TABLET ORAL at 14:34

## 2020-09-23 RX ADMIN — CLOPIDOGREL BISULFATE 75 MG: 75 TABLET ORAL at 12:37

## 2020-09-23 RX ADMIN — INSULIN GLARGINE 10 UNITS: 100 INJECTION, SOLUTION SUBCUTANEOUS at 22:05

## 2020-09-23 RX ADMIN — LOSARTAN POTASSIUM 100 MG: 100 TABLET, FILM COATED ORAL at 12:37

## 2020-09-23 RX ADMIN — INSULIN LISPRO 1 UNITS: 100 INJECTION, SOLUTION INTRAVENOUS; SUBCUTANEOUS at 12:39

## 2020-09-23 RX ADMIN — Medication 10 ML: at 22:05

## 2020-09-23 RX ADMIN — Medication 10 ML: at 08:27

## 2020-09-23 ASSESSMENT — PAIN SCALES - GENERAL: PAINLEVEL_OUTOF10: 0

## 2020-09-23 NOTE — PLAN OF CARE
Problem: Falls - Risk of:  Goal: Will remain free from falls  Description: Will remain free from falls  Outcome: Ongoing  Goal: Absence of physical injury  Description: Absence of physical injury  Outcome: Ongoing     Problem: Skin Integrity:  Goal: Will show no infection signs and symptoms  Description: Will show no infection signs and symptoms  Outcome: Ongoing  Goal: Absence of new skin breakdown  Description: Absence of new skin breakdown  Outcome: Ongoing     Problem: HEMODYNAMIC STATUS  Goal: Patient has stable vital signs and fluid balance  Outcome: Ongoing     Problem: ACTIVITY INTOLERANCE/IMPAIRED MOBILITY  Goal: Mobility/activity is maintained at optimum level for patient  Outcome: Ongoing     Problem: COMMUNICATION IMPAIRMENT  Goal: Ability to express needs and understand communication  Outcome: Ongoing     Problem: Pain:  Goal: Pain level will decrease  Description: Pain level will decrease  Outcome: Ongoing  Goal: Control of acute pain  Description: Control of acute pain  Outcome: Ongoing  Goal: Control of chronic pain  Description: Control of chronic pain  Outcome: Ongoing     Problem: Nutrition  Goal: Optimal nutrition therapy  Outcome: Ongoing

## 2020-09-23 NOTE — PROGRESS NOTES
NAME:  Milton Romero  YOB: 1945  MEDICAL RECORD NUMBER:  [id number]  TODAYS DATE:  9/23/2020    Discussed personal risk factors for Stroke /TIA with patient/family, and ways to reduce the risk for a recurrent stroke. Patient's personal risk factors which were identified are:     [] Alcohol Abuse: check with your physician before any alcohol consumption. [] Atrial fibrillation: may cause blood clots. [] Drug Abuse: Seek help, talk with your doctor  [] Clotting Disorder  [x] Diabetes  [] Family history of stroke or heart disease  [x] High Blood Pressure/Hypertension: work with your physician. [x] High cholesterol: monitor cholesterol levels with your physician.   [] Overweight/Obesity: work with your physician for your ideal body weight.  [] Physical Inactivity: get regular exercise as directed by your physician. [x] Personal history of previous TIA or stroke  [] Poor Diet; decrease salt (sodium) in your diet, follow diet directed by physician. [] Smoking: Cigarette/Cigar: stop smoking. Advised pt. that you can reduce your risk for stroke/TIA by modifying/controlling the risk factors that you have. Pt.advised to take the medications as prescribed, which will be detailed in the discharge instructions, and to not stop taking them without consulting their physician. In addition, pt. advised to maintain a healthy diet, exercise regularly and to not smoke. [facility]'s Stroke treatment and prevention, Managing your recovery  notebook  provided and/or reviewed  with patient/family. The notebook includes, but not limited to, sections addressing warning signs & symptoms of a stroke, which are: sudden numbness or weakness especially on one side of the body, sudden confusion, difficulty speaking or understanding, sudden changes in vision, sudden dizziness or loss of balance/ coordination, or sudden severe headache.   The need to call EMS (911) immediately if signs & symptoms occur is emphasized . The notebook also provides education on Stroke community resources and stroke advocacy. The need for follow-up after discharge was highlighted with patient/family with them being able to repeat understanding of the importance of this.       Electronically signed by Steve Concepcion RN on 9/23/2020 at 11:42 AM

## 2020-09-23 NOTE — PROGRESS NOTES
Patient declined ECHO due to confusion about the test. After reviewing what the test is for and how it is performed with the patient and his son; patient is agreeable to complete the ECHO. Patient's son states the confusion was due to the  calling the test an \"operation\" instead of an ultrasound test of the heart.

## 2020-09-23 NOTE — CONSULTS
Consult Note  Physical Medicine and Rehabilitation    Patient: Melanie Anderson  [id number]  Date: 9/23/2020      Chief Complaint: left side weakness    History of Present Illness/Hospital Course:  Patient is a 75 yo right handed M with pmh HTN, HLD, DM2, CKD, and prior left PCA stroke who initially presented 9/22/2020 with left side weakness resulting in fall and back pain. Imaging revealed acute/subacute ischemic right pontine infarct in addition to likely subacute lacunar infarct in left frontal lobe deep white matter and right posterior centrum semiovale. Also with chronic left PCA territory infarct with severe stenosis. Etiology thought to be microvascular vs embolic. Neurology recommending DAPT x 21 days and then monotherapy (on Plavix and statin at baseline). Also found to have L1 compression fracture which is being managed conservatively. Course complicated by uncontrolled HTN and DM2. Patient speaks [place]. Therefore history obtained via chart review and interpretation by son (patient/son refuse use of blue phone). Patient reports ongoing left side weakness, slurred speech, and low back pain. He has vision change but is unable to further characterize. He denies headache, tingling/numbness. Son reports he has been intermittently confused. He would like to come to ARU to improve his function prior to returning home.      Prior Level of Function:  Supervision for ambulating with cane, bathing, dressing, toileting    Current Level of Function:  Min-mod assist x 2 person for mobility  Mod-max assist for ADLs    Pertinent Social History:  Support: Lives with Family(wife, son, grand children and other family members)  Home set-up: Bi-level home with 3+5 steps to enter    Past Medical History:   Diagnosis Date    [facility]     Diabetes mellitus ([facility]     Gallstone     GERD (gastroesophageal reflux disease)     Hyperlipidemia     Hypertension     Renal insufficiency Past Surgical History:   Procedure Laterality Date    APPENDECTOMY      CHOLECYSTECTOMY      COLONOSCOPY N/A 5/8/2020    COLONOSCOPY POLYPECTOMY SNARE/COLD BIOPSY performed by Dee Dee Medina MD at [place] [address]  06/08/2018    UPPER GASTROINTESTINAL ENDOSCOPY N/A 2/28/2019    EGD W/EUS FNA performed by Reggie Kruger MD at [place] [address] N/A 4/23/2019    EGD BIOPSY performed by Reggie Kruger MD at [place] [address] N/A 4/23/2019    EGD W/EUS FNA performed by Reggie Kruger MD at [place] [address] N/A 4/29/2020    EGD BIOPSY performed by Dee Dee Medina MD at [facility] History   Problem Relation Age of Onset    No Known Problems Mother     No Known Problems Father        Social History     Socioeconomic History    Marital status:      Spouse name: Evgeny Whitfield Number of children: 8    Years of education: None    Highest education level: None   Occupational History    Occupation: farmer   Social Needs    Financial resource strain: None    Food insecurity     Worry: None     Inability: None    Transportation needs     Medical: None     Non-medical: None   Tobacco Use    Smoking status: Never Smoker    Smokeless tobacco: Never Used   Substance and Sexual Activity    Alcohol use: No    Drug use: No    Sexual activity: Yes     Comment: frank   Lifestyle    Physical activity     Days per week: None     Minutes per session: None    Stress: None   Relationships    Social connections     Talks on phone: None     Gets together: None     Attends Temple service: None     Active member of club or organization: None     Attends meetings of clubs or organizations: None     Relationship status: None    Intimate partner violence     Fear of current or ex partner: None     Emotionally abused: None     Physically abused: None Forced sexual activity: None   Other Topics Concern    None   Social History Narrative    None           REVIEW OF SYSTEMS:   CONSTITUTIONAL: negative for fevers, chills, diaphoresis, appetite change, night sweats, unexpected weight change, fatigue  EYES: negative for blurred vision, eye discharge, visual disturbance and icterus  HEENT: negative for hearing loss, tinnitus, ear drainage, sinus pressure, nasal congestion, epistaxis and snoring  RESPIRATORY: Negative for hemoptysis, cough, sputum production  CARDIOVASCULAR: negative for chest pain, palpitations, exertional chest pressure/discomfort, syncope, edema  GASTROINTESTINAL: negative for nausea, vomiting, diarrhea, blood in stool, abdominal pain, constipation  GENITOURINARY: negative for frequency, dysuria, urinary incontinence, decreased urine volume, and hematuria  HEMATOLOGIC/LYMPHATIC: negative for easy bruising, bleeding and lymphadenopathy  ALLERGIC/IMMUNOLOGIC: negative for recurrent infections, angioedema, anaphylaxis and drug reactions  ENDOCRINE: negative for weight changes and diabetic symptoms including polyuria, polydipsia and polyphagia  MUSCULOSKELETAL: negative for pain, joint swelling, decreased range of motion  NEUROLOGICAL: refer to HPI  PSYCHIATRIC/BEHAVIORAL: negative for hallucinations, behavioral problems, +confusion.      Physical Examination:  Vitals:   Patient Vitals for the past 24 hrs:   BP Temp Temp src Pulse Resp SpO2 Height Weight   09/23/20 1200 (!) 166/86 97.9 °F (36.6 °C) Oral 74 20 -- -- --   09/23/20 0758 (!) 173/97 97.4 °F (36.3 °C) Oral 92 20 96 % -- --   09/23/20 0400 (!) 145/84 96.1 °F (35.6 °C) Axillary 62 18 96 % -- 131 lb 9.8 oz (59.7 kg)   09/23/20 0001 (!) 155/95 96 °F (35.6 °C) Axillary 76 20 93 % -- --   09/22/20 2003 (!) 166/96 97.3 °F (36.3 °C) Axillary 76 18 96 % -- --   09/22/20 1815 -- -- -- -- -- -- 5' 3\" (1.6 m) 134 lb 14.7 oz (61.2 kg)   09/22/20 1744 (!) 176/102 97.8 °F (36.6 °C) Oral 85 20 94 % -- -- Lab Results   Component Value Date    ALT 16 09/22/2020    AST 23 09/22/2020    ALKPHOS 107 09/22/2020    BILITOT 0.7 09/22/2020       Most recent echocardiogram revealed:  PENDING    Most recent EKG revealed:  Sinus tachycardia   Poor data quality, interpretation may be adversely affectedLeft anterior fascicular blockLeft ventricular hypertrophy with repolarization abnormalityAbnormal ECGWhen compared with ECG of 26-AUG-2020 23:23,No significant change likelyConfirmed by [facility] Danielle MOMIN MD ([id number]) on 9/22/2020 4:50:35 PM    Most recent imaging studies revealed:    MRI brain  Right paramedian pontine acute to subacute infarct.  No hemorrhagic conversion         Additionally areas of likely subacute lacunar infarct involving the left    frontal lobe deep white matter as well as the right posterior centrum    semiovale         Chronic small ischemic disease and age related change. CTA head and neck  50% stenosis of proximal left internal carotid artery.         Severe stenosis of proximal left posterior cerebral artery. XRay lumbar spine  1. Multilevel degenerative disc disease and facet joint arthropathy    2. Mild anterior wedging L1 not evident on previous CT done 04/28/2020 could    represent a mild acute compression fracture. On my review, CXR displays right basilar atelectasis. Assessment:  1. Acute ischemic stroke (Right son, posterior centrum semiolave, left frontal lobe deep white matter)  2. H/o left PCA stroke with severe stenosis  3. Dysphagia  4. HTN, uncontrolled  5. DM2, uncontrolled  6. CKD  7. L1 compression fracture    Impairments: left hemiparesis, decreased coordination, balance, endurance, cognition, dysarthria, dysphagia  Recommendations:    Patient with new functional deficits and ongoing medical complexity. Demonstrates ability to tolerate 3 hours therapy/day. He is a good candidate for acute inpatient rehab when medically appropriate. Thank you for this consult. Please contact me with any questions or concerns. Kalie Underwood.  Phuong Oconnell MD 9/23/2020, 2:11 PM

## 2020-09-23 NOTE — PROGRESS NOTES
Physical Therapy  Facility/Department: [address] PROGRESSIVE CARE  Initial Assessment    NAME: Meena Humphrey  : 1945  MRN: [id number]    Date of Service: 2020  Discharge Recommendations:  Patient would benefit from continued therapy after discharge, 5-7 sessions per week   PT Equipment Recommendations  Other: will monitor    Assessment   Body structures, Functions, Activity limitations: Decreased functional mobility ; Decreased balance;Decreased strength  Assessment: Pt presents with decreased functional mobility after admisison for L sided weakness/CVA. Pt speaks [place]. Prior to admission, pt's family reported mobilie within the home setting using a cane, and family only supervised bathing/dressing for pt. This date,p t needed Min A for bed mobility, Min to Mod A for sit<>stand trasnfers, and did ambulate with Mod A x 2 person with RW x 10 ft into bathroom, but bvery unsteadily and with loss of L hand . Pt is functioning well below ihs usual baseline and feel inttense 5-7 x wk therapy is indicated for pt to regain the most functional independence prior to pt returning home with family. Will cont to follow and progress as able. Daysi Duval scored a 13/24 on the AM-PAC short mobility form. Current research shows that an AM-PAC score of 17 or less is typically not associated with a discharge to the patient's home setting. Based on the patient's AM-PAC score and their current functional mobility deficits, it is recommended that the patient have 5-7 sessions per week of Physical Therapy at d/c to increase the patient's independence. At this time, this patient demonstrates the endurance, and/or tolerance for 3 hours of therapy each day, with a treatment frequency of 5-7x/wk. Please see assessment section for further patient specific details. If patient discharges prior to next session this note will serve as a discharge summary. Please see below for the latest assessment towards goals. Prognosis: Good  Decision Making: Medium Complexity  History: as noted q  Exam: as above  Clinical Presentation: evolving  Barriers to Learning: language  REQUIRES PT FOLLOW UP: Yes  Activity Tolerance  Activity Tolerance: Patient Tolerated treatment well; Other  Activity Tolerance: limited by language barrier       Patient Diagnosis(es): The primary encounter diagnosis was Cerebrovascular accident (CVA), unspecified mechanism ([facility] A diagnosis of Left-sided muscle weakness was also pertinent to this visit. has a past medical history of Cerebral artery occlusion with cerebral infarction ([facility] Diabetes mellitus ([facility] Gallstone, GERD (gastroesophageal reflux disease), Hyperlipidemia, Hypertension, and Renal insufficiency. has a past surgical history that includes Appendectomy; Cholecystectomy; Upper gastrointestinal endoscopy (06/08/2018); Upper gastrointestinal endoscopy (N/A, 2/28/2019); Upper gastrointestinal endoscopy (N/A, 4/23/2019); Upper gastrointestinal endoscopy (N/A, 4/23/2019); Upper gastrointestinal endoscopy (N/A, 4/29/2020); and Colonoscopy (N/A, 5/8/2020). Restrictions  Restrictions/Precautions  Restrictions/Precautions: Fall Risk  Position Activity Restriction  Other position/activity restrictions: L sided weakness; speaks [place]  Vision/Hearing  Vision: Within Functional Limits(adequate for assessment needs)  Hearing: Within functional limits(adequate for assessment needs)     Subjective  General  Chart Reviewed: Yes  Additional Pertinent Hx: Per Dr. Jethro Lai , \"The patient is a 76 y.o. male who presents to [facility] with unsteady on feet. Pt speaks [place], son at bedside helping getting history. According to son/pt, pt apparently sustained a fall yesterday and felt like he couldn't move his left side. Today he had difficulty ambulating using his lt side and hence son brought pt to the ER\"  Diagnosed with CVA with L sided weakness.   Family / Caregiver Present: Yes(son present at bedside.)  Subjective  Subjective: Pt in supine, son at bedside. Pt's son assisted with interpreting (pt's son declined  phone). Pt reported need to use the bathroom. Pt with reports of chronic back pain, but pain not rated. Orientation  Orientation  Overall Orientation Status: Impaired(limited due to language barrier, son reported some confustion at baseline.)  Orientation Level: Oriented to person  Social/Functional History  Social/Functional History  Lives With: Family(wife, son, grand children and other family members)  Type of Home: House(bi-level)  Home Layout: Bed/Bath upstairs  Home Access: Stairs to enter with rails  Entrance Stairs - Number of Steps: 3 ADELINE access from outside and then 4-5 steps in the house to bedroom level (the pt gets assist for these steps)  Entrance Stairs - Rails: Both  Bathroom Shower/Tub: Tub/Shower unit  Bathroom Toilet: Standard  Bathroom Equipment: Grab bars in shower  Home Equipment: Cane  ADL Assistance: Needs assistance(supervision for bathing, dressing and toileting)  Homemaking Assistance: Needs assistance  Ambulation Assistance: Needs assistance(with cane with SBA from family)  Transfer Assistance: Needs assistance(SBA provided by family, pt sleeps in a regular bed)  Active : No  Patient's  Info: son     Objective  AROM RLE (degrees)  RLE AROM: WFL  AROM LLE (degrees)  LLE AROM : WFL  LLE General AROM: AAROM grossly WFL  Strength RLE  Strength RLE: WFL  Strength LLE  Comment: grossly 3/5--able to take some resistance, but difficulty due to language barrier.      Bed mobility  Supine to Sit: Minimal assistance(HOB fully upright, verbal & visual demo given--increased time)  Sit to Supine: (nt--pt up in recliner at end of session.)  Transfers  Sit to Stand: Minimal Assistance;2 Person Assistance  Stand to sit: 2 Person Assistance;Minimal Assistance  Bed to Chair: Dependent/Total(via Stedy A x 2)  Ambulation  Ambulation?: Yes  Ambulation 1  Surface: level tile  Device: Rolling Walker  Assistance: Minimal assistance; Moderate assistance;2 Person assistance  Quality of Gait: very unsteady with LOB to L side/pt loses L  on RW. Gait Deviations: Deviated path;Decreased step height;Decreased step length; Slow Janeth  Distance: x 10 ft into bathroom with RW and much unsteadiness as above. Comments: Opted for Stedy/lift use [facility] Stairs/Curb  Stairs?: No     Balance  Sitting - Static: Fair  Sitting - Dynamic: Fair  Standing - Static: Poor  Standing - Dynamic: Poor  Comments: in Stedy, needed Min A due to L lean, stance/attempted amb with RW up to Mod A x 2 person due to unstadiness and L lean/LOB/decreased L hand  onto RW. Plan   Plan  Times per week: 3-5 x wk in acute setting  Current Treatment Recommendations: Functional Mobility Training  Safety Devices  Type of devices: Left in chair, Call light within reach, Chair alarm in place, Nurse notified, Gait belt, Patient at risk for falls       AM-PAC Score  AM-PAC Inpatient Mobility Raw Score : 13 (09/23/20 1042)  AM-PAC Inpatient T-Scale Score : 36.74 (09/23/20 1042)  Mobility Inpatient CMS 0-100% Score: 64.91 (09/23/20 1042)  Mobility Inpatient CMS G-Code Modifier : CL (09/23/20 1042)     Goals  Short term goals  Time Frame for Short term goals: by acute d/c--anticipate ongoing therapy in rehab setting. Short term goal 1: bed mobility SBA  Short term goal 2: transfers CGA  Short term goal 3: amb x 20 ft with RW and Min A x 1  Short term goal 4: tolerate LE Ther ex for increased LE strength. Long term goals  Time Frame for Long term goals : tbd at next level of care. Patient Goals   Patient goals : pt's/family goal is some rehab before returning home.   Therapy Time   Individual Concurrent Group Co-treatment   Time In 0945         Time Out [address] [place] [address] Electronically signed by Debbie Wiggins PT on

## 2020-09-23 NOTE — PROGRESS NOTES
consistencies, effective compensatory strategies, and safe eating environment. Impression  Dysphagia Diagnosis: Mild oral stage dysphagia;Mild pharyngeal stage dysphagia  · Accepted and tolerated evaluation at bedside. · patient alert, cooperative, pleasant; follows simple dx and answers concrete questions without significantly apparent word-finding difficulty using the translation phone. Son reports patient at confused at baseline and no notable changes in speech/language/cognitive-language skills at this time. SLP eval deferred at this time. · Mild oropharyngeal dysphagia characterized by decreased mastication, decreased lingual manipulation, delayed swallow, and decreased laryngeal elevation. Prolonged but adequate bolus prep with need for liquid wash and repeat swallow to clear oral reside with solids. No overt sign/symptoms of penetration/aspiration even when challenged with large serial thin boluses via straw. Patient noted with belching/burping at times. · Recommend resume regular diet texture with thin liquids with ST clinical monitor for diet tolerance and tx needs. Dysphagia Outcome Severity Scale: Level 5: Mild dysphagia- Distant supervision. May need one diet consistency restricted     Treatment Plan  Requires SLP Intervention: Yes  Duration/Frequency of Treatment: ST to follow-up 1-3 times for dysphagia during acute admission  D/C Recommendations: To be determined    Recommended Diet and Intervention  Diet Solids Recommendation: Regular  Liquid Consistency Recommendation: Thin  Recommended Form of Meds: PO  Compensatory Swallowing Strategies: Upright as possible for all oral intake;Remain upright for 30-45 minutes after meals    Therapeutic Interventions: Diet tolerance monitoring;Patient/Family education    Treatment/Goals  Dysphagia Goals: The patient will tolerate recommended diet without observed clinical signs of aspiration; The patient/caregiver will demonstrate understanding of compensatory strategies for improved swallowing safety. General  Chart Reviewed: Yes  Behavior/Cognition: Alert; Cooperative;Pleasant mood  Communication Observation: Functional  Follows Directions: Simple  Dentition: Adequate  Patient Positioning: Upright in bed  Baseline Vocal Quality: Normal  Volitional Cough: Strong  Volitional Swallow: Delayed  Consistencies Administered: Dysphagia Pureed (Dysphagia I); Nectar - cup; Thin - cup; Thin - straw;Dysphagia Soft and Bite-Sized (Dysphagia III); Reg solid    Vision/Hearing  Vision: (adequate for assessment needs)  Hearing: (adequate for assessment needs)    Oral Motor Deficits  Lingual Strength: Reduced  Mandible: Impaired    Oral Phase Dysfunction  Impaired Mastication: Soft Solid; Reg Solid  Decreased Anterior to Posterior Transit: All  Suspected Premature Bolus Loss: All     Indicators of Pharyngeal Phase Dysfunction  Delayed Swallow: All  Decreased Laryngeal Elevation: All    Prognosis  Prognosis for safe diet advancement: good  Consulted and agree with results and recommendations: Patient;RN    Education  Patient Education: completed on results/recs/plan  Patient Education Response: Verbalizes understanding;Needs reinforcement         Therapy Time  SLP Individual Minutes  Time In: 0845  Time Out: 2708  Minutes: Sruthi Verduzco, [id number] [place] [address], [id number]  Speech-Language Pathologist  9/23/2020 9:25 AM

## 2020-09-23 NOTE — CARE COORDINATION
Referral to [address] [place] [address] received. The patient was discussed with PM&R physician and the patient can be accepted when medically discharged. No pre-cert is required.

## 2020-09-23 NOTE — PROGRESS NOTES
[facility] [place]  Personalized Stroke Treatment Plan  My Stroke Type:   [x] Ischemic Stroke (Blockage of blood flow to the brain)     [] TIA - Transient Ischemic Attack (mini-stroke)    Personal risk factors you can control include:    [] Alcohol Abuse: check with your physician before any alcohol consumption. [] Atrial fibrillation: may cause blood clots. [] Drug Abuse: Seek help, talk with your doctor  [x] Clotting Disorder  [] Diabetes  [] Family history of stroke or heart disease  [x] High Blood Pressure/Hypertension: work with your physician.  [] High cholesterol: monitor cholesterol levels with your physician.   [] Overweight/Obesity: work with your physician for your ideal body weight.  [] Physical Inactivity: get regular exercise as directed by your physician. [x] Personal history of previous TIA or stroke  [] Poor Diet; decrease salt (sodium) in your diet, follow diet directed by physician. [] Smoking: Cigarette/Cigar: stop smoking. Follow up with your physician is important after discharge. TAKE all medications as prescribed. Do not stop taking any medications   without talking to your physician. BE FAST is a simple way to remember the main symptoms of stroke. Recognizing these symptoms helps you know when to call for medical help. BE FAST stands for:                                                 B Balance problems                                                 E Eyes, vision lost        F  Face Drooping      A  Arm Weakness        S  Speech Difficulty      T  Time to Call 9-1-1  DO NOT DELAY THIS! Educated patient and/or family on personal risk factors for stroke/TIA. Included ways to reduce the risk for recurrent stroke. [facility] [employer]'s Stroke treatment and prevention, Managing your recovery  notebook  provided to patient. The notebook includes, but not limited to, sections addressing warning signs & symptoms of a stroke.  The need to call EMS (911) immediately if signs &

## 2020-09-23 NOTE — PROGRESS NOTES
Comprehensive Nutrition Assessment    Type and Reason for Visit:  Initial, Positive Nutrition Screen(decreased po / appetite, difficulty chewing & swallowing, wt loss)    Nutrition Recommendations/Plan:   Add Ensure Clear tid to start    Nutrition Assessment:  Pt with pmh of CVA, DM, HLD, HTN, Renal insufficiency, Very [place], adm for left sided weakness. Found to have suffered another CVA. Swallowing evaluated & diet adv to Minced / Moist texture / CCC. Pt has not yet taken po. Noted preference for no meat or eggs. Given limited protein sources, will add Ensure Clear tid to start. Pt speaks no english & uses an  phone for communication. Records reflect no significant wt loss over the past year. Malnutrition Assessment:  Malnutrition Status:  Insufficient data    Context:  Acute Illness     Due to current CDC guidelines recommending 6-ft distancing for social isolation for COVID19 prevention, NFPE/malnutrition assessment was deferred at this time. Estimated Daily Nutrient Needs:  Energy (kcal):  7484-3155 (25-30 x ABW 60 kg); Weight Used for Energy Requirements:        Protein (g):  48-72 (.8-1.2 x ABW (adj for ckd); Weight Used for Protein Requirements:           Fluid (ml/day):  1 ml per kcal; Weight Used for Fluid Requirements:         Nutrition Related Findings:  Noted BM on 9/22. Noted no edema.       Wounds:  None       Current Nutrition Therapies:    DIET GENERAL; Carb Control: 4 carb choices (60 gms)/meal; Dysphagia Minced and Moist    Anthropometric Measures:  · Height: 5' 3\" (160 cm)  · Current Body Weight: 132 lb (59.9 kg)   · Admission Body Weight: 133 lb (60.3 kg)    · Ideal Body Weight: 124 lbs; % Ideal Body Weight 106.5 %   · BMI: 23.4  · BMI Categories: Underweight (BMI less than 22) age over 72       Nutrition Diagnosis:   · Biting/chewing (masticatory) difficulty related to altered GI function as evidenced by (need for altered texture)      Nutrition Interventions:   Food and/or

## 2020-09-23 NOTE — PROCEDURES
I called the  line and had them explain the echo with a bubble test to Mr. Duval. He was hesitant, stating he didn't understand what was going on. He wanted to talk with his son, to help him understand what tests he is getting. I called the RN Daniella Fisher to inform her he wants to wait to talk with his son. She will contact him. He said his only compaint was hand pain.   Echo may be performed tomorrow

## 2020-09-23 NOTE — PLAN OF CARE
Problem: Falls - Risk of:  Goal: Will remain free from falls  Description: Will remain free from falls  Outcome: Ongoing  Goal: Absence of physical injury  Description: Absence of physical injury  Outcome: Ongoing     Problem: Skin Integrity:  Goal: Will show no infection signs and symptoms  Description: Will show no infection signs and symptoms  Outcome: Ongoing  Goal: Absence of new skin breakdown  Description: Absence of new skin breakdown  Outcome: Ongoing     Problem: HEMODYNAMIC STATUS  Goal: Patient has stable vital signs and fluid balance  Outcome: Ongoing     Problem: ACTIVITY INTOLERANCE/IMPAIRED MOBILITY  Goal: Mobility/activity is maintained at optimum level for patient  Outcome: Ongoing     Problem: COMMUNICATION IMPAIRMENT  Goal: Ability to express needs and understand communication  Outcome: Ongoing     Problem: Pain:  Goal: Pain level will decrease  Description: Pain level will decrease  Outcome: Ongoing  Goal: Control of acute pain  Description: Control of acute pain  Outcome: Ongoing  Goal: Control of chronic pain  Description: Control of chronic pain  Outcome: Ongoing

## 2020-09-23 NOTE — PLAN OF CARE
Nutrition Problem #1:  Biting/chewing (masticatory) difficulty  Intervention: Food and/or Nutrient Delivery: Continue Current Diet, Start Oral Nutrition Supplement  Nutritional Goals: tolerate most appropriate form of nutrition

## 2020-09-23 NOTE — CONSULTS
Neurology Consult Note  Reason for Consult: stroke    Chief complaint: L sided weakness    Dr Fabiola Henning MD asked me to see Cecilia Sharif in consultation for evaluation of stroke    History of Present Illness:  Cecilia Sharif is a 76 y.o. male who presents with L sided weakness. The patient speaks [place]. I was able to obtain information via interview and translation by the patient's son at the bedside, supplemented by chart review. The patient reportedly went to bed around 2100 Sunday night feeling OK. When he woke up Monday morning he attempted to get out of bed and realized that his L side was weak was unable to stand up. This weakness persisted, his son also noticing that he was slurring his speech throughout the day Monday. They became concerned for stroke, eventually coming to the ED yesterday morning shortly after 1100. BP was in the 140s. No fever. CT head was suggestive of an acute R pontine infarct. CTA head/neck no LVO. His symptoms persist during today's evaluation. He says that the patient's losartan was stopped about a month ago apparently given concerns related to his CKD. He was not started on another antihypertensive. They do take his BP at home and has been consistently in the 722P - 505Z systolic. He does have a hx of stroke in 2016 w/out any residual impact. I believe he was taking Plavix prior to admission.       Medical History:  Past Medical History:   Diagnosis Date    [facility] [address] [facility]     Diabetes mellitus ([facility] [address] [facility]     Gallstone     GERD (gastroesophageal reflux disease)     Hyperlipidemia     Hypertension     Renal insufficiency      Past Surgical History:   Procedure Laterality Date    APPENDECTOMY      CHOLECYSTECTOMY      COLONOSCOPY N/A 5/8/2020    COLONOSCOPY POLYPECTOMY SNARE/COLD BIOPSY performed by Jerry Ayala MD at [address] [place] [address],[address] Floor  06/08/2018    UPPER GASTROINTESTINAL ENDOSCOPY N/A 2/28/2019    EGD W/EUS FNA performed by Sourav Fuller MD at [address] [place] [address] [facility] N/A 4/23/2019    EGD BIOPSY performed by Sourav Fuller MD at [address] [place] [address] [facility] N/A 4/23/2019    EGD W/EUS FNA performed by Sourav Fuller MD at [address] [place] [address] [facility] N/A 4/29/2020    EGD BIOPSY performed by Mike Murray MD at [address] [place] [address]     Scheduled Meds:   insulin glargine  10 Units Subcutaneous Nightly    aspirin  325 mg Oral Once    sodium chloride flush  10 mL Intravenous 2 times per day    enoxaparin  40 mg Subcutaneous Nightly    aspirin  81 mg Oral Daily    Or    aspirin  300 mg Rectal Daily    sucralfate  1 g Oral 3 times per day    clopidogrel  75 mg Oral Daily    DULoxetine  30 mg Oral Daily    losartan  100 mg Oral Daily    pantoprazole  40 mg Oral BID AC    polyethylene glycol  17 g Oral Daily    rosuvastatin  10 mg Oral Daily    tamsulosin  0.4 mg Oral Nightly    insulin lispro  0-6 Units Subcutaneous TID WC    insulin lispro  0-3 Units Subcutaneous Nightly     Medications Prior to Admission:   insulin glargine (LANTUS) 100 UNIT/ML injection vial, Inject 30 Units into the skin nightly  omeprazole (PRILOSEC) 40 MG delayed release capsule, Take 40 mg by mouth daily  vitamin D (CHOLECALCIFEROL) 25 MCG (1000 UT) TABS tablet, Take 2,000 Units by mouth daily  acetaminophen (TYLENOL) 500 MG tablet, Take 1,000 mg by mouth 2 times daily  ondansetron (ZOFRAN ODT) 4 MG disintegrating tablet, Take 1 tablet by mouth every 8 hours as needed for Nausea or Vomiting Let dissolve in mouth.  losartan (COZAAR) 100 MG tablet, Take 1 tablet by mouth daily  rosuvastatin (CRESTOR) 10 MG tablet, Take 10 mg by mouth daily  insulin lispro, 1 Unit Dial, 100 UNIT/ML SOPN, Inject 14 Units into the skin 2 times daily (with meals) 5 units with breakfast, 9 units with lunch, 9 units with dinner  polyethylene glycol (MIRALAX) PACK packet, Take 17 g by mouth daily  DULoxetine (CYMBALTA) 30 MG extended release capsule, Take 30 mg by mouth daily  tamsulosin (FLOMAX) 0.4 MG capsule, Take 0.4 mg by mouth nightly   clopidogrel (PLAVIX) 75 MG tablet, Take 1 tablet by mouth daily  polyvinyl alcohol (LIQUIFILM TEARS) 1.4 % ophthalmic solution, Place 1 drop into both eyes as needed for Dry Eyes    Allergies   Allergen Reactions    Atorvastatin Other (See Comments)     Myalgia -- stopped 11/2019 to see if pain improves. Possible PMR diagnosis as well. Will follow-up in 3 mo. Family History   Problem Relation Age of Onset    No Known Problems Mother     No Known Problems Father      Social History     Tobacco Use   Smoking Status Never Smoker   Smokeless Tobacco Never Used     Social History     Substance and Sexual Activity   Drug Use No     Social History     Substance and Sexual Activity   Alcohol Use No     ROS:  Constitutional- No weight loss or fevers  Eyes- No diplopia. No photophobia. Ears/nose/throat- No dysphagia. + Dysarthria  Cardiovascular- No palpitations. No chest pain  Respiratory- No dyspnea. No Cough  Gastrointestinal- No Abdominal pain. No Vomiting. Genitourinary- No incontinence. No urinary retention  Musculoskeletal- No myalgia. No arthralgia  Skin- No rash. No easy bruising. Psychiatric- No depression. No anxiety  Endocrine- + diabetes. No thyroid issues. Hematologic- No bleeding difficulty. No fatigue  Neurologic- + weakness. No Headache. Exam:  Blood pressure (!) 173/97, pulse 92, temperature 97.4 °F (36.3 °C), temperature source Oral, resp. rate 20, height 5' 3\" (1.6 m), weight 131 lb 9.8 oz (59.7 kg), SpO2 96 %.   Constitutional    Vital signs: BP, HR, and RR reviewed   General alert, no distress  Eyes: unable to visualize the fundi  Cardiovascular: pulses symmetric in all 4 extremities  Psychiatric: cooperative with examination, no psychotic behavior noted.  Neurologic  Mental status:   orientation to person, place. General fund of knowledge grossly intact   Memory grossly intact   Attention intact as able to attend well to the exam     Language fluent in conversation   Comprehension intact; follows simple commands  Cranial nerves:   CN2: visual fields grossly full   CN 3,4,6: extraocular muscles intact  CN5: facial sensation symmetric   CN7: slight L facial weakness w/ reported mild dysarthria. CN8: hearing grossly intact  CN9: palate elevated symmetrically  CN11: trap full strength on shoulder shrug  CN12: tongue midline with protrusion  Strength: L hemiparesis, UE > LE. Deep tendon reflexes: normal in all 4 extremities  Sensory: reports some decreased sensation in his LLE. Cerebellar/coordination: finger nose finger normal without ataxia in his RUE. Tone: normal in all 4 extremities  Gait: deferred at this time for safety given weakness. Labs  LDL 43  HgA1c 9.8    Na 139  K 3.7  BUN 12  Cr 1.3    WBC 6.7K  Hg 13.2  Platelets 417    Studies  MRI brain w/o 9/22/20, independently reviewed  1. Right paramedian pontine acute to subacute infarct.  No hemorrhagic conversion    2. Additionally areas of likely subacute lacunar infarct involving the left    frontal lobe deep white matter as well as the right posterior centrum    semiovale    3. Chronic small ischemic disease and age related change. CTA head/neck 9/22/20, independently reviewed  1. 50% stenosis of proximal left internal carotid artery. 2. Severe stenosis of proximal left posterior cerebral artery. Impression  1. L hemiparesis w/ dysarthria. MRI confirms an acute R pontine infarct w/ possible additional areas of subacute infarcts. Could be microvascular versus embolic. Chronic L PCA infarct w/ severe proximal stenosis noted. Primary risk factors for stroke include previous stroke, DM, and HTN.   His losartan was d/c about 1 month ago w/out initiation of an alternative. 2.  Hypertension. 3.  Diabetes. 4. Hx L PCA infarct. Recommendations  1. TTE. May consider THERESA. 2.  DAPT is reasonable for 21 days, transitioning back to monotherapy afterwards. 3.  BP at least < 160 trending toward normotension in the next couple of days. He will likely require antihypertensive therapy to achieve goal of at least < 140/90. Will leave up to primary team.  4.  Continue statin. 5.  Efforts to achieve normoglycemia. HgA1c goal < 7.0.    6. Continue on telemetry to monitor for atrial fibrillation. 7.  PT/OT/SLP evaluations.       Suki Camera NP  [address] [place] [address] [address] Neurology    A copy of this note was provided for Dr Ursula Ballesteros MD

## 2020-09-23 NOTE — PROGRESS NOTES
Occupational Therapy   Occupational Therapy Initial Assessment  Date: 2020   Patient Name: Malini Kessler  MRN: [id number]     : 1945    Date of Service: 2020    Discharge Recommendations:  5-7 sessions per week, Patient would benefit from continued therapy after discharge  OT Equipment Recommendations  Other: TBD     Malini Kessler scored a 12/24 on the AM-PAC ADL Inpatient form. Current research shows that an AM-PAC score of 17 or less is typically not associated with a discharge to the patient's home setting. Based on the patient's AM-PAC score and their current ADL deficits, it is recommended that the patient have 5-7 sessions per week of Occupational Therapy at d/c to increase the patient's independence. At this time, this patient demonstrates the endurance, and/or tolerance for 3 hours of therapy each day, with a treatment frequency of 5-7x/wk. Please see assessment section for further patient specific details. If patient discharges prior to next session this note will serve as a discharge summary. Please see below for the latest assessment towards goals. Assessment   Performance deficits / Impairments: Decreased functional mobility ; Decreased ADL status; Decreased ROM; Decreased strength;Decreased endurance;Decreased balance;Decreased fine motor control;Decreased coordination  Assessment: Pt is a 76 y.o. male admitted with CVA. At baseline, pt lives with family and required supervision for ADLs and fxl mobility with cane. Pt currently functioning below baseline, limited in self-care by L-sided weakness, decreased balance/fxl mobility, decreased coordination, and decreased fxl activity tolerance. This date, pt min A x2 for fxl transfer, mod A x2 for fxl mobility with walker ~10 ft, total A toileting, min A grooming, and anticipate pt would require overall mod/max A for ADLs.  Pt demonstrates need for ongoing skilled OT at high intensity to maximize pt's safety and independence prior to return home. Will cont to follow while hospitalized. Prognosis: Good  Decision Making: Medium Complexity  History: see above  Exam: decreased ADL status, balance/fxl mobility, fxl activity tolerance, strength/ROM L UE/LE  Assistance / Modification: anticipate overall mod/max A for ADLs  OT Education: OT Role;Plan of Care;ADL Adaptive Strategies;Transfer Training  Barriers to Learning: language  REQUIRES OT FOLLOW UP: Yes  Activity Tolerance  Activity Tolerance: Patient limited by fatigue  Safety Devices  Safety Devices in place: Yes  Type of devices: Chair alarm in place; Left in chair;Call light within reach;Gait belt           Patient Diagnosis(es): The primary encounter diagnosis was Cerebrovascular accident (CVA), unspecified mechanism ([facility] [address] A diagnosis of Left-sided muscle weakness was also pertinent to this visit. has a past medical history of Cerebral artery occlusion with cerebral infarction ([facility] [address] Diabetes mellitus ([facility] [address] Gallstone, GERD (gastroesophageal reflux disease), Hyperlipidemia, Hypertension, and Renal insufficiency. has a past surgical history that includes Appendectomy; Cholecystectomy; Upper gastrointestinal endoscopy (06/08/2018); Upper gastrointestinal endoscopy (N/A, 2/28/2019); Upper gastrointestinal endoscopy (N/A, 4/23/2019); Upper gastrointestinal endoscopy (N/A, 4/23/2019); Upper gastrointestinal endoscopy (N/A, 4/29/2020); and Colonoscopy (N/A, 5/8/2020). Restrictions  Restrictions/Precautions  Restrictions/Precautions: Fall Risk  Position Activity Restriction  Other position/activity restrictions: L sided weakness; speaks [place]    Subjective   General  Chart Reviewed: Yes  Additional Pertinent Hx: Per Dr. Alfonso House , \"The patient is a 76 y.o. male who presents to [facility] with unsteady on feet. Pt speaks Bulgarian, son at bedside helping getting history.  According to son/pt, pt apparently sustained a fall yesterday and felt like he couldn't move his left side. Today he had difficulty ambulating using his lt side and hence son brought pt to the ER\"  Diagnosed with  Family / Caregiver Present: Yes(son)  Referring Practitioner: Saud Mims MD  Diagnosis: Acute CVA - with left sided weakness  Subjective  Subjective: Pt met b/s for OT eval/cotx with PT. Pt in bed on arrival, agreeable to participate in therapy. Pt c/o L sided weakness and back pain, did not rate. Pt son present at bedside and assists with interpreting for pt (pt declined using  phone). Social/Functional History  Social/Functional History  Lives With: Family(wife, son, grand children and other family members)  Type of Home: House(bi-level)  Home Layout: Bed/Bath upstairs  Home Access: Stairs to enter with rails  Entrance Stairs - Number of Steps: 3 ADELINE access from outside and then 4-5 steps in the house to bedroom level (the pt gets assist for these steps)  Entrance Stairs - Rails: Both  Bathroom Shower/Tub: Tub/Shower unit  Bathroom Toilet: Standard  Bathroom Equipment: Grab bars in shower  Home Equipment: Cane  ADL Assistance: Needs assistance(supervision for bathing, dressing and toileting)  Homemaking Assistance: Needs assistance  Ambulation Assistance: Needs assistance(with cane with SBA from family)  Transfer Assistance: Needs assistance(SBA provided by family, pt sleeps in a regular bed)  Active : No  Patient's  Info: son       Objective   Vision: Within Functional Limits(adequate for assessment needs)  Hearing: Within functional limits(adequate for assessment needs)      Orientation  Overall Orientation Status: (difficult to assess d/t language barrier, per son some confusion at baseline)     Balance  Sitting Balance: Minimal assistance(CGA/min A d/t L lean)  Standing Balance: Moderate assistance  Functional Mobility  Functional - Mobility Device: Rolling Walker  Activity: To/from bathroom  Assist Level:  Moderate assistance  Functional Mobility Comments: Pt completed fxl mobility to/from BR with RW and Mod A x2. L LE with intermittent buckling and loss of L hand  on walker requiring assist.    ADL  Grooming: Minimal assistance(seated/standing in willis stedy at sink to wash face, brush teeth. CGA for sitting balance on willis stedy d/t L lean, mod  A when standing.)  Toileting: Dependent/Total(to void urine at toilet, assist to manage clothing down/up)  Additional Comments: Anticipate pt is set-up feeding, max A dressing and bathing based on ROM/strength, balance, endurance. Coordination  Movements Are Fluid And Coordinated: No  Coordination and Movement description: Left UE;Fine motor impairments;Gross motor impairments;Tremors;Right UE  Quality of Movement Other  Comment: Pt is R hand dominant. L UE limited by weakness, and R UE with tremor (worsens as pt fatigues)     Bed mobility  Supine to Sit: Minimal assistance(HOB fully upright, verbal & visual demo given--increased time)  Sit to Supine: (nt--pt up in recliner at end of session.)     Transfers  Sit to stand: Minimal assistance;2 Person assistance  Stand to sit: Minimal assistance;2 Person assistance   Toilet Transfers  Toilet - Technique: Ambulating  Equipment Used: Grab bars  Toilet Transfer: Minimal assistance;2 Person assistance  Toilet Transfers Comments: RW>toilet, toilet>willis stedy    Cognition  Overall Cognitive Status: Exceptions  Following Commands: Follows one step commands with increased time; Follows one step commands with repetition  Safety Judgement: Decreased awareness of need for safety  Initiation: Requires cues for some  Sequencing: Requires cues for some  Cognition Comment: difficult to assess d/t language barrier     LUE AROM (degrees)  LUE AROM : Exceptions  LUE General AROM: AROM shoulder flexion limited to ~100*, grossly WFL distally but digit flexion lacks full end range/weak grasp  RUE AROM (degrees)  RUE AROM : WFL     LUE Strength  Gross LUE Strength: Exceptions to [facility] Shoulder Flex: 3+/5  L Elbow Flex: 4-/5  L Hand General: 3+/5  RUE Strength  Gross RUE Strength: WFL                   Plan   Plan  Times per week: 3-5  Times per day: Daily  Current Treatment Recommendations: Functional Mobility Training, Endurance Training, Safety Education & Training, Self-Care / ADL, Strengthening, Balance Training, ROM, Neuromuscular Re-education, Cognitive/Perceptual Training, Patient/Caregiver Education & Training, Equipment Evaluation, Education, & procurement, Cognitive Reorientation      AM-PAC Score        AM-[facility] Inpatient Daily Activity Raw Score: 12 (09/23/20 1041)  AM-PAC Inpatient ADL T-Scale Score : 30.6 (09/23/20 1041)  ADL Inpatient CMS 0-100% Score: 66.57 (09/23/20 1041)  ADL Inpatient CMS G-Code Modifier : CL (09/23/20 1041)    Goals  Short term goals  Time Frame for Short term goals: Prior to d/c:  Short term goal 1: Pt will complete UB bathing/dressing with SBA. Short term goal 2: Pt will complete LB bathing/dressing with mod A. Short term goal 3: Pt will complete toileting with mod A. Short term goal 4: Pt will complete fxl mobility and fxl transfers to/from ADL surfaces with min A using AD. Short term goal 5: Pt will tolerate 10-15 minutes L UE therex/neuromuscular re-education to improve strength/function L UE for ADLs. Long term goals  Time Frame for Long term goals : STG=LTG  Patient Goals   Patient goals : to undergo rehab and get stronger prior to return home. Therapy Time   Individual Concurrent Group Co-treatment   Time In [facility]         Time Out 1040         Minutes 54         Timed Code Treatment Minutes: [address] [facility]? L [id number]

## 2020-09-24 ENCOUNTER — APPOINTMENT (OUTPATIENT)
Dept: CT IMAGING | Age: 75
DRG: 045 | End: 2020-09-24
Payer: MEDICAID

## 2020-09-24 LAB
GLUCOSE BLD-MCNC: 165 MG/DL (ref 70–99)
GLUCOSE BLD-MCNC: 176 MG/DL (ref 70–99)
GLUCOSE BLD-MCNC: 201 MG/DL (ref 70–99)
GLUCOSE BLD-MCNC: 280 MG/DL (ref 70–99)
LV EF: 58 %
LVEF MODALITY: NORMAL
PERFORMED ON: ABNORMAL

## 2020-09-24 PROCEDURE — 6360000002 HC RX W HCPCS: Performed by: INTERNAL MEDICINE

## 2020-09-24 PROCEDURE — 6370000000 HC RX 637 (ALT 250 FOR IP): Performed by: INTERNAL MEDICINE

## 2020-09-24 PROCEDURE — 2580000003 HC RX 258: Performed by: INTERNAL MEDICINE

## 2020-09-24 PROCEDURE — 6370000000 HC RX 637 (ALT 250 FOR IP): Performed by: NURSE PRACTITIONER

## 2020-09-24 PROCEDURE — 94760 N-INVAS EAR/PLS OXIMETRY 1: CPT

## 2020-09-24 PROCEDURE — 93306 TTE W/DOPPLER COMPLETE: CPT

## 2020-09-24 PROCEDURE — 97530 THERAPEUTIC ACTIVITIES: CPT

## 2020-09-24 PROCEDURE — 2060000000 HC ICU INTERMEDIATE R&B

## 2020-09-24 PROCEDURE — 97535 SELF CARE MNGMENT TRAINING: CPT

## 2020-09-24 PROCEDURE — 70450 CT HEAD/BRAIN W/O DYE: CPT

## 2020-09-24 PROCEDURE — 92526 ORAL FUNCTION THERAPY: CPT

## 2020-09-24 RX ORDER — CLOPIDOGREL BISULFATE 75 MG/1
300 TABLET ORAL ONCE
Status: COMPLETED | OUTPATIENT
Start: 2020-09-24 | End: 2020-09-24

## 2020-09-24 RX ORDER — LOSARTAN POTASSIUM 25 MG/1
25 TABLET ORAL DAILY
Status: DISCONTINUED | OUTPATIENT
Start: 2020-09-25 | End: 2020-09-25 | Stop reason: HOSPADM

## 2020-09-24 RX ORDER — INSULIN GLARGINE 100 [IU]/ML
12 INJECTION, SOLUTION SUBCUTANEOUS NIGHTLY
Status: DISCONTINUED | OUTPATIENT
Start: 2020-09-24 | End: 2020-09-25 | Stop reason: HOSPADM

## 2020-09-24 RX ADMIN — INSULIN LISPRO 1 UNITS: 100 INJECTION, SOLUTION INTRAVENOUS; SUBCUTANEOUS at 09:05

## 2020-09-24 RX ADMIN — SUCRALFATE 1 G: 1 TABLET ORAL at 21:14

## 2020-09-24 RX ADMIN — SUCRALFATE 1 G: 1 TABLET ORAL at 13:11

## 2020-09-24 RX ADMIN — Medication 10 ML: at 09:05

## 2020-09-24 RX ADMIN — CLOPIDOGREL BISULFATE 75 MG: 75 TABLET ORAL at 09:05

## 2020-09-24 RX ADMIN — Medication 10 ML: at 21:14

## 2020-09-24 RX ADMIN — DULOXETINE HYDROCHLORIDE 30 MG: 30 CAPSULE, DELAYED RELEASE ORAL at 09:05

## 2020-09-24 RX ADMIN — INSULIN GLARGINE 12 UNITS: 100 INJECTION, SOLUTION SUBCUTANEOUS at 21:15

## 2020-09-24 RX ADMIN — PANTOPRAZOLE SODIUM 40 MG: 40 TABLET, DELAYED RELEASE ORAL at 15:50

## 2020-09-24 RX ADMIN — CLOPIDOGREL BISULFATE 300 MG: 75 TABLET ORAL at 15:50

## 2020-09-24 RX ADMIN — TAMSULOSIN HYDROCHLORIDE 0.4 MG: 0.4 CAPSULE ORAL at 21:14

## 2020-09-24 RX ADMIN — POLYETHYLENE GLYCOL 3350 17 G: 17 POWDER, FOR SOLUTION ORAL at 09:05

## 2020-09-24 RX ADMIN — INSULIN LISPRO 3 UNITS: 100 INJECTION, SOLUTION INTRAVENOUS; SUBCUTANEOUS at 13:12

## 2020-09-24 RX ADMIN — INSULIN LISPRO 4 UNITS: 100 INJECTION, SOLUTION INTRAVENOUS; SUBCUTANEOUS at 17:31

## 2020-09-24 RX ADMIN — PANTOPRAZOLE SODIUM 40 MG: 40 TABLET, DELAYED RELEASE ORAL at 06:43

## 2020-09-24 RX ADMIN — LOSARTAN POTASSIUM 100 MG: 100 TABLET, FILM COATED ORAL at 09:05

## 2020-09-24 RX ADMIN — ENOXAPARIN SODIUM 40 MG: 40 INJECTION SUBCUTANEOUS at 21:14

## 2020-09-24 RX ADMIN — SUCRALFATE 1 G: 1 TABLET ORAL at 06:43

## 2020-09-24 RX ADMIN — ROSUVASTATIN CALCIUM 10 MG: 10 TABLET, FILM COATED ORAL at 09:04

## 2020-09-24 RX ADMIN — ASPIRIN 81 MG: 81 TABLET, COATED ORAL at 09:05

## 2020-09-24 RX ADMIN — INSULIN LISPRO 1 UNITS: 100 INJECTION, SOLUTION INTRAVENOUS; SUBCUTANEOUS at 21:15

## 2020-09-24 NOTE — PROGRESS NOTES
Hospitalist Progress Note      PCP: Caleb Winn    Date of Admission: 9/22/2020    Chief Complaint: fall at home, back pain, ataxia, unable to ambulate, left side weakness UE and LE        Subjective:     Back pain better when not moving. He did not try any tramadol. Worsening LUE weakness today - this progressed to worsening LLE weakness today. Repeat CT ordered stat and did not reveal acute change. Medications:  Reviewed    Infusion Medications    dextrose       Scheduled Medications    clopidogrel  300 mg Oral Once    insulin glargine  10 Units Subcutaneous Nightly    sodium chloride flush  10 mL Intravenous 2 times per day    enoxaparin  40 mg Subcutaneous Nightly    aspirin  81 mg Oral Daily    Or    aspirin  300 mg Rectal Daily    sucralfate  1 g Oral 3 times per day    clopidogrel  75 mg Oral Daily    DULoxetine  30 mg Oral Daily    losartan  100 mg Oral Daily    pantoprazole  40 mg Oral BID AC    polyethylene glycol  17 g Oral Daily    rosuvastatin  10 mg Oral Daily    tamsulosin  0.4 mg Oral Nightly    insulin lispro  0-6 Units Subcutaneous TID WC    insulin lispro  0-3 Units Subcutaneous Nightly     PRN Meds: traMADol **OR** traMADol, sodium chloride flush, polyethylene glycol, promethazine **OR** ondansetron, perflutren lipid microspheres, glucose, dextrose, glucagon (rDNA), dextrose      Intake/Output Summary (Last 24 hours) at 9/24/2020 1439  Last data filed at 9/23/2020 1655  Gross per 24 hour   Intake 120 ml   Output --   Net 120 ml       Physical Exam Performed:    BP (!) 141/95   Pulse 90   Temp 98 °F (36.7 °C) (Oral)   Resp 18   Ht 5' 3\" (1.6 m)   Wt 125 lb 10.6 oz (57 kg)   SpO2 94%   BMI 22.26 kg/m²     General appearance: No apparent distress, appears stated age and cooperative. HEENT: Pupils equal, round, and reactive to light. Conjunctivae/corneas clear. Neck: Supple, with full range of motion. No jugular venous distention.  Trachea midline. Respiratory:  Normal respiratory effort. Clear to auscultation, bilaterally without Rales/Wheezes/Rhonchi. Cardiovascular: Regular rate and rhythm with normal S1/S2 without murmurs, rubs or gallops. Abdomen: Soft, non-tender, non-distended with normal bowel sounds. Musculoskeletal: No clubbing, cyanosis or edema bilaterally. Full range of motion without deformity. Skin: Skin color, texture, turgor normal.  No rashes or lesions. Neurologic:  LUE and LLE weakness - worse today - getting to the point of 2-3/5 on the motor response. Pt having difficulty following instruction for neuro exam event with  service due to very J[facility]AN [facility] Psychiatric: Alert and oriented, thought content mostly appropriate, limited insight. Capillary Refill: Brisk,< 3 seconds   Peripheral Pulses: +2 palpable, equal bilaterally       Labs:   Recent Labs     09/22/20  1137 09/23/20  0513   WBC 7.6 6.7   HGB 14.0 13.2*   HCT 42.1 39.9*    230     Recent Labs     09/22/20  1137 09/23/20  0513    139   K 3.9 3.7    107   CO2 24 22   BUN 17 12   CREATININE 1.4* 1.3   CALCIUM 9.3 8.9     Recent Labs     09/22/20  1137   AST 23   ALT 16   BILITOT 0.7   ALKPHOS 107     Recent Labs     09/22/20  1137   INR 0.92     Recent Labs     09/22/20  1802 09/22/20  2305   TROPONINI <0.01 <0.01       Urinalysis:      Lab Results   Component Value Date    NITRU Negative 09/03/2020    WBCUA 4 09/03/2020    RBCUA 1 09/03/2020    BLOODU Negative 09/03/2020    SPECGRAV 1.017 09/03/2020    GLUCOSEU 100 09/03/2020       Radiology:  CT HEAD WO CONTRAST   Final Result   No acute intracranial abnormality. Right pontine infarct again noted. Old left occipital infarct unchanged. MRI brain without contrast   Final Result   Right paramedian pontine acute to subacute infarct.   No hemorrhagic conversion      Additionally areas of likely subacute lacunar infarct involving the left   frontal lobe deep white matter as well as Supplements: Clear Liquid Oral Supplement  Code Status: Full Code        Dispo - approved for rehab. Unfortunately LUE and LLE weakness worse today - continue to monitor inpatient.         Ned Tate MD

## 2020-09-24 NOTE — PROGRESS NOTES
Progress Note    Updates  Son reports speech is better, though L side is weaker.       Active Ambulatory Problems     Diagnosis Date Noted    Chest pain 05/19/2017    Hypertension 05/19/2017    Hyperlipidemia 05/19/2017    Diabetes mellitus ([facility] [address] 05/19/2017    Unexplained weight loss 05/23/2019    Esophagitis 05/23/2019    Granulomatous adenopathy 05/23/2019    Mediastinal lymphadenopathy 05/23/2019    History of arterial ischemic stroke 05/23/2019    Abdominal pain 04/25/2020     Past Surgical History:   Procedure Laterality Date    APPENDECTOMY      CHOLECYSTECTOMY      COLONOSCOPY N/A 5/8/2020    COLONOSCOPY POLYPECTOMY SNARE/COLD BIOPSY performed by Leyla Marx MD at [address] [facility]  06/08/2018    UPPER GASTROINTESTINAL ENDOSCOPY N/A 2/28/2019    EGD W/EUS FNA performed by Williams Mclaughlin MD at [facility] 4/23/2019    EGD BIOPSY performed by Williams Mclaughlin MD at [facility] 4/23/2019    EGD W/EUS FNA performed by Williams Mclaughlin MD at [address] [place] [address] 4/29/2020    EGD BIOPSY performed by Leyla Marx MD at [facility]     Current Facility-Administered Medications:     insulin glargine (LANTUS) injection vial 10 Units, 10 Units, Subcutaneous, Nightly, Melinda Jimenez MD, 10 Units at 09/23/20 2205    traMADol (ULTRAM) tablet 50 mg, 50 mg, Oral, Q6H PRN **OR** traMADol (ULTRAM) tablet 100 mg, 100 mg, Oral, Q6H PRN, Raman Robbins MD    sodium chloride flush 0.9 % injection 10 mL, 10 mL, Intravenous, 2 times per day, Melinda Jimenez MD, 10 mL at 09/24/20 0905    sodium chloride flush 0.9 % injection 10 mL, 10 mL, Intravenous, PRN, Melinda Jimenez MD    polyethylene glycol (GLYCOLAX) packet 17 g, 17 g, Oral, Daily PRN, Melinda Jimenez MD    promethazine (PHENERGAN) tablet 12.5 mg, 12.5 mg, Oral, Q6H PRN **OR** ondansetron (ZOFRAN) injection 4 mg, 4 mg, Intravenous, Q6H PRN, Jazzy Swartz MD    enoxaparin (LOVENOX) injection 40 mg, 40 mg, Subcutaneous, Nightly, Jazzy Swartz MD, 40 mg at 09/23/20 2205    aspirin EC tablet 81 mg, 81 mg, Oral, Daily, 81 mg at 09/24/20 0905 **OR** aspirin suppository 300 mg, 300 mg, Rectal, Daily, Jazzy Swartz MD, 300 mg at 09/23/20 2934    perflutren lipid microspheres (DEFINITY) injection 1.65 mg, 1.5 mL, Intravenous, ONCE PRN, Jazzy Swartz MD    sucralfate (CARAFATE) tablet 1 g, 1 g, Oral, 3 times per day, Jazzy Swartz MD, 1 g at 09/24/20 0643    clopidogrel (PLAVIX) tablet 75 mg, 75 mg, Oral, Daily, Jazzy Swartz MD, 75 mg at 09/24/20 0905    DULoxetine (CYMBALTA) extended release capsule 30 mg, 30 mg, Oral, Daily, Jazzy Swartz MD, 30 mg at 09/24/20 0905    losartan (COZAAR) tablet 100 mg, 100 mg, Oral, Daily, Jazzy Swartz MD, 100 mg at 09/23/20 1237    pantoprazole (PROTONIX) tablet 40 mg, 40 mg, Oral, BID AC, Jazzy Swartz MD, 40 mg at 09/24/20 0643    polyethylene glycol (GLYCOLAX) packet 17 g, 17 g, Oral, Daily, Jazzy Swartz MD    rosuvastatin (CRESTOR) tablet 10 mg, 10 mg, Oral, Daily, Jazzy Swartz MD, 10 mg at 09/24/20 0904    tamsulosin (FLOMAX) capsule 0.4 mg, 0.4 mg, Oral, Nightly, Jazzy Swartz MD, 0.4 mg at 09/23/20 2205    insulin lispro (HUMALOG) injection vial 0-6 Units, 0-6 Units, Subcutaneous, TID WC, Jazzy Swartz MD, 2 Units at 09/23/20 1702    insulin lispro (HUMALOG) injection vial 0-3 Units, 0-3 Units, Subcutaneous, Nightly, Lucie Davila MD    glucose (GLUTOSE) 40 % oral gel 15 g, 15 g, Oral, PRN, Lucie Davila MD    dextrose 50 % IV solution, 12.5 g, Intravenous, PRN, Jazzy Swartz MD    glucagon (rDNA) injection 1 mg, 1 mg, Intramuscular, PRN, Jazzy Swartz MD    dextrose 5 % solution, 100 mL/hr, Intravenous, PRN, Lucie JUNE Edward Braxton MD    Exam  Blood pressure (!) 149/92, pulse 109, temperature 97.5 °F (36.4 °C), temperature source Oral, resp. rate 18, height 5' 3\" (1.6 m), weight 125 lb 10.6 oz (57 kg), SpO2 95 %. Constitutional                          Vital signs: BP, HR, and RR reviewed            General alert, no distress  Eyes: unable to visualize the fundi  Cardiovascular: pulses symmetric in all 4 extremities  Psychiatric: cooperative with examination, no psychotic behavior noted. Neurologic  Mental status:   orientation to person, place. Attention intact as able to attend well to the exam                Language fluent in conversation per son. Comprehension intact; follows simple commands  Cranial nerves:   CN2: visual fields grossly full though technically difficult exam.    CN 3,4,6: extraocular muscles intact  CN7: slight L facial weakness w/ reported mild dysarthria. CN8: hearing grossly intact  CN11: trap strength decreased on the L.    CN12: tongue midline with protrusion  Strength: L hemiparesis. Sensory: reports some decreased sensation in his LLE. Cerebellar/coordination: no gross ataxia in his LUE. Tone: normal in all 4 extremities  Gait: deferred at this time for safety given weakness. ROS  Constitutional- No weight loss or fevers  Eyes- No diplopia. No photophobia. Ears/nose/throat- No dysphagia. + Dysarthria  Cardiovascular- No palpitations. No chest pain  Respiratory- No dyspnea. No Cough  Gastrointestinal- No Abdominal pain. No Vomiting. Genitourinary- No incontinence. No urinary retention  Musculoskeletal- No myalgia. No arthralgia  Skin- No rash. No easy bruising. Psychiatric- No depression. No anxiety  Endocrine- + diabetes. No thyroid issues. Hematologic- No bleeding difficulty. No fatigue  Neurologic- + weakness. No Headache. Labs  LDL 43  HgA1c 9.8    Studies  MRI brain w/o 9/22/20  1.  Right paramedian pontine acute to subacute infarct.  No hemorrhagic conversion    2. Additionally areas of likely subacute lacunar infarct involving the left    frontal lobe deep white matter as well as the right posterior centrum    semiovale    3. Chronic small ischemic disease and age related change. CTA head/neck 9/22/20  1. 50% stenosis of proximal left internal carotid artery. 2. Severe stenosis of proximal left posterior cerebral artery.       Impression  1. L hemiparesis w/ dysarthria. MRI confirms an acute R pontine infarct w/ possible additional areas of subacute infarcts. Suspect microvascular versus embolic. Chronic L PCA infarct w/ severe proximal stenosis noted. Primary risk factors for stroke include previous stroke, DM, and HTN. His losartan was d/c about 1 month ago w/out initiation of an alternative. His L hemiparesis is worse today w/out any other new focal neurologic deficits. Suspect this may be an extension of his known infarct. Recommendations  1. Primary team has arranged a CT head w/o.    2.  Would recommend bedrest w/ HOB in lower position. 3.  Cautiously liberalize BP. Reasonable to systolic BP roughly in the 160 range. 4.  Will give 300 mg Plavix load after CT w/ continuation of DAPT. 5.  Continue statin. 6.  TTE pending. 7.  Continue on telemetry to monitor for atrial fibrillation. Discussed w/ RN.       Deena Hernandes NP  [address] [place] [address] [address] Neurology    A copy of this note was provided for Dr Luh Anderson MD

## 2020-09-24 NOTE — PLAN OF CARE
chronic pain  9/24/2020 0810 by Abhi Freire RN  Outcome: Ongoing  9/23/2020 1840 by Veronica Reyna RN  Outcome: Ongoing     Problem: Nutrition  Goal: Optimal nutrition therapy  9/24/2020 0810 by Abhi Freire RN  Outcome: Ongoing  9/23/2020 1840 by Veronica Reyna RN  Outcome: Ongoing

## 2020-09-24 NOTE — PROGRESS NOTES
Occupational Therapy  Facility/Department: [address] PROGRESSIVE CARE  Daily Treatment Note  NAME: Iam Soto  : 1945  MRN: [id number]    Date of Service: 2020    Discharge Recommendations:  5-7 sessions per week, Patient would benefit from continued therapy after discharge  OT Equipment Recommendations  Other: TBD    Iam Soto scored a  on the AM-PAC ADL Inpatient form. Current research shows that an AM-PAC score of 17 or less is typically not associated with a discharge to the patient's home setting. Based on the patient's AM-PAC score and their current ADL deficits, it is recommended that the patient have 5-7 sessions per week of Occupational Therapy at d/c to increase the patient's independence. At this time, this patient demonstrates the endurance, and/or tolerance for 3 hours of therapy each day, with a treatment frequency of 5-7x/wk. Please see assessment section for further patient specific details. If patient discharges prior to next session this note will serve as a discharge summary. Please see below for the latest assessment towards goals. Assessment   Performance deficits / Impairments: Decreased functional mobility ; Decreased ADL status; Decreased ROM; Decreased strength;Decreased endurance;Decreased balance;Decreased fine motor control;Decreased coordination  Assessment: Discussed with OTR: Pt tolerated session fairly well. Pt limited by the above deficits, including decreased strength, balance, coordination, ROM S/P CVA affecting his L side. Pt required use of willis stedy for transfer bed to commode, x2 assist to completng stand-pivot transfers with Mod A x1. Pt required up to Max/dep for ADL's. Pt is functioning below his baseline and will benefit from cont OT at d/c. Prognosis: Good  History: Pt is a 76 y.o. male admitted with CVA. At baseline, pt lived with family and required supervision for ADLs and fxl mobility with cane. OT Education: OT Role;Plan of Care; ADL Adaptive Strategies;Transfer Training  REQUIRES OT FOLLOW UP: Yes  Activity Tolerance  Activity Tolerance: Patient limited by fatigue;Patient Tolerated treatment well  Safety Devices  Safety Devices in place: Yes(RNWhit)  Type of devices: Chair alarm in place; Left in chair;Call light within reach;Gait belt;Nurse notified         Patient Diagnosis(es): The primary encounter diagnosis was Cerebrovascular accident (CVA), unspecified mechanism ([facility] [address] A diagnosis of Left-sided muscle weakness was also pertinent to this visit. has a past medical history of Cerebral artery occlusion with cerebral infarction ([facility] [address] Diabetes mellitus ([facility] [address] Gallstone, GERD (gastroesophageal reflux disease), Hyperlipidemia, Hypertension, and Renal insufficiency. has a past surgical history that includes Appendectomy; Cholecystectomy; Upper gastrointestinal endoscopy (06/08/2018); Upper gastrointestinal endoscopy (N/A, 2/28/2019); Upper gastrointestinal endoscopy (N/A, 4/23/2019); Upper gastrointestinal endoscopy (N/A, 4/23/2019); Upper gastrointestinal endoscopy (N/A, 4/29/2020); and Colonoscopy (N/A, 5/8/2020). Restrictions  Restrictions/Precautions  Restrictions/Precautions: Fall Risk  Position Activity Restriction  Other position/activity restrictions: L sided weakness; speaks [place]  Subjective   General  Chart Reviewed: Yes, Progress Notes, Orders  Additional Pertinent Hx: Per Dr. Alfonso House , \"The patient is a 76 y.o. male who presents to [facility] with unsteady on feet. Pt speaks K[place]gyz, son at bedside helping getting history. According to son/pt, pt apparently sustained a fall yesterday and felt like he couldn't move his left side.  Today he had difficulty ambulating using his lt side and hence son brought pt to the ER\"  Diagnosed with  Family / Caregiver Present: No  Referring Practitioner: Saud Mims MD  Diagnosis: Acute CVA - with left sided weakness  Subjective  Subjective: Pt met BS, in bed.Pt agreeable to OT, requesting to use bathroom (thru  phone). RN in to assist with bed to BR transfer, via [place]. Orientation  Orientation  Overall Orientation Status: Impaired(info thru interpretor-pt orineted to self only. \"I do not know the date\". Pt did state he's in hospital)  Objective    ADL  Feeding: Setup;Verbal cueing(fed self oatmeal, drank from cup. Cues for slowing pace, taking small bites of breakfast potatoes and chewing food thoroughly)  LE Dressing: Maximum assistance(to don hospital pants)  Toileting: Maximum assistance;Dependent/Total(did not void. Assist for clothes management (initial stance on willis stedy, to static stance at commode, pt holding R wall grab bar.)        Balance  Sitting Balance: Contact guard assistance(at EOB)  Standing Balance  Time: 1 min or less  Activity: static stance for ADL's with Min/Mod A x1;  stand-pivot from commode to recliner with Mod A x1  Wheelchair Bed Transfers  Wheelchair/Bed - Technique: Stand pivot  Equipment Used: (recliner, from commode)  Level of Asssistance: Moderate assistance(x1)  Wheelchair Transfers Comments: Assist cuers for hand placement. Bed to commode in BR via willis Musical Sneakers  Bed mobility  Supine to Sit: Minimal assistance(HOB raised)  Sit to Supine: Unable to assess(up to chair at end of tx)  Transfers  Sit to stand: Minimal assistance; Moderate assistance(x1-2)  Stand to sit: Moderate assistance(x1)  Transfer Comments: Initial sit>stand off bed onto willis stedy, with RN assisting, Min A x2. Mod A x1 for stand to sit onto commode. Pivot transfer commode to recliner brought into BR, Mod A x1            Cognition  Overall Cognitive Status: Exceptions  Following Commands: Follows one step commands with increased time; Follows one step commands with repetition  Safety Judgement: Decreased awareness of need for safety  Initiation: Requires cues for some  Sequencing: Requires cues for some  Cognition Comment: difficult to assess d/t language barrier     Perception  Overall Perceptual Status: Impaired  Unilateral Attention: Cues to attend to left side of body;Cues to maintain midline in standing            Plan   Plan  Times per week: 3-5  Times per day: Daily  Current Treatment Recommendations: Functional Mobility Training, Endurance Training, Safety Education & Training, Self-Care / ADL, Strengthening, Balance Training, ROM, Neuromuscular Re-education, Cognitive/Perceptual Training, Patient/Caregiver Education & Training, Equipment Evaluation, Education, & procurement, Cognitive Reorientation    AM-PAC Score        AM-PAC Inpatient Daily Activity Raw Score: 12 (09/24/20 0902)  AM-PAC Inpatient ADL T-Scale Score : 30.6 (09/24/20 0902)  ADL Inpatient CMS 0-100% Score: 66.57 (09/24/20 0902)  ADL Inpatient CMS G-Code Modifier : CL (09/24/20 0902)    Goals  Short term goals  Time Frame for Short term goals: Prior to d/c: Status: goals ongoing  Short term goal 1: Pt will complete UB bathing/dressing with SBA. Short term goal 2: Pt will complete LB bathing/dressing with mod A. Short term goal 3: Pt will complete toileting with mod A. Short term goal 4: Pt will complete fxl mobility and fxl transfers to/from ADL surfaces with min A using AD. Short term goal 5: Pt will tolerate 10-15 minutes L UE therex/neuromuscular re-education to improve strength/function L UE for ADLs. Long term goals  Time Frame for Long term goals : STG=LTG  Patient Goals   Patient goals : to undergo rehab and get stronger prior to return home.        Therapy Time   Individual Concurrent Group Co-treatment   Time In 0817         Time Out 0902         Minutes [id number] [facility] MÉNDEZ/L,[facility]

## 2020-09-24 NOTE — PROGRESS NOTES
Pt returned to room after CT. NIH increased from 12 to 15 with more weakness on the left side. Amadou Justice MD and Milka Sneed NP aware. No new orders. Will continue to monitor.     Electronically signed by Leeanne Okeefe RN on 9/24/2020 at 7:55 PM

## 2020-09-24 NOTE — PLAN OF CARE
Problem: Falls - Risk of:  Goal: Will remain free from falls  Description: Will remain free from falls  9/24/2020 1834 by Zana Aldana RN  Outcome: Ongoing  9/24/2020 0810 by Micheal Haynes RN  Outcome: Ongoing  Goal: Absence of physical injury  Description: Absence of physical injury  9/24/2020 1834 by Zana Aldana RN  Outcome: Ongoing  9/24/2020 0810 by Micheal Haynes RN  Outcome: Ongoing     Problem: Skin Integrity:  Goal: Will show no infection signs and symptoms  Description: Will show no infection signs and symptoms  9/24/2020 1834 by Zana Aldana RN  Outcome: Ongoing  9/24/2020 0810 by Micheal Haynes RN  Outcome: Ongoing  Goal: Absence of new skin breakdown  Description: Absence of new skin breakdown  9/24/2020 1834 by Zana Aldana RN  Outcome: Ongoing  9/24/2020 0810 by Micheal Haynes RN  Outcome: Ongoing     Problem: HEMODYNAMIC STATUS  Goal: Patient has stable vital signs and fluid balance  9/24/2020 1834 by Zana Aldana RN  Outcome: Ongoing  9/24/2020 0810 by Micheal Haynes RN  Outcome: Ongoing     Problem: ACTIVITY INTOLERANCE/IMPAIRED MOBILITY  Goal: Mobility/activity is maintained at optimum level for patient  9/24/2020 1834 by Zana Aldana RN  Outcome: Ongoing  9/24/2020 0810 by Micheal Haynes RN  Outcome: Ongoing     Problem: COMMUNICATION IMPAIRMENT  Goal: Ability to express needs and understand communication  9/24/2020 1834 by Zana Aldana RN  Outcome: Ongoing  9/24/2020 0810 by Micheal Haynes RN  Outcome: Ongoing     Problem: Pain:  Goal: Pain level will decrease  Description: Pain level will decrease  9/24/2020 1834 by Zana Aldana RN  Outcome: Ongoing  9/24/2020 0810 by Micheal Haynes RN  Outcome: Ongoing  Goal: Control of acute pain  Description: Control of acute pain  9/24/2020 1834 by Zana Aldana RN  Outcome: Ongoing  9/24/2020 0810 by Micheal Haynes RN  Outcome: Ongoing  Goal: Control of chronic pain  Description: Control of chronic pain  9/24/2020 1834 by Margaret Quiroga RN  Outcome: Ongoing  9/24/2020 0810 by Edmar Lo RN  Outcome: Ongoing     Problem: Nutrition  Goal: Optimal nutrition therapy  9/24/2020 1834 by Margaret Quiroga RN  Outcome: Ongoing  9/24/2020 0810 by Edmar Lo RN  Outcome: Ongoing

## 2020-09-24 NOTE — PROGRESS NOTES
Department of Physical Medicine & Rehabilitation  Progress Note    Patient Identification:  Stephane Dixon  [id number]  : 1945  Admit date: 2020    Chief Complaint: left side weaknes    Subjective:   No acute events overnight. Patient noted to have worsened left side weakness by Neurology and Dr. Marella Lundborg. CT head stable. Patient seen this afternoon. Attempted to use blue phone for  but was disconnected. When I called back was sent to voicemail??? Therefore difficult to obtain history from patient. Left side does seem a bit more weak compared to yesterday afternoon. He also seems more confused. Required hand-over-hand assist to bring phone to his ear (using right hand). Could indicate some worsened apraxia. ROS: Unable to obtain due to language barrier and unavailable      Objective:  Patient Vitals for the past 24 hrs:   BP Temp Temp src Pulse Resp SpO2 Weight   20 1449 (!) 173/108 98.7 °F (37.1 °C) Oral 97 18 97 % --   20 1245 (!) 141/95 98 °F (36.7 °C) Oral 90 18 94 % --   20 0814 (!) 149/92 97.5 °F (36.4 °C) Oral 109 18 95 % --   20 0449 (!) 159/97 97.9 °F (36.6 °C) Oral 123 20 93 % 125 lb 10.6 oz (57 kg)   20 2302 (!) 143/92 98.2 °F (36.8 °C) Oral 81 16 95 % --   20 1959 (!) 152/82 97.4 °F (36.3 °C) Oral 86 16 95 % --   20 1520 (!) 169/89 97.7 °F (36.5 °C) Oral 81 17 95 % --     Const: Alert. No distress, pleasant. HEENT: Normocephalic, atraumatic. Normal sclera/conjunctiva. MMM. CV: Regular rate and rhythm. Resp: No respiratory distress. Lungs CTAB. Abd: Soft, nontender, nondistended, NABS+   Ext: No edema. Neuro: Alert, follows some commands with tactile cues. Difficult due to language barrier. Possibly increased apraxia/confusion today. LUE overall 2-3/5, LLE 3/5. Psych: Cooperative, appropriate mood and affect    Laboratory data: Available via EMR.    Last 24 hour lab  Recent Results (from the past 24 hour(s))   POCT Glucose    Collection Time: 09/23/20  4:55 PM   Result Value Ref Range    POC Glucose 238 (H) 70 - 99 mg/dl    Performed on ACCU-CHEK    POCT Glucose    Collection Time: 09/23/20  9:27 PM   Result Value Ref Range    POC Glucose 136 (H) 70 - 99 mg/dl    Performed on ACCU-CHEK    POCT Glucose    Collection Time: 09/24/20  8:15 AM   Result Value Ref Range    POC Glucose 176 (H) 70 - 99 mg/dl    Performed on ACCU-CHEK    POCT Glucose    Collection Time: 09/24/20 12:48 PM   Result Value Ref Range    POC Glucose 280 (H) 70 - 99 mg/dl    Performed on ACCU-CHEK        Therapy progress:  PT  Position Activity Restriction  Other position/activity restrictions: L sided weakness; speaks [place]  Objective     Sit to Stand: Minimal Assistance, 2 Person Assistance  Stand to sit: 2 Person Assistance, Minimal Assistance  Bed to Chair: Dependent/Total(via Stedy A x 2)  Device: Rolling Walker  Assistance: Minimal assistance, Moderate assistance, 2 Person assistance  Distance: x 10 ft into bathroom with RW and much unsteadiness as above. OT  PT Equipment Recommendations  Other: will monitor  Toilet - Technique: Ambulating  Equipment Used: Grab bars  Toilet Transfers Comments: RW>toilet, toilet>wilils murray  Assessment        SLP  Diet Solids Recommendation: Regular  Liquid Consistency Recommendation: Thin    Body mass index is 22.26 kg/m². Assessment:  1. Acute ischemic stroke (Right son, posterior centrum semiolave, left frontal lobe deep white matter)  2. H/o left PCA stroke with severe stenosis  3. Dysphagia  4. HTN, uncontrolled  5. DM2, uncontrolled  6. CKD  7. L1 compression fracture     Impairments: left hemiparesis, decreased coordination, balance, endurance, cognition, dysarthria, dysphagia    Recommendations:     Patient with worsened left hemiparesis today and possibly worse confusion (difficult to assess due to language barrier). Remains a candidate for ARU when medically ready.  Agree with  Clarke plan to continue monitoring on acute floor for today given worsened neuro deficits.      Thank you for this consult. Please contact me with any questions or concerns. [address].  Natali Caban MD 9/24/2020, 3:14 PM

## 2020-09-24 NOTE — PROGRESS NOTES
(dysphagia II)/thin liquids. Objective:     Pain   Patient Currently in Pain: Pt pointed towards left knee and gestured pain. RN notified. Unable to discern if pt in pain d/t pt unable to use . Cognitive/Behavior   Behavior/Cognition: Alert, Cooperative, Pleasant mood    Presentations   Consistencies Administered: Dysphagia Pureed (Dysphagia I), Thin - cup, Dysphagia Minced and Moist (Dysphagia II)    Positioning   Pt seen upright in bed. PO Trials:  · Thin Liquids: via straw, multiple sips x2  · Nectar thick liquids  · Honey Thick liquids  · Puree: mashed potato bites x1  · Soft food: minced and moist meat bites x2  · Regular food    Dysphagia Tx:   RN Ok'd entry for tx. Pt on RA and RR at 24/min throughout PO trials. Pt with clear vocal quality throughout.  phone attempted. Pt confused and unable to answer/comment with use of Maori . Pt accepted PO trials of minced and moist (dysphagia II) meat, puree (dysphagia I) mashed potatoes and thin liquids via straw sip. Pt noted taking multiple gulp sips on straw. Pt presents with delayed throat clear x1 following TL via straw. Pt politely declined all further PO trials. Goals:   Dysphagia Goals:   Goal 1: The patient will tolerate recommended diet without observed clinical signs of aspiration 09/24 Ongoing; Assessed with Dysphagia II, dysphagia I and thin liquids via straw. Goal 2: The patient/caregiver will demonstrate understanding of compensatory strategies for improved swallowing safety. 09/24 Ongoing; Pt unable to use  phone this date. Strategies not educated this date. Assessment:   Impressions:   Dysphagia Diagnosis: Mild oral stage dysphagia, Mild pharyngeal stage dysphagia     · Accepted and tolerated f/u tx  · Patient alert, cooperative, pleasant; Pt unable to use translation phone this date. Pt occasionally giggled and looked confused.  Pt unable to respond to  questions/comments this date, which is a mental status change from yesterday. Son not present this date. Per son from 09/23 report, pt confused at baseline and no notable changes in speech/language/cognitive-language skills at this time. SLP eval deferred at this time. · Mild oropharyngeal dysphagia characterized by decreased mastication, decreased lingual manipulation, delayed swallow, and decreased laryngeal elevation. Prolonged but adequate bolus prep with need for liquid wash and repeat swallow to clear oral reside with solids. One delayed throat clear following large serial thin bolus vis straw. Patient noted with belching/burping at times. · Recommend resume minced and moist (dysphagia II) per pt/family preference with thin liquids with ST clinical monitor for diet tolerance and tx needs. Diet Recommendations:  Solids: Minced and Moist (dysphagia II)  Liquids: Thin  Meds: PO     Strategies:   Compensatory Swallowing Strategies: Upright as possible for all oral intake, Remain upright for 30-45 minutes after meals    Education:  Consulted and agree with results and recommendations: Patient, RN  Patient Education: completed on results/recs/plan  Patient Education Response: Verbalizes understanding, Needs reinforcement    Prognosis:   Guarded, good    Plan:     Continue Dysphagia Therapy:   Interventions: Therapeutic Interventions: Diet tolerance monitoring, Patient/Family education  Duration/Frequency of therapy while on unit: Duration/Frequency of Treatment  Duration/Frequency of Treatment: ST to follow-up 1-3 times for dyspaghia durign acute admission  Discharge Instructions:   Anticipate NEED for further skilled Speech Therapy for Dysphagia at discharge    This note serves as a D/C Summary in the event that this patient is discharged prior to the next therapy session.     Coded treatment time: 0  Total treatment time: 18 minutes    Raphael Yip M.S., CCC-SLP   Speech Language Pathologist  [id number]   on 9/24/2020 at 1:52

## 2020-09-25 ENCOUNTER — HOSPITAL ENCOUNTER (INPATIENT)
Age: 75
LOS: 22 days | Discharge: HOME HEALTH | DRG: 045 | End: 2020-10-17
Attending: PHYSICAL MEDICINE & REHABILITATION | Admitting: PHYSICAL MEDICINE & REHABILITATION
Payer: MEDICAID

## 2020-09-25 VITALS
DIASTOLIC BLOOD PRESSURE: 84 MMHG | WEIGHT: 125.44 LBS | HEIGHT: 63 IN | SYSTOLIC BLOOD PRESSURE: 126 MMHG | HEART RATE: 96 BPM | BODY MASS INDEX: 22.23 KG/M2 | TEMPERATURE: 97.9 F | RESPIRATION RATE: 16 BRPM | OXYGEN SATURATION: 93 %

## 2020-09-25 PROBLEM — I63.50 RIGHT PONTINE STROKE (HCC): Status: ACTIVE | Noted: 2020-09-25

## 2020-09-25 LAB
ALBUMIN SERPL-MCNC: 4 G/DL (ref 3.4–5)
ANION GAP SERPL CALCULATED.3IONS-SCNC: 14 MMOL/L (ref 3–16)
BASOPHILS ABSOLUTE: 0.1 K/UL (ref 0–0.2)
BASOPHILS RELATIVE PERCENT: 0.7 %
BUN BLDV-MCNC: 16 MG/DL (ref 7–20)
CALCIUM SERPL-MCNC: 9.3 MG/DL (ref 8.3–10.6)
CHLORIDE BLD-SCNC: 104 MMOL/L (ref 99–110)
CO2: 15 MMOL/L (ref 21–32)
CREAT SERPL-MCNC: 1.3 MG/DL (ref 0.8–1.3)
EOSINOPHILS ABSOLUTE: 0.2 K/UL (ref 0–0.6)
EOSINOPHILS RELATIVE PERCENT: 2.3 %
GFR AFRICAN AMERICAN: >60
GFR NON-AFRICAN AMERICAN: 54
GLUCOSE BLD-MCNC: 137 MG/DL (ref 70–99)
GLUCOSE BLD-MCNC: 210 MG/DL (ref 70–99)
GLUCOSE BLD-MCNC: 210 MG/DL (ref 70–99)
GLUCOSE BLD-MCNC: 238 MG/DL (ref 70–99)
GLUCOSE BLD-MCNC: 388 MG/DL (ref 70–99)
HCT VFR BLD CALC: 46.8 % (ref 40.5–52.5)
HEMOGLOBIN: 15.1 G/DL (ref 13.5–17.5)
LYMPHOCYTES ABSOLUTE: 1.8 K/UL (ref 1–5.1)
LYMPHOCYTES RELATIVE PERCENT: 24 %
MAGNESIUM: 2.3 MG/DL (ref 1.8–2.4)
MCH RBC QN AUTO: 25.3 PG (ref 26–34)
MCHC RBC AUTO-ENTMCNC: 32.2 G/DL (ref 31–36)
MCV RBC AUTO: 78.6 FL (ref 80–100)
MONOCYTES ABSOLUTE: 0.5 K/UL (ref 0–1.3)
MONOCYTES RELATIVE PERCENT: 6.8 %
NEUTROPHILS ABSOLUTE: 5 K/UL (ref 1.7–7.7)
NEUTROPHILS RELATIVE PERCENT: 66.2 %
PDW BLD-RTO: 15.7 % (ref 12.4–15.4)
PERFORMED ON: ABNORMAL
PHOSPHORUS: 4.2 MG/DL (ref 2.5–4.9)
PLATELET # BLD: 221 K/UL (ref 135–450)
PMV BLD AUTO: 7.7 FL (ref 5–10.5)
POTASSIUM SERPL-SCNC: 4.2 MMOL/L (ref 3.5–5.1)
RBC # BLD: 5.95 M/UL (ref 4.2–5.9)
SODIUM BLD-SCNC: 133 MMOL/L (ref 136–145)
WBC # BLD: 7.6 K/UL (ref 4–11)

## 2020-09-25 PROCEDURE — 97110 THERAPEUTIC EXERCISES: CPT

## 2020-09-25 PROCEDURE — 6370000000 HC RX 637 (ALT 250 FOR IP): Performed by: INTERNAL MEDICINE

## 2020-09-25 PROCEDURE — 97112 NEUROMUSCULAR REEDUCATION: CPT

## 2020-09-25 PROCEDURE — 36415 COLL VENOUS BLD VENIPUNCTURE: CPT

## 2020-09-25 PROCEDURE — 85025 COMPLETE CBC W/AUTO DIFF WBC: CPT

## 2020-09-25 PROCEDURE — 6370000000 HC RX 637 (ALT 250 FOR IP): Performed by: PHYSICAL MEDICINE & REHABILITATION

## 2020-09-25 PROCEDURE — 6360000002 HC RX W HCPCS: Performed by: PHYSICAL MEDICINE & REHABILITATION

## 2020-09-25 PROCEDURE — 80069 RENAL FUNCTION PANEL: CPT

## 2020-09-25 PROCEDURE — 1280000000 HC REHAB R&B

## 2020-09-25 PROCEDURE — 94760 N-INVAS EAR/PLS OXIMETRY 1: CPT

## 2020-09-25 PROCEDURE — 83735 ASSAY OF MAGNESIUM: CPT

## 2020-09-25 PROCEDURE — 2580000003 HC RX 258: Performed by: INTERNAL MEDICINE

## 2020-09-25 RX ORDER — DEXTROSE MONOHYDRATE 50 MG/ML
100 INJECTION, SOLUTION INTRAVENOUS PRN
Status: DISCONTINUED | OUTPATIENT
Start: 2020-09-25 | End: 2020-10-17 | Stop reason: HOSPADM

## 2020-09-25 RX ORDER — ACETAMINOPHEN 325 MG/1
650 TABLET ORAL EVERY 6 HOURS PRN
Status: DISCONTINUED | OUTPATIENT
Start: 2020-09-25 | End: 2020-10-17 | Stop reason: HOSPADM

## 2020-09-25 RX ORDER — POLYETHYLENE GLYCOL 3350 17 G/17G
17 POWDER, FOR SOLUTION ORAL DAILY PRN
Status: DISCONTINUED | OUTPATIENT
Start: 2020-09-25 | End: 2020-10-17 | Stop reason: HOSPADM

## 2020-09-25 RX ORDER — DEXTROSE MONOHYDRATE 25 G/50ML
12.5 INJECTION, SOLUTION INTRAVENOUS PRN
Status: CANCELLED | OUTPATIENT
Start: 2020-09-25

## 2020-09-25 RX ORDER — SENNA AND DOCUSATE SODIUM 50; 8.6 MG/1; MG/1
1 TABLET, FILM COATED ORAL 2 TIMES DAILY
Status: CANCELLED | OUTPATIENT
Start: 2020-09-25

## 2020-09-25 RX ORDER — LANOLIN ALCOHOL/MO/W.PET/CERES
3 CREAM (GRAM) TOPICAL NIGHTLY PRN
Status: CANCELLED | OUTPATIENT
Start: 2020-09-25

## 2020-09-25 RX ORDER — SODIUM CHLORIDE 0.9 % (FLUSH) 0.9 %
10 SYRINGE (ML) INJECTION PRN
Status: CANCELLED | OUTPATIENT
Start: 2020-09-25

## 2020-09-25 RX ORDER — SENNA AND DOCUSATE SODIUM 50; 8.6 MG/1; MG/1
1 TABLET, FILM COATED ORAL 2 TIMES DAILY
Status: DISCONTINUED | OUTPATIENT
Start: 2020-09-25 | End: 2020-10-17 | Stop reason: HOSPADM

## 2020-09-25 RX ORDER — DEXTROSE MONOHYDRATE 25 G/50ML
12.5 INJECTION, SOLUTION INTRAVENOUS PRN
Status: DISCONTINUED | OUTPATIENT
Start: 2020-09-25 | End: 2020-10-17 | Stop reason: HOSPADM

## 2020-09-25 RX ORDER — ROSUVASTATIN CALCIUM 10 MG/1
10 TABLET, COATED ORAL DAILY
Status: DISCONTINUED | OUTPATIENT
Start: 2020-09-26 | End: 2020-10-17 | Stop reason: HOSPADM

## 2020-09-25 RX ORDER — TAMSULOSIN HYDROCHLORIDE 0.4 MG/1
0.4 CAPSULE ORAL NIGHTLY
Status: DISCONTINUED | OUTPATIENT
Start: 2020-09-25 | End: 2020-10-17 | Stop reason: HOSPADM

## 2020-09-25 RX ORDER — PROMETHAZINE HYDROCHLORIDE 25 MG/1
12.5 TABLET ORAL EVERY 6 HOURS PRN
Status: CANCELLED | OUTPATIENT
Start: 2020-09-25

## 2020-09-25 RX ORDER — SUCRALFATE 1 G/1
1 TABLET ORAL EVERY 8 HOURS SCHEDULED
Status: CANCELLED | OUTPATIENT
Start: 2020-09-25

## 2020-09-25 RX ORDER — PANTOPRAZOLE SODIUM 40 MG/1
40 TABLET, DELAYED RELEASE ORAL
Status: CANCELLED | OUTPATIENT
Start: 2020-09-25

## 2020-09-25 RX ORDER — BISACODYL 10 MG
10 SUPPOSITORY, RECTAL RECTAL DAILY PRN
Status: CANCELLED | OUTPATIENT
Start: 2020-09-25

## 2020-09-25 RX ORDER — ASPIRIN 81 MG/1
81 TABLET ORAL DAILY
Status: CANCELLED | OUTPATIENT
Start: 2020-09-26

## 2020-09-25 RX ORDER — TAMSULOSIN HYDROCHLORIDE 0.4 MG/1
0.4 CAPSULE ORAL NIGHTLY
Status: CANCELLED | OUTPATIENT
Start: 2020-09-25

## 2020-09-25 RX ORDER — ASPIRIN 81 MG/1
81 TABLET ORAL DAILY
Status: DISCONTINUED | OUTPATIENT
Start: 2020-09-26 | End: 2020-10-16

## 2020-09-25 RX ORDER — INSULIN GLARGINE 100 [IU]/ML
12 INJECTION, SOLUTION SUBCUTANEOUS NIGHTLY
Status: CANCELLED | OUTPATIENT
Start: 2020-09-25

## 2020-09-25 RX ORDER — DEXTROSE MONOHYDRATE 50 MG/ML
100 INJECTION, SOLUTION INTRAVENOUS PRN
Status: CANCELLED | OUTPATIENT
Start: 2020-09-25

## 2020-09-25 RX ORDER — TRAMADOL HYDROCHLORIDE 50 MG/1
100 TABLET ORAL EVERY 6 HOURS PRN
Status: CANCELLED | OUTPATIENT
Start: 2020-09-25

## 2020-09-25 RX ORDER — DULOXETIN HYDROCHLORIDE 30 MG/1
30 CAPSULE, DELAYED RELEASE ORAL DAILY
Status: CANCELLED | OUTPATIENT
Start: 2020-09-26

## 2020-09-25 RX ORDER — NICOTINE POLACRILEX 4 MG
15 LOZENGE BUCCAL PRN
Status: CANCELLED | OUTPATIENT
Start: 2020-09-25

## 2020-09-25 RX ORDER — CLOPIDOGREL BISULFATE 75 MG/1
75 TABLET ORAL DAILY
Status: CANCELLED | OUTPATIENT
Start: 2020-09-26

## 2020-09-25 RX ORDER — CLOPIDOGREL BISULFATE 75 MG/1
75 TABLET ORAL DAILY
Status: DISCONTINUED | OUTPATIENT
Start: 2020-09-26 | End: 2020-10-17 | Stop reason: HOSPADM

## 2020-09-25 RX ORDER — LOSARTAN POTASSIUM 25 MG/1
25 TABLET ORAL DAILY
Qty: 30 TABLET | Refills: 1 | Status: ON HOLD | OUTPATIENT
Start: 2020-09-25 | End: 2020-10-16 | Stop reason: SDUPTHER

## 2020-09-25 RX ORDER — PANTOPRAZOLE SODIUM 40 MG/1
40 TABLET, DELAYED RELEASE ORAL
Status: DISCONTINUED | OUTPATIENT
Start: 2020-09-26 | End: 2020-10-17 | Stop reason: HOSPADM

## 2020-09-25 RX ORDER — ASPIRIN 300 MG/1
300 SUPPOSITORY RECTAL DAILY
Status: CANCELLED | OUTPATIENT
Start: 2020-09-26

## 2020-09-25 RX ORDER — ASPIRIN 81 MG/1
81 TABLET ORAL DAILY
Qty: 30 TABLET | Refills: 3 | Status: ON HOLD | OUTPATIENT
Start: 2020-09-26 | End: 2020-10-16 | Stop reason: HOSPADM

## 2020-09-25 RX ORDER — SODIUM CHLORIDE 0.9 % (FLUSH) 0.9 %
10 SYRINGE (ML) INJECTION PRN
Status: DISCONTINUED | OUTPATIENT
Start: 2020-09-25 | End: 2020-10-17 | Stop reason: HOSPADM

## 2020-09-25 RX ORDER — ACETAMINOPHEN 325 MG/1
650 TABLET ORAL EVERY 6 HOURS PRN
Status: CANCELLED | OUTPATIENT
Start: 2020-09-25

## 2020-09-25 RX ORDER — TRAMADOL HYDROCHLORIDE 50 MG/1
50 TABLET ORAL EVERY 6 HOURS PRN
Status: CANCELLED | OUTPATIENT
Start: 2020-09-25

## 2020-09-25 RX ORDER — SODIUM CHLORIDE 0.9 % (FLUSH) 0.9 %
10 SYRINGE (ML) INJECTION EVERY 12 HOURS SCHEDULED
Status: CANCELLED | OUTPATIENT
Start: 2020-09-25

## 2020-09-25 RX ORDER — ONDANSETRON 2 MG/ML
4 INJECTION INTRAMUSCULAR; INTRAVENOUS EVERY 6 HOURS PRN
Status: CANCELLED | OUTPATIENT
Start: 2020-09-25

## 2020-09-25 RX ORDER — TRAMADOL HYDROCHLORIDE 50 MG/1
100 TABLET ORAL EVERY 6 HOURS PRN
Status: DISCONTINUED | OUTPATIENT
Start: 2020-09-25 | End: 2020-10-17 | Stop reason: HOSPADM

## 2020-09-25 RX ORDER — DULOXETIN HYDROCHLORIDE 30 MG/1
30 CAPSULE, DELAYED RELEASE ORAL DAILY
Status: DISCONTINUED | OUTPATIENT
Start: 2020-09-26 | End: 2020-10-17 | Stop reason: HOSPADM

## 2020-09-25 RX ORDER — LOSARTAN POTASSIUM 25 MG/1
25 TABLET ORAL DAILY
Status: DISCONTINUED | OUTPATIENT
Start: 2020-09-26 | End: 2020-09-28

## 2020-09-25 RX ORDER — SODIUM CHLORIDE 0.9 % (FLUSH) 0.9 %
10 SYRINGE (ML) INJECTION EVERY 12 HOURS SCHEDULED
Status: DISCONTINUED | OUTPATIENT
Start: 2020-09-25 | End: 2020-09-26

## 2020-09-25 RX ORDER — INSULIN LISPRO 100 [IU]/ML
0-12 INJECTION, SOLUTION INTRAVENOUS; SUBCUTANEOUS
Qty: 1 PEN | Refills: 3 | Status: ON HOLD | OUTPATIENT
Start: 2020-09-25 | End: 2020-10-16 | Stop reason: HOSPADM

## 2020-09-25 RX ORDER — PROMETHAZINE HYDROCHLORIDE 25 MG/1
12.5 TABLET ORAL EVERY 6 HOURS PRN
Status: DISCONTINUED | OUTPATIENT
Start: 2020-09-25 | End: 2020-10-17 | Stop reason: HOSPADM

## 2020-09-25 RX ORDER — ONDANSETRON 2 MG/ML
4 INJECTION INTRAMUSCULAR; INTRAVENOUS EVERY 6 HOURS PRN
Status: DISCONTINUED | OUTPATIENT
Start: 2020-09-25 | End: 2020-10-17 | Stop reason: HOSPADM

## 2020-09-25 RX ORDER — POLYETHYLENE GLYCOL 3350 17 G/17G
17 POWDER, FOR SOLUTION ORAL DAILY PRN
Status: CANCELLED | OUTPATIENT
Start: 2020-09-25

## 2020-09-25 RX ORDER — BISACODYL 10 MG
10 SUPPOSITORY, RECTAL RECTAL DAILY PRN
Status: DISCONTINUED | OUTPATIENT
Start: 2020-09-25 | End: 2020-10-17 | Stop reason: HOSPADM

## 2020-09-25 RX ORDER — LANOLIN ALCOHOL/MO/W.PET/CERES
3 CREAM (GRAM) TOPICAL NIGHTLY PRN
Status: DISCONTINUED | OUTPATIENT
Start: 2020-09-25 | End: 2020-10-17 | Stop reason: HOSPADM

## 2020-09-25 RX ORDER — NICOTINE POLACRILEX 4 MG
15 LOZENGE BUCCAL PRN
Status: DISCONTINUED | OUTPATIENT
Start: 2020-09-25 | End: 2020-10-17 | Stop reason: HOSPADM

## 2020-09-25 RX ORDER — ASPIRIN 300 MG/1
300 SUPPOSITORY RECTAL DAILY
Status: DISCONTINUED | OUTPATIENT
Start: 2020-09-26 | End: 2020-10-16

## 2020-09-25 RX ORDER — INSULIN GLARGINE 100 [IU]/ML
15 INJECTION, SOLUTION SUBCUTANEOUS NIGHTLY
Qty: 1 VIAL | Refills: 3 | Status: ON HOLD | OUTPATIENT
Start: 2020-09-25 | End: 2020-10-16 | Stop reason: HOSPADM

## 2020-09-25 RX ORDER — SUCRALFATE 1 G/1
1 TABLET ORAL EVERY 8 HOURS SCHEDULED
Status: DISCONTINUED | OUTPATIENT
Start: 2020-09-25 | End: 2020-10-17 | Stop reason: HOSPADM

## 2020-09-25 RX ORDER — INSULIN GLARGINE 100 [IU]/ML
12 INJECTION, SOLUTION SUBCUTANEOUS NIGHTLY
Status: DISCONTINUED | OUTPATIENT
Start: 2020-09-25 | End: 2020-09-26

## 2020-09-25 RX ORDER — ROSUVASTATIN CALCIUM 10 MG/1
10 TABLET, COATED ORAL DAILY
Status: CANCELLED | OUTPATIENT
Start: 2020-09-26

## 2020-09-25 RX ORDER — TRAMADOL HYDROCHLORIDE 50 MG/1
50 TABLET ORAL EVERY 6 HOURS PRN
Status: DISCONTINUED | OUTPATIENT
Start: 2020-09-25 | End: 2020-10-17 | Stop reason: HOSPADM

## 2020-09-25 RX ORDER — LOSARTAN POTASSIUM 25 MG/1
25 TABLET ORAL DAILY
Status: CANCELLED | OUTPATIENT
Start: 2020-09-26

## 2020-09-25 RX ADMIN — SUCRALFATE 1 G: 1 TABLET ORAL at 13:56

## 2020-09-25 RX ADMIN — PANTOPRAZOLE SODIUM 40 MG: 40 TABLET, DELAYED RELEASE ORAL at 06:38

## 2020-09-25 RX ADMIN — ASPIRIN 81 MG: 81 TABLET, COATED ORAL at 08:27

## 2020-09-25 RX ADMIN — SUCRALFATE 1 G: 1 TABLET ORAL at 06:38

## 2020-09-25 RX ADMIN — Medication 3 MG: at 21:07

## 2020-09-25 RX ADMIN — ENOXAPARIN SODIUM 40 MG: 40 INJECTION SUBCUTANEOUS at 20:53

## 2020-09-25 RX ADMIN — TAMSULOSIN HYDROCHLORIDE 0.4 MG: 0.4 CAPSULE ORAL at 20:53

## 2020-09-25 RX ADMIN — INSULIN LISPRO 4 UNITS: 100 INJECTION, SOLUTION INTRAVENOUS; SUBCUTANEOUS at 17:25

## 2020-09-25 RX ADMIN — Medication 10 ML: at 08:28

## 2020-09-25 RX ADMIN — SUCRALFATE 1 G: 1 TABLET ORAL at 22:22

## 2020-09-25 RX ADMIN — LOSARTAN POTASSIUM 25 MG: 25 TABLET, FILM COATED ORAL at 08:27

## 2020-09-25 RX ADMIN — INSULIN LISPRO 2 UNITS: 100 INJECTION, SOLUTION INTRAVENOUS; SUBCUTANEOUS at 20:52

## 2020-09-25 RX ADMIN — ROSUVASTATIN CALCIUM 10 MG: 10 TABLET, FILM COATED ORAL at 08:27

## 2020-09-25 RX ADMIN — CLOPIDOGREL BISULFATE 75 MG: 75 TABLET ORAL at 08:27

## 2020-09-25 RX ADMIN — INSULIN GLARGINE 12 UNITS: 100 INJECTION, SOLUTION SUBCUTANEOUS at 20:53

## 2020-09-25 RX ADMIN — INSULIN LISPRO 10 UNITS: 100 INJECTION, SOLUTION INTRAVENOUS; SUBCUTANEOUS at 12:49

## 2020-09-25 RX ADMIN — POLYETHYLENE GLYCOL 3350 17 G: 17 POWDER, FOR SOLUTION ORAL at 08:27

## 2020-09-25 RX ADMIN — SENNOSIDES-DOCUSATE SODIUM TAB 8.6-50 MG 1 TABLET: 8.6-5 TAB at 20:53

## 2020-09-25 RX ADMIN — TRAMADOL HYDROCHLORIDE 50 MG: 50 TABLET ORAL at 21:07

## 2020-09-25 RX ADMIN — DULOXETINE HYDROCHLORIDE 30 MG: 30 CAPSULE, DELAYED RELEASE ORAL at 08:27

## 2020-09-25 ASSESSMENT — PAIN DESCRIPTION - PAIN TYPE
TYPE: ACUTE PAIN

## 2020-09-25 ASSESSMENT — PAIN DESCRIPTION - ORIENTATION: ORIENTATION: LEFT

## 2020-09-25 ASSESSMENT — PAIN SCALES - GENERAL
PAINLEVEL_OUTOF10: 0
PAINLEVEL_OUTOF10: 5
PAINLEVEL_OUTOF10: 0

## 2020-09-25 ASSESSMENT — PAIN DESCRIPTION - DESCRIPTORS: DESCRIPTORS: ACHING

## 2020-09-25 ASSESSMENT — PAIN DESCRIPTION - LOCATION: LOCATION: ARM;HAND

## 2020-09-25 ASSESSMENT — PAIN DESCRIPTION - FREQUENCY: FREQUENCY: INTERMITTENT

## 2020-09-25 ASSESSMENT — PAIN - FUNCTIONAL ASSESSMENT: PAIN_FUNCTIONAL_ASSESSMENT: ACTIVITIES ARE NOT PREVENTED

## 2020-09-25 ASSESSMENT — PAIN DESCRIPTION - ONSET: ONSET: ON-GOING

## 2020-09-25 NOTE — PROGRESS NOTES
Called [facility] for holter monitor and they usually place it on the pt once they leave rehab. Also talked to rehab about this so will put it on the dc instructions to call [facility] when pt is going to be discharged. DR. Renetta Velarde made aware.

## 2020-09-25 NOTE — PROGRESS NOTES
Physical Therapy  Facility/Department: [address] PROGRESSIVE CARE  Daily Treatment Note  NAME: Melanie Anderson  : 1945  MRN: [id number]    Date of Service: 2020    Discharge Recommendations:  5-7 sessions per week, Patient would benefit from continued therapy after discharge        Assessment   Body structures, Functions, Activity limitations: Decreased functional mobility ; Decreased balance;Decreased strength  Assessment: Pt limited by fatigue, but able to participate in multiple trunk exercises in sitting, and several standing exercises. He remains unable to grasp walker with L hand, demonstrating minimal LUE movement. He would benefit from continued therapy to gradually progress OOB activity, and improve transfers/ambulation as able. Anticipate transfer to ARU. PT Education: General Safety;PT Role;Goals  Activity Tolerance  Activity Tolerance: Patient Tolerated treatment well;Patient limited by endurance     Patient Diagnosis(es): The primary encounter diagnosis was Cerebrovascular accident (CVA), unspecified mechanism ([facility] [address] A diagnosis of Left-sided muscle weakness was also pertinent to this visit. has a past medical history of Cerebral artery occlusion with cerebral infarction ([address] Diabetes mellitus ([address] Gallstone, GERD (gastroesophageal reflux disease), Hyperlipidemia, Hypertension, and Renal insufficiency. has a past surgical history that includes Appendectomy; Cholecystectomy; Upper gastrointestinal endoscopy (2018); Upper gastrointestinal endoscopy (N/A, 2019); Upper gastrointestinal endoscopy (N/A, 2019); Upper gastrointestinal endoscopy (N/A, 2019); Upper gastrointestinal endoscopy (N/A, 2020); and Colonoscopy (N/A, 2020). Restrictions  Restrictions/Precautions  Restrictions/Precautions: Fall Risk  Position Activity Restriction  Other position/activity restrictions: L sided weakness; speaks [place]     Subjective   Subjective  Subjective:  Son to assist with interpreting. Pt agreeable to mobilization. Plan to transfer to ARU soon. Objective      Bed mobility  Supine to Sit: Moderate assistance(HOB elevated)     Transfers  Sit to Stand: Minimal Assistance; Moderate Assistance  Stand to sit: Minimal Assistance; Moderate Assistance  Stand Pivot Transfers: Moderate Assistance(Using walker; Unable to grasp with L hand; L knee buckling noted.)     Ambulation  Ambulation?: No     Exercises  Comments: Standing with walker: March in place RLE/LLE x 10, max cues for sequencing, Min A for balance. Other exercises  Other exercises?: Yes  Other exercises 1: Static stand without walker support, EO, x 1 min., CGA-Min A, cues to avoid lean to L. Other exercises 2: Seated: A/P weight shift x 10 with EO, UE support,  x 5 with EC, UE support, CGA-Min A, cues for technique. Other exercises 3: Seated: Lateral weight shift x 10 EO, UE support,  x 5 EC, no UE support, Min A for facilitation. Other Activities: Other (see comment)  Comment: Pt positioned for comfort in bedside chair at end of session. AM-PAC Score  AM-PAC Inpatient Mobility Raw Score : 12 (09/25/20 1053)  AM-PAC Inpatient T-Scale Score : 35.33 (09/25/20 1053)  Mobility Inpatient CMS 0-100% Score: 68.66 (09/25/20 1053)  Mobility Inpatient CMS G-Code Modifier : CL (09/25/20 1053)          Goals  Short term goals  Time Frame for Short term goals: by acute d/c--anticipate ongoing therapy in rehab setting. Short term goal 1: bed mobility SBA  Short term goal 2: transfers CGA  Short term goal 3: amb x 20 ft with RW and Min A x 1  Short term goal 4: tolerate LE Ther ex for increased LE strength. Long term goals  Time Frame for Long term goals : tbd at next level of care. Patient Goals   Patient goals : pt's/family goal is some rehab before returning home.     Plan    Plan  Times per week: 3-5 x wk in acute setting  Current Treatment Recommendations: Functional Mobility Training, Balance Training,

## 2020-09-25 NOTE — PROGRESS NOTES
4 Eyes Skin Assessment     The patient is being assess for  Admission    I agree that 2 RN's have performed a thorough Head to Toe Skin Assessment on the patient. ALL assessment sites listed below have been assessed. Areas assessed by both nurses: Alissa Ag. Caroline  [x]   Head, Face, and Ears   [x]   Shoulders, Back, and Chest  [x]   Arms, Elbows, and Hands   [x]   Coccyx, Sacrum, and IschIum  [x]   Legs, Feet, and Heels        Does the Patient have Skin Breakdown?   No         Gonzalez Prevention initiated:  Yes   Wound Care Orders initiated:  No      C nurse consulted for Pressure Injury (Stage 3,4, Unstageable, DTI, NWPT, and Complex wounds), New and Established Ostomies:  No      Nurse 1 eSignature: Electronically signed by Ernst Hinton RN on 9/25/20 at 5:19 PM EDT    **SHARE this note so that the co-signing nurse is able to place an eSignature**    Nurse 2 eSignature: Electronically signed by Cesar Aguilar RN on 9/25/20 at 5:20 PM EDT

## 2020-09-25 NOTE — PLAN OF CARE
Problem: Falls - Risk of:  Goal: Will remain free from falls  Description: Will remain free from falls  9/25/2020 1045 by Mark Kline RN  Outcome: Ongoing     Problem: Falls - Risk of:  Goal: Absence of physical injury  Description: Absence of physical injury  9/25/2020 1045 by Mark Kline RN  Outcome: Ongoing     Problem: Skin Integrity:  Goal: Will show no infection signs and symptoms  Description: Will show no infection signs and symptoms  9/25/2020 1045 by Mark Kline RN  Outcome: Ongoing     Problem: Skin Integrity:  Goal: Absence of new skin breakdown  Description: Absence of new skin breakdown  9/25/2020 1045 by Mark Kline RN  Outcome: Ongoing     Problem: HEMODYNAMIC STATUS  Goal: Patient has stable vital signs and fluid balance  9/25/2020 1045 by Mark Kline RN  Outcome: Ongoing     Problem: ACTIVITY INTOLERANCE/IMPAIRED MOBILITY  Goal: Mobility/activity is maintained at optimum level for patient  9/25/2020 1045 by Mark Kline RN  Outcome: Ongoing     Problem: COMMUNICATION IMPAIRMENT  Goal: Ability to express needs and understand communication  9/25/2020 1045 by Mark Kline RN  Outcome: Ongoing     Problem: Pain:  Goal: Pain level will decrease  Description: Pain level will decrease  9/25/2020 1045 by Mark Kline RN  Outcome: Ongoing     Problem: Pain:  Goal: Control of acute pain  Description: Control of acute pain  9/25/2020 1045 by Mark Kline RN  Outcome: Ongoing     Problem: Pain:  Goal: Control of chronic pain  Description: Control of chronic pain  9/25/2020 1045 by Mark Kline RN  Outcome: Ongoing     Problem: Nutrition  Goal: Optimal nutrition therapy  9/25/2020 1045 by Mark Kline RN  Outcome: Ongoing

## 2020-09-25 NOTE — CARE COORDINATION
INITIAL CASE MANAGEMENT ASSESSMENT (follow up)     PT/OT Recs: 5-7 days per week. Inpatient rehab at discharge.      Active Services: Prior to medical admission patient reports that she used no active services. It appears as if she has  history with [facility] if home based rehab is needed in the future. PLAN/COMMENTS:   Acute Rehab with [facility] when patient is medically ready per Dr. Kristal Jang. PT notes dated 9/24 supports transition when appropriate. SW will continue to monitor. Respectfully submitted,    JEVON Vo  [facility]   [phone]    Electronically signed by JEVON Kent on 9/25/2020 at 9:53 AM

## 2020-09-25 NOTE — DISCHARGE SUMMARY
Hospital Medicine Discharge Summary    Patient ID: Meena Humphrey      Patient's PCP: Lanney Landau    Admit Date: 9/22/2020     Discharge Date:   9/25/2020    Admitting Physician: Gopi Richter MD     Discharge Physician: Lesa Sandhu MD     Discharge Diagnoses: Active Hospital Problems    Diagnosis    Acute cerebrovascular accident [facility]) [I63.9]       The patient was seen and examined on day of discharge and this discharge summary is in conjunction with any daily progress note from day of discharge. Hospital Course: 75yo man Kazakh speaking, with Hx of HTN and uncontrolled DMII, prior CVA Hx, presented with acute onset left UE and less so LE weakness. Acute Ischemic CVA  Multiple locations, possibly embolic process. ECHO did not suggest acute findings. Plan:               - neuro involved. - cont telemetry. - pt on plavix and statin when this happened. - presently on DAPT and statin. - LUE and LLE worsened today (Thursday 9/24)                          - repeat CT head - no new changes, evolving known CVA                          - will decrease losartan dose and allow for more permissive HTN. - cont PTOT as tolerates. - he was accepted for IP rehab - I would like to see his symptoms stabilize or improve before we transition him to rehab unit. But unfortunately he actually worsened today. - plan for 30 day cardiac monitor on discharge.         HTN - permissive HTN. Please note that this patient had worsening LUE and LLE weakness symptoms when his BP dropped in 195 systolic range. I decreased his losartan dose to 25. He is doing better clinically with this dose. Avoid hypotension. Aim for gradual and slow BP reduction to goal over the next 2-4 weeks.         DMII uncontrolled. Cont lantus, ISS.    A1C 9.8  Increased lantus to 14  Increased ISS to med dose. BG controlled fairly well on this regimen - will continue on discharge           Back Pain. Has L1 compression fracture on XR. Add tramadol for symptom control - has not received any - pain appears positional - cont to monitor. PTOT as tolerates.         Pt will transition to IP rehab on discharge. He will continue with tele monitor in rehab and will have Holter 30day monitor when he gets discharged home - eval for paroxysmal Afib as the cause of his repeat CVAs. Thus far we have not seen any Afib on his telemetry review. Physical Exam Performed:     /84   Pulse 96   Temp 97.9 °F (36.6 °C) (Oral)   Resp 16   Ht 5' 3\" (1.6 m)   Wt 125 lb 7.1 oz (56.9 kg)   SpO2 93%   BMI 22.22 kg/m²       General appearance: No apparent distress, appears stated age and cooperative. HEENT: Pupils equal, round, and reactive to light. Conjunctivae/corneas clear. Neck: Supple, with full range of motion. No jugular venous distention. Trachea midline. Respiratory:  Normal respiratory effort. Clear to auscultation, bilaterally without Rales/Wheezes/Rhonchi. Cardiovascular: Regular rate and rhythm with normal S1/S2 without murmurs, rubs or gallops. Abdomen: Soft, non-tender, non-distended with normal bowel sounds. Musculoskeletal: No clubbing, cyanosis or edema bilaterally. Full range of motion without deformity. Skin: Skin color, texture, turgor normal.  No rashes or lesions. Neurologic:  LUE and LLE weakness - worse today - getting to the point of 2-3/5 on the motor response. Pt having difficulty following instruction for neuro exam event with  service due to very [address]. Psychiatric: Alert and oriented, thought content mostly appropriate, limited insight. Capillary Refill: Brisk,< 3 seconds   Peripheral Pulses: +2 palpable, equal bilaterally        Labs:  For convenience and continuity at follow-up the following most recent labs are provided:      CBC: Lab Results   Component Value Date    WBC 7.6 09/25/2020    HGB 15.1 09/25/2020    HCT 46.8 09/25/2020     09/25/2020       Renal:    Lab Results   Component Value Date     09/25/2020    K 4.2 09/25/2020    K 3.7 09/23/2020     09/25/2020    CO2 15 09/25/2020    BUN 16 09/25/2020    CREATININE 1.3 09/25/2020    CALCIUM 9.3 09/25/2020    PHOS 4.2 09/25/2020         Significant Diagnostic Studies    Radiology:   CT HEAD WO CONTRAST   Final Result   No acute intracranial abnormality. Right pontine infarct again noted. Old left occipital infarct unchanged. MRI brain without contrast   Final Result   Right paramedian pontine acute to subacute infarct. No hemorrhagic conversion      Additionally areas of likely subacute lacunar infarct involving the left   frontal lobe deep white matter as well as the right posterior centrum   semiovale      Chronic small ischemic disease and age related change. XR LUMBAR SPINE (2-3 VIEWS)   Final Result   1. Multilevel degenerative disc disease and facet joint arthropathy   2. Mild anterior wedging L1 not evident on previous CT done 04/28/2020 could   represent a mild acute compression fracture. CT HEAD WO CONTRAST   Final Result   Low-attenuation in the right aspect the son suspicious for acute ischemia. CTA HEAD NECK W CONTRAST   Final Result   50% stenosis of proximal left internal carotid artery. Severe stenosis of proximal left posterior cerebral artery. XR CHEST PORTABLE   Final Result      No acute abnormality improved aeration lungs compared to the prior study                Consults:     IP CONSULT TO PHARMACY  PHARMACY TO CHANGE BASE FLUIDS  IP CONSULT TO NEUROSURGERY  IP CONSULT TO HOSPITALIST  IP CONSULT TO NEUROLOGY  IP CONSULT TO PHYSICAL MEDICINE REHAB    Disposition:  IP rehab. Condition at Discharge: Stable    Discharge Instructions/Follow-up:  PCP 1 week.      Code Status:  Full Code concerns please feel free to contact me at [phone].

## 2020-09-25 NOTE — PLAN OF CARE
Problem: Falls - Risk of:  Goal: Will remain free from falls  Description: Will remain free from falls  9/25/2020 0513 by Juno Cortes RN  Outcome: Ongoing  9/24/2020 1834 by Marie Jolley RN  Outcome: Ongoing  Goal: Absence of physical injury  Description: Absence of physical injury  9/25/2020 0513 by Juno Cortes RN  Outcome: Ongoing  9/24/2020 1834 by Marie Jolley RN  Outcome: Ongoing

## 2020-09-25 NOTE — PROGRESS NOTES
Progress Note    Updates  No significant events overnight. LUE>LLE weakness ongoing.       Active Ambulatory Problems     Diagnosis Date Noted    Chest pain 05/19/2017    Hypertension 05/19/2017    Hyperlipidemia 05/19/2017    [facility] 05/19/2017    Unexplained weight loss 05/23/2019    Esophagitis 05/23/2019    Granulomatous adenopathy 05/23/2019    Mediastinal lymphadenopathy 05/23/2019    History of arterial ischemic stroke 05/23/2019    Abdominal pain 04/25/2020     Past Surgical History:   Procedure Laterality Date    APPENDECTOMY      CHOLECYSTECTOMY      COLONOSCOPY N/A 5/8/2020    COLONOSCOPY POLYPECTOMY SNARE/COLD BIOPSY performed by Hoang Jim MD at [address] [facility]  06/08/2018    UPPER GASTROINTESTINAL ENDOSCOPY N/A 2/28/2019    EGD W/EUS FNA performed by Brittany Modi MD at [address] [facility] 4/23/2019    EGD BIOPSY performed by Brittany Modi MD at [address] [facility] 4/23/2019    EGD W/EUS FNA performed by Brittany Modi MD at [address] [facility] 4/29/2020    EGD BIOPSY performed by Hoang Jim MD at [address] [place] [address] Facility-Administered Medications:     losartan (COZAAR) tablet 25 mg, 25 mg, Oral, Daily, Luh Anderson MD, 25 mg at 09/25/20 0827    insulin glargine (LANTUS) injection vial 12 Units, 12 Units, Subcutaneous, Nightly, Luh Anderson MD, 12 Units at 09/24/20 2115    insulin lispro (HUMALOG) injection vial 0-12 Units, 0-12 Units, Subcutaneous, TID WC, Luh Anderson MD, 4 Units at 09/24/20 1731    insulin lispro (HUMALOG) injection vial 0-6 Units, 0-6 Units, Subcutaneous, Nightly, Luh Anderson MD, 1 Units at 09/24/20 2115    traMADol (ULTRAM) tablet 50 mg, 50 mg, Oral, Q6H PRN **OR** traMADol (ULTRAM) tablet 100 mg, 100 mg, Oral, Q6H PRN, Ceferino MD Clarke    sodium chloride flush 0.9 % injection 10 mL, 10 mL, Intravenous, 2 times per day, Saud Mims MD, 10 mL at 09/25/20 0828    sodium chloride flush 0.9 % injection 10 mL, 10 mL, Intravenous, PRN, Saud Mims MD    polyethylene glycol (GLYCOLAX) packet 17 g, 17 g, Oral, Daily PRN, Saud Mims MD    promethazine (PHENERGAN) tablet 12.5 mg, 12.5 mg, Oral, Q6H PRN **OR** ondansetron (ZOFRAN) injection 4 mg, 4 mg, Intravenous, Q6H PRN, Saud Mims MD    enoxaparin (LOVENOX) injection 40 mg, 40 mg, Subcutaneous, Nightly, Lucie Davila MD, 40 mg at 09/24/20 2114    aspirin EC tablet 81 mg, 81 mg, Oral, Daily, 81 mg at 09/25/20 0827 **OR** aspirin suppository 300 mg, 300 mg, Rectal, Daily, Saud Mims MD, 300 mg at 09/23/20 6575    perflutren lipid microspheres (DEFINITY) injection 1.65 mg, 1.5 mL, Intravenous, ONCE PRN, Saud Mims MD    sucralfate (CARAFATE) tablet 1 g, 1 g, Oral, 3 times per day, Saud Mims MD, 1 g at 09/25/20 6158    clopidogrel (PLAVIX) tablet 75 mg, 75 mg, Oral, Daily, Saud Mims MD, 75 mg at 09/25/20 0827    DULoxetine (CYMBALTA) extended release capsule 30 mg, 30 mg, Oral, Daily, Saud Mims MD, 30 mg at 09/25/20 0827    pantoprazole (PROTONIX) tablet 40 mg, 40 mg, Oral, BID AC, Saud Mims MD, 40 mg at 09/25/20 6072    polyethylene glycol (GLYCOLAX) packet 17 g, 17 g, Oral, Daily, Saud Mims MD, 17 g at 09/25/20 0827    rosuvastatin (CRESTOR) tablet 10 mg, 10 mg, Oral, Daily, Lucie Davila MD, 10 mg at 09/25/20 0827    tamsulosin (FLOMAX) capsule 0.4 mg, 0.4 mg, Oral, Nightly, Lucie Davila MD, 0.4 mg at 09/24/20 2114    glucose (GLUTOSE) 40 % oral gel 15 g, 15 g, Oral, PRN, Lucie Davila MD    dextrose 50 % IV solution, 12.5 g, Intravenous, PRN, Saud Mims MD    glucagon (rDNA) injection 1 mg, 1 mg, Intramuscular, PRN, Lucie Davila, MD    dextrose 5 % solution, 100 mL/hr, Intravenous, PRN, Jazmin Amato MD    Exam  Blood pressure (!) 164/94, pulse 93, temperature 98.2 °F (36.8 °C), temperature source Oral, resp. rate 16, height 5' 3\" (1.6 m), weight 125 lb 7.1 oz (56.9 kg), SpO2 92 %. Constitutional                          Vital signs: BP, HR, and RR reviewed            General alert, no distress  Eyes: unable to visualize the fundi  Cardiovascular: pulses symmetric in all 4 extremities  Psychiatric: cooperative with examination, no psychotic behavior noted. Neurologic  Mental status:   orientation to person, place.                Attention intact as able to attend well to the exam                Language fluent in conversation per son.   Cortez Crowe; follows simple commands  Cranial nerves:   CN2: visual fields grossly full though technically difficult exam.    CN 3,4,6: extraocular muscles intact  CN7: L facial weakness w/ reported mild dysarthria.    CN8: hearing grossly intact  CN11: trap strength decreased on the L.    CN12: tongue midline with protrusion  Strength: L hemiparesis, UE>LE. Sensory: reports some decreased sensation in his LLE.    Cerebellar/coordination: no gross ataxia in his RUE. Tone: normal in all 4 extremities  Gait: deferred at this time for safety given weakness.      ROS   Constitutional- No weight loss or fevers  Eyes- No diplopia. No photophobia. Ears/nose/throat- No dysphagia. + Dysarthria  Cardiovascular- No palpitations. No chest pain  Respiratory- No dyspnea. No Cough  Gastrointestinal- No Abdominal pain. No Vomiting. Genitourinary- No incontinence. No urinary retention  Musculoskeletal- No myalgia. No arthralgia  Skin- No rash. No easy bruising. Psychiatric- No depression. No anxiety  Endocrine- + diabetes. No thyroid issues. Hematologic- No bleeding difficulty. No fatigue  Neurologic- + weakness. No Headache.     Labs  LDL 43  HgA1c 9.8    Studies  Follow up CT head w/o 9/24/20, independently reviewed. Stable. No hemorrhage. MRI brain w/o 9/22/20  1. Right paramedian pontine acute to subacute infarct.  No hemorrhagic conversion    2. Additionally areas of likely subacute lacunar infarct involving the left    frontal lobe deep white matter as well as the right posterior centrum    semiovale    3. Chronic small ischemic disease and age related change. CTA head/neck 9/22/20  1. 50% stenosis of proximal left internal carotid artery.    2. Severe stenosis of proximal left posterior cerebral artery.       TTE 9/24/20     Ejection fraction is visually estimated to be 55-60%. Indeterminate diastolic function. Normal function of all valves. Normal L atrium. Impression  1. L hemiparesis w/ dysarthria.  MRI confirms an acute R pontine infarct w/ ?additional areas of subacute infarcts.  Suspect microvascular versus embolic.      His weakness worsened some yesterday, now stable, likely an extension of his known infarct. CT head stable. Primary risk factors for stroke include previous stroke, DM, and HTN. He had been off BP medications for a month. Recommendations  1. DAPT for 3 weeks, transitioning to monotherapy w/ Plavix at that time. 2.  High intensity statin. 3.  Reasonable to keep BP < 160/90, trending toward normotension over the next day or two.   4.  Recommend efforts to achieve normoglycemia. HgA1c < 7.0.     5. Continue inpatient monitoring for atrial fibrillation. A 30 day event monitor is being arranged afterwards. 6.  Rehabilitation efforts. 7.  Can follow up outpatient. Will sign off. Please call back w/ any additional questions or concerns. Thank you.       Shelby Barreto [address] Neurology    A copy of this note was provided for Dr Ky Aleman MD

## 2020-09-26 LAB
ANION GAP SERPL CALCULATED.3IONS-SCNC: 14 MMOL/L (ref 3–16)
BASOPHILS ABSOLUTE: 0.1 K/UL (ref 0–0.2)
BASOPHILS RELATIVE PERCENT: 1 %
BUN BLDV-MCNC: 20 MG/DL (ref 7–20)
CALCIUM SERPL-MCNC: 9.6 MG/DL (ref 8.3–10.6)
CHLORIDE BLD-SCNC: 97 MMOL/L (ref 99–110)
CO2: 20 MMOL/L (ref 21–32)
CREAT SERPL-MCNC: 1.2 MG/DL (ref 0.8–1.3)
EOSINOPHILS ABSOLUTE: 0.2 K/UL (ref 0–0.6)
EOSINOPHILS RELATIVE PERCENT: 2.8 %
GFR AFRICAN AMERICAN: >60
GFR NON-AFRICAN AMERICAN: 59
GLUCOSE BLD-MCNC: 211 MG/DL (ref 70–99)
GLUCOSE BLD-MCNC: 261 MG/DL (ref 70–99)
GLUCOSE BLD-MCNC: 290 MG/DL (ref 70–99)
GLUCOSE BLD-MCNC: 301 MG/DL (ref 70–99)
GLUCOSE BLD-MCNC: 339 MG/DL (ref 70–99)
HCT VFR BLD CALC: 46.5 % (ref 40.5–52.5)
HEMOGLOBIN: 15.2 G/DL (ref 13.5–17.5)
LYMPHOCYTES ABSOLUTE: 1.9 K/UL (ref 1–5.1)
LYMPHOCYTES RELATIVE PERCENT: 27 %
MCH RBC QN AUTO: 25.2 PG (ref 26–34)
MCHC RBC AUTO-ENTMCNC: 32.7 G/DL (ref 31–36)
MCV RBC AUTO: 77.1 FL (ref 80–100)
MONOCYTES ABSOLUTE: 0.5 K/UL (ref 0–1.3)
MONOCYTES RELATIVE PERCENT: 7.1 %
NEUTROPHILS ABSOLUTE: 4.3 K/UL (ref 1.7–7.7)
NEUTROPHILS RELATIVE PERCENT: 62.1 %
PDW BLD-RTO: 15.5 % (ref 12.4–15.4)
PERFORMED ON: ABNORMAL
PLATELET # BLD: 261 K/UL (ref 135–450)
PMV BLD AUTO: 7.6 FL (ref 5–10.5)
POTASSIUM REFLEX MAGNESIUM: 4.2 MMOL/L (ref 3.5–5.1)
PREALBUMIN: 22.2 MG/DL (ref 20–40)
RBC # BLD: 6.04 M/UL (ref 4.2–5.9)
SODIUM BLD-SCNC: 131 MMOL/L (ref 136–145)
WBC # BLD: 6.8 K/UL (ref 4–11)

## 2020-09-26 PROCEDURE — 1280000000 HC REHAB R&B

## 2020-09-26 PROCEDURE — 97167 OT EVAL HIGH COMPLEX 60 MIN: CPT

## 2020-09-26 PROCEDURE — 97110 THERAPEUTIC EXERCISES: CPT

## 2020-09-26 PROCEDURE — 97530 THERAPEUTIC ACTIVITIES: CPT

## 2020-09-26 PROCEDURE — 97116 GAIT TRAINING THERAPY: CPT

## 2020-09-26 PROCEDURE — 84134 ASSAY OF PREALBUMIN: CPT

## 2020-09-26 PROCEDURE — 80048 BASIC METABOLIC PNL TOTAL CA: CPT

## 2020-09-26 PROCEDURE — 36415 COLL VENOUS BLD VENIPUNCTURE: CPT

## 2020-09-26 PROCEDURE — 85025 COMPLETE CBC W/AUTO DIFF WBC: CPT

## 2020-09-26 PROCEDURE — 6360000002 HC RX W HCPCS: Performed by: PHYSICAL MEDICINE & REHABILITATION

## 2020-09-26 PROCEDURE — 97535 SELF CARE MNGMENT TRAINING: CPT

## 2020-09-26 PROCEDURE — 92523 SPEECH SOUND LANG COMPREHEN: CPT

## 2020-09-26 PROCEDURE — 94760 N-INVAS EAR/PLS OXIMETRY 1: CPT

## 2020-09-26 PROCEDURE — 97162 PT EVAL MOD COMPLEX 30 MIN: CPT

## 2020-09-26 PROCEDURE — 6370000000 HC RX 637 (ALT 250 FOR IP): Performed by: PHYSICAL MEDICINE & REHABILITATION

## 2020-09-26 PROCEDURE — 97542 WHEELCHAIR MNGMENT TRAINING: CPT

## 2020-09-26 PROCEDURE — 92610 EVALUATE SWALLOWING FUNCTION: CPT

## 2020-09-26 RX ORDER — INSULIN GLARGINE 100 [IU]/ML
15 INJECTION, SOLUTION SUBCUTANEOUS NIGHTLY
Status: DISCONTINUED | OUTPATIENT
Start: 2020-09-26 | End: 2020-09-28

## 2020-09-26 RX ADMIN — SUCRALFATE 1 G: 1 TABLET ORAL at 21:44

## 2020-09-26 RX ADMIN — INSULIN LISPRO 6 UNITS: 100 INJECTION, SOLUTION INTRAVENOUS; SUBCUTANEOUS at 16:36

## 2020-09-26 RX ADMIN — ASPIRIN 81 MG: 81 TABLET, COATED ORAL at 08:11

## 2020-09-26 RX ADMIN — ROSUVASTATIN CALCIUM 10 MG: 10 TABLET, FILM COATED ORAL at 08:11

## 2020-09-26 RX ADMIN — LOSARTAN POTASSIUM 25 MG: 25 TABLET ORAL at 08:11

## 2020-09-26 RX ADMIN — SUCRALFATE 1 G: 1 TABLET ORAL at 06:15

## 2020-09-26 RX ADMIN — SENNOSIDES-DOCUSATE SODIUM TAB 8.6-50 MG 1 TABLET: 8.6-5 TAB at 08:11

## 2020-09-26 RX ADMIN — SUCRALFATE 1 G: 1 TABLET ORAL at 14:35

## 2020-09-26 RX ADMIN — SENNOSIDES-DOCUSATE SODIUM TAB 8.6-50 MG 1 TABLET: 8.6-5 TAB at 20:33

## 2020-09-26 RX ADMIN — CLOPIDOGREL BISULFATE 75 MG: 75 TABLET ORAL at 08:11

## 2020-09-26 RX ADMIN — Medication 3 MG: at 20:34

## 2020-09-26 RX ADMIN — PANTOPRAZOLE SODIUM 40 MG: 40 TABLET, DELAYED RELEASE ORAL at 06:15

## 2020-09-26 RX ADMIN — INSULIN GLARGINE 15 UNITS: 100 INJECTION, SOLUTION SUBCUTANEOUS at 20:32

## 2020-09-26 RX ADMIN — TAMSULOSIN HYDROCHLORIDE 0.4 MG: 0.4 CAPSULE ORAL at 20:33

## 2020-09-26 RX ADMIN — DULOXETINE HYDROCHLORIDE 30 MG: 30 CAPSULE, DELAYED RELEASE ORAL at 08:11

## 2020-09-26 RX ADMIN — INSULIN LISPRO 4 UNITS: 100 INJECTION, SOLUTION INTRAVENOUS; SUBCUTANEOUS at 20:32

## 2020-09-26 RX ADMIN — INSULIN LISPRO 4 UNITS: 100 INJECTION, SOLUTION INTRAVENOUS; SUBCUTANEOUS at 08:10

## 2020-09-26 RX ADMIN — PANTOPRAZOLE SODIUM 40 MG: 40 TABLET, DELAYED RELEASE ORAL at 16:35

## 2020-09-26 RX ADMIN — ENOXAPARIN SODIUM 40 MG: 40 INJECTION SUBCUTANEOUS at 20:32

## 2020-09-26 RX ADMIN — TRAMADOL HYDROCHLORIDE 100 MG: 50 TABLET, FILM COATED ORAL at 08:11

## 2020-09-26 ASSESSMENT — PAIN DESCRIPTION - PAIN TYPE: TYPE: ACUTE PAIN

## 2020-09-26 ASSESSMENT — PAIN DESCRIPTION - DESCRIPTORS: DESCRIPTORS: ACHING

## 2020-09-26 ASSESSMENT — PAIN SCALES - GENERAL
PAINLEVEL_OUTOF10: 7
PAINLEVEL_OUTOF10: 3
PAINLEVEL_OUTOF10: 0

## 2020-09-26 ASSESSMENT — PAIN DESCRIPTION - ORIENTATION: ORIENTATION: LEFT

## 2020-09-26 ASSESSMENT — PAIN DESCRIPTION - LOCATION: LOCATION: ARM;HAND

## 2020-09-26 ASSESSMENT — PAIN DESCRIPTION - ONSET: ONSET: ON-GOING

## 2020-09-26 ASSESSMENT — PAIN DESCRIPTION - FREQUENCY: FREQUENCY: INTERMITTENT

## 2020-09-26 ASSESSMENT — PAIN - FUNCTIONAL ASSESSMENT: PAIN_FUNCTIONAL_ASSESSMENT: ACTIVITIES ARE NOT PREVENTED

## 2020-09-26 NOTE — PROGRESS NOTES
Speech Language/Pathology   SPEECH LANGUAGE AND CLINICAL BEDSIDE SWALLOWING EVALUATION   Speech Therapy Department       Sylvester Duval (interreter reports pt's name is Jaren Dewey as his whole first name)  AGE: 76 y.o. GENDER: male  : 1945  [id number]     Onset date:  2020  Rehab Admission:  2020  EPISODE DATE:  2020  Referring physician: Dr. Margaret Cuellar DIAGNOSIS: acute CVA    History of Present Illness/Hospital Course:  Patient is a 77 yo right handed M with pmh HTN, HLD, DM2, CKD, and prior left PCA stroke who initially presented 2020 with left side weakness resulting in fall and back pain. Imaging revealed acute/subacute ischemic right pontine infarct in addition to likely subacute lacunar infarct in left frontal lobe deep white matter and right posterior centrum semiovale. Also with chronic left PCA territory infarct with severe stenosis. Etiology thought to be microvascular vs embolic. Neurology recommending DAPT x 21 days and then monotherapy (on Plavix and statin at baseline). Also found to have L1 compression fracture which is being managed conservatively. Course complicated by uncontrolled HTN and DM2. Patient did have worsening of left side weakness yesterday, possibly due to relative hypotension. Therefore BP regimen was reduced.    Now presents to ARU with impaired mobility, self-care, and cognition/communication below his baseline. Patient speaks Rajinder Amado. Therefore history obtained via chart review and interpretation by son (patient/son refuse use of blue phone). Currently, patient reports ongoing left side weakness and slurred speech (worse than admission but stable since yesterday). He has vision change but is unable to further characterize. He denies headache, tingling/numbness. Son reports ongoing intermittent confusion/disorientation.  Patient motivated to start inpatient rehab program.     Chief Complaint: left side weakness    PAST MEDICAL HISTORY Diagnosis Date    [facility]     Diabetes mellitus ([facility]     Gallstone     GERD (gastroesophageal reflux disease)     Hyperlipidemia     Hypertension     Renal insufficiency        PAST SURGICAL HISTORY    Past Surgical History:   Procedure Laterality Date    APPENDECTOMY      CHOLECYSTECTOMY      COLONOSCOPY N/A 5/8/2020    COLONOSCOPY POLYPECTOMY SNARE/COLD BIOPSY performed by Deanna Goldman MD at [address] [place] [address]  06/08/2018    UPPER GASTROINTESTINAL ENDOSCOPY N/A 2/28/2019    EGD W/EUS FNA performed by Norbert Zavala MD at [address] [place] [address] 4/23/2019    EGD BIOPSY performed by Norbert Zavala MD at [address] [place] [address] N/A 4/23/2019    EGD W/EUS FNA performed by Norbert Zavala MD at [address] [place] [address] 4/29/2020    EGD BIOPSY performed by Deanna Goldman MD at [address] [place] [address]    Allergies   Allergen Reactions    Atorvastatin Other (See Comments)     Myalgia -- stopped 11/2019 to see if pain improves. Possible PMR diagnosis as well. Will follow-up in 3 mo. CHART REVIEW:  9/22/2020 admitted s/p fall with concern for CVA:  The patient is a 67 y. o. male who presents to [facility] with unsteady on feet. Pt speaks Bengali, son at bedside helping getting history. According to son/pt, pt apparently sustained a fall yesterday and felt like he couldn't move his left side.  Today he had difficulty ambulating using his lt side and hence son brought pt to the ER.  9/22/2020 BRAIN MRI:   Impression    Right paramedian pontine acute to subacute infarct.  No hemorrhagic conversion         Additionally areas of likely subacute lacunar infarct involving the left    frontal lobe deep white matter as well as the right posterior centrum    semiovale         Chronic small ischemic disease and age related change.         9/22/2020 CXR:  Impression         No acute abnormality improved aeration lungs compared to the prior study            General  Chart Reviewed: Yes  Family / Caregiver Present: Yes  Pain Assessment  Patient Currently in Pain: Denies(Pt reports occasional pain deep in L ear that comes and goes, is not currently happening.)     Vision  Vision: Impaired  Hearing  Hearing: Exceptions to [facility]       Prior Status: Premorbid diet is described as regular with thin liquids with pt disliking cold items of any kind. Son and grandson report pt cognition is at baseline with confusion prior to this event. Intepreter reports pt's name is Shraddha Singh as his whole first name.  states this commonly happens as the name looks like a middle name and it is written as such. Reason for referral: Ximena Ram was referred for a Speech-Language and Bedside Swallow Evaluation to assess the efficiency of communicative effectiveness; the efficiency of swallow function, rule out aspiration and make recommendations regarding safe dietary consistencies, effective compensatory strategies, and safe eating environment. Dysphagia Treatment Diagnosis: Oropharyngeal Dysphagia   Speech Language Treatment Diagnosis: cognitive communication deficit        IMPRESSIONS  Dysphagia Diagnosis  Dysphagia Diagnosis: Mild pharyngeal stage dysphagia;Mild to moderate oral stage dysphagia  Dysphagia Impression : Pt presents with no overt signs of aspiration or penetration. Swallow appears effortful at times and pt reports some difficulty. Oral skills for solids appear impaired with slow posterior propulsion and trace oral residue. Pt at risk for adequate intake due to his c/o intermittent nausea, prefers only warm food and drinks, and the food provided is not what pt is familiar with.   Nsg reports family is allowed to bring in outside food and grandson is educated on the parameters of minced and moist and asked to show all items to the nurse to clarify their safety. Cognitive Diagnosis: Pt presents with impaired memory, orientation,  and problem solving for basic information. Grandson reports this is typical for pt. Per acute notes, pt's son also confirmed this is pt's baseline. Aphasia Diagnosis: Further evaluation needed to clarify. Speech Diagnosis: Further evaluation needed to clarify; MD notes indicate pt reports slurred speech. Communication Diagnosis: Pt requires an  to communicate. Pt appears to often have difficulty expressing himself accurately and with detail for typical conversation. Recommended Diet:  Diet Solids Recommendation: Dysphagia Minced and Moist (Dysphagia II)  Liquid Consistency Recommendation: Thin  Medication Administration:  Crushed in puree as able. Strategies: 90 degree positioning with all p.o. intake; small bites/sips; alternate textures through meal; reduce rate of intake;    Plan/Recommendations: Speech therapy 3-5 times a weeks for speech-language treatment, and dysphagia treatment     Plan:  5x/week while on ARU    Recommendations  D/C Recommendations: To be determined  Requires SLP Intervention: Yes    Therapy  Requires SLP Intervention: Yes  Therapeutic Interventions: Diet tolerance monitoring, Patient/Family education, Oral motor exercises  Duration/Frequency of Treatment: 5x/week while on ARU    Dysphagia Goals  Dysphagia Goals: The patient will tolerate recommended diet without observed clinical signs of aspiration, The patient will improve oral motor function via therapeutic oral motor exercises to 5/5 each trial., The patient will tolerate mechanical soft foods 10/10.      Speech and Language Goals  Goals:   Short-term Goals  Timeframe for Short-term Goals: 10-14 days  Goal 1: The pt will recall basic orientation information with assist  Goal 2: The pt will follow commands within the context of therapy  Goal 3: Additional goals to be developed as indicated including oral residue clears with liquid and time. Indicators of Pharyngeal Phase Dysfunction   Indicators of Pharyngeal Phase Dysfunction  Delayed Swallow: All  Decreased Laryngeal Elevation: All  Pharyngeal Phase   Pharyngeal: Pt with no overt signs of aspiration or penetration: no cough, choke or vocal change. Swallow appears effortful. Education  [employer] and agree with results and recommendations: Patient, Family member, RN  Patient Education: completed on results/recs/plan  Patient Education Response: Verbalizes understanding, No evidence of learning    Please accept this as Speech Therapy Discharge status, if pt is discharged prior to next therapy session.     Timed Code Treatment:  Time In: 4513  [facility]  Time In: 1115  Time Out: 9851  Minutes: [facility], [facility] Earnest 87 CCC/SLP [address] [place] [address] Pathologist  09/26/20  2:38 PM

## 2020-09-26 NOTE — PLAN OF CARE
Problem: Nutrition  Goal: Optimal nutrition therapy  Outcome: Ongoing   Nutrition Problem #1:  Biting/chewing (masticatory) difficulty  Intervention: Food and/or Nutrient Delivery: Continue Current Diet, Continue Oral Nutrition Supplement  Nutritional Goals: Consume >50% of meals and supplements

## 2020-09-26 NOTE — PLAN OF CARE
ARU PATIENT TREATMENT PLAN  [facility]   [address] [facility] [address]  [phone]    Lety Chaparro    : 1945  Acct #: [de-identified]  MRN: [id number]   PHYSICIAN:  Keanu Romero MD    Rehabilitation Diagnosis:    Acute ischemic stroke  -Localization: Right son, posterior centrum semiolave, left frontal lobe deep white matter  -Etiology: small vessel vs embolic  -Telemetry in ARU, then event monitor at discharge  -Secondary ppx with DAPT x 3 weeks (then Plavix alone), statin, BP and glucose control  -PT/OT/SLP     H/o left PCA stroke with severe stenosis  -Secondary ppx as above     Dysphagia   -Dietitian and SLP consults.   -Currently on Minced/moist     HTN, uncontrolled  -Still allowing some permissive HTN due to worsening of symptoms with normalization of BP  -losartan     DM2, uncontrolled  -Lantus, ISS     CKD  -Avoid nephrotoxins, renally dose meds  -Monitor      L1 compression fracture  -Pain control  -PT/OT     Bladder   -High risk retention   -Monitor PVRs, SC prn >300cc  -Flomax     Bowel   -High risk constipation   -senna+colace BID, PRN miralax, MoM, and bisacodyl supp.     Pain control  -prn tramasol     PPx  -DVT: lovenox  -GI: pantoprazole      ADMIT DATE:2020    Patient Goals:  \"I want to be normal again\"     Admitting Impairments: left hemiparesis, decreased coordination, balance, endurance, cognition, dysarthria, dysphagia  Barriers: left hemiparesis, decreased coordination, balance, endurance, cognition, dysarthria, dysphagia, medical comorbidities  Participation: patient lives with wife, son and grandson- needed some help with bathing, dressing, and toileting, SBA for transfers and gait.        CARE PLAN     NURSING:  Cynthia Duval while on this unit will:     [x] Be continent of bowel and bladder     [x] Have an adequate number of bowel movements  [x] Urinate with no urinary retention >300ml in bladder  [] Complete bladder protocol with gibbs removal  [x] Maintain O2 SATs at 92%  [x] Have pain managed while on ARU       [] Be pain free by discharge   [x] Have no skin breakdown while on ARU  [] Have improved skin integrity via wound measurements  [] Have no signs/symptoms of infection at the wound site  [x] Be free from injury during hospitalization   [x] Complete education with patient/family with understanding demonstrated for:  [x] Adjustment   [x] Other: CVA, diabetic needs, diet and swallowing precautions. Nursing interventions may include bowel/bladder training, education for medical assistive devices, medication education, O2 saturation management, energy conservation, stress management techniques, fall prevention, alarms protocol, seating and positioning, skin/wound care, pressure relief instruction,dressing changes,  infection protection, DVT prophylaxis, and/or assistance with in room safety with transfers to bed, toilet, wheelchair, shower as well as bathroom activities and hygiene. Patient/caregiver education for:   [x] Disease/sustained injury/management      [x] Medication Use   [] Surgical intervention   [x] Safety   [x] Body mechanics and or joint protection   [x] Health maintenance         PHYSICAL THERAPY:  Goals:                  Short term goals  Time Frame for Short term goals: 2 weeks  Short term goal 1: bed mobility SBA  Short term goal 2: transfers CGA  Short term goal 3: amb x 20 ft with RW and Min A x 1  Short term goal 4: tolerate LE Ther ex for increased LE strength. Short term goal 5: curb step min A            Long term goals  Time Frame for Long term goals : 3-4 weeks  Long term goal 1: bed mobility SBA  Long term goal 2: transfers SBA  Long term goal 3: car transfer CGA  Long term goal 4: amb 48' with RW vs hemiwalker CGA to SBA  Long term goal 5: 4 steps B rails CGA  These goals were reviewed with this patient at the time of assessment and Sylvester Duval is in agreement.      Plan of Care: Pt to be seen 5-6 mins per day for 90 day/week 3-4 weeks. Current Treatment Recommendations: Functional Mobility Training, Balance Training, Endurance Training, ROM, Cognitive/Perceptual Training, Transfer Training, Gait Training, Stair training, IADL Training, Neuromuscular Re-education      OCCUPATIONAL THERAPY:  Goals:             Short term goals  Time Frame for Short term goals: 1 week pt will. Karma Valentinoo term goal 1: bathe with mod assist and AD  Short term goal 2: dress UB with mod assist, LB with max assist and AD as needed  Short term goal 3: toilet with max assist and AD  Short term goal 4: transfer with mod assist and AD  Short term goal 5: functional mobilty with LRAD and mod assist  Short term goal 6: improve LUE function to assist with ADL, positioning 25%  Short term goal 7: improve left side awareness to require mod cues for safety with ADL and mobility :  Long term goals  Time Frame for Long term goals : 3-4 weeks pt will. .. Long term goal 1: bathe with min assist and AD  Long term goal 2: dress UB after set up, LB with min assist and AD as needed  Long term goal 3: toilet with CGA and AD as needed  Long term goal 4: transfer with CGA and AD as needed  Long term goal 5: functional mobility with CGA and LRAD :    These goals were reviewed with this patient at the time of assessment and Lety Chaparro is in agreement    Plan of Care:  Pt to be seen 90   mins per day for 5-6 day/week 3-4 weeks. SPEECH THERAPY: Goals will be left blank if speech is not following this patient. Goals:                              Dysphagia Goals  Dysphagia Goals:  The patient will tolerate recommended diet without observed clinical signs of aspiration, The patient will improve oral motor function via therapeutic oral motor exercises to 5/5 each trial., The patient will tolerate mechanical soft foods 10/10.                              Short-term Goals  Timeframe for Short-term Goals: 10-14 days  Goal 1: The pt will recall basic orientation information with assist  Goal 2: The pt will follow commands within the context of therapy  Goal 3: Additional goals to be developed as indicated. Timeframe for Short-term Goals: 10-14 days  These goals were reviewed with this patient at the time of assessment and Preethi Sweeney is in agreement    Plan of Care: Pt to be seen 45 mins per day for 5 day/week 3 weeks. CASE MANAGEMENT:  Goals:   Assist patient/family with discharge planning, patient/family counseling,   and coordination with insurance during ARU stay. Admission Period/Goal QIM CODES   QIM  Admit/Goal Score    Eating  4/5   Oral Hygiene  3/6   [address]  88/4   Shower/Bathe Self  2/3   Upper Body Dressing  1/5   Lower Body Dressing  1/3   Putting on/Taking off Footwear  1/3   Roll Left and Right  3/4   Sit to Lying  3/4   Lying to Sitting on Side of Bed  2/4   Sit to Stand  2/4   Chair/Bed to Chair Transfer  2/4   Toilet Transfer  2/4   Car Transfer  88/4   Walk 10 feet  88/4   Walk 50 feet with 2 Turns  88/4   Walk 150 feet with 2 turns  88   Walk 10 feet on Uneven Surfaces  88   1 Step  88/4   4 Steps  88/4   12 Steps  88   Picking up Object  88   Wheel 50 feet with 2 Turns   Type?  88/4   Wheel 150 feet with 2 Turns   Type? Rahul Victor Kenneth Perez will be seen a minimum of 3 hours of therapy per day, a minimum of 5 out of 7 days per week. [] In this rare instance due to the nature of this patient's medical involvement, this patient will be seen 15 hours per week (900 minutes within a 7 day period). Treatments may include therapeutic exercises, gait training, neuromuscular re-ed, transfer training, community reintegration, bed mobility, w/c mobility and training, self care, home mgmt, cognitive training, energy conservation,dysphagia tx, speech/language/communication therapy, group therapy, and patient/family education.  In addition, dietician/nutritionist may monitor calorie count rehab program in order to return to community setting. I have reviewed this initial plan of care and agree with its contents:    Title   Name    Date    Time    Physician: Aletha Ribera.  Cheo Rdz MD 9/28/2020, 5:32 PM      Case Mgmt:    Ramona Ramos [place]     Case Management   [phone]    9/28/2020  4:56 PM      OT: Zaira Mota, OTR/L  #[id number] 9/26/20 10:48 AM    PT:Electronically signed by Laroy Rinne, PT on 9/26/20 at 1:10 PM EDT    RN: Bal Keane RN, CRRN 09/26/2020 3:43 pm    ST: Skip Rivero MS CCC/SLP [id number]    : Oneida Galindo, MPT 9/28/2020  1344    Other:

## 2020-09-26 NOTE — PROGRESS NOTES
Physical Therapy    Facility/Department: [address] REHAB  Initial Assessment    NAME: Ronald Glass  : 1945  MRN: [id number]    Date of Service: 2020    Discharge Recommendations:  Patient would benefit from continued therapy after discharge, Continue to assess pending progress   PT Equipment Recommendations  Other: will monitor    Assessment   Body structures, Functions, Activity limitations: Decreased functional mobility ; Decreased balance;Decreased strength  Assessment: Pt presents to rehab unit following a R CVA with L weakness (previous L CVA with min R weakness). Today pt required mod A for bed mobity and transfer sit to stand, max A for pivot transfer, max A for amb 4' and mod A to propel w/c 25'. Pt will benefot from continued high intensity therapy to increase strength, functional mobility and safety before returning home where he has family to assist.  Treatment Diagnosis: decreased functional mobility following CVA with L alondra  Prognosis: Good  Decision Making: Medium Complexity  History: as noted  Exam: as above  Clinical Presentation: evolving  Barriers to Learning: language  REQUIRES PT FOLLOW UP: Yes  Activity Tolerance  Activity Tolerance: Patient limited by endurance  Activity Tolerance: limited by language barrier       Patient Diagnosis(es): There were no encounter diagnoses. has a past medical history of Cerebral artery occlusion with cerebral infarction ([facility] [address] Diabetes mellitus ([address] Gallstone, GERD (gastroesophageal reflux disease), Hyperlipidemia, Hypertension, and Renal insufficiency. has a past surgical history that includes Appendectomy; Cholecystectomy; Upper gastrointestinal endoscopy (2018); Upper gastrointestinal endoscopy (N/A, 2019); Upper gastrointestinal endoscopy (N/A, 2019); Upper gastrointestinal endoscopy (N/A, 2019);  Upper gastrointestinal endoscopy (N/A, 2020); and Colonoscopy (N/A, pt gets assist for these steps)  Entrance Stairs - Rails: Both  Bathroom Shower/Tub: Tub/Shower unit  Bathroom Toilet: Standard  Bathroom Equipment: Grab bars in shower  Home Equipment: Cane  ADL Assistance: Needs assistance(supervision for bathing, dressing and toileting)  Homemaking Assistance: Needs assistance  Ambulation Assistance: Needs assistance(with cane with SBA from family)  Transfer Assistance: Needs assistance(SBA provided by family, pt sleeps in a regular bed)  Active : No  Patient's  Info: son  Occupation: Retired  Type of occupation: farmer, village leader  Leisure & Hobbies: sometimes watches TV, but gives him a headache  Additional Comments: Aviva Garcia reports someone is with pt at home all day to assist him. Cognition   Cognition  Overall Cognitive Status: Exceptions( used throughout session)  Following Commands: Follows one step commands with increased time; Follows one step commands with repetition  Attention Span: Attends with cues to redirect  Safety Judgement: Decreased awareness of need for safety  Problem Solving: Assistance required to implement solutions;Assistance required to generate solutions;Assistance required to identify errors made  Insights: Decreased awareness of deficits  Initiation: Requires cues for some  Sequencing: Requires cues for some  Cognition Comment: some difficulty following directions,  present    Objective          AROM RLE (degrees)  RLE AROM: WFL  AROM LLE (degrees)  LLE AROM : WFL  Strength RLE  Strength RLE: WFL  Strength LLE  Comment: grossly 3/5--able to take some resistance, but difficulty due to language barrier.   Tone RLE  RLE Tone: Normotonic  Tone LLE  LLE Tone: Normotonic  Sensation  Overall Sensation Status: (sensation present but decreased in L foot, intact from the ankle up)  Bed mobility  Rolling to Left: Minimal assistance  Rolling to Right: Moderate assistance  Supine to Sit: Moderate assistance  Sit to Supine: Moderate assistance  Transfers  Sit to Stand: Moderate Assistance  Stand to sit: Moderate Assistance  Bed to Chair: Maximum assistance  Stand Pivot Transfers: Maximum Assistance  Ambulation  Ambulation?: Yes  More Ambulation?: Yes  Ambulation 1  Surface: level tile  Device: Parallel Bars  Assistance: Maximum assistance  Quality of Gait: difficulty shifting weight to L to allow step on the R,neeed max A for weight shift and advancement of both legs  Gait Deviations: Decreased step length;Decreased step height  Distance: 3'  Ambulation 2  Surface - 2: level tile  Device 2: Martinez rail(R)  Other Apparatus 2: Wheelchair follow  Assistance 2: Moderate assistance;Maximum assistance  Quality of Gait 2: still required max A to shift weight, initial max A to advance unweighted foot with verbal and tactile cues, progressed to mod A with pt better able to advance legs when unweighted  Distance: 6'  Stairs/Curb  Stairs?: No  Wheelchair Activities  Wheelchair Parts Management: No  Propulsion: Yes  Propulsion 1  Propulsion: Manual  Level: Level Tile  Method: RUE;RLE  Level of Assistance:  Moderate assistance  Description/ Details: multiplt verbal and tactile cues for R hand and foot placement, needed multiple cues to only push forward with R hand and not pull wheel backward, pt with significant difficulty understanding technique  Distance: 22'  with 1 rest break     Balance  Posture: Fair  Sitting - Static: Fair  Sitting - Dynamic: Fair  Standing - Static: Poor  Standing - Dynamic: Poor  Exercises  Comments: pt with significant difficulty following verbal cue and demonatration but ws able toperform a few reps of AP, TKE and hip flex  Other exercises  Other exercises?: Yes  Other exercises 1: standing weightshift R and L with mod A     Plan   Plan  Times per week: 5 to 6  Times per day: Twice a day  Plan weeks: 3 to 4  Current Treatment Recommendations: Functional Mobility Training, Balance Training, Endurance Training, ROM, Cognitive/Perceptual Training, Transfer Training, Gait Training, Stair training, IADL Training, Neuromuscular Re-education  Safety Devices  Type of devices: All fall risk precautions in place, Call light within reach, Chair alarm in place, Gait belt, Left in chair, Nurse notified    Goals  Short term goals  Time Frame for Short term goals: 2 weeks  Short term goal 1: bed mobility SBA  Short term goal 2: transfers CGA  Short term goal 3: amb x 20 ft with RW and Min A x 1  Short term goal 4: tolerate LE Ther ex for increased LE strength. Short term goal 5: curb step min A  Long term goals  Time Frame for Long term goals : 3-4 weeks  Long term goal 1: bed mobility SBA  Long term goal 2: transfers SBA  Long term goal 3: car transfer CGA  Long term goal 4: amb 48' with RW vs hemiwalker CGA to SBA  Long term goal 5: 4 steps B rails CGA  Patient Goals   Patient goals : pt's/family goal is some rehab before returning home.        Therapy Time   Individual Concurrent Group Co-treatment   Time In 0945         Time Out 1100         Minutes 75         Timed Code Treatment Minutes: [address] [place], [address]

## 2020-09-26 NOTE — PROGRESS NOTES
Ref Range    Sodium 131 (L) 136 - 145 mmol/L    Potassium reflex Magnesium 4.2 3.5 - 5.1 mmol/L    Chloride 97 (L) 99 - 110 mmol/L    CO2 20 (L) 21 - 32 mmol/L    Anion Gap 14 3 - 16    Glucose 339 (H) 70 - 99 mg/dL    BUN 20 7 - 20 mg/dL    CREATININE 1.2 0.8 - 1.3 mg/dL    GFR Non- 59 (A) >60    GFR African American >60 >60    Calcium 9.6 8.3 - 10.6 mg/dL   CBC auto differential    Collection Time: 09/26/20  9:41 AM   Result Value Ref Range    WBC 6.8 4.0 - 11.0 K/uL    RBC 6.04 (H) 4.20 - 5.90 M/uL    Hemoglobin 15.2 13.5 - 17.5 g/dL    Hematocrit 46.5 40.5 - 52.5 %    MCV 77.1 (L) 80.0 - 100.0 fL    MCH 25.2 (L) 26.0 - 34.0 pg    MCHC 32.7 31.0 - 36.0 g/dL    RDW 15.5 (H) 12.4 - 15.4 %    Platelets 695 724 - 762 K/uL    MPV 7.6 5.0 - 10.5 fL    Neutrophils % 62.1 %    Lymphocytes % 27.0 %    Monocytes % 7.1 %    Eosinophils % 2.8 %    Basophils % 1.0 %    Neutrophils Absolute 4.3 1.7 - 7.7 K/uL    Lymphocytes Absolute 1.9 1.0 - 5.1 K/uL    Monocytes Absolute 0.5 0.0 - 1.3 K/uL    Eosinophils Absolute 0.2 0.0 - 0.6 K/uL    Basophils Absolute 0.1 0.0 - 0.2 K/uL   Prealbumin    Collection Time: 09/26/20  9:41 AM   Result Value Ref Range    Prealbumin 22.2 20.0 - 40.0 mg/dL       Therapy progress:  PT  Position Activity Restriction  Other position/activity restrictions: L sided weakness; speaks [place] -  present  Objective           OT        Assessment        SLP          Body mass index is 21.87 kg/m².     Assessment and Plan:    Impairments: left hemiparesis, decreased coordination, balance, endurance, cognition, dysarthria, dysphagia     Acute ischemic stroke  -Localization: Right son, posterior centrum semiolave, left frontal lobe deep white matter  -Etiology: small vessel vs embolic  -Telemetry in ARU, then event monitor at discharge  -Secondary ppx with DAPT x 3 weeks (then Plavix alone), statin, BP and glucose control  -PT/OT/SLP     H/o left PCA stroke with severe stenosis  -Secondary ppx as above     Dysphagia   -Dietitian and SLP consults.   -Currently on Minced/moist     HTN, uncontrolled  -Still allowing some permissive HTN due to worsening of symptoms with normalization of BP  -losartan     DM2, uncontrolled  -Lantus (increase to 15 units qhs), ISS     CKD  -Avoid nephrotoxins, renally dose meds  -Monitor      L1 compression fracture  -Pain control  -PT/OT     Bladder   -High risk retention   -Monitor PVRs, SC prn >300cc  -Flomax     Bowel   -High risk constipation   -senna+colace BID, PRN miralax, MoM, and bisacodyl supp.     Pain control  -prn tramadol     PPx  -DVT: lovenox  -GI: pantoprazole    Rehab Progress: Tolerating therapy thus far. Working on left side function, compensatory strategies, cognition/communication, swallow. Anticipated Dispo: home with family  Services/DME: TBD  ELOS: 3 weeks      [address]  Heriberto Brian MD 9/26/2020, 11:50 AM

## 2020-09-26 NOTE — PROGRESS NOTES
Pt resting in bed comfortably. A&O X1, forgetful. Admitted on 9/25/2020 with right pontine stroke. Left sided weakness. Slurred speech. Reported pain 5/10 to right arm, pain medication given. HR regular. Lungs sounds clear. Denies any chest pain or shortness of breaths. Reported cough. Abd soft and nontender. Active bowel sounds. Had BM today. Skin dry and intact. Transfers with stedy. HS medication given whole with water. Tolerated well. Education provided on pain management. Skin care, medication, and fall precaution. Call light in reach. Patient instructed to call if there is any needs or changes.  Electronically signed by Roberto Godwin RN on 9/25/2020 at 9:31 PM

## 2020-09-26 NOTE — PROGRESS NOTES
left side weakness resulting in fall and back pain. Imaging revealed acute/subacute ischemic right pontine infarct in addition to likely subacute lacunar infarct in left frontal lobe deep white matter and right posterior centrum semiovale. Also with chronic left PCA territory infarct with severe stenosis. Etiology thought to be microvascular vs embolic. Neurology recommending DAPT x 21 days and then monotherapy (on Plavix and statin at baseline). Also found to have L1 compression fracture which is being managed conservatively. Course complicated by uncontrolled HTN and DM2. Patient did have worsening of left side weakness yesterday, possibly due to relative hypotension. Therefore BP regimen was reduced. Now presents to ARU with impaired mobility, self-care, and cognition/communication below his baseline. Patient speaks [place]. Therefore history obtained via chart review and interpretation by son (patient/son refuse use of blue phone). Currently, patient reports ongoing left side weakness and slurred speech (worse than admission but stable since yesterday). He has vision change but is unable to further characterize. He denies headache, tingling/numbness. Son reports ongoing intermittent confusion/disorientation. Patient motivated to start inpatient rehab program. (copied per note Dr Rodger Medina, 9/25/2020)  Exam: bathing, dressing, grooming, transfers, mobility, cognition, UE, activity tolerance  Assistance / Modification: max/dep self care, willis murray transfers with assist of 1  OT Education: OT Role;Plan of Care;Transfer Training;ADL Adaptive Strategies  REQUIRES OT FOLLOW UP: Yes  Activity Tolerance  Activity Tolerance: Patient Tolerated treatment well;Patient limited by fatigue  Activity Tolerance: tolerated session, but was fatigued at end  [facility] in place: Yes  Type of devices: Chair alarm in place; Left in chair;Call light within reach;Gait belt;Nurse notified           Patient Diagnosis(es):CVA   has a past medical history of Cerebral artery occlusion with cerebral infarction ([address] Diabetes mellitus ([address] Gallstone, GERD (gastroesophageal reflux disease), Hyperlipidemia, Hypertension, and Renal insufficiency. has a past surgical history that includes Appendectomy; Cholecystectomy; Upper gastrointestinal endoscopy (06/08/2018); Upper gastrointestinal endoscopy (N/A, 2/28/2019); Upper gastrointestinal endoscopy (N/A, 4/23/2019); Upper gastrointestinal endoscopy (N/A, 4/23/2019); Upper gastrointestinal endoscopy (N/A, 4/29/2020); and Colonoscopy (N/A, 5/8/2020).     Treatment Diagnosis: decreased ADL, balance, Left sided function      Restrictions  Restrictions/Precautions  Restrictions/Precautions: Fall Risk  Position Activity Restriction  Other position/activity restrictions: L sided weakness; speaks [place] -  present    Subjective   General  Chart Reviewed: Yes, Progress Notes, Orders, History and Physical, Previous Admission  Family / Caregiver Present: No( present)  Referring Practitioner: Ceci Ayala  Diagnosis: Acute CVA - with left sided weakness  Subjective  Subjective: pt met bedside, nursing finishing with meds, agreeable to OT,  present  Pain Assessment  Pain Level: 3  Social/Functional History  Social/Functional History  Lives With: Family(wife, son, grand children and other family members)  Type of Home: House(bi-level)  Home Layout: Bed/Bath upstairs  Home Access: Stairs to enter with rails  Entrance Stairs - Number of Steps: 3 ADELINE access from outside and then 4-5 steps in the house to bedroom level (the pt gets assist for these steps)  Entrance Stairs - Rails: Both  Bathroom Shower/Tub: Tub/Shower unit  Bathroom Toilet: Standard  Bathroom Equipment: Grab bars in shower  Home Equipment: Cane  ADL Assistance: Needs assistance(supervision for bathing, dressing and toileting)  Homemaking Assistance: Needs assistance  Ambulation Assistance: Needs assistance(with cane with SBA from family)  Transfer Assistance: Needs assistance(SBA provided by family, pt sleeps in a regular bed)  Active : No  Patient's  Info: son  Occupation: Retired  Type of occupation: farmer, village leader  Leisure & Hobbies: sometimes watches TV, but gives him a headache       Objective   Vision: Impaired(complains of blurriness)  Hearing: Exceptions to WFL(states hearing is worse since he has gotten sick)    Orientation  Overall Orientation Status: Impaired  Orientation Level: Oriented to person(states he is in the hospital after his left arm became weak. No specifics. did not know the date or name of hospital.   states [place] years are different than [place] - states his birth year is 5 [place])     Balance  Sitting Balance: Contact guard assistance(generally sat with SBA, but did lean toward left at end of shower on shower chair)  Standing Balance: Moderate assistance(leans to left)  Functional Mobility  Assist Level: Dependent/Total  Functional Mobility Comments: willis murray to/from bathroom  Shower Transfers  Shower - Transfer To: Transfer tub bench(in shower stall)  Shower Transfers: Dependent  Shower Transfers Comments: willis murray assist of 1  Wheelchair Bed Transfers  Equipment Used: Wheelchair;Bed;Other(recliner)  Level of Asssistance: Dependent/Total;Maximum assistance  Wheelchair Transfers Comments: willis murray from bed, to/from transfer tub bench, stand pivot wheelchair to recliner with mod/max assist - difficulty turning to sit  ADL  Grooming: Minimal assistance(assisted to place toothpaste on brush , cues for quality, assist for basin - did not use cup to rinse, kept using hand and spilling water)  UE Bathing:  Moderate assistance(completed abdomen, most of left arm, assisted with right arm and left axilla)  LE Bathing: Maximum assistance(pt completed thighs, hussain area, assisted with lower legs, stood with willis murray and assist of 1 to complete buttocks after shower)  UE Dressing: Dependent/Total(assist with all parts)  LE Dressing: Dependent/Total(assisted to thread briefs and pants over feet, stood with willis stedy to pull over hips, assisted with slipper socks)  Additional Comments: wet towel for non skid surface in shower, unsure if he was perseverating or just very particular with bathing. Lean to left increasing minimally toward end of shower  Tone RUE  RUE Tone: Normotonic  Tone LUE  LUE Tone: Hypotonic  Coordination  Movements Are Fluid And Coordinated: No  Coordination and Movement description: Left UE;Fine motor impairments;Gross motor impairments;Decreased speed;Decreased accuracy;Tremors;Right UE  Quality of Movement Other  Comment: Pt is R hand dominant. L UE limited by weakness, and R UE with tremor (worsens as pt fatigues)     Bed mobility  Supine to Sit: Moderate assistance;Maximum assistance(assisted min with LE's and trunk)        Cognition  Overall Cognitive Status: Exceptions( used throughout session)  Following Commands: Follows one step commands with increased time; Follows one step commands with repetition  Attention Span: Attends with cues to redirect  Safety Judgement: Decreased awareness of need for safety  Problem Solving: Assistance required to implement solutions;Assistance required to generate solutions;Assistance required to identify errors made  Insights: Decreased awareness of deficits  Initiation: Requires cues for some  Sequencing: Requires cues for some  Cognition Comment: some difficulty following directions,  present  Perception  Overall Perceptual Status: Impaired  Unilateral Attention: Cues to attend to left side of body;Cues to maintain midline in standing;Cues to maintain midline in sitting(appears to be apraxic)     Sensation  Overall Sensation Status: Impaired(states sensation left arm is numb, does respond to touch)        LUE AROM (degrees)  LUE AROM : Exceptions  LUE General AROM: shoulder flex and not need to do anything in the kitchen       Therapy Time   Individual Concurrent Group Co-treatment   Time In 0815         Time Out 0945         Minutes 90         Timed Code Treatment Minutes: 75 Minutes(+15 min eval)       Emily Henriquez, OTR/L [id number]

## 2020-09-26 NOTE — CONSULTS
Comprehensive Nutrition Assessment    Type and Reason for Visit:  Initial, Positive Nutrition Screen, Consult    Nutrition Recommendations/Plan:     Continue general; carb control; dysphagia minced and moist diet  Continue Ensure Clear ONS    Nutrition Assessment:  RD consulted per rehab protocol. Pt currently on general; carb control; dysphagia minced and moist diet. Also receives Ensure Clear ONS. SLP to manage textures and consistencies of diet. Pt in rehab following admission fror CVA. Positive nutrition screen shows poor appetite/intake, wt loss of 2-13 lbs, difficulty chewing/swallowing, and no meat or eggs at home. Pt eating % of meals per I/O flowsheets. Has lost 7.5% x 5 mo, which is not significant. Will continue to monitor nutritional status, weights, and intakes as stay progresses. Malnutrition Assessment:  Malnutrition Status:  No malnutrition      Estimated Daily Nutrient Needs:  Energy (kcal):  3089-8806 kcal (28-32 kcal/kg); Weight Used for Energy Requirements:  Current     Protein (g):  45-73g (0.8-1.3 g/kg); Weight Used for Protein Requirements:  Current        Fluid (ml/day):  1 ml/kcal; Weight Used for Fluid Requirements:  Current      Nutrition Related Findings:  BM 9/25; no edema;  Na low at 133 on 9/25; glucose elevated at 211 today      Wounds:  None       Current Nutrition Therapies:    DIET GENERAL; Carb Control: 4 carb choices (60 gms)/meal; Dysphagia Minced and Moist  Dietary Nutrition Supplements: Clear Liquid Oral Supplement    Anthropometric Measures:  · Height: 5' 3\" (160 cm)  · Current Body Weight: 123 lb (55.8 kg)   · Usual Body Weight: 133 lb (60.3 kg)(per EMR)     · Ideal Body Weight: 124 lbs  · BMI: 21.8  · Adjusted Body Weight:  ; No Adjustment   · BMI Categories: Underweight (BMI less than 22) age over 72       Nutrition Diagnosis:   · Biting/chewing (masticatory) difficulty related to altered GI function as evidenced by (need for altered texture)    Nutrition Interventions:   Food and/or Nutrient Delivery:  Continue Current Diet, Continue Oral Nutrition Supplement  Nutrition Education/Counseling:  No recommendation at this time   Coordination of Nutrition Care:  Continued Inpatient Monitoring    Goals:  Consume >50% of meals and supplements       Nutrition Monitoring and Evaluation:   Food/Nutrient Intake Outcomes:  Supplement Intake, Food and Nutrient Intake  Physical Signs/Symptoms Outcomes:  Biochemical Data, Chewing or Swallowing, Weight, Skin, Meal Time Behavior     Discharge Planning:    Continue current diet, Continue Oral Nutrition Supplement     Electronically signed by Maria Luisa Roe RD, LD on 9/26/20 at 9:05 AM EDT    Contact: [phone]

## 2020-09-27 LAB
GLUCOSE BLD-MCNC: 171 MG/DL (ref 70–99)
GLUCOSE BLD-MCNC: 188 MG/DL (ref 70–99)
GLUCOSE BLD-MCNC: 271 MG/DL (ref 70–99)
GLUCOSE BLD-MCNC: 343 MG/DL (ref 70–99)
PERFORMED ON: ABNORMAL

## 2020-09-27 PROCEDURE — 94760 N-INVAS EAR/PLS OXIMETRY 1: CPT

## 2020-09-27 PROCEDURE — 6370000000 HC RX 637 (ALT 250 FOR IP): Performed by: PHYSICAL MEDICINE & REHABILITATION

## 2020-09-27 PROCEDURE — 51798 US URINE CAPACITY MEASURE: CPT

## 2020-09-27 PROCEDURE — 6360000002 HC RX W HCPCS: Performed by: PHYSICAL MEDICINE & REHABILITATION

## 2020-09-27 PROCEDURE — 1280000000 HC REHAB R&B

## 2020-09-27 RX ADMIN — PANTOPRAZOLE SODIUM 40 MG: 40 TABLET, DELAYED RELEASE ORAL at 05:46

## 2020-09-27 RX ADMIN — SUCRALFATE 1 G: 1 TABLET ORAL at 05:46

## 2020-09-27 RX ADMIN — PANTOPRAZOLE SODIUM 40 MG: 40 TABLET, DELAYED RELEASE ORAL at 17:22

## 2020-09-27 RX ADMIN — SUCRALFATE 1 G: 1 TABLET ORAL at 12:56

## 2020-09-27 RX ADMIN — SENNOSIDES-DOCUSATE SODIUM TAB 8.6-50 MG 1 TABLET: 8.6-5 TAB at 08:45

## 2020-09-27 RX ADMIN — ROSUVASTATIN CALCIUM 10 MG: 10 TABLET, FILM COATED ORAL at 08:45

## 2020-09-27 RX ADMIN — TAMSULOSIN HYDROCHLORIDE 0.4 MG: 0.4 CAPSULE ORAL at 21:51

## 2020-09-27 RX ADMIN — ASPIRIN 81 MG: 81 TABLET, COATED ORAL at 08:45

## 2020-09-27 RX ADMIN — DULOXETINE HYDROCHLORIDE 30 MG: 30 CAPSULE, DELAYED RELEASE ORAL at 08:45

## 2020-09-27 RX ADMIN — SENNOSIDES-DOCUSATE SODIUM TAB 8.6-50 MG 1 TABLET: 8.6-5 TAB at 21:51

## 2020-09-27 RX ADMIN — INSULIN LISPRO 1 UNITS: 100 INJECTION, SOLUTION INTRAVENOUS; SUBCUTANEOUS at 21:51

## 2020-09-27 RX ADMIN — INSULIN LISPRO 2 UNITS: 100 INJECTION, SOLUTION INTRAVENOUS; SUBCUTANEOUS at 08:44

## 2020-09-27 RX ADMIN — CLOPIDOGREL BISULFATE 75 MG: 75 TABLET ORAL at 08:45

## 2020-09-27 RX ADMIN — INSULIN GLARGINE 15 UNITS: 100 INJECTION, SOLUTION SUBCUTANEOUS at 21:51

## 2020-09-27 RX ADMIN — TRAMADOL HYDROCHLORIDE 50 MG: 50 TABLET ORAL at 21:51

## 2020-09-27 RX ADMIN — Medication 3 MG: at 21:51

## 2020-09-27 RX ADMIN — SUCRALFATE 1 G: 1 TABLET ORAL at 21:51

## 2020-09-27 RX ADMIN — ENOXAPARIN SODIUM 40 MG: 40 INJECTION SUBCUTANEOUS at 21:51

## 2020-09-27 RX ADMIN — LOSARTAN POTASSIUM 25 MG: 25 TABLET ORAL at 08:45

## 2020-09-27 RX ADMIN — INSULIN LISPRO 8 UNITS: 100 INJECTION, SOLUTION INTRAVENOUS; SUBCUTANEOUS at 17:23

## 2020-09-27 RX ADMIN — INSULIN LISPRO 6 UNITS: 100 INJECTION, SOLUTION INTRAVENOUS; SUBCUTANEOUS at 12:56

## 2020-09-27 ASSESSMENT — PAIN SCALES - GENERAL
PAINLEVEL_OUTOF10: 4
PAINLEVEL_OUTOF10: 0

## 2020-09-27 ASSESSMENT — PAIN DESCRIPTION - ONSET: ONSET: ON-GOING

## 2020-09-27 ASSESSMENT — PAIN DESCRIPTION - DESCRIPTORS: DESCRIPTORS: ACHING

## 2020-09-27 ASSESSMENT — PAIN DESCRIPTION - ORIENTATION: ORIENTATION: LEFT

## 2020-09-27 ASSESSMENT — PAIN DESCRIPTION - PAIN TYPE: TYPE: CHRONIC PAIN

## 2020-09-27 ASSESSMENT — PAIN DESCRIPTION - LOCATION: LOCATION: BACK;ARM

## 2020-09-27 ASSESSMENT — PAIN DESCRIPTION - FREQUENCY: FREQUENCY: CONTINUOUS

## 2020-09-27 NOTE — PROGRESS NOTES
76 yr old male admitted to ARU s/p CVA. Noted with left sided weakness, leg stronger than arm. Unable to grasp with left hand. Transfers with stedy x 2 staff assist.  Took am meds one at a time with thin liquids without concern. Up in recliner at present. Positioned for comfort. Call light and needed items within reach.

## 2020-09-27 NOTE — PLAN OF CARE
Problem: Falls - Risk of:  Goal: Will remain free from falls  Description: Will remain free from falls  Outcome: Ongoing  Note: Risk assessment complete; interventions in place; call light and needed items within reach. Problem: Skin Integrity:  Goal: Will show no infection signs and symptoms  Description: Will show no infection signs and symptoms  Outcome: Ongoing  Note: Remains free of concern; T & R with staff assist; up in chair at present with cushion in place; son at bedside.

## 2020-09-27 NOTE — PROGRESS NOTES
Pt resting in bed comfortably. A&O X1, forgetful. Admitted on 9/25/2020 with right pontine stroke. Left sided weakness. Slurred speech. HR regular. Lungs sounds clear. Denies any chest pain or shortness of breaths. Cough better tonight. Abd soft and nontender. Active bowel sounds. Skin dry and intact. Transfers with stedy X1-2. HS medication given. Tolerated well. Education provided on pain management. Skin care, medication, and fall precaution. Call light in reach. Patient instructed to call if there is any needs or changes.  Electronically signed by Karla Menon RN on 9/26/2020 at 11:57 PM

## 2020-09-27 NOTE — PLAN OF CARE
Problem: Falls - Risk of:  Goal: Will remain free from falls  Description: Will remain free from falls  Outcome: Ongoing  Note: Pt AAO X1, forgetful. Admitted with right pontine stroke. Free of fall. Transfers with assist of stedy X2 and gait belt. Yellow socks on. Wheels locked. Bed lowest position. Alarm on. Call light, over the bed table, and personal belonging in reach. Hourly rounding. Patient instructed to call for needs or changes. Problem: Skin Integrity:  Goal: Will show no infection signs and symptoms  Description: Will show no infection signs and symptoms  Outcome: Ongoing  Note: No new skin breakdown. Repositioned. Pillow support. Stocking removed. Clean and dry skin. Ambulated pt in the room. Heels elevated.      Problem: Discharge Planning:  Goal: Patients continuum of care needs are met  Description: Patients continuum of care needs are met  Outcome: Ongoing

## 2020-09-28 LAB
ANION GAP SERPL CALCULATED.3IONS-SCNC: 14 MMOL/L (ref 3–16)
BASOPHILS ABSOLUTE: 0.1 K/UL (ref 0–0.2)
BASOPHILS RELATIVE PERCENT: 0.9 %
BUN BLDV-MCNC: 26 MG/DL (ref 7–20)
CALCIUM SERPL-MCNC: 9.4 MG/DL (ref 8.3–10.6)
CHLORIDE BLD-SCNC: 102 MMOL/L (ref 99–110)
CO2: 19 MMOL/L (ref 21–32)
CREAT SERPL-MCNC: 1.4 MG/DL (ref 0.8–1.3)
EOSINOPHILS ABSOLUTE: 0.1 K/UL (ref 0–0.6)
EOSINOPHILS RELATIVE PERCENT: 1.6 %
GFR AFRICAN AMERICAN: 60
GFR NON-AFRICAN AMERICAN: 49
GLUCOSE BLD-MCNC: 228 MG/DL (ref 70–99)
GLUCOSE BLD-MCNC: 229 MG/DL (ref 70–99)
GLUCOSE BLD-MCNC: 231 MG/DL (ref 70–99)
GLUCOSE BLD-MCNC: 268 MG/DL (ref 70–99)
GLUCOSE BLD-MCNC: 330 MG/DL (ref 70–99)
HCT VFR BLD CALC: 43.1 % (ref 40.5–52.5)
HEMOGLOBIN: 14.2 G/DL (ref 13.5–17.5)
LYMPHOCYTES ABSOLUTE: 1.5 K/UL (ref 1–5.1)
LYMPHOCYTES RELATIVE PERCENT: 20.9 %
MCH RBC QN AUTO: 25.3 PG (ref 26–34)
MCHC RBC AUTO-ENTMCNC: 33 G/DL (ref 31–36)
MCV RBC AUTO: 76.8 FL (ref 80–100)
MONOCYTES ABSOLUTE: 0.4 K/UL (ref 0–1.3)
MONOCYTES RELATIVE PERCENT: 6.1 %
NEUTROPHILS ABSOLUTE: 5.2 K/UL (ref 1.7–7.7)
NEUTROPHILS RELATIVE PERCENT: 70.5 %
PDW BLD-RTO: 15.7 % (ref 12.4–15.4)
PERFORMED ON: ABNORMAL
PLATELET # BLD: 248 K/UL (ref 135–450)
PMV BLD AUTO: 7.6 FL (ref 5–10.5)
POTASSIUM REFLEX MAGNESIUM: 4.2 MMOL/L (ref 3.5–5.1)
RBC # BLD: 5.61 M/UL (ref 4.2–5.9)
REASON FOR REJECTION: NORMAL
REJECTED TEST: NORMAL
SODIUM BLD-SCNC: 135 MMOL/L (ref 136–145)
WBC # BLD: 7.3 K/UL (ref 4–11)

## 2020-09-28 PROCEDURE — 92526 ORAL FUNCTION THERAPY: CPT

## 2020-09-28 PROCEDURE — 97116 GAIT TRAINING THERAPY: CPT

## 2020-09-28 PROCEDURE — 97110 THERAPEUTIC EXERCISES: CPT

## 2020-09-28 PROCEDURE — 97530 THERAPEUTIC ACTIVITIES: CPT

## 2020-09-28 PROCEDURE — 80048 BASIC METABOLIC PNL TOTAL CA: CPT

## 2020-09-28 PROCEDURE — 97112 NEUROMUSCULAR REEDUCATION: CPT

## 2020-09-28 PROCEDURE — 94760 N-INVAS EAR/PLS OXIMETRY 1: CPT

## 2020-09-28 PROCEDURE — 1280000000 HC REHAB R&B

## 2020-09-28 PROCEDURE — 92507 TX SP LANG VOICE COMM INDIV: CPT

## 2020-09-28 PROCEDURE — 85025 COMPLETE CBC W/AUTO DIFF WBC: CPT

## 2020-09-28 PROCEDURE — 36415 COLL VENOUS BLD VENIPUNCTURE: CPT

## 2020-09-28 PROCEDURE — 6360000002 HC RX W HCPCS: Performed by: PHYSICAL MEDICINE & REHABILITATION

## 2020-09-28 PROCEDURE — 6370000000 HC RX 637 (ALT 250 FOR IP): Performed by: PHYSICAL MEDICINE & REHABILITATION

## 2020-09-28 PROCEDURE — 97542 WHEELCHAIR MNGMENT TRAINING: CPT

## 2020-09-28 RX ORDER — LOSARTAN POTASSIUM 25 MG/1
12.5 TABLET ORAL DAILY
Status: DISCONTINUED | OUTPATIENT
Start: 2020-09-29 | End: 2020-10-14

## 2020-09-28 RX ORDER — INSULIN GLARGINE 100 [IU]/ML
18 INJECTION, SOLUTION SUBCUTANEOUS NIGHTLY
Status: DISCONTINUED | OUTPATIENT
Start: 2020-09-28 | End: 2020-09-29

## 2020-09-28 RX ADMIN — INSULIN LISPRO 4 UNITS: 100 INJECTION, SOLUTION INTRAVENOUS; SUBCUTANEOUS at 08:06

## 2020-09-28 RX ADMIN — LOSARTAN POTASSIUM 25 MG: 25 TABLET ORAL at 08:05

## 2020-09-28 RX ADMIN — INSULIN GLARGINE 18 UNITS: 100 INJECTION, SOLUTION SUBCUTANEOUS at 20:52

## 2020-09-28 RX ADMIN — TRAMADOL HYDROCHLORIDE 50 MG: 50 TABLET ORAL at 20:58

## 2020-09-28 RX ADMIN — ROSUVASTATIN CALCIUM 10 MG: 10 TABLET, FILM COATED ORAL at 08:05

## 2020-09-28 RX ADMIN — ENOXAPARIN SODIUM 40 MG: 40 INJECTION SUBCUTANEOUS at 20:58

## 2020-09-28 RX ADMIN — DULOXETINE HYDROCHLORIDE 30 MG: 30 CAPSULE, DELAYED RELEASE ORAL at 08:05

## 2020-09-28 RX ADMIN — INSULIN LISPRO 8 UNITS: 100 INJECTION, SOLUTION INTRAVENOUS; SUBCUTANEOUS at 17:43

## 2020-09-28 RX ADMIN — INSULIN LISPRO 4 UNITS: 100 INJECTION, SOLUTION INTRAVENOUS; SUBCUTANEOUS at 12:43

## 2020-09-28 RX ADMIN — CLOPIDOGREL BISULFATE 75 MG: 75 TABLET ORAL at 08:06

## 2020-09-28 RX ADMIN — INSULIN LISPRO 3 UNITS: 100 INJECTION, SOLUTION INTRAVENOUS; SUBCUTANEOUS at 20:52

## 2020-09-28 RX ADMIN — PANTOPRAZOLE SODIUM 40 MG: 40 TABLET, DELAYED RELEASE ORAL at 17:43

## 2020-09-28 RX ADMIN — SENNOSIDES-DOCUSATE SODIUM TAB 8.6-50 MG 1 TABLET: 8.6-5 TAB at 20:58

## 2020-09-28 RX ADMIN — POLYETHYLENE GLYCOL 3350 17 G: 17 POWDER, FOR SOLUTION ORAL at 05:19

## 2020-09-28 RX ADMIN — SUCRALFATE 1 G: 1 TABLET ORAL at 23:04

## 2020-09-28 RX ADMIN — PANTOPRAZOLE SODIUM 40 MG: 40 TABLET, DELAYED RELEASE ORAL at 05:19

## 2020-09-28 RX ADMIN — SUCRALFATE 1 G: 1 TABLET ORAL at 05:19

## 2020-09-28 RX ADMIN — SENNOSIDES-DOCUSATE SODIUM TAB 8.6-50 MG 1 TABLET: 8.6-5 TAB at 08:05

## 2020-09-28 RX ADMIN — TAMSULOSIN HYDROCHLORIDE 0.4 MG: 0.4 CAPSULE ORAL at 20:58

## 2020-09-28 RX ADMIN — SUCRALFATE 1 G: 1 TABLET ORAL at 12:43

## 2020-09-28 RX ADMIN — ASPIRIN 81 MG: 81 TABLET, COATED ORAL at 08:05

## 2020-09-28 ASSESSMENT — PAIN DESCRIPTION - PROGRESSION: CLINICAL_PROGRESSION: GRADUALLY IMPROVING

## 2020-09-28 ASSESSMENT — PAIN DESCRIPTION - ONSET: ONSET: ON-GOING

## 2020-09-28 ASSESSMENT — PAIN DESCRIPTION - ORIENTATION: ORIENTATION: LEFT

## 2020-09-28 ASSESSMENT — PAIN DESCRIPTION - DESCRIPTORS: DESCRIPTORS: DISCOMFORT

## 2020-09-28 ASSESSMENT — PAIN - FUNCTIONAL ASSESSMENT: PAIN_FUNCTIONAL_ASSESSMENT: ACTIVITIES ARE NOT PREVENTED

## 2020-09-28 ASSESSMENT — PAIN SCALES - GENERAL
PAINLEVEL_OUTOF10: 0
PAINLEVEL_OUTOF10: 0
PAINLEVEL_OUTOF10: 6

## 2020-09-28 ASSESSMENT — PAIN DESCRIPTION - LOCATION: LOCATION: LEG

## 2020-09-28 ASSESSMENT — PAIN DESCRIPTION - PAIN TYPE: TYPE: ACUTE PAIN

## 2020-09-28 ASSESSMENT — PAIN DESCRIPTION - FREQUENCY: FREQUENCY: OTHER (COMMENT)

## 2020-09-28 NOTE — CARE COORDINATION
LSW reviewed chart. Pt speaks no [place], speaks [place]. LSW entered room, using the [employer], Called for [employer]. No family in room. Pt had difficulty with the language line. LSw introduced self. LSW asked if I could speak with a family member about dc planning and he agreed to have me speak with Kortney Pepper.   [place], [place]     Case Management   [phone]    9/28/2020  4:58 PM

## 2020-09-28 NOTE — PROGRESS NOTES
Physical Therapy  Facility/Department: [address] REHAB  Daily Treatment Note  NAME: Kraig Gray  : 1945  MRN: [id number]    Date of Service: 2020    Discharge Recommendations:  Patient would benefit from continued therapy after discharge, Continue to assess pending progress        Assessment   Body structures, Functions, Activity limitations: Decreased functional mobility ; Decreased balance;Decreased strength  Assessment: Pt presents to rehab unit following a R CVA with L weakness (previous L CVA with min R weakness). Today pt required mod A for transfer sit to stand, max A of 1 to 2 (2 aspt fatigued) for pivot transfer, mod to max A x 2 for amb 8' x 2 at gomez rail and mod A to propel w/c 25'. Pt will benefit from continued high intensity therapy to increase strength, functional mobility and safety before returning home where he has family to assist.  Treatment Diagnosis: decreased functional mobility following CVA with L alondra  Decision Making: Medium Complexity  Barriers to Learning: language  REQUIRES PT FOLLOW UP: Yes  Activity Tolerance  Activity Tolerance: Patient limited by endurance     Patient Diagnosis(es): There were no encounter diagnoses. has a past medical history of Cerebral artery occlusion with cerebral infarction ([facility] Diabetes mellitus ([facility] [address] Gallstone, GERD (gastroesophageal reflux disease), Hyperlipidemia, Hypertension, and Renal insufficiency. has a past surgical history that includes Appendectomy; Cholecystectomy; Upper gastrointestinal endoscopy (2018); Upper gastrointestinal endoscopy (N/A, 2019); Upper gastrointestinal endoscopy (N/A, 2019); Upper gastrointestinal endoscopy (N/A, 2019); Upper gastrointestinal endoscopy (N/A, 2020); and Colonoscopy (N/A, 2020).     Restrictions  Restrictions/Precautions  Restrictions/Precautions: Fall Risk  Position Activity Restriction  Other position/activity restrictions: L sided weakness; speaks Benitez Dunbar - family present and declines  phone  Subjective   General  Chart Reviewed: Yes  Additional Pertinent Hx: Per Dr. Jordan Wilde , \"The patient is a 76 y.o. male who presents to [facility] with unsteady on feet. Pt speaks Yi, son at bedside helping getting history. According to son/pt, pt apparently sustained a fall yesterday and felt like he couldn't move his left side. Today he had difficulty ambulating using his lt side and hence son brought pt to the ER\"  Diagnosed with CVA with L sided weakness. Response To Previous Treatment: Patient with no complaints from previous session. Family / Caregiver Present: Yes  Referring Practitioner: Kassy Altamirano Comment  Comments: pt reports theough family that left leg feels weaker and he wants to get moving  Pain Screening  Patient Currently in Pain: Denies  Vital Signs  Patient Currently in Pain: Denies       Orientation     Cognition      Objective      Transfers  Stand to sit: Moderate Assistance;2 Person Assistance  Bed to Chair: Moderate assistance;2 Person Assistance;Maximum assistance  Stand Pivot Transfers: Moderate Assistance;2 Person Assistance;Maximum Assistance(initially assist x 1, required assist x 2 as pt fatigued)  Ambulation  Ambulation?: Yes  More Ambulation?: Yes  Ambulation 1  Surface: level tile  Device: Martinez rail(mirror for visual cues to decrease L lean)  Assistance: Moderate assistance;2 Person assistance;Maximum assistance  Quality of Gait: difficulty shifting weight to L to allow step on the R,needed max A for weight shift and advancement of both legs initially, progressed to being able to take small step with LE on his own  Distance: 8' x 2  Comments: Stedy in room for safety  Wheelchair Activities  Wheelchair Parts Management: No  Propulsion: Yes  Propulsion 1  Propulsion: Manual  Level: Level Tile  Method: RUE;RLE  Level of Assistance:  Moderate assistance  Description/ Details: multiple verbal and tactile cues Devices  Type of devices:  All fall risk precautions in place, Gait belt     Therapy Time   Individual Concurrent Group Co-treatment   Time In 1030     1115   Time Out 1115     1200   Minutes 45     45   Timed Code Treatment Minutes: [address], [address]

## 2020-09-28 NOTE — PROGRESS NOTES
Department of Physical Medicine & Rehabilitation  Progress Note    Patient Identification:  Stephanie Heaton  [id number]  : 1945  Admit date: 2020    Chief Complaint: Right pontine stroke [facility])    Subjective:   No acute events overnight. Patient seen this am sitting up in room. Frustrated with ongoing weakness. Education provided to patient and son. He denies pain. Appetite is good with specific foods. ROS: No f/c, n/v, cp     Objective:  Patient Vitals for the past 24 hrs:   BP Temp Temp src Pulse Resp SpO2 Weight   20 0515 -- -- -- -- -- -- 125 lb 14.1 oz (57.1 kg)   20 0341 132/85 97.4 °F (36.3 °C) Oral 89 16 93 % --   20 0028 122/77 97.3 °F (36.3 °C) Oral 85 18 94 % --   20 2044 111/75 98.2 °F (36.8 °C) Oral 87 16 94 % --   20 1507 123/78 98 °F (36.7 °C) Oral 106 18 -- --   20 1315 135/81 97.8 °F (36.6 °C) Oral 111 18 -- --     Const: Alert. No distress, pleasant. HEENT: Normocephalic, atraumatic. Normal sclera/conjunctiva. MMM. CV: Regular rate and rhythm. Resp: No respiratory distress. Lungs CTAB. Abd: Soft, nontender, nondistended, NABS+   Ext: No edema. Neuro: Alert, evidence of mild confusion (limited by language barrier), speech dysarthric, left hemiparesis (1-3/5)  Psych: Cooperative, appropriate mood and affect    Laboratory data: Available via EMR.    Last 24 hour lab  Recent Results (from the past 24 hour(s))   POCT Glucose    Collection Time: 20 11:09 AM   Result Value Ref Range    POC Glucose 271 (H) 70 - 99 mg/dl    Performed on ACCU-CHEK    POCT Glucose    Collection Time: 20  4:31 PM   Result Value Ref Range    POC Glucose 343 (H) 70 - 99 mg/dl    Performed on ACCU-CHEK    POCT Glucose    Collection Time: 20  8:41 PM   Result Value Ref Range    POC Glucose 188 (H) 70 - 99 mg/dl    Performed on ACCU-CHEK    POCT Glucose    Collection Time: 20  6:58 AM   Result Value Ref Range    POC Glucose 231 (H) 70 - 99 mg/dl Performed on ACCU-CHEK    Basic Metabolic Panel w/ Reflex to MG    Collection Time: 09/28/20  8:08 AM   Result Value Ref Range    Sodium 135 (L) 136 - 145 mmol/L    Potassium reflex Magnesium 4.2 3.5 - 5.1 mmol/L    Chloride 102 99 - 110 mmol/L    CO2 19 (L) 21 - 32 mmol/L    Anion Gap 14 3 - 16    Glucose 228 (H) 70 - 99 mg/dL    BUN 26 (H) 7 - 20 mg/dL    CREATININE 1.4 (H) 0.8 - 1.3 mg/dL    GFR Non- 49 (A) >60    GFR  60 (A) >60    Calcium 9.4 8.3 - 10.6 mg/dL   SPECIMEN REJECTION    Collection Time: 09/28/20  9:02 AM   Result Value Ref Range    Rejected Test CBCWD     Reason for Rejection see below    CBC Auto Differential    Collection Time: 09/28/20  9:45 AM   Result Value Ref Range    WBC 7.3 4.0 - 11.0 K/uL    RBC 5.61 4.20 - 5.90 M/uL    Hemoglobin 14.2 13.5 - 17.5 g/dL    Hematocrit 43.1 40.5 - 52.5 %    MCV 76.8 (L) 80.0 - 100.0 fL    MCH 25.3 (L) 26.0 - 34.0 pg    MCHC 33.0 31.0 - 36.0 g/dL    RDW 15.7 (H) 12.4 - 15.4 %    Platelets 010 950 - 750 K/uL    MPV 7.6 5.0 - 10.5 fL    Neutrophils % 70.5 %    Lymphocytes % 20.9 %    Monocytes % 6.1 %    Eosinophils % 1.6 %    Basophils % 0.9 %    Neutrophils Absolute 5.2 1.7 - 7.7 K/uL    Lymphocytes Absolute 1.5 1.0 - 5.1 K/uL    Monocytes Absolute 0.4 0.0 - 1.3 K/uL    Eosinophils Absolute 0.1 0.0 - 0.6 K/uL    Basophils Absolute 0.1 0.0 - 0.2 K/uL       Therapy progress:  PT  Position Activity Restriction  Other position/activity restrictions: L sided weakness; speaks [place] -  present  Objective     Sit to Stand: Moderate Assistance  Stand to sit: Moderate Assistance  Bed to Chair: Maximum assistance  Device: Parallel Bars  Assistance: Maximum assistance  Distance: 3'  OT  PT Equipment Recommendations  Other: will monitor     Assessment        SLP  Diet Solids Recommendation: Dysphagia Minced and Moist (Dysphagia II)  Liquid Consistency Recommendation: Thin    Body mass index is 22.3 kg/m².     Assessment and Plan:    Impairments: left hemiparesis, decreased coordination, balance, endurance, cognition, dysarthria, dysphagia     Acute ischemic stroke  -Localization: Right son, posterior centrum semiolave, left frontal lobe deep white matter  -Etiology: small vessel vs embolic  -Telemetry in ARU, then event monitor at discharge  -Secondary ppx with DAPT x 3 weeks (then Plavix alone), statin, BP and glucose control  -PT/OT/SLP     H/o left PCA stroke with severe stenosis  -Secondary ppx as above     Dysphagia   -Dietitian and SLP consults.   -Currently on Minced/moist     HTN, uncontrolled  -Still allowing some permissive HTN due to worsening of symptoms with normalization of BP  -losartan (dose decreased)     DM2, uncontrolled  -Lantus (increase to 18 units qhs), ISS     CKD  -Avoid nephrotoxins, renally dose meds  -Monitor      L1 compression fracture  -Pain control  -PT/OT     Bladder   -High risk retention   -Monitor PVRs, SC prn >300cc  -Flomax     Bowel   -High risk constipation   -senna+colace BID, PRN miralax, MoM, and bisacodyl supp.     Pain control  -prn tramadol     PPx  -DVT: lovenox  -GI: pantoprazole    Rehab Progress: Tolerating therapy thus far. Working on left side function, compensatory strategies, cognition/communication, swallow. Anticipated Dispo: home with family  Services/DME: [facility]  ELOS: 3 weeks      [address]  Thanh Monique MD 9/28/2020, 10:47 AM

## 2020-09-28 NOTE — PROGRESS NOTES
OCCUPATIONAL THERAPY  Progress Note   Second Session    Patient Name: Bakari Foy  Medical Record Number: [id number]    Treatment Diagnosis: decreased functional mobility following CVA with L alondra    General  Chart Reviewed: Yes, Progress Notes  Additional Pertinent Hx: Per Dr. Jethro Lai , \"The patient is a 76 y.o. male who presents to [facility] with unsteady on feet. Pt speaks Danish, son at bedside helping getting history. According to son/pt, pt apparently sustained a fall yesterday and felt like he couldn't move his left side. Today he had difficulty ambulating using his lt side and hence son brought pt to the ER\"  Diagnosed with  Response to previous treatment: Patient with no complaints from previous session  Family / Caregiver Present: Christianne Sandoval)  Referring Practitioner: Cordell Azevedo  Diagnosis: Acute CVA - with left sided weakness     Restrictions/Precautions  Restrictions/Precautions: Fall Risk        Position Activity Restriction  Other position/activity restrictions: L sided weakness; speaks Robert Book - family present and declines  phone    Subjective: Pt met in dept. Pt without interpretor this session. Pt able to follow minimally, demonstration/gesture cues. Objective:     Transfers-sit><stands from w/c to stand at table, 2c, with max A x1. Pt stood close to 1 min first time and 30 sec 2nd time. Pt max A x1 for static balance at table, with suppost at L knee, hip and placement of L hand onto table for wt bearing, thru L wrist.    Neuromuscular re-ed:  BUE AROM ex's with hands clasped-pt able to initiate clasping hands, and completed 10 reps x4 for elbow flexion, and alternating touching to each shoulder, shoulder flexion with elbow flex-ext, x10 reps. Wt bearing thru L hand tasks-over ball, over table top in stance. L UE facilitation for elbow flex-ext, shoulder pro-retraction, over table top. Pt demonstrating slight AROM at L elbow, shoulder after ex's.    Pt consistently cued for attention to L side. Sitting balance-at edge of chair, CGA/SBA      Assessment: Pt tolerated session fairly well. Pt easily distracted by other activity in the room and required consistent cueing, physical, with verbal, to attend to L side. Pt with noted AROM in L UE, in gravity lessened position, after facilitation ex's and with slight improved attention to L side.         Safety Device - Type of devices:  []  All fall risk precautions in place [] Bed alarm in place  [] Call light within reach [] Chair alarm in place [] Positioning belt [x] Gait belt [] Patient at risk for falls [] Left in bed [] Left in chair [] Telesitter in use [] Sitter present [] Nurse notified []  None      Therapy Time   Individual Co-treatment   Time In 1345    Time Out 1430    Minutes Rebecca Cohen. Katty MÉNDEZ/ARTHUR,515

## 2020-09-28 NOTE — PROGRESS NOTES
[] Agitated  [] Uncooperative  [] Distractible [] Motivated  [] Self-Limiting [] Anxious  [x] Other:  phone utilized. Endurance:  [x] Adequate for participation in SLP sessions  [] Reduced overall  [] Lethargic  [] Other:  Safety: [] No concerns at this time  [] Reduced insight into deficits  []  Reduced safety awareness [] Not following call light procedures  [] Unable to Assess  [] Other:  Swallowing Precautions: 90 degree positioning with all p.o. intake; small bites/sips; alternate textures through meal; reduce rate of intake;     Barriers toward pt/family plan for progress toward d/c plan: Medical status and caregiver support may be barriers           Date: 9/28/2020      Tx session 1 Tx session 2   Total Timed Code Min SLP Individual Minutes  Time In: 0845  Time Out: 0920  Minutes: 35  Coded treatment time  0 n/a     Group Treatment Minutes 0 0   Co-Treat Minutes 0 0   Variance/Reason:      Pain     Pain Intervention [] RN notified  [] Repositioned  [] Intervention offered and patient declined  [] N/A  [] Other: [] RN notified  [] Repositioned  [] Intervention offered and patient declined  [] N/A  [] Other:   Subjective     Pt was seen bedside   phone utilized (Arabic)    Objective:  Goals       Dysphagia Goals:   1. The patient will tolerate dysphagia minced and moist with thin liquid diet without observed clinical signs of aspiration,      Thin Liquid by straw: no anterior loss; no overt clinical s/s post swallow; no voice quality changes    Minced and moist: Pt declined by shaking his head n/a   2. The patient will improve oral motor function via therapeutic oral motor exercises to 5/5 each trial. Pt followed no commands for volitional oral motor rom ex to address left facial droop    SLP completed left facial tactile stimulation    Labial/buccal muscular engagement targeted via po presentations of liquids/straw (adequate labial seal and adequate oral suckling) n/a   3.  The patient will tolerate skilled trials of soft/bite size with thin liquid consistencies  10/10. Declined by pt as evidenced by shaking head and turning his head away n/a     Cognitive-Communication goals       Goal 1: The pt will recall basic orientation information with assist Temporal orientation/spatial orientation questions:   -via Wh?: <25%  -via Y/N? : <25%    Difficult to condition pt to test.  stated pt was kept saying \"I cannot move my left side\" n/a     Goal 2: The pt will follow commands within the context of therapy   One step commands: <25% n/a     Goal 3: Additional goals to be developed as indicated. CFN body parts as evidenced by labeling body parts SLP touched: <25%    Counting in Frisian to 25: limited to 5.  stated pt kept counting to 5; but frequent pauses    Wh? Regarding biographical information: limited to name.  stating pt not responding to questions; but kept stating \"I cannot move my left side\"     further reported; difficult to understand pt's speech beyond the counting to 5; stating he was unable to move left side and name. Otherwise other responses were not intelligible. n/a   Other areas targeted:     Education:   ongoing pt ed; but pt did not demonstrate learning    Safety Devices: [x] Call light within reach  [x] Chair alarm activated  [] Bed alarm activated  [x] Other: alerted RN [] Call light within reach  [] Chair alarm activated  [] Bed alarm activated  [] Other:    Progress Assessment: 9/28/2020:  phone utilized. Difficult to assess as pt did not always respond to questions/commands; and verbal responses were not always intelligible. SLP completed a chart review; chart review does report history of cognitive decline in memory/orientation/PS. Pt reportedly lives with family (multi generations); needed assist with ADLs and homemaking; pt receives 24 hour supervision. Plan: Continue as per plan of care.    Continued Tx Upon Discharge: ?    [] Yes [] No [x] TBD based on progress while on ARU [] Vital Stim indicated [] Other:   Estimated discharge date: TBD   Discharge recommendations:   [] Home independently  [x] Home with assistance [x]  24 hour supervision  [] ECF [] Other:     Additional information:     Interventions used during Rehab Stay:  [] Speech/Language Treatment  [] Instruction in HEP [] Group [x] Dysphagia Treatment [] Cognitive Treatment   [x] Other:     Adverse Reactions to Treatment/Significant Change in Status: n/a      Electronically Signed by    Warren Cueto. Prakash,MS,CCC,SLP [id number]  Speech and Language Pathologist

## 2020-09-28 NOTE — PROGRESS NOTES
Occupational Therapy  Facility/Department: [address] IP REH[facility]  Daily Treatment Note  NAME: Pollo Layton  : 1945  MRN: [id number]    Date of Service: 2020    Discharge Recommendations:  24 hour supervision or assist, Home with Home health OT, Continue to assess pending progress  OT Equipment Recommendations  Other: TBD    Assessment   Performance deficits / Impairments: Decreased functional mobility ; Decreased ADL status; Decreased ROM; Decreased strength;Decreased endurance;Decreased balance;Decreased coordination;Decreased safe awareness;Decreased cognition;Decreased sensation;Decreased posture  Assessment: Pt tolerated tx well. Pt participated in fxl mobility using hallway railing with mod/max Ax2 for balance, max verbal/tacile cues for sequencing. Pt's son present and assisting to interpret, difficult to assess if aphasia is affecting command following. Pt required mod Ax2 for sit<>stand transfers at hallway rail and max Ax2 for stand pivot transfers. Pt sat edge of mat x10 mins and participating in sitting balance, neuromuscular re-ed activities with good tolerance. Cont per OT POC  Treatment Diagnosis: decreased ADL, balance, Left sided function  Prognosis: Good  OT Education: OT Role;Plan of Care;Transfer Training  Barriers to Learning: language  REQUIRES OT FOLLOW UP: Yes  Activity Tolerance  Activity Tolerance: Patient Tolerated treatment well  Safety Devices  Safety Devices in place: Yes  Type of devices: Call light within reach;Gait belt;Nurse notified; Chair alarm in place; Left in chair         Patient Diagnosis(es): There were no encounter diagnoses. has a past medical history of Cerebral artery occlusion with cerebral infarction ([facility] [address] Diabetes mellitus ([facility] [address] Gallstone, GERD (gastroesophageal reflux disease), Hyperlipidemia, Hypertension, and Renal insufficiency. has a past surgical history that includes Appendectomy; Cholecystectomy;  Upper gastrointestinal endoscopy (2018); Upper gastrointestinal endoscopy (N/A, 2/28/2019); Upper gastrointestinal endoscopy (N/A, 4/23/2019); Upper gastrointestinal endoscopy (N/A, 4/23/2019); Upper gastrointestinal endoscopy (N/A, 4/29/2020); and Colonoscopy (N/A, 5/8/2020). Restrictions  Restrictions/Precautions  Restrictions/Precautions: Fall Risk  Position Activity Restriction  Other position/activity restrictions: L sided weakness; speaks Gisselleagatha Florian - family present and declines  phone  Subjective   General  Chart Reviewed: Yes, Progress Notes  Additional Pertinent Hx: Per Dr. Anuja Morales , \"The patient is a 76 y.o. male who presents to [facility] with unsteady on feet. Pt speaks Telugu, son at bedside helping getting history. According to son/pt, pt apparently sustained a fall yesterday and felt like he couldn't move his left side. Today he had difficulty ambulating using his lt side and hence son brought pt to the ER\"  Diagnosed with  Response to previous treatment: Patient with no complaints from previous session  Family / Caregiver Present: Jose Paz)  Referring Practitioner: Ashtyn Ambrocio  Diagnosis: Acute CVA - with left sided weakness  Subjective  Subjective: pt met in dept. pt with PTDayan and ready for OT cotx. Pt's son present and assisting to interpret. pt without c/o      Orientation     Objective    ADL  Feeding: Setup        Balance  Sitting Balance: (CGA/min A seated edge of mat)  Standing Balance: Maximum assistance(mod/max Ax2 standing at gomez rail)  Functional Mobility  Functional - Mobility Device: Other  Activity: Other  Assist Level: Dependent/Total  Functional Mobility Comments: pt completed fxl mobility x2 bouts on hallway rail with mod/max Ax2 for balance, assist to advance L LE. max verbal/tacile cues for hand placement, sequencing. . pt required mod A and max cues to propel w/c short distance in hallway using R UE only  Wheelchair Bed Transfers  Wheelchair/Bed - Technique: Stand pivot  Equipment Used: Wheelchair; Other  Level of Asssistance: Maximum assistance;2 Person assistance  Wheelchair Transfers Comments: max Ax2 for stand pivot from w/c<> therapy mat; w/c > recliner to the right. max cues for sequencing, son assists to interpret but pt still has difficulty following commands     Transfers  Sit to stand: Moderate assistance;2 Person assistance  Stand to sit: Moderate assistance;2 Person assistance  Transfer Comments: sit<>stand at hallway rail                 Neuromuscular Education  Neuromuscular education: Yes  Functional Movement Patterns: pt sat on edge of therapy mat x10 mins with CGA/min A for sitting balance. . pt weight shifting forward/back, from L <> R with max verbal cues/tactile facilitation. pt reached in all planes using R UE and weight shifted to reach for cones, pt with difficulty tracking and locating objects to the left     Cognition  Overall Cognitive Status: Exceptions(son assisted to interpret)  Following Commands: Follows one step commands with increased time; Follows one step commands with repetition  Attention Span: Attends with cues to redirect  Safety Judgement: Decreased awareness of need for safety  Problem Solving: Assistance required to implement solutions;Assistance required to generate solutions;Assistance required to identify errors made  Insights: Decreased awareness of deficits  Initiation: Requires cues for some  Sequencing: Requires cues for some  Cognition Comment: some difficulty following directions, son assisting to interpret         Plan   Plan  Times per week: 5-6 days per week  Times per day: Twice a day  Plan weeks: 3-4 weeks  Current Treatment Recommendations: Functional Mobility Training, Endurance Training, Safety Education & Training, Self-Care / ADL, Strengthening, Balance Training, ROM, Neuromuscular Re-education, Cognitive/Perceptual Training, Patient/Caregiver Education & Training, Equipment Evaluation, Education, & procurement, Cognitive Reorientation    Goals  Short term goals  Time Frame for Short term goals: 1 week pt will. Amalia Jaimesmer term goal 1: bathe with mod assist and AD  Short term goal 2: dress UB with mod assist, LB with max assist and AD as needed  Short term goal 3: toilet with max assist and AD  Short term goal 4: transfer with mod assist and AD  Short term goal 5: functional mobilty with LRAD and mod assist  Short term goal 6: improve LUE function to assist with ADL, positioning 25%  Short term goal 7: improve left side awareness to require mod cues for safety with ADL and mobility  Long term goals  Time Frame for Long term goals : 3-4 weeks pt will. .. Long term goal 1: bathe with min assist and AD  Long term goal 2: dress UB after set up, LB with min assist and AD as needed  Long term goal 3: toilet with CGA and AD as needed  Long term goal 4: transfer with CGA and AD as needed  Long term goal 5: functional mobility with CGA and LRAD  Patient Goals   Patient goals : \"I want to be normal again\"  With cues, pt agreed he wanted to improve his left UE strength, be able to bathe and dress himself, be able to stand and walk .   States he does not need to do anything in the kitchen       Therapy Time   Individual Concurrent Group Co-treatment   Time In       1115   Time Out       1200   Minutes       12 [facility], OTR/L [id number]

## 2020-09-28 NOTE — PROGRESS NOTES
Pt resting in bed comfortably. A&O X1, forgetful. Admitted on 9/25/2020 with right pontine stroke. Left sided weakness. Slurred speech. HR regular. Lungs sounds clear. Denies any chest pain or shortness of breaths. Abd soft and nontender. Active bowel sounds. Skin dry and intact. Transfers with stedy X1-2. HS medication given. Tolerated well. Education provided on pain management. Skin care, medication, and fall precaution. Call light in reach. Patient instructed to call if there is any needs or changes.  Electronically signed by Kehinde Izaguirre RN on 9/28/2020 at 12:10 AM

## 2020-09-29 LAB
GLUCOSE BLD-MCNC: 158 MG/DL (ref 70–99)
GLUCOSE BLD-MCNC: 198 MG/DL (ref 70–99)
GLUCOSE BLD-MCNC: 243 MG/DL (ref 70–99)
GLUCOSE BLD-MCNC: 243 MG/DL (ref 70–99)
PERFORMED ON: ABNORMAL

## 2020-09-29 PROCEDURE — 97129 THER IVNTJ 1ST 15 MIN: CPT

## 2020-09-29 PROCEDURE — 92526 ORAL FUNCTION THERAPY: CPT

## 2020-09-29 PROCEDURE — 94760 N-INVAS EAR/PLS OXIMETRY 1: CPT

## 2020-09-29 PROCEDURE — 1280000000 HC REHAB R&B

## 2020-09-29 PROCEDURE — 97530 THERAPEUTIC ACTIVITIES: CPT

## 2020-09-29 PROCEDURE — 6360000002 HC RX W HCPCS: Performed by: PHYSICAL MEDICINE & REHABILITATION

## 2020-09-29 PROCEDURE — 92507 TX SP LANG VOICE COMM INDIV: CPT

## 2020-09-29 PROCEDURE — 97112 NEUROMUSCULAR REEDUCATION: CPT

## 2020-09-29 PROCEDURE — 97535 SELF CARE MNGMENT TRAINING: CPT

## 2020-09-29 PROCEDURE — 6370000000 HC RX 637 (ALT 250 FOR IP): Performed by: PHYSICAL MEDICINE & REHABILITATION

## 2020-09-29 RX ORDER — INSULIN GLARGINE 100 [IU]/ML
22 INJECTION, SOLUTION SUBCUTANEOUS NIGHTLY
Status: DISCONTINUED | OUTPATIENT
Start: 2020-09-29 | End: 2020-09-30

## 2020-09-29 RX ADMIN — TAMSULOSIN HYDROCHLORIDE 0.4 MG: 0.4 CAPSULE ORAL at 20:52

## 2020-09-29 RX ADMIN — LOSARTAN POTASSIUM 12.5 MG: 25 TABLET, FILM COATED ORAL at 07:47

## 2020-09-29 RX ADMIN — PANTOPRAZOLE SODIUM 40 MG: 40 TABLET, DELAYED RELEASE ORAL at 06:06

## 2020-09-29 RX ADMIN — PANTOPRAZOLE SODIUM 40 MG: 40 TABLET, DELAYED RELEASE ORAL at 17:37

## 2020-09-29 RX ADMIN — TRAMADOL HYDROCHLORIDE 100 MG: 50 TABLET, FILM COATED ORAL at 19:47

## 2020-09-29 RX ADMIN — ROSUVASTATIN CALCIUM 10 MG: 10 TABLET, FILM COATED ORAL at 07:47

## 2020-09-29 RX ADMIN — CLOPIDOGREL BISULFATE 75 MG: 75 TABLET ORAL at 07:47

## 2020-09-29 RX ADMIN — INSULIN LISPRO 4 UNITS: 100 INJECTION, SOLUTION INTRAVENOUS; SUBCUTANEOUS at 12:28

## 2020-09-29 RX ADMIN — SUCRALFATE 1 G: 1 TABLET ORAL at 13:10

## 2020-09-29 RX ADMIN — SENNOSIDES-DOCUSATE SODIUM TAB 8.6-50 MG 1 TABLET: 8.6-5 TAB at 20:52

## 2020-09-29 RX ADMIN — SUCRALFATE 1 G: 1 TABLET ORAL at 06:05

## 2020-09-29 RX ADMIN — DULOXETINE HYDROCHLORIDE 30 MG: 30 CAPSULE, DELAYED RELEASE ORAL at 07:47

## 2020-09-29 RX ADMIN — DICLOFENAC 4 G: 10 GEL TOPICAL at 20:52

## 2020-09-29 RX ADMIN — INSULIN GLARGINE 22 UNITS: 100 INJECTION, SOLUTION SUBCUTANEOUS at 20:55

## 2020-09-29 RX ADMIN — ASPIRIN 81 MG: 81 TABLET, COATED ORAL at 07:47

## 2020-09-29 RX ADMIN — INSULIN LISPRO 6 UNITS: 100 INJECTION, SOLUTION INTRAVENOUS; SUBCUTANEOUS at 17:37

## 2020-09-29 RX ADMIN — POLYETHYLENE GLYCOL 3350 17 G: 17 POWDER, FOR SOLUTION ORAL at 07:51

## 2020-09-29 RX ADMIN — ENOXAPARIN SODIUM 40 MG: 40 INJECTION SUBCUTANEOUS at 20:52

## 2020-09-29 RX ADMIN — INSULIN LISPRO 2 UNITS: 100 INJECTION, SOLUTION INTRAVENOUS; SUBCUTANEOUS at 20:54

## 2020-09-29 RX ADMIN — INSULIN LISPRO 2 UNITS: 100 INJECTION, SOLUTION INTRAVENOUS; SUBCUTANEOUS at 07:51

## 2020-09-29 RX ADMIN — SUCRALFATE 1 G: 1 TABLET ORAL at 20:52

## 2020-09-29 ASSESSMENT — PAIN DESCRIPTION - PROGRESSION: CLINICAL_PROGRESSION: NOT CHANGED

## 2020-09-29 ASSESSMENT — PAIN DESCRIPTION - DESCRIPTORS: DESCRIPTORS: ACHING

## 2020-09-29 ASSESSMENT — PAIN SCALES - GENERAL
PAINLEVEL_OUTOF10: 0
PAINLEVEL_OUTOF10: 8
PAINLEVEL_OUTOF10: 0

## 2020-09-29 ASSESSMENT — PAIN DESCRIPTION - LOCATION: LOCATION: KNEE

## 2020-09-29 ASSESSMENT — PAIN DESCRIPTION - FREQUENCY: FREQUENCY: OTHER (COMMENT)

## 2020-09-29 ASSESSMENT — PAIN DESCRIPTION - PAIN TYPE: TYPE: ACUTE PAIN

## 2020-09-29 ASSESSMENT — PAIN - FUNCTIONAL ASSESSMENT: PAIN_FUNCTIONAL_ASSESSMENT: ACTIVITIES ARE NOT PREVENTED

## 2020-09-29 ASSESSMENT — PAIN DESCRIPTION - ORIENTATION: ORIENTATION: RIGHT;LEFT

## 2020-09-29 ASSESSMENT — PAIN DESCRIPTION - ONSET: ONSET: ON-GOING

## 2020-09-29 NOTE — PATIENT CARE CONFERENCE
Janeth  Distance: 6'  Comments: PT provided support for left UE as he could not maintain  on bar, despite wanting to try to. He was very frustrated that he could not hold to the bar on his own. Patient with anxiety with ambulation and requires support and encouragement. Tactile cues are also required to allwo for any forward progression. ambulated 10' with hemiwalker and max assist of one at left side for weight shifting, to prevent trunk rotation to the left, facilitating left knee and hip extension, preventing hip abduction and external rotation. Patient required frequent verbal cueing for sequencing. OT also providing min to mod assist for management of the hemiwalker and trunk posturing as patient flexes forward. Wheelchair follow required. Propulsion: Manual  Level: Level Tile  Method: WILVER GARCIA  Level of Assistance: Moderate assistance  Description/ Details: multiple verbal and tactile cues for R hand and foot placement but used minimally. PT changed patient to a alondra height chair which may allow for improved mobility. Patient consistently using right UE very very short pushing strokes despite cueing from PT/interpretor. Unable to maintain straight path on his own. Distance: 25' x 2      Stairs  # Steps : 4  Stairs Height: 6\"  Rails: Bilateral(right railing ascending and descending)  Curbs: (unsafe at this time)  Device: No Device  Assistance: Maximum assistance  Comment: Patient ascended and descended 4 step with right hand on railing and PT supporting left UE. Patient with great difficulty weight shifting onto right LE to allow for advancement of left LE. He requires max tactile cues and verbal cues for sequence and safety. Patient required facilitation to left LE to prevent buckling in weight bearing and requried assistance to clear toes when advanceing left LE up and to prevent adduction when descending the stairs.   Patient has tendency to keep trunk rotated to the left and left hip flexed. He required max assist for posture and did not follow instructions to correct posture. Assessment: Patient perseverating on left hand weakness during session. Difficulty with communication secondary to patient's slurred speech making it difficult for the phone interpretation and limited interpreting done with family present. Patient requiring max assist for ambulation and on stairs. He has decreased awareness of position of left LE nad reuqires max cueing to use left LE. Patient continues to be well below baseline and will benefit from continued skilled therapy for strengthening, functional mobility training, and safety training to allow for safe return home with assistance. SPEECH THERAPY (intentionally left blank if not actively being seen by this service):  Assessment /Progress: Pt with Oropharyngeal Dysphagia characterized by left oral motor muscular weakness with reduced bolus control with risk of premature loss and oral pocketing and delayed swallow. Pt is making continued gradual progress with recent diet upgrade to dysphagia soft/bite size with thin liquids and oral care post meals. 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  phone has been utilized which was difficult for pt and for the  . BAsed on pt responses concern for perseveration; dysarthria versus aphasia errors. Pt requires max visual cues and occasional max tactile cues to follow commands. Pt is most optimal in context fo situation with spontaneous meaning verbalizations and improved ease of following directions. Son was present for session 9/29/2020 and also voices concern regarding communication status changes \"ie son reported \"pt does not understand me at times\" and \" pt's speech is hard to follow at times\". Son reporting a significant status change. Will continue therapy. Alfa Randall,MS,CCC,SLP [id number]  Speech and Language Pathologist      OCCUPATIONAL THERAPY    ADL  Feeding: Setup  Grooming: (pt already completed oral care this morning) (minimal assistance from 9/26 session)  UE Bathing: Moderate assistance  LE Bathing: Maximum assistance(pt required assist for periareas in standing- pt able to stand with CGA briefly using grab bar. pt able to reach tops of both feet but required assist for quality)  UE Dressing: Maximum assistance, Dependent/Total(max cues for hemitechnique. pt able to assist with pulling shirt overhead)  LE Dressing: Dependent/Total(assist for all parts. pt stood with CGA at grab bar while OT pulled up pants)  Toileting: (declined need, completed dry transfer)  Additional Comments: wet towel for non skid surface in shower. . pt either perseverating or very particular/thorough with bathing though difficult to assess d/t language and cognition/aphasia. son assisting to translate and stated that pt was finished with the shower, however pt turned the water back on and began rinsing again    Bed mobility  Rolling to Left: Minimal assistance  Rolling to Right: Minimal assistance(tactile cueing and increased time. min assist to bring left arm to right hand and to bring left LE across body)  Supine to Sit: Moderate assistance  Sit to Supine: Moderate assistance(Patient fearful to lay onto left shoulder and required guidance to go down onto his elbow.)  Scooting: Contact guard assistance(increased time)  Comment: bed mobility done on flat therapy mat. Required many tactile cues as not all instructions were being interpreted    Functional Transfers: Toilet Transfers  Toilet - Technique: (via willis stedy)  Equipment Used: Standard toilet  Toilet Transfer: Dependent/Total  Toilet Transfers Comments: use of willis [employer]     [address] - Transfer From: Other  Shower - Transfer Type: To and From  Shower - Transfer To:  Transfer tub bench  Shower - Technique: Stand pivot(via willis stedy)  Shower Transfers: Dependent, Moderate assistance  Shower Transfers Comments: use of willis stedy to safely enter shower, then completed stand pivot transfer using grab bar from TTB > w/c mod A    Perception  Overall Perceptual Status: Impaired  Unilateral Attention: Cues to attend to left side of body, Cues to maintain midline in standing, Cues to maintain midline in sitting(appears to be apraxic)         UE Function: LUE hemiparesis/neglect, LUE not functional for ADL tasks      Assessment: Pt tolerated tx fair, though limited by fatigue and language barrier which makes session difficult. Pt's son assists to interpret but does not do the best job. ..anticipate pt also limited by expressive/receptive aphasia component. Pt completed stand pivot transfers with mod/max A, pt required overall max/total A for ADLs. Pt seems to perseverate at times and has difficulty with command following. Cont per OT POC      NUTRITION  Most recent weightWeight: 126 lb 1.7 oz (57.2 kg)  BSA (Calculated - sq m): 1.6 sq meters  BMI (Calculated): 22.4  Please see nutrition note for details. NURSING  Continent of bowel and bladder, last BM 9/29/20. Monitor and maintain skin integrity  Patient/family education:  CVA, diabetic needs, diet and swallowing precautions.     MEDICAL  Adjusting BP med to allow more permissive HTN  Increasing insulin regimen for hyperglycemia    TEAM SUMMARY AND DISCHARGE PLAN  Estimated Length of Stay: tentative DC 10/17/20  Destination: home with family assist and home care   · Anticipated Services at Discharge:    [x] OT  [x] PT   [x] SLP    [x] RN   [x] Home Health aide []   Community Resources: _______________________________  Equipment recommendations:  [] Hospital bed [x] Tub bench  [] Shower chair [] Hand held shower  [] Raised toilet seat [] Toilet safety frame [x] Bedside commode vs TSF/RTS continue to assess/address  [] W/C: _____  [] Rolling Walker [] Standard walker [] Gait belt [] cane: _________  [] Sliding board [] Alternate seating/furniture [] O2 [] Hip Kit: _______  [] Life Line [] Other: _TBD______  Factors facilitating achievement of predicted outcomes: Family support, Caregiver support and Cooperative  Barriers to the achievement of predicted outcomes/Interventions:   Left alondra, left neglect, aphasia/apraxia/? Language barrier- strengthening, conditioning, adaptive equipment, alondra techniques, other compensatory strategies for self care and mobility, left regard activities, verbal, visual cues, gestures and other compensatory strategies for communication, use of in person  vs. telephonic      Interdisciplinary Individualized Plan of Care Review:    · Continue Current Plan of Care: Yes    · Modifications:_____________________________    Special Needs in the Upcoming Week :    [x] Family/Caregiver Education  [] Home visit  []Therapeutic Pass   [] Consults:_______    [] Other;_______    Patient Rehab Team Goals for the Upcoming Week:  1. Transfer consistently with no more than min assist of one person assist.  2. Pt will tolerate soft/bite size food consistencies with thin liquids without complications  3. Pt will demonstrate functional concrete communication skills on the unit for basic DL with set up ; visual cues and moderate clarification cues   4. Pt will perform ADL tasks with decreased assistance across disciplines. 5. Patient goals : \"I want to be normal again\"  With cues, pt agreed he wanted to improve his left UE strength, be able to bathe and dress himself, be able to stand and walk . Team Members Present at Conference:  Physician: Dr Heriberto Brian  : Elen Stephens [place]    Occupational Therapist: Jayshree Valiente OTR/L#[id number]  Physical Liberty Arreguin, [id number]  Speech Therapist: Jay Green. Prakash,MS,CCC,SLP [id number]  Nurse: Sidney Deluca RN   Arlyn Miranda [facility]      I led this team conference and I approve the established interdisciplinary plan of care as documented within the medical record of Daisy Jorge. MD: Janalyn Closs.  Dana Tellez MD 9/30/2020, 3:39 PM

## 2020-09-29 NOTE — PROGRESS NOTES
Pt.admitted to rehab due to CVA with left sided weakness. Slurred speech. Conversation difficult due to language barrier. Stedy x 2 for transfer. Medications taken whole with water one at a time . Tramadol 50 mg given for pain. Remains on telemetry. Skin intact. Bed alarm on, call light in reach. Will continue to monitor pt for safety.

## 2020-09-29 NOTE — PROGRESS NOTES
Department of Physical Medicine & Rehabilitation  Progress Note    Patient Identification:  Nati Hernandez  [id number]  : 1945  Admit date: 2020    Chief Complaint: Right pontine stroke [facility])    Subjective:   No acute events overnight. Patient seen this afternoon sitting up in room. Reports stable weakness. Speech somewhat improved. Having some shoulder pain. Had discussion with son at the bedside regarding patient's home DM2 regimen. ROS: No f/c, n/v, cp     Objective:  Patient Vitals for the past 24 hrs:   BP Temp Temp src Pulse Resp SpO2 Weight   20 0850 -- -- -- -- -- 95 % --   20 0409 118/77 97.3 °F (36.3 °C) Oral 72 16 94 % 126 lb 1.7 oz (57.2 kg)   20 0004 117/74 97.4 °F (36.3 °C) Oral 76 18 94 % --   20 2045 (!) 140/80 97.9 °F (36.6 °C) Oral 80 18 94 % --   20 1514 138/83 97.3 °F (36.3 °C) Oral 99 18 95 % --     Const: Alert. No distress, pleasant. HEENT: Normocephalic, atraumatic. Normal sclera/conjunctiva. MMM. CV: Regular rate and rhythm. Resp: No respiratory distress. Lungs CTAB. Abd: Soft, nontender, nondistended, NABS+   Ext: No edema. Neuro: Alert, evidence of mild confusion (limited by language barrier), speech dysarthric, left hemiparesis (1-3/5)  Psych: Cooperative, appropriate mood and affect    Laboratory data: Available via EMR.    Last 24 hour lab  Recent Results (from the past 24 hour(s))   POCT Glucose    Collection Time: 20 12:14 PM   Result Value Ref Range    POC Glucose 229 (H) 70 - 99 mg/dl    Performed on ACCU-CHEK    POCT Glucose    Collection Time: 20  4:32 PM   Result Value Ref Range    POC Glucose 330 (H) 70 - 99 mg/dl    Performed on ACCU-CHEK    POCT Glucose    Collection Time: 20  8:24 PM   Result Value Ref Range    POC Glucose 268 (H) 70 - 99 mg/dl    Performed on ACCU-CHEK    POCT Glucose    Collection Time: 20  7:03 AM   Result Value Ref Range    POC Glucose 158 (H) 70 - 99 mg/dl    Performed on ACCU-CHEK        Therapy progress:  PT  Position Activity Restriction  Other position/activity restrictions: L sided weakness; speaks Vincent Membreno - family present and declines  phone  Objective     Sit to Stand: Moderate Assistance  Stand to sit: Moderate Assistance, 2 Person Assistance  Bed to Chair: Moderate assistance, 2 Person Assistance, Maximum assistance  Device: Martinez rail(mirror for visual cues to decrease L lean)  Assistance: Moderate assistance, 2 Person assistance, Maximum assistance  Distance: 8' x 2  OT  PT Equipment Recommendations  Other: will monitor     Assessment        SLP  Diet Solids Recommendation: Dysphagia Minced and Moist (Dysphagia II)  Liquid Consistency Recommendation: Thin    Body mass index is 22.34 kg/m².     Assessment and Plan:    Impairments: left hemiparesis, decreased coordination, balance, endurance, cognition, dysarthria, dysphagia     Acute ischemic stroke  -Localization: Right son, posterior centrum semiolave, left frontal lobe deep white matter  -Etiology: small vessel vs embolic  -Telemetry in ARU, then event monitor at discharge  -Secondary ppx with DAPT x 3 weeks (then Plavix alone), statin, BP and glucose control  -PT/OT/SLP     H/o left PCA stroke with severe stenosis  -Secondary ppx as above     Dysphagia   -Dietitian and SLP consults.   -Upgrade to soft/bite sized + thins     HTN, uncontrolled  -Still allowing some permissive HTN due to worsening of symptoms with normalization of BP  -losartan (dose decreased)     DM2, uncontrolled  -Lantus (increased to 22 units qhs), ISS (increase to high dose)     CKD  -Avoid nephrotoxins, renally dose meds  -Monitor      L1 compression fracture  -Pain control  -PT/OT     Bladder   -High risk retention   -Monitor PVRs, SC prn >300cc  -Flomax     Bowel   -High risk constipation   -senna+colace BID, PRN miralax, MoM, and bisacodyl supp.     Pain control  -prn tramadol     PPx  -DVT: lovenox  -GI: pantoprazole    Rehab Progress: Tolerating therapy thus far. Working on left side function, compensatory strategies, cognition/communication, swallow. Anticipated Dispo: home with family  Services/DME: TBD  ELOS: 3 weeks      Moy E Elvira Mariano.  Florian Pulido MD 9/29/2020, 9:53 AM

## 2020-09-29 NOTE — PROGRESS NOTES
Occupational Therapy  Facility/Department: [address] IP REHAB  Daily Treatment Note  NAME: Meena Humphrey  : 1945  MRN: [id number]    Date of Service: 2020    Discharge Recommendations:  24 hour supervision or assist, Home with Home health OT, Continue to assess pending progress  OT Equipment Recommendations  Other: TBD    Assessment   Performance deficits / Impairments: Decreased functional mobility ; Decreased ADL status; Decreased ROM; Decreased strength;Decreased endurance;Decreased balance;Decreased coordination;Decreased safe awareness;Decreased cognition;Decreased sensation;Decreased posture  Assessment: Pt tolerated tx fair, though limited by fatigue and language barrier which makes session difficult. Pt's son assists to interpret but does not do the best job. Pt completed stand pivot transfers with mod/max A, pt required overall max/total A for ADLs. Pt seems to perseverate at times and has difficulty with command following. Cont per OT POC  Treatment Diagnosis: decreased ADL, balance, Left sided function  Prognosis: Good  OT Education: OT Role;Plan of Care;Transfer Training;ADL Adaptive Strategies;Precautions  Barriers to Learning: language, possible aphasia  REQUIRES OT FOLLOW UP: Yes  Activity Tolerance  Activity Tolerance: Patient Tolerated treatment well;Patient limited by fatigue  Safety Devices  Safety Devices in place: Yes  Type of devices: Call light within reach;Gait belt; Chair alarm in place; Left in chair         Patient Diagnosis(es): There were no encounter diagnoses. has a past medical history of Cerebral artery occlusion with cerebral infarction ([facility] [address] Diabetes mellitus ([facility] [address] Gallstone, GERD (gastroesophageal reflux disease), Hyperlipidemia, Hypertension, and Renal insufficiency. has a past surgical history that includes Appendectomy; Cholecystectomy; Upper gastrointestinal endoscopy (2018); Upper gastrointestinal endoscopy (N/A, 2019);  Upper gastrointestinal endoscopy (N/A, 4/23/2019); Upper gastrointestinal endoscopy (N/A, 4/23/2019); Upper gastrointestinal endoscopy (N/A, 4/29/2020); and Colonoscopy (N/A, 5/8/2020). Restrictions  Restrictions/Precautions  Restrictions/Precautions: Fall Risk  Position Activity Restriction  Other position/activity restrictions: L sided weakness; speaks Renaldo Dunbar - family present and declines  phone  Subjective   General  Chart Reviewed: Yes, Progress Notes  Additional Pertinent Hx: Per Dr. Aleja Montoya , \"The patient is a 76 y.o. male who presents to [facility] with unsteady on feet. Pt speaks Sami, son at bedside helping getting history. According to son/pt, pt apparently sustained a fall yesterday and felt like he couldn't move his left side. Today he had difficulty ambulating using his lt side and hence son brought pt to the ER\"  Diagnosed with  Response to previous treatment: Patient with no complaints from previous session  Family / Caregiver Present: No(son)  Referring Practitioner: Cheo Rdz  Diagnosis: Acute CVA - with left sided weakness  Subjective  Subjective: pt met b/s for OT. pt seated in recliner, son present and agreeable to translate - declines  phone. . though son only interpreting ~50% of the time      Orientation     Objective    ADL  Grooming: (pt already completed oral care this morning)  UE Bathing: Moderate assistance  LE Bathing: Maximum assistance(pt required assist for periareas in standing- pt able to stand with CGA briefly using grab bar. pt able to reach tops of both feet but required assist for quality)  UE Dressing: Maximum assistance;Dependent/Total(max cues for hemitechnique. pt able to assist with pulling shirt overhead)  LE Dressing: (assist for all parts. pt stood with CGA at grab bar while OT pulled up pants)  Toileting: (declined need)  Additional Comments: wet towel for non skid surface in shower. . pt either perseverating or very particular/thorough with bathing though difficult to assess d/t language and cognition. son assisting to translate and stated that pt was finished with the shower, however pt turned the water back on and began rinsing again        Balance  Sitting Balance: Contact guard assistance  Standing Balance: Minimal assistance(CGA/min A)  Standing Balance  Time: multiple brief stands  Activity: using grab bar, max cues and only able to stand ~30 secs at a time  Functional Mobility  Functional - Mobility Device: Wheelchair(+ willis stedy)  Activity: To/from bathroom  Assist Level: Dependent/Total  Functional Mobility Comments: used willis stedy to safely transfer into shower, then pt completed stand pivot transfers <> w/c for remainder of session. OT managed w/c throughout  [address] - Transfer From: Other  Shower - Transfer Type: To and From  Shower - Transfer To: Transfer tub bench  Shower - Technique: Stand pivot(via willis stedy)  Shower Transfers: Dependent; Moderate assistance  Shower Transfers Comments: use of willis stedy to safely enter shower, then completed stand pivot transfer using grab bar from TTB > w/c mod A  Wheelchair Bed Transfers  Wheelchair/Bed - Technique: Stand pivot  Equipment Used: Wheelchair; Other(recliner)  Level of Asssistance: Maximum assistance  Wheelchair Transfers Comments: OT gave visual demo prior to transfer, then pt completed stand pivot from w/c > recliner to the right max A     Transfers  Sit to stand: Moderate assistance;Maximum assistance  Stand to sit: Moderate assistance;Maximum assistance  Transfer Comments: mod A with use of grab bar; max A without        Cognition  Overall Cognitive Status: Exceptions(son assisted to interpret)  Following Commands: Follows one step commands with increased time; Follows one step commands with repetition  Attention Span: Attends with cues to redirect  Safety Judgement: Decreased awareness of need for safety  Problem Solving: Assistance required to implement solutions;Assistance likely benefit from in-person  to increase pt's participation in the session. Cont per OT POC    Plan   Plan  Times per week: 5-6 days per week  Times per day: Twice a day  Plan weeks: 3-4 weeks  Current Treatment Recommendations: Functional Mobility Training, Endurance Training, Safety Education & Training, Self-Care / ADL, Strengthening, Balance Training, ROM, Neuromuscular Re-education, Cognitive/Perceptual Training, Patient/Caregiver Education & Training, Equipment Evaluation, Education, & procurement, Cognitive Reorientation    Goals  Short term goals  Time Frame for Short term goals: 1 week pt will. Lorie Shirley term goal 1: bathe with mod assist and AD  Short term goal 2: dress UB with mod assist, LB with max assist and AD as needed  Short term goal 3: toilet with max assist and AD  Short term goal 4: transfer with mod assist and AD  Short term goal 5: functional mobilty with LRAD and mod assist  Short term goal 6: improve LUE function to assist with ADL, positioning 25%  Short term goal 7: improve left side awareness to require mod cues for safety with ADL and mobility  Long term goals  Time Frame for Long term goals : 3-4 weeks pt will. .. Long term goal 1: bathe with min assist and AD  Long term goal 2: dress UB after set up, LB with min assist and AD as needed  Long term goal 3: toilet with CGA and AD as needed  Long term goal 4: transfer with CGA and AD as needed  Long term goal 5: functional mobility with CGA and LRAD  Patient Goals   Patient goals : \"I want to be normal again\"  With cues, pt agreed he wanted to improve his left UE strength, be able to bathe and dress himself, be able to stand and walk .   States he does not need to do anything in the kitchen       Therapy Time   Individual Concurrent Group Co-treatment   Time In 1015         Time Out 1115         Minutes 60            Therapy Time   Individual Co-treatment   Time In  1515   Time Out  1600   Minutes  [address] [facility], OTR/L [id number]

## 2020-09-29 NOTE — PROGRESS NOTES
Physical Therapy  Facility/Department: [address] IP REHAB  Daily Treatment Note  NAME: Daisy Jorge  : 1945  MRN: [id number]    Date of Service: 2020    Discharge Recommendations:  Patient would benefit from continued therapy after discharge, Continue to assess pending progress   PT Equipment Recommendations  Other: will monitor    Assessment   Body structures, Functions, Activity limitations: Decreased functional mobility ; Decreased balance;Decreased strength  Assessment: Patient perseverating on left hand weakness during session. Difficulty with communication secondary to patient's slurred speech making it difficult for the phone interpretation and limited interpreting done with family present. Patient requiring max assist for ambulation and on stairs. He has decreased awareness of position of left LE and requires max cueing to use left LE. Patient continues to be well below baseline and will benefit from continued skilled therapy for strengthening, functional mobility training, and safety training to allow for safe return home with assistance. Treatment Diagnosis: decreased functional mobility following CVA with L alondra  Prognosis: Fair;Good  PT Education: General Safety;Transfer Training;Gait Training;Functional Mobility Training  Barriers to Learning: language  REQUIRES PT FOLLOW UP: Yes  Activity Tolerance  Activity Tolerance: Patient limited by endurance  Activity Tolerance: very limited with language barrier and patient's slurred speech making it difficulty for phone interpretation to be done. Patient Diagnosis(es): There were no encounter diagnoses. has a past medical history of Cerebral artery occlusion with cerebral infarction ([address] Diabetes mellitus ([facility] [address] Gallstone, GERD (gastroesophageal reflux disease), Hyperlipidemia, Hypertension, and Renal insufficiency. has a past surgical history that includes Appendectomy; Cholecystectomy;  Upper gastrointestinal endoscopy (2018); Upper gastrointestinal endoscopy (N/A, 2/28/2019); Upper gastrointestinal endoscopy (N/A, 4/23/2019); Upper gastrointestinal endoscopy (N/A, 4/23/2019); Upper gastrointestinal endoscopy (N/A, 4/29/2020); and Colonoscopy (N/A, 5/8/2020). Social/Functional History  Lives With: Family(wife, son, grand children and other family members)  Type of Home: House(bi-level)  Home Layout: Bed/Bath upstairs  Home Access: Stairs to enter with rails  Entrance Stairs - Number of Steps: 3 ADELINE access from outside and then 4-5 steps in the house to bedroom level (the pt gets assist for these steps)  Entrance Stairs - Rails: Both  Bathroom Shower/Tub: Tub/Shower unit  Bathroom Toilet: Standard  Bathroom Equipment: Grab bars in [address]: Cane  ADL Assistance: Needs assistance(supervision for bathing, dressing and toileting)  Homemaking Assistance: Needs assistance  Ambulation Assistance: Needs assistance(with cane with SBA from family)  Transfer Assistance: Needs assistance(SBA provided by family, pt sleeps in a regular bed)  Active : No  Patient's  Info: son  Occupation: Retired  Type of occupation: farmer, village leader  Leisure & Hobbies: sometimes watches TV, but gives him a headache  Additional Comments: Mere Sauceda reports someone is with pt at home all day to assist him. Restrictions  Restrictions/Precautions  Restrictions/Precautions: Fall Risk  Position Activity Restriction  Other position/activity restrictions: L sided weakness; speaks Carlyle William - family present and declines  phone     Subjective   General  Chart Reviewed: Yes  Additional Pertinent Hx: Per Dr. Kae Kyle , \"The patient is a 76 y.o. male who [facility] to [facility] with unsteady on feet. Pt speaks Pashto, son at bedside helping getting history. According to son/pt, pt apparently sustained a fall yesterday and felt like he couldn't move his left side.  Today he had difficulty ambulating using his lt side and hence son brought pt to the ER\"  Diagnosed with CVA with L sided weakness. Response To Previous Treatment: Patient with no complaints from previous session. Family / Caregiver Present: Yes  Referring Practitioner: Anup Rodriguez  Subjective  Subjective: Patient reporting his left hand is very weak but reports no pain. He perseverates on weakness in left hand and does not give much detail with left LE. General Comment  Comments: PT began with interpretor phone, but the interpretor had some difficulty understanding responses from the patient. PT used the phone system so patient could hear instructions in his primary language. Son, Jeff Thornton, arrived half way through session and was then used for interpreting per family preference. He did not introduce himself to this PT and he did not interpret 50% of the words PT used. PT had to prompt him at times to interpret for the patient. Neither patient nor son, Jeff Thornton, could answer PT's question as to what he prefers to be called. Objective   Bed mobility  Rolling to Left: Minimal assistance  Rolling to Right: Minimal assistance(tactile cueing and increased time. min assist to bring left arm to right hand and to bring left LE across body)  Supine to Sit: Moderate assistance  Sit to Supine: Moderate assistance(Patient fearful to lay onto left shoulder and required guidance to go down onto his elbow.)  Scooting: Contact guard assistance(increased time)  Comment: bed mobility done on flat therapy mat. Required many tactile cues as not all instructions were being interpreted    Transfers  Sit to Stand: Moderate Assistance  Stand to sit: Moderate Assistance  Bed to Chair: Moderate assistance (stand pivot. Patient with difficulty following instructions and PT using many visual and tactile cues.   Patient with clear anxiety when attempting to stand to pivot)    Ambulation  Ambulation?: Yes  More Ambulation?: No  Ambulation 1  Surface: level tile  Device: Parallel Bars  Assistance: Maximum assistance  Quality of Gait: Patient with left lean and having great difficulty shifting weight to the right. He is resistant as PT attempts to assist him. Poor control of left LE and often allowing for external rotation and knee flexion. He was able to extend left knee with verbal and tactile cueing, but does not attempt to extend his knee on his own. Requires assistance to advance left LE. Patient with very short right step length and having difficulty stepping forward. He will not increase step length despite multiple cues. patient has tendency to keep trunk rotated to the left and has difficulty following instruction to correct. Gait Deviations: Decreased step length;Decreased step height;Slow Janeth  Distance: 6'  Comments: PT provided support for left UE as he could not maintain  on bar, despite wanting to try to. He was very frustrated that he could not hold to the bar on his own. Patient with anxiety with ambulation and requires support and encouragement. Tactile cues are also required to allow for any forward progression. Stairs/Curb  Stairs?: Yes  Stairs  # Steps : 4  Stairs Height: 6\"  Rails: Bilateral(right railing ascending and descending)  Curbs: (unsafe at this time)  Device: No Device  Assistance: Maximum assistance  Comment: Patient ascended and descended 4 step with right hand on railing and PT supporting left UE. Patient with great difficulty weight shifting onto right LE to allow for advancement of left LE. He requires max tactile cues and verbal cues for sequence and safety. Patient required facilitation to left LE to prevent buckling in weight bearing and required assistance to clear toes when advancing left LE up and to prevent adduction when descending the stairs. Patient has tendency to keep trunk rotated to the left and left hip flexed. He required max assist for posture and did not follow instructions to correct posture.     Wheelchair Activities  Wheelchair Size: PT switched patient to 18\" wheelchair for better fit from a 20\"  Wheelchair Type: Standard  Wheelchair Cushion: (waffle cushion)  Pressure Relief Type: Lateral lean  Level of Assistance for pressure relief activities: Maximum assistance(patient appears to have difficulty understanding instruction)  Wheelchair Parts Management: No  Propulsion: Yes  Propulsion 1  Propulsion: Manual  Level: Level Tile  Method: RUE;RLE  Level of Assistance: Moderate assistance  Description/ Details: multiple verbal and tactile cues for R hand and foot placement but used minimally. PT changed patient to a alondra height chair which may allow for improved mobility. Patient consistently using right UE very short pushing strokes despite cueing from PT/interpretor. Unable to maintain straight path on his own. PM Session  Patient seen for co-treatment with OT secondary to increased needs with mobility. Son, Migue Cherry, present for interpreting. No complaints of pain but he continues to complain of decreased mobility in left hand. Stand pivot wheelchair>therapy mat with mod assist of one and CGA of another. Performed seated dynamic balance activities reaching and using right UE to assist left UE for activities. He was CGA-SBA, but more CGA with further reaching forward. Patient with difficulty reacting to the left. She OT note for more detail on left UE use. PT fitted patient for bobath sling to use during weight bearing activities. PT demonstrated use of hemiwalker for patient and he was eager to use it. Sit>stand with mod assist of one and min assist of one. Patient ambulated 10' with hemiwalker and max assist of one at left side for weight shifting, to prevent trunk rotation to the left, facilitating left knee and hip extension, preventing hip abduction and external rotation. Patient required frequent verbal cueing for sequencing.  OT also providing min to mod assist for management of the hemiwalker and trunk posturing as patient flexes forward. Wheelchair follow required. Patient returned to room with OT, Lio Hernández. Communication is very limited by language barrier and limited translation from son. Reminders given to inform therapist what patient is saying. Will plan to set up for  to be present for at least one session tomorrow. Goals  Short term goals  Time Frame for Short term goals: 2 weeks  Short term goal 1: bed mobility SBA  Short term goal 2: transfers CGA  Short term goal 3: amb x 20 ft with RW and Min A x 1  Short term goal 4: tolerate LE Ther ex for increased LE strength. Short term goal 5: curb step min A  Long term goals  Time Frame for Long term goals : 3-4 weeks  Long term goal 1: bed mobility SBA  Long term goal 2: transfers SBA  Long term goal 3: car transfer CGA  Long term goal 4: amb 48' with RW vs hemiwalker CGA to SBA  Long term goal 5: 4 steps B rails CGA  Patient Goals   Patient goals : pt's/family goal is some rehab before returning home. Plan    Plan  Times per week: 5 to 6  Times per day: Twice a day  Plan weeks: 3 to 4  Current Treatment Recommendations: Functional Mobility Training, Balance Training, Endurance Training, ROM, Cognitive/Perceptual Training, Transfer Training, Gait Training, Stair training, IADL Training, Neuromuscular Re-education  Safety Devices  Type of devices: All fall risk precautions in place, Call light within reach, Chair alarm in place, Gait belt, Left in chair(son, Angelica, present.   PT positioned patient with pillows under arms for support)     Therapy Time   Individual Concurrent Group Co-treatment   Time In 0900         Time Out 0945         Minutes 45                Second Session Therapy Time     Individual Co-treatment   Time In  1515   Time Out  1600   Minutes  45          Electronically signed by Hang London PT on 9/29/2020 at 10:38 AM

## 2020-09-29 NOTE — PROGRESS NOTES
Pleasant  [x] Confused  [] Agitated  [] Uncooperative  [] Distractible [] Motivated  [] Self-Limiting [] Anxious  [x] Other:  phone utilized. Endurance:  [x] Adequate for participation in SLP sessions  [] Reduced overall  [] Lethargic  [] Other:  Safety: [] No concerns at this time  [] Reduced insight into deficits  []  Reduced safety awareness [] Not following call light procedures  [] Unable to Assess  [] Other:  Swallowing Precautions: 90 degree positioning with all p.o. intake; small bites/sips; alternate textures through meal; reduce rate of intake;     Barriers toward pt/family plan for progress toward d/c plan: Medical status and caregiver support may be barriers           Date: 9/29/2020      Tx session 1 Tx session 2   Total Timed Code Min SLP Individual Minutes  Time In: 4175  Time Out: 6099  Minutes: 40  Coded treatment time  10 n/a     Group Treatment Minutes 0 0   Co-Treat Minutes 0 0   Variance/Reason:  5 bathroom and PCA assist    Pain denied    Pain Intervention [] RN notified  [] Repositioned  [] Intervention offered and patient declined  [] N/A  [] Other: [] RN notified  [] Repositioned  [] Intervention offered and patient declined  [] N/A  [] Other:   Subjective     Pt was seen bedside  Family present  Discussed  phone and difficulty 9/28/2020 . Family member is reporting pt does have difficulty using  phone. Family is reporting also difficulty for pt to follow directions overall even when he (son ) is giving    Objective:  Goals       Dysphagia Goals:   1. The patient will tolerate dysphagia minced and moist with thin liquid diet without observed clinical signs of aspiration,      Thin Liquid by straw: no anterior loss; no overt clinical s/s post swallow; no voice quality changes    Minced and moist:goal met    Goal met ; will request diet upgrade n/a   2.  The patient will improve oral motor function via therapeutic oral motor exercises to 5/5 each trial. Left facial droop and weakness    -Labial/buccal muscular engagement targeted via po presentations of liquids/straw (adequate labial seal and adequate oral suckling)  -labial/buccal muscular engagement targeted with po food consistencies. No anterior loss during mastication. No anterior loss of oral residue post swallow. Minimal to mild oral pocketing    Pt is responding to tactile cues emphasizing rom stretch and strengthening via resistance ex    Will upgrade goal n/a   3. The patient will tolerate skilled trials of soft/bite size with thin liquid consistencies  10/10. Soft/bite size food consistencies: improving left oral motor muscular engagement; improving bolus control and A-P oral transit; less left oral pockeint (minimal to mild).     Will recommend diet upgrade to soft/bite size n/a     Cognitive-Communication goals       Goal 1: The pt will recall basic orientation information with assist Temporal orientation/spatial orientation questions:   -via Wh?: goal not met  -via Y/N? : 60 %    Will upgrade/modify goal n/a     Goal 2: The pt will follow commands within the context of therapy   1 step commands without visual cues: <50% (concern for aphasia and apraxia)    1 step commands with visual demonstrations and tactile cues/ physical cues >70%    1 step commands within the context of functional activity with visual cues: >70%    1 step motor commands for transfers with stedy: max cues (visual/tactile)    Auditory word/food discrimination: inconsistent; but increased with visual cues    Will upgrade/modify goal n/a   Goal 3: New goal  Pt will convey basic needs/wants via verbal/pointing/gestures >50%   CFN (items in the environment: per spouse report during functional table top task:     Conveying likes/dislikes via Y/N?: 66%    Conveying likes/dislikes via pointing/gestures: >25% via pointing   n/a   Other areas targeted:     Education:   Pt/son ed    Safety Devices: [x] Call light within reach  [x] Chair alarm activated  [] Bed alarm activated  [x] Other: son at his side [] Call light within reach  [] Chair alarm activated  [] Bed alarm activated  [] Other:    Progress Assessment: 9/28/2020:  phone utilized. Difficult to assess as pt did not always respond to questions/commands; and verbal responses were not always intelligible. SLP completed a chart review; chart review does report history of cognitive decline in memory/orientation/PS. Pt reportedly lives with family (multi generations); needed assist with ADLs and homemaking; pt receives 24 hour supervision. 9/29/2020: Son voiced concern regarding communication changes in that pt is not able to use  phone; and has difficulty understanding what family say. SLP ed in use of context of an activity and visual cues/gestures to assist. Activities incorporating family; basic DL activities most optimal.  Will upgrade diet to soft/bite size food with thin liquids; oral care post meals. Pt/son ed in examples of food consistencies on soft/bite size food consistencies; and oral care. Toothettes placed in the room and SLP ed pt and family in uses   Plan: Continue as per plan of care. Continued Tx Upon Discharge: ?    [] Yes [] No [x] TBD based on progress while on ARU [] Vital Stim indicated [] Other:   Estimated discharge date: TBD   Discharge recommendations:   [] Home independently  [x] Home with assistance [x]  24 hour supervision  [] ECF [] Other:     Additional information:     Interventions used during Rehab Stay:  [] Speech/Language Treatment  [] Instruction in HEP [] Group [x] Dysphagia Treatment [] Cognitive Treatment   [x] Other:     Adverse Reactions to Treatment/Significant Change in Status: n/a      Electronically Signed by    Ramses Dorado. Prakash,MS,CCC,SLP [id number]  Speech and Language Pathologist

## 2020-09-29 NOTE — CARE COORDINATION
LSW called for sonJuliana to introduce  role and to initiate discussion regarding DC planning and to inform of weekly Team Confrences on Wednesdays to review his progress. SOCIAL WORK ASSESSMENT      GOAL:   To return home with multiple family members. HOME SITUATION:  Patient and multiple family members live in a house with 3 steps to enter and then 5 steps up to bedroom and bathroom. He was independent in his personal care needs but did not administer his own medications nor did he participate in any household chores. Family is with him 24 hours of the day. He does have a pcp, Dr Martir Gaines. He likes to fill his scripts at [facility] on [address]. PRIOR LEVEL OF FUNCTIONING:       PERSONAL CARE: ind                                                                          DRIVES:  NO                                                                     FINANCES:  NO                                                                   MEALS:   deptendetn                                GROCERY SHOPS:  dep      DME CURRENTLY AT HOME:  Shower chair, bar in shower and cane      CURRENT HOME CARE/SERVICES:  None. LSW informed sonBren of possible post acute services to contine his progress at home. Son is agreeable to having home care if ordered. PREFERRED HOME CARE:  To be determined. TEAM CONFERENCE DAY:   Wednesdays. LSW informed son of weekly Team Conferences to review his progress, DME recommendations and dc date. This worker will return the call to son to give this update and plan for dc needs. LSW informed patient of preferred  time on date of discharge which is between 10 - 12 noon. LSW informed patient of recommendation for PCP visit within 7 days post discharge.     [place], [place]     Case Management   [phone]    9/29/2020  5:03 PM

## 2020-09-30 LAB
GLUCOSE BLD-MCNC: 166 MG/DL (ref 70–99)
GLUCOSE BLD-MCNC: 293 MG/DL (ref 70–99)
GLUCOSE BLD-MCNC: 381 MG/DL (ref 70–99)
GLUCOSE BLD-MCNC: 392 MG/DL (ref 70–99)
PERFORMED ON: ABNORMAL

## 2020-09-30 PROCEDURE — 97110 THERAPEUTIC EXERCISES: CPT

## 2020-09-30 PROCEDURE — 97116 GAIT TRAINING THERAPY: CPT

## 2020-09-30 PROCEDURE — 6360000002 HC RX W HCPCS: Performed by: PHYSICAL MEDICINE & REHABILITATION

## 2020-09-30 PROCEDURE — 97112 NEUROMUSCULAR REEDUCATION: CPT

## 2020-09-30 PROCEDURE — 97535 SELF CARE MNGMENT TRAINING: CPT

## 2020-09-30 PROCEDURE — 94760 N-INVAS EAR/PLS OXIMETRY 1: CPT

## 2020-09-30 PROCEDURE — 97129 THER IVNTJ 1ST 15 MIN: CPT

## 2020-09-30 PROCEDURE — 6370000000 HC RX 637 (ALT 250 FOR IP): Performed by: PHYSICAL MEDICINE & REHABILITATION

## 2020-09-30 PROCEDURE — 92507 TX SP LANG VOICE COMM INDIV: CPT

## 2020-09-30 PROCEDURE — 92526 ORAL FUNCTION THERAPY: CPT

## 2020-09-30 PROCEDURE — 97530 THERAPEUTIC ACTIVITIES: CPT

## 2020-09-30 PROCEDURE — 1280000000 HC REHAB R&B

## 2020-09-30 RX ORDER — INSULIN GLARGINE 100 [IU]/ML
25 INJECTION, SOLUTION SUBCUTANEOUS NIGHTLY
Status: DISCONTINUED | OUTPATIENT
Start: 2020-09-30 | End: 2020-10-01

## 2020-09-30 RX ADMIN — DULOXETINE HYDROCHLORIDE 30 MG: 30 CAPSULE, DELAYED RELEASE ORAL at 07:29

## 2020-09-30 RX ADMIN — SENNOSIDES-DOCUSATE SODIUM TAB 8.6-50 MG 1 TABLET: 8.6-5 TAB at 20:45

## 2020-09-30 RX ADMIN — INSULIN LISPRO 3 UNITS: 100 INJECTION, SOLUTION INTRAVENOUS; SUBCUTANEOUS at 07:29

## 2020-09-30 RX ADMIN — INSULIN LISPRO 15 UNITS: 100 INJECTION, SOLUTION INTRAVENOUS; SUBCUTANEOUS at 17:19

## 2020-09-30 RX ADMIN — DICLOFENAC 4 G: 10 GEL TOPICAL at 13:45

## 2020-09-30 RX ADMIN — ASPIRIN 81 MG: 81 TABLET, COATED ORAL at 07:29

## 2020-09-30 RX ADMIN — CLOPIDOGREL BISULFATE 75 MG: 75 TABLET ORAL at 07:29

## 2020-09-30 RX ADMIN — DICLOFENAC 4 G: 10 GEL TOPICAL at 08:18

## 2020-09-30 RX ADMIN — INSULIN LISPRO 15 UNITS: 100 INJECTION, SOLUTION INTRAVENOUS; SUBCUTANEOUS at 12:35

## 2020-09-30 RX ADMIN — ROSUVASTATIN CALCIUM 10 MG: 10 TABLET, FILM COATED ORAL at 07:29

## 2020-09-30 RX ADMIN — TAMSULOSIN HYDROCHLORIDE 0.4 MG: 0.4 CAPSULE ORAL at 20:45

## 2020-09-30 RX ADMIN — LOSARTAN POTASSIUM 12.5 MG: 25 TABLET, FILM COATED ORAL at 07:29

## 2020-09-30 RX ADMIN — SUCRALFATE 1 G: 1 TABLET ORAL at 20:45

## 2020-09-30 RX ADMIN — PANTOPRAZOLE SODIUM 40 MG: 40 TABLET, DELAYED RELEASE ORAL at 06:50

## 2020-09-30 RX ADMIN — SUCRALFATE 1 G: 1 TABLET ORAL at 06:50

## 2020-09-30 RX ADMIN — ENOXAPARIN SODIUM 40 MG: 40 INJECTION SUBCUTANEOUS at 20:46

## 2020-09-30 RX ADMIN — SENNOSIDES-DOCUSATE SODIUM TAB 8.6-50 MG 1 TABLET: 8.6-5 TAB at 07:29

## 2020-09-30 RX ADMIN — INSULIN GLARGINE 25 UNITS: 100 INJECTION, SOLUTION SUBCUTANEOUS at 20:46

## 2020-09-30 RX ADMIN — PANTOPRAZOLE SODIUM 40 MG: 40 TABLET, DELAYED RELEASE ORAL at 17:18

## 2020-09-30 RX ADMIN — INSULIN LISPRO 5 UNITS: 100 INJECTION, SOLUTION INTRAVENOUS; SUBCUTANEOUS at 20:46

## 2020-09-30 RX ADMIN — SUCRALFATE 1 G: 1 TABLET ORAL at 13:45

## 2020-09-30 ASSESSMENT — PAIN SCALES - GENERAL
PAINLEVEL_OUTOF10: 0
PAINLEVEL_OUTOF10: 0

## 2020-09-30 NOTE — PROGRESS NOTES
Physical Therapy  Facility/Department: [address] IP REHAB  Daily Treatment Note  NAME: Rody Mcqueen  : 1945  MRN: [id number]    Date of Service: 2020    Discharge Recommendations:  Patient would benefit from continued therapy after discharge, Continue to assess pending progress   PT Equipment Recommendations  Other: will monitor    Assessment   Body structures, Functions, Activity limitations: Decreased functional mobility ; Decreased balance;Decreased strength  Assessment: Patient very hard working and improved understanding with increased communication from grandson. Patient with improved mobility in left LE, but not active motion noted in DF or PF. Patient continues to have difficulty with wheelchair mobility, but did have slight improvement with using right LE this session. Patient continues to be well below baseline and will benefit from continued skilled therapy for strengthening, functional mobility training, and safety training to allow for safe return home with assistance. Treatment Diagnosis: decreased functional mobility following CVA with L alondra  Prognosis: Fair;Good  PT Education: General Safety;Transfer Training;Gait Training;Functional Mobility Training  Barriers to Learning: language  REQUIRES PT FOLLOW UP: Yes  Activity Tolerance  Activity Tolerance: Patient limited by endurance  Activity Tolerance: improved communication with grandson increasing communication/translation     Patient Diagnosis(es): There were no encounter diagnoses. has a past medical history of Cerebral artery occlusion with cerebral infarction ([address] Diabetes mellitus ([address] Gallstone, GERD (gastroesophageal reflux disease), Hyperlipidemia, Hypertension, and Renal insufficiency. has a past surgical history that includes Appendectomy; Cholecystectomy; Upper gastrointestinal endoscopy (2018); Upper gastrointestinal endoscopy (N/A, 2019); Upper gastrointestinal endoscopy (N/A, 2019);  Upper gastrointestinal endoscopy (N/A, 4/23/2019); Upper gastrointestinal endoscopy (N/A, 4/29/2020); and Colonoscopy (N/A, 5/8/2020). Social/Functional History  Lives With: Family(wife, son, grand children and other family members)  Type of Home: House(bi-level)  Home Layout: Bed/Bath upstairs  Home Access: Stairs to enter with rails  Entrance Stairs - Number of Steps: 3 ADELINE access from outside and then 4-5 steps in the house to bedroom level (the pt gets assist for these steps)  Entrance Stairs - Rails: Both  Bathroom Shower/Tub: Tub/Shower unit  Bathroom Toilet: Standard  Bathroom Equipment: Grab bars in [address]: Cane  ADL Assistance: Needs assistance(supervision for bathing, dressing and toileting)  Homemaking Assistance: Needs assistance  Ambulation Assistance: Needs assistance(with cane with SBA from family)  Transfer Assistance: Needs assistance(SBA provided by family, pt sleeps in a regular bed)  Active : No  Patient's  Info: son  Occupation: Retired  Type of occupation: farmer, village leader  Leisure & Hobbies: sometimes watches TV, but gives him a headache  Additional Comments: Meggan Hartley reports someone is with pt at home all day to assist him. Restrictions  Restrictions/Precautions  Restrictions/Precautions: Fall Risk  Required Braces or Orthoses?: No  Position Activity Restriction  Other position/activity restrictions: L sided weakness; speaks Hailee Mirelesn - family prefers to translate     Subjective   General  Chart Reviewed: Yes  Additional Pertinent Hx: Per Dr. Nehal Hester , \"The patient is a 76 y.o. male who presents to [facility] with unsteady on feet. Pt speaks Persian, son at bedside helping getting history. According to son/pt, pt apparently sustained a fall yesterday and felt like he couldn't move his left side. Today he had difficulty ambulating using his lt side and hence son brought pt to the ER\"  Diagnosed with CVA with L sided weakness.   Response To Previous Treatment: Patient with no complaints from previous session. Family / Caregiver Present: Yes(grandson)  Referring Practitioner: Aida Motta  Subjective  Subjective: patient reporting no complaints except for lack of movement in left UE and LE. General Comment  Comments: Patient's grandson present and interpreting for patient. Objective      Transfers  Sit to Stand: Minimal Assistance(tactile cueing for hand placement)  Stand to sit: Moderate Assistance  Bed to Chair: Moderate assistance(poor control of left LE while stepping around to chair, but improved knee extension in weight bearing)    Ambulation  Ambulation?: No    Wheelchair Activities  Wheelchair Size: 18\"  Wheelchair Type: Standard  Wheelchair Cushion: (waffle cushion)  Pressure Relief Type: Lateral lean  Level of Assistance for pressure relief activities: Moderate assistance  Wheelchair Parts Management: No  Propulsion: Yes  Propulsion 1  Propulsion: Manual  Level: Level Tile  Method: RUE;RLE  Level of Assistance: Minimal assistance; Moderate assistance  Description/ Details: Patient with improved use of right LE during wheelchair mobility, but required max verbal cueing from grandson and frequent tactile/hand over leg cueing from PT.  patient required reminders to allow for improved range of motion to allow for improved push with right UE. Tolerance improving and patient able to go longer distance. Last 25', PT instructed patient on use of bilateral LE to incorporate more use of left LE. Patient able to advance left LE, but unable to get plantar flexion to allow for a pull with left LE. Definite mod assist with turns. Distance: 79' with one turn          Exercises  Hip Flexion: x10 with visual target of PTs hand  Hip Abduction: x10 bilaterally with resistance to right LE. Minimal movement in left LE, but able to initiate the movement. x15 hip add sets with increased resistance to right LE and cueing to hold for three seconds.   Knee Long Arc Quad: x10 with cueing for fast extension and slow flexion. Patient achieving increased ROM, but unable to fully extend the knee on his own. He was able to slowly lower initially, but lost control as fatigue began to set in after several reps. Ankle Pumps: PROM with tapping to left anterior tibialis and gastroc/soleus during DF/PF. Unable to achieve any active contraction with either activity. Comments: left LE ther ex done while seated in wheelchair           Comment: PT was notified in patient care conference that the patient was very uncomfortable in large recliner chair, and it was requested to try a different chair in room. PT retrieved lower, grey, high back chair and brought it to patient's room. Patient was transferred into this with non-skid surface placed under patient. PT also propped left UE on pillow for support so it would not get stuck between him and the chair. Jose reported he was much happier with this chair as the patient's feet reach the floor. PT attempted to notify Mac CARRASCO, and PCA, Dufm Smack, but unable to reach them. PT did notify charge RN, Ray, and asked to let PT know if changes need to be made. PM Session  Patient seen as co-treatment this PM with interpretor present to allow for improved weight bearing activities. Patient in bathroom at start of session and PT/OT went bedside. Patient verbalized when he was finished on commode. Patient visibly embarrassed with therapists assisting him in bathroom as he was trying to cover himself up. Patient was also tearful. Interpretor explained that the patient is \"very emotional\" because in their culture the males of the family would assist him with personal care needs, never a woman. Will try to set up more male caregivers. Patient was min assist of two and required min assist of one to maintain balance while OT assisted with pulling pants up. He was mod assist of two to pivot back to wheelchair.    OT assisted patient with washing hands at this sink. Sit>stand at [employer] with mod assist x2 with moderate cueing for hand placement and use of left LE. PT assisting with extending left knee into upright and at hips to achieve full upright. Ambulated 15' x2 with hemiwalker and max assist of one and mod assist of one. He requires step by step cueing for sequencing the hemiwalker and cues to keep walker closer to him. Ace wrap was applied as a dorsiflex assist to left foot for second ambulation, but minimal improvement secondary to poor control at hip causing external rotation adduction. Mirror used for visual feedback, but did not attend to it the first trial. Improved with second trial with continued cueing. Wheelchair brought to patient each time. Stand pivot transfer with hemiwalker with min assist of one to stand, but mod assist of two with pivoting to grey, high back chair with use of hemiwalker. Poor use of walker. Son, Ani Krishna, present at end of session. Patient also reporting he is having blurred vision and seems to indicate that he is not seeing full words, but unclear secondary to language barriers and aphasia. He reports blurred vision has been worsening over past few months and now seems to be improving. Patient also tearful secondary to missing his great grandchildren at home. PT/OT did best to provide support. Safety Device - Type of devices:  [x]  All fall risk precautions in place [] Bed alarm in place  [x] Call light within reach [x] Chair alarm in place [] Positioning belt [x] Gait belt [] Patient at risk for falls [] Left in bed [x] Left in chair [] Telesitter in use [] Sitter present [] Nurse notified []  None (son, Ani Krishna, present)          Goals  Short term goals  Time Frame for Short term goals: 2 weeks  Short term goal 1: bed mobility SBA  Short term goal 2: transfers CGA  Short term goal 3: amb x 20 ft with RW and Min A x 1  Short term goal 4: tolerate LE Ther ex for increased LE strength.   Short term goal 5: curb step min A  Long term goals  Time Frame for Long term goals : 3-4 weeks  Long term goal 1: bed mobility SBA  Long term goal 2: transfers SBA  Long term goal 3: car transfer CGA  Long term goal 4: amb 48' with RW vs hemiwalker CGA to SBA  Long term goal 5: 4 steps B rails CGA  Patient Goals   Patient goals : pt's/family goal is some rehab before returning home. Plan    Plan  Times per week: 5 to 6  Times per day: Twice a day  Plan weeks: 3 to 4  Current Treatment Recommendations: Functional Mobility Training, Balance Training, Endurance Training, ROM, Cognitive/Perceptual Training, Transfer Training, Gait Training, Stair training, IADL Training, Neuromuscular Re-education  Safety Devices  Type of devices:  All fall risk precautions in place, Call light within reach, Chair alarm in place, Gait belt, Left in chair, Nurse notified(grandson present and charge RN, Arsenio Severance, notified)     Therapy Time   Individual Concurrent Group Co-treatment   Time In 1130         Time Out 1200         Minutes 30                Second Session Therapy Time     Individual Co-treatment   Time In  8839   Time Out  1600   Minutes  45          Electronically signed by Sameer Quintana PT on 9/30/2020 at 12:57 PM

## 2020-09-30 NOTE — PROGRESS NOTES
Patient admitted to rehab with a diagnosis of a  CVA. Patient is alert to self and  is used to communicate with patient. Patient speaks no english . Patient is continent of bowel and bladder. Last bowel movement was on the 9/29/20. Patient transfers with a steady 1-2 person assist. Medications are given whole in applesauce, Patient is a full code and is on a carb control diet. Patient does not eat meat or eggs. Patient shift assessment complete and rounding done to anticipate needs. Chair and bed alarms set for safety and call light in reach of patient.

## 2020-09-30 NOTE — PROGRESS NOTES
Occupational Therapy  Facility/Department: [address] IP REHAB  Daily Treatment Note  NAME: Taty Grossman  : 1945  MRN: [id number]    Date of Service: 2020    Discharge Recommendations:  24 hour supervision or assist, Home with Home health OT, Continue to assess pending progress  OT Equipment Recommendations  Other: TBD    Assessment   Performance deficits / Impairments: Decreased functional mobility ; Decreased ADL status; Decreased ROM; Decreased strength;Decreased endurance;Decreased balance;Decreased coordination;Decreased safe awareness;Decreased cognition;Decreased sensation;Decreased posture  Assessment: Pt tolerated tx well. Pt is very motivated to improve L UE function and tolerated all therex/neuromuscular re-ed activities well. Pt noted to have some improvement in L shoulder and elbow movement, though remains largely non-functional. Pt's grandson present this session, and states he feels that the pt is understanding commands/conversation and able to be understood close to his baseline, though feels the pt may have some STM deficits. Pt initially required max A for sit<>stand, and improved to min A with max verbal/tactile cues for technique. Pt required up to max A for standing balance during WB activity. Cont per OT POC  Treatment Diagnosis: decreased ADL, balance, Left sided function  Prognosis: Good  OT Education: OT Role;Plan of Care;Transfer Training;Precautions; Home Exercise Program  Patient Education: neuromuscular re-ed activities, SROM  Barriers to Learning: language, possible aphasia  REQUIRES OT FOLLOW UP: Yes  Activity Tolerance  Activity Tolerance: Patient Tolerated treatment well;Patient limited by fatigue  Safety Devices  Safety Devices in place: Yes  Type of devices: Call light within reach;Gait belt; Chair alarm in place; Left in chair;Patient at risk for falls(in w/c)         Patient Diagnosis(es): There were no encounter diagnoses.       has a past medical history of Cerebral artery occlusion with cerebral infarction ([facility] [address] Diabetes mellitus ([facility] Gallstone, GERD (gastroesophageal reflux disease), Hyperlipidemia, Hypertension, and Renal insufficiency. has a past surgical history that includes Appendectomy; Cholecystectomy; Upper gastrointestinal endoscopy (06/08/2018); Upper gastrointestinal endoscopy (N/A, 2/28/2019); Upper gastrointestinal endoscopy (N/A, 4/23/2019); Upper gastrointestinal endoscopy (N/A, 4/23/2019); Upper gastrointestinal endoscopy (N/A, 4/29/2020); and Colonoscopy (N/A, 5/8/2020). Restrictions  Restrictions/Precautions  Restrictions/Precautions: Fall Risk  Position Activity Restriction  Other position/activity restrictions: L sided weakness; speaks Margaret Garibay - family present and declines  phone  Subjective   General  Chart Reviewed: Yes, Progress Notes  Additional Pertinent Hx: Per Dr. Byron Pelaez , \"The patient is a 76 y.o. male who presents to [facility] with unsteady on feet. Pt speaks Kazakh, son at bedside helping getting history. According to son/pt, pt apparently sustained a fall yesterday and felt like he couldn't move his left side. Today he had difficulty ambulating using his lt side and hence son brought pt to the ER\"  Diagnosed with  Response to previous treatment: Patient with no complaints from previous session  Family / Caregiver Present: Mirza Aponte, interpreting and involved in session)  Referring Practitioner: Avani Mullen  Diagnosis: Acute CVA - with left sided weakness  Subjective  Subjective: pt met in dept for OT. pt seated in recliner, agreeable to therapy. pt's grandson, Ritika Garcia, present and interpreting, he was very helpful and involved. Ritika Garcia states he feels that the pt is understanding his family well and they are able to understand him. he does state that the pt seems to have some STM deficits.  pt denied pain other than his left side is \"not working\"      Orientation     Objective    Balance  Sitting Balance: Stand by assistance(in w/c)  Standing Balance: Maximum assistance  Standing Balance  Time: 2 mins; x1 min  Activity: static standing at tabletop for WB through L UE  Comment: pt leaning heavily to the left despite max cues, mirror placed in front for visual feedback. pt able to correct briefly and stand with CGA but overall max A. also required L knee blocked  Functional Mobility  Functional - Mobility Device: Wheelchair  Assist Level: Dependent/Total  Functional Mobility Comments: OT managed w/c during session     Transfers  Sit to stand: Minimal assistance;Maximum assistance  Stand to sit: Minimal assistance;Maximum assistance  Transfer Comments: pt initially required max A for sit<>stand; improved to min A with max verbal/tactile cues for hand and foot placement                 Neuromuscular Education  Neuromuscular education: Yes  Vibration: used gentle vibration to facilitate elbow flexion/extension, minimal to no response but pt tolerated well.  pt able to achieve slight elbow flexion with tapping to bicep muscle  Weight Bearing  Weight Bearing Technique: Yes  LUE Weight Bearing: Forearm standing  Response To Weight Bearing Technique: pt required max A for standing balance and facilitating weight through L UE, mirror in front of pt for visual feedback                          Type of ROM/Therapeutic Exercise  Type of ROM/Therapeutic Exercise: PROM;AAROM  Exercises  Scapular Protraction: x10 AAROM  Scapular Retraction: x10 AAROM  Shoulder Depression: x10 AAROM  Shoulder Elevation: x10 AAROM  Shoulder Flexion: x10 PROM to 90*  Shoulder Extension: x10 PROM to 90*  Horizontal ABduction: x10 PROM  Horizontal ADduction: x10 PROM to 90*  Elbow Flexion: x10 AAROM (very slight movement)  Elbow Extension: x10 AAROM (very slight muscle contraction)  Supination: x10 PROM  Pronation: x10 AAROM  Wrist Flexion: x10 PROM  Wrist Extension: x10 PROM  Finger Flexion: x5 PROM, x5 SROM  Finger Extension: x5 PROM, x5 SROM           PM Session:  Subjective: pt met b/s for OT. Pt seated on commode, requested door closed for privacy.  present, pt states that he is \"emotional\" because in his culture he would have a male such as his son assist him with personal care needs. Will try to set up more male caregivers for the pt    Objective:  Pt completed sit>stand min Ax2 from commode, pt stood min Ax2 while OT pulled pants up over hips. Pt sat in w/c at sink and washed hands with setup and assist to wash left hand. Pt completed SPT to w/c mod Ax2. Transported to dept in w/c. Pt completed sit<>stand mod Ax2 to hemiwalker. Pt requires mod verbal/tactile cues for hand placement and sequencing transfer. Pt completed fxl mobility x2 bouts with max Ax1 from PT for advancing L LE and for balance, mod A from OT for trunk control and managing alondra walker. Pt requires max verbal/tactile cues for sequencing with alondra walker. Trialed use of mirror in front of pt to provide visual feedback, which seemed to help though pt needed cues to look at himself. Pt took seated rest break between bouts of ambulation. Pt tearful, expressed that he is missing his family and especially his young great-grandchildren. Therapists provided therapeutic listening and support. Transported pt back to room in /. Pt completed sit>stand CGA/min Ax2, pivoted mod Ax2 using hemiwalker to gray high back chair. Once in chair, pt indicates that he is having difficulty seeing/reading the buttons on his call light. Pt was able to point to the red call button and the green TV button. Pt states he has been having blurry vision in past 4-5 months, though has been improving in past few days. Will continue to assess pt's vision. Pt was left positioned in his chair with alarm engaged, needs in reach, son present. Assessment: Pt tolerated tx well. Pt is very hard-working with all activity and motivated to return home with family support.  Pt continues to require significant 2 person assist for all fxl transfers and mobility, so will need continued therapy before he is able to d/c home. Plan   Plan  Times per week: 5-6 days per week  Times per day: Twice a day  Plan weeks: 3-4 weeks  Current Treatment Recommendations: Functional Mobility Training, Endurance Training, Safety Education & Training, Self-Care / ADL, Strengthening, Balance Training, ROM, Neuromuscular Re-education, Cognitive/Perceptual Training, Patient/Caregiver Education & Training, Equipment Evaluation, Education, & procurement, Cognitive Reorientation    Goals  Short term goals  Time Frame for Short term goals: 1 week pt will. Benja Anshu term goal 1: bathe with mod assist and AD  Short term goal 2: dress UB with mod assist, LB with max assist and AD as needed  Short term goal 3: toilet with max assist and AD  Short term goal 4: transfer with mod assist and AD  Short term goal 5: functional mobilty with LRAD and mod assist  Short term goal 6: improve LUE function to assist with ADL, positioning 25%  Short term goal 7: improve left side awareness to require mod cues for safety with ADL and mobility  Long term goals  Time Frame for Long term goals : 3-4 weeks pt will. .. Long term goal 1: bathe with min assist and AD  Long term goal 2: dress UB after set up, LB with min assist and AD as needed  Long term goal 3: toilet with CGA and AD as needed  Long term goal 4: transfer with CGA and AD as needed  Long term goal 5: functional mobility with CGA and LRAD  Patient Goals   Patient goals : \"I want to be normal again\"  With cues, pt agreed he wanted to improve his left UE strength, be able to bathe and dress himself, be able to stand and walk .   States he does not need to do anything in the kitchen       Therapy Time   Individual Concurrent Group Co-treatment   Time In 1015         Time Out 1100         Minutes 45            Therapy Time   Individual Co-treatment   Time In  1515   Time Out  1600   Minutes

## 2020-09-30 NOTE — PLAN OF CARE
Problem: Falls - Risk of:  Goal: Will remain free from falls  Description: Will remain free from falls  9/30/2020 0922 by Soni Oquendo RN  Outcome: Ongoing    Outcome: Ongoing  Goal: Absence of physical injury  Description: Absence of physical injury  9/30/2020 7943 by Soni Oquendo RN  Outcome: Ongoing    Outcome: Ongoing

## 2020-09-30 NOTE — PROGRESS NOTES
Department of Physical Medicine & Rehabilitation  Progress Note    Patient Identification:  Stephanie Heaton  [id number]  : 1945  Admit date: 2020    Chief Complaint: Right pontine stroke [facility])    Subjective:   No acute events overnight. Patient seen this am working with PT. Requiring significant cues for wheelchair propulsion with RUE and RLE, however distance increased. ROS: No f/c, n/v, cp     Objective:  Patient Vitals for the past 24 hrs:   BP Temp Temp src Pulse Resp SpO2 Weight   20 0821 -- -- -- -- -- 95 % --   20 0730 (!) 148/88 98.4 °F (36.9 °C) Axillary 78 16 96 % --   20 0450 125/73 98.1 °F (36.7 °C) Oral 70 16 95 % 125 lb 10.6 oz (57 kg)   20 0011 135/72 98.1 °F (36.7 °C) Oral 69 16 96 % --   20 129/75 97.7 °F (36.5 °C) Oral 70 16 95 % --     Const: Alert. No distress, pleasant. HEENT: Normocephalic, atraumatic. Normal sclera/conjunctiva. MMM. CV: Regular rate and rhythm. Resp: No respiratory distress. Lungs CTAB. Abd: Soft, nontender, nondistended, NABS+   Ext: No edema. Neuro: Alert, evidence of mild confusion (limited by language barrier), speech dysarthric, left hemiparesis (1-3/5)  Psych: Cooperative, appropriate mood and affect    Laboratory data: Available via EMR.    Last 24 hour lab  Recent Results (from the past 24 hour(s))   POCT Glucose    Collection Time: 20  4:54 PM   Result Value Ref Range    POC Glucose 243 (H) 70 - 99 mg/dl    Performed on ACCU-CHEK    POCT Glucose    Collection Time: 20  7:51 PM   Result Value Ref Range    POC Glucose 198 (H) 70 - 99 mg/dl    Performed on ACCU-CHEK    POCT Glucose    Collection Time: 20  7:12 AM   Result Value Ref Range    POC Glucose 166 (H) 70 - 99 mg/dl    Performed on ACCU-CHEK    POCT Glucose    Collection Time: 20 11:17 AM   Result Value Ref Range    POC Glucose 392 (H) 70 - 99 mg/dl    Performed on ACCU-CHEK        Therapy progress:  PT  Position Activity pantoprazole    Rehab Progress: Interdisciplinary team conference was held today with entire rehab treatment team including PT, OT, SLP, Dietician, RN, and SW. Discussion focused on progress toward rehab goals and discharge planning. Making gradual progress. Language barrier interfering, difficulty with translators on phone, therefore requesting in-person . Working on left side function, functional mobility, compensatory strategies (anticipating primarily wheelchair for mobility). SLP working on aphasia/apraxia, dysarthria, dysphagia, cognition. Separate conference then held with patient/family, questions answered and concerns addressed. Total treatment time >35 min with greater than 50% spent in care coordination. Anticipated Dispo: home with family, anticipate 24/7 assist  Services:  PT, OT, SLP, RN, Aide  DME: [facility]LLA  ELOS: 10/17      Kalie Oconnell MD 9/30/2020, 3:35 PM

## 2020-09-30 NOTE — PROGRESS NOTES
Speech Language Pathology  ACUTE REHAB UNIT  SPEECH/LANGUAGE PATHOLOGY      [x] Daily  [] Weekly Care Conference Note  [] Discharge    Patient:Sylvester Duval      [facility]:[id number]  [facility]:[id number]  Rehab Dx/Hx: Right pontine stroke ([facility] [address] [I63.50]    Precautions: Dysphagia; Bong Villaseñor is native language; Potential cognitive-communication deficitgs  Home situation: Lives with family  ST Dx: [] Aphasia  [x] Dysarthria  [] Apraxia   [x] Oropharyngeal dysphagia [x] Cognitive   Impairment  [] Other:   Initial Speech Therapy Assessment Diagnosis:   Per INitial Evaluations 9/26/2020:   1. Cognitive Diagnosis: Pt presents with impaired memory, orientation,  and problem solving for basic information. Grandson reports this is typical for pt. Per acute notes, pt's son also confirmed this is pt's baseline. 2. Aphasia Diagnosis: Further evaluation needed to clarify. 3. Speech Diagnosis: Further evaluation needed to clarify. 4. Communication Diagnosis: Pt requires and  to communicate. Pt appears to often have difficulty expressing himself accurately and with detail for typical conversation. 5. Dysphagia Diagnosis: Mild pharyngeal stage dysphagia;Mild to moderate oral stage dysphagia   Pt presents with no overt signs of aspiration or penetration. Swallow appears effortful at times and pt reports some difficulty. Oral skills for solids appear impaired with slow posterior propulsion and trace oral residue. Pt at risk for adequate intake due to his c/o intermittent nausea, prefers only warm food and drinks, and the food provided is not what pt is familiar with.   Date of Admit: 9/25/2020  Room #: A3C-1417/3260-01  Date: 9/30/2020       Current functional status (updated daily):         Current Diet Order:Dietary Nutrition Supplements: Clear Liquid Oral Supplement  DIET GENERAL; Carb Control: 4 carb choices (60 gms)/meal; Dysphagia Soft and Bite-Sized; Vegetarian (Lacto-Ovo)   Behavior: [x] Alert  [] Cooperative [x]  Pleasant  [x] Confused  [] Agitated  [] Uncooperative  [] Distractible [] Motivated  [] Self-Limiting [] Anxious  [x] Other:  phone utilized. Endurance:  [x] Adequate for participation in SLP sessions  [] Reduced overall  [] Lethargic  [] Other:  Safety: [] No concerns at this time  [] Reduced insight into deficits  []  Reduced safety awareness [] Not following call light procedures  [] Unable to Assess  [] Other:  Swallowing Precautions: 90 degree positioning with all p.o. intake; small bites/sips; alternate textures through meal; reduce rate of intake;     Barriers toward pt/family plan for progress toward d/c plan: Medical status and caregiver support may be barriers           Date: 9/30/2020      Tx session 1 Tx session 2   Total Timed Code Min SLP Individual Minutes  Time In: 0730  Time Out: 0825  Minutes: 55  Coded treatment time  15 n/a     Group Treatment Minutes 0 0   Co-Treat Minutes 0 0   Variance/Reason:  n/a    Pain denied    Pain Intervention [] RN notified  [] Repositioned  [] Intervention offered and patient declined  [] N/A  [] Other: [] RN notified  [] Repositioned  [] Intervention offered and patient declined  [] N/A  [] Other:   Subjective     Pt seen bedside  RN at his side  Pt in recliner   phone utilized    Objective:  Goals       Dysphagia Goals:   1. Upgraded goal  The patient will tolerate dysphagia soft/bites size with thin liquid diet without observed clinical signs of aspiration,      Thin Liquid by straw cup:one episode of coughing with concern for penetration/aspiration. · Pt had additional presentations via cup/straw/bottle and no overt clinical s/s    Soft/bite size food consistency: cues for reduced rate and consistent clearing left oral residue     n/a   2.  Upgraded goal  The patient will improve oral motor function via therapeutic oral motor exercises to 15/15 each trial. Left facial droop and weakness    -Labial/buccal muscular engagement targeted via po presentations of liquids/straw (adequate labial seal and adequate oral suckling)  -labial/buccal muscular engagement targeted with po food consistencies. No anterior loss during mastication. No anterior loss of oral residue post swallow. Minimal to mild oral pocketing    Lingual coordination ex: pt with difficulty with with mixed food/liquid consistencies   n/a   3. New goal   The patient will tolerate skilled trials of regular food with thin liquid consistencies  10/10.  Upgraded goal n/a     Cognitive-Communication goals       Goal 1:Modified goal   The pt will demonstrate improved recall of daily events; learned strategies with set up; structure and <max cues Modified goal    Temporal orientation/spatial orientation questions:   -max reorientation to place/date and therapy schedule    Pt oriented to self and place    Pt recall of this SLP: pt did not fully demonstrate evidence of recall of this SLP or diet upgrade identified 9/29/2020 n/a     Goal 2: Upgraded goal  The pt will demonstrate improved processing for short concrete questions regarding basic DL and / or to follow 1 step commands with max visual demonstrations;and with <moderate tactile cues   1 step commands without visual cues: 50% (concern for aphasia and apraxia)    1 step commands with visual demonstrations and tactile cues/ physical cues >75%    1 step commands within the context of functional activity with visual cues: >70%    1 step motor commands for transfers with stedy: max cues (visual/tactile)    Auditory word/food discrimination: inconsistent; but increased with visual cues    [place]?: mild to maximum repetition/clarification required n/a   Goal 3: New goal  Pt will convey basic needs/wants via verbal/pointing/gestures >50%   CFN ([place] [employer]): 70% by label; pt is using gestures:     Conveying likes/dislikes via Y/N?: inconsistent    Conveying likes/dislikes via pointing/gestures: >75%   n/a   Other areas targeted: Cognition: Pt is identifying and making needs regarding : discomfort in recliner; complaints of stomach issues and can't go to the bathroom; and can't move left side. · SLP addressed each but frequent repetition    Education:   Pt ed ongoing    Safety Devices: [x] Call light within reach  [x] Chair alarm activated  [] Bed alarm activated  [] Other:  [] Call light within reach  [] Chair alarm activated  [] Bed alarm activated  [] Other:    Progress Assessment: 9/28/2020:  phone utilized. Difficult to assess as pt did not always respond to questions/commands; and verbal responses were not always intelligible. SLP completed a chart review; chart review does report history of cognitive decline in memory/orientation/PS. Pt reportedly lives with family (multi generations); needed assist with ADLs and homemaking; pt receives 24 hour supervision. 9/29/2020: Son voiced concern regarding communication changes in that pt is not able to use  phone; and has difficulty understanding what family say. SLP ed in use of context of an activity and visual cues/gestures to assist. Activities incorporating family; basic DL activities most optimal.  Will upgrade diet to soft/bite size food with thin liquids; oral care post meals. Pt/son ed in examples of food consistencies on soft/bite size food consistencies; and oral care. Toothettes placed in the room and SLP ed pt and family in uses. 9/30/2020 SLP discussed with RN (RN actually in room when addressing pt complaints regarding discomfort in current recliner) ; and  SLP discussed with team (at team meeting ) regarding pt height anc concern regarding current recliner in room (ie pt's feet dangle; pt complaints cannot get comfortable etc)   Plan: Continue as per plan of care.    Continued Tx Upon Discharge: ?    [] Yes [] No [x] TBD based on progress while on ARU [] Vital Stim indicated [] Other:   Estimated discharge date: TBD   Discharge recommendations:   [] Home independently [x] Home with assistance [x]  24 hour supervision  [] ECF [] Other:     Additional information:     Interventions used during Rehab Stay:  [] Speech/Language Treatment  [] Instruction in HEP [] Group [x] Dysphagia Treatment [] Cognitive Treatment   [x] Other:     Adverse Reactions to Treatment/Significant Change in Status: n/a      Electronically Signed by    Remigio Jenkins. Prakash,MS,CCC,SLP [id number]  Speech and Language Pathologist

## 2020-10-01 LAB
ANION GAP SERPL CALCULATED.3IONS-SCNC: 11 MMOL/L (ref 3–16)
BASOPHILS ABSOLUTE: 0.1 K/UL (ref 0–0.2)
BASOPHILS RELATIVE PERCENT: 0.9 %
BUN BLDV-MCNC: 18 MG/DL (ref 7–20)
CALCIUM SERPL-MCNC: 9.9 MG/DL (ref 8.3–10.6)
CHLORIDE BLD-SCNC: 101 MMOL/L (ref 99–110)
CO2: 24 MMOL/L (ref 21–32)
CREAT SERPL-MCNC: 1.2 MG/DL (ref 0.8–1.3)
EOSINOPHILS ABSOLUTE: 0.3 K/UL (ref 0–0.6)
EOSINOPHILS RELATIVE PERCENT: 5 %
GFR AFRICAN AMERICAN: >60
GFR NON-AFRICAN AMERICAN: 59
GLUCOSE BLD-MCNC: 159 MG/DL (ref 70–99)
GLUCOSE BLD-MCNC: 161 MG/DL (ref 70–99)
GLUCOSE BLD-MCNC: 183 MG/DL (ref 70–99)
GLUCOSE BLD-MCNC: 192 MG/DL (ref 70–99)
GLUCOSE BLD-MCNC: 247 MG/DL (ref 70–99)
HCT VFR BLD CALC: 42.1 % (ref 40.5–52.5)
HEMOGLOBIN: 14 G/DL (ref 13.5–17.5)
LYMPHOCYTES ABSOLUTE: 2.3 K/UL (ref 1–5.1)
LYMPHOCYTES RELATIVE PERCENT: 38.7 %
MCH RBC QN AUTO: 25.6 PG (ref 26–34)
MCHC RBC AUTO-ENTMCNC: 33.2 G/DL (ref 31–36)
MCV RBC AUTO: 76.9 FL (ref 80–100)
MONOCYTES ABSOLUTE: 0.5 K/UL (ref 0–1.3)
MONOCYTES RELATIVE PERCENT: 8.3 %
NEUTROPHILS ABSOLUTE: 2.8 K/UL (ref 1.7–7.7)
NEUTROPHILS RELATIVE PERCENT: 47.1 %
PDW BLD-RTO: 15.7 % (ref 12.4–15.4)
PERFORMED ON: ABNORMAL
PLATELET # BLD: 265 K/UL (ref 135–450)
PMV BLD AUTO: 7.9 FL (ref 5–10.5)
POTASSIUM REFLEX MAGNESIUM: 3.8 MMOL/L (ref 3.5–5.1)
RBC # BLD: 5.48 M/UL (ref 4.2–5.9)
SODIUM BLD-SCNC: 136 MMOL/L (ref 136–145)
WBC # BLD: 5.9 K/UL (ref 4–11)

## 2020-10-01 PROCEDURE — 80048 BASIC METABOLIC PNL TOTAL CA: CPT

## 2020-10-01 PROCEDURE — 97129 THER IVNTJ 1ST 15 MIN: CPT

## 2020-10-01 PROCEDURE — 6360000002 HC RX W HCPCS: Performed by: PHYSICAL MEDICINE & REHABILITATION

## 2020-10-01 PROCEDURE — 1280000000 HC REHAB R&B

## 2020-10-01 PROCEDURE — 97530 THERAPEUTIC ACTIVITIES: CPT

## 2020-10-01 PROCEDURE — 85025 COMPLETE CBC W/AUTO DIFF WBC: CPT

## 2020-10-01 PROCEDURE — 97116 GAIT TRAINING THERAPY: CPT

## 2020-10-01 PROCEDURE — 97112 NEUROMUSCULAR REEDUCATION: CPT

## 2020-10-01 PROCEDURE — 6370000000 HC RX 637 (ALT 250 FOR IP): Performed by: PHYSICAL MEDICINE & REHABILITATION

## 2020-10-01 PROCEDURE — 97542 WHEELCHAIR MNGMENT TRAINING: CPT

## 2020-10-01 PROCEDURE — 97130 THER IVNTJ EA ADDL 15 MIN: CPT

## 2020-10-01 PROCEDURE — 97110 THERAPEUTIC EXERCISES: CPT

## 2020-10-01 PROCEDURE — 92526 ORAL FUNCTION THERAPY: CPT

## 2020-10-01 PROCEDURE — 94760 N-INVAS EAR/PLS OXIMETRY 1: CPT

## 2020-10-01 PROCEDURE — 36415 COLL VENOUS BLD VENIPUNCTURE: CPT

## 2020-10-01 RX ORDER — INSULIN GLARGINE 100 [IU]/ML
30 INJECTION, SOLUTION SUBCUTANEOUS NIGHTLY
Status: DISCONTINUED | OUTPATIENT
Start: 2020-10-01 | End: 2020-10-12

## 2020-10-01 RX ADMIN — INSULIN LISPRO 6 UNITS: 100 INJECTION, SOLUTION INTRAVENOUS; SUBCUTANEOUS at 13:05

## 2020-10-01 RX ADMIN — DICLOFENAC 4 G: 10 GEL TOPICAL at 09:27

## 2020-10-01 RX ADMIN — SENNOSIDES-DOCUSATE SODIUM TAB 8.6-50 MG 1 TABLET: 8.6-5 TAB at 09:21

## 2020-10-01 RX ADMIN — ENOXAPARIN SODIUM 40 MG: 40 INJECTION SUBCUTANEOUS at 21:43

## 2020-10-01 RX ADMIN — DICLOFENAC 4 G: 10 GEL TOPICAL at 21:48

## 2020-10-01 RX ADMIN — PANTOPRAZOLE SODIUM 40 MG: 40 TABLET, DELAYED RELEASE ORAL at 05:34

## 2020-10-01 RX ADMIN — CLOPIDOGREL BISULFATE 75 MG: 75 TABLET ORAL at 09:22

## 2020-10-01 RX ADMIN — SUCRALFATE 1 G: 1 TABLET ORAL at 05:35

## 2020-10-01 RX ADMIN — SUCRALFATE 1 G: 1 TABLET ORAL at 21:43

## 2020-10-01 RX ADMIN — ASPIRIN 81 MG: 81 TABLET, COATED ORAL at 09:21

## 2020-10-01 RX ADMIN — DULOXETINE HYDROCHLORIDE 30 MG: 30 CAPSULE, DELAYED RELEASE ORAL at 09:21

## 2020-10-01 RX ADMIN — LOSARTAN POTASSIUM 12.5 MG: 25 TABLET, FILM COATED ORAL at 09:21

## 2020-10-01 RX ADMIN — PANTOPRAZOLE SODIUM 40 MG: 40 TABLET, DELAYED RELEASE ORAL at 16:09

## 2020-10-01 RX ADMIN — SENNOSIDES-DOCUSATE SODIUM TAB 8.6-50 MG 1 TABLET: 8.6-5 TAB at 21:43

## 2020-10-01 RX ADMIN — TAMSULOSIN HYDROCHLORIDE 0.4 MG: 0.4 CAPSULE ORAL at 21:43

## 2020-10-01 RX ADMIN — INSULIN LISPRO 2 UNITS: 100 INJECTION, SOLUTION INTRAVENOUS; SUBCUTANEOUS at 21:43

## 2020-10-01 RX ADMIN — INSULIN LISPRO 3 UNITS: 100 INJECTION, SOLUTION INTRAVENOUS; SUBCUTANEOUS at 09:23

## 2020-10-01 RX ADMIN — SUCRALFATE 1 G: 1 TABLET ORAL at 14:02

## 2020-10-01 RX ADMIN — DICLOFENAC 4 G: 10 GEL TOPICAL at 14:02

## 2020-10-01 RX ADMIN — INSULIN GLARGINE 30 UNITS: 100 INJECTION, SOLUTION SUBCUTANEOUS at 21:43

## 2020-10-01 RX ADMIN — INSULIN LISPRO 3 UNITS: 100 INJECTION, SOLUTION INTRAVENOUS; SUBCUTANEOUS at 18:08

## 2020-10-01 RX ADMIN — ROSUVASTATIN CALCIUM 10 MG: 10 TABLET, FILM COATED ORAL at 09:22

## 2020-10-01 ASSESSMENT — PAIN SCALES - GENERAL: PAINLEVEL_OUTOF10: 0

## 2020-10-01 NOTE — PROGRESS NOTES
Department of Physical Medicine & Rehabilitation  Progress Note    Patient Identification:  Gigi Alcaraz  [id number]  : 1945  Admit date: 2020    Chief Complaint: Right pontine stroke [facility])    Subjective:   No acute events overnight. Patient seen this afternoon sitting up in bed. Reports some improvement in ability to stand which is encouraging to him. Shoulder pain somewhat improved with topical treatment. ROS: No f/c, n/v, cp     Objective:  Patient Vitals for the past 24 hrs:   BP Temp Temp src Pulse Resp SpO2 Weight   10/01/20 1600 (!) 148/93 97.4 °F (36.3 °C) Axillary 88 16 95 % --   10/01/20 1300 (!) 151/94 97.5 °F (36.4 °C) Oral 95 16 94 % --   10/01/20 0915 (!) 158/97 97.4 °F (36.3 °C) Axillary 99 16 96 % --   10/01/20 0836 -- -- -- -- -- 96 % --   10/01/20 0430 (!) 150/70 98 °F (36.7 °C) Oral 75 16 97 % 125 lb 10.6 oz (57 kg)   10/01/20 0037 (!) 154/72 97.5 °F (36.4 °C) Oral 79 16 95 % --   20 (!) 163/80 98.1 °F (36.7 °C) Oral 78 16 93 % --     Const: Alert. No distress, pleasant. HEENT: Normocephalic, atraumatic. Normal sclera/conjunctiva. MMM. CV: Regular rate and rhythm. Resp: No respiratory distress. Lungs CTAB. Abd: Soft, nontender, nondistended, NABS+   Ext: No edema. Neuro: Alert, evidence of mild confusion (limited by language barrier), speech dysarthric, left hemiparesis (1-3/5)  Psych: Cooperative, appropriate mood and affect    Laboratory data: Available via EMR.    Last 24 hour lab  Recent Results (from the past 24 hour(s))   POCT Glucose    Collection Time: 20  4:43 PM   Result Value Ref Range    POC Glucose 381 (H) 70 - 99 mg/dl    Performed on ACCU-CHEK    POCT Glucose    Collection Time: 20  8:19 PM   Result Value Ref Range    POC Glucose 293 (H) 70 - 99 mg/dl    Performed on ACCU-CHEK    Basic Metabolic Panel w/ Reflex to MG    Collection Time: 10/01/20  4:43 AM   Result Value Ref Range    Sodium 136 136 - 145 mmol/L    Potassium reflex Magnesium 3.8 3.5 - 5.1 mmol/L    Chloride 101 99 - 110 mmol/L    CO2 24 21 - 32 mmol/L    Anion Gap 11 3 - 16    Glucose 161 (H) 70 - 99 mg/dL    BUN 18 7 - 20 mg/dL    CREATININE 1.2 0.8 - 1.3 mg/dL    GFR Non- 59 (A) >60    GFR African American >60 >60    Calcium 9.9 8.3 - 10.6 mg/dL   CBC Auto Differential    Collection Time: 10/01/20  4:43 AM   Result Value Ref Range    WBC 5.9 4.0 - 11.0 K/uL    RBC 5.48 4.20 - 5.90 M/uL    Hemoglobin 14.0 13.5 - 17.5 g/dL    Hematocrit 42.1 40.5 - 52.5 %    MCV 76.9 (L) 80.0 - 100.0 fL    MCH 25.6 (L) 26.0 - 34.0 pg    MCHC 33.2 31.0 - 36.0 g/dL    RDW 15.7 (H) 12.4 - 15.4 %    Platelets 648 355 - 426 K/uL    MPV 7.9 5.0 - 10.5 fL    Neutrophils % 47.1 %    Lymphocytes % 38.7 %    Monocytes % 8.3 %    Eosinophils % 5.0 %    Basophils % 0.9 %    Neutrophils Absolute 2.8 1.7 - 7.7 K/uL    Lymphocytes Absolute 2.3 1.0 - 5.1 K/uL    Monocytes Absolute 0.5 0.0 - 1.3 K/uL    Eosinophils Absolute 0.3 0.0 - 0.6 K/uL    Basophils Absolute 0.1 0.0 - 0.2 K/uL   POCT Glucose    Collection Time: 10/01/20  7:32 AM   Result Value Ref Range    POC Glucose 183 (H) 70 - 99 mg/dl    Performed on ACCU-CHEK    POCT Glucose    Collection Time: 10/01/20 12:02 PM   Result Value Ref Range    POC Glucose 247 (H) 70 - 99 mg/dl    Performed on ACCU-CHEK        Therapy progress:  PT  Position Activity Restriction  Other position/activity restrictions: L sided weakness; speaks Sinda Barajas - Son Julius Martínez here, offered translation phone but son prefers to translate  Objective     Sit to Stand: Minimal Assistance  Stand to sit: Minimal Assistance, Moderate Assistance  Bed to Chair: Moderate assistance, Maximum assistance(to L)  Device: Hemiwalker  Other Apparatus: Wheelchair follow, Slider(L arm in sling)  Assistance: Maximum assistance  Distance: 4'  OT  PT Equipment Recommendations  Other: will monitor  Toilet - Technique: (via willis murray)  Equipment Used: Standard toilet  Toilet Transfers Comments: use of willis murray  Assessment        SLP  Diet Solids Recommendation: Dysphagia Minced and Moist (Dysphagia II)  Liquid Consistency Recommendation: Thin    Body mass index is 22.26 kg/m². Assessment and Plan:    Impairments: left hemiparesis, decreased coordination, balance, endurance, cognition, dysarthria, dysphagia     Acute ischemic stroke  -Localization: Right son, posterior centrum semiolave, left frontal lobe deep white matter  -Etiology: small vessel vs embolic  -Telemetry in ARU, then event monitor at discharge  -Secondary ppx with DAPT x 3 weeks (then Plavix alone), statin, BP and glucose control  -PT/OT/SLP     H/o left PCA stroke with severe stenosis  -Secondary ppx as above     Dysphagia   -Dietitian and SLP consults.   -Upgrade to soft/bite sized + thins     HTN, uncontrolled  -Still allowing some permissive HTN due to worsening of symptoms with normalization of BP  -losartan (dose decreased)     DM2, uncontrolled  -Lantus (increased to 30 units qhs, home dose), high dose ISS     CKD  -Avoid nephrotoxins, renally dose meds  -Monitor      L1 compression fracture  -Pain control  -PT/OT     Bladder   -High risk retention   -Monitor PVRs, SC prn >300cc  -Flomax     Bowel   -High risk constipation   -senna+colace BID, PRN miralax, MoM, and bisacodyl supp.     Pain control  -prn tramadol     PPx  -DVT: lovenox  -GI: pantoprazole    Rehab Progress: Making gradual progress. Working on left side function, functional mobility, compensatory strategies (anticipating primarily wheelchair for mobility). SLP working on aphasia/apraxia, dysarthria, dysphagia, cognition. Anticipated Dispo: home with family, anticipate 24/7 assist  Services:  PT, OT, SLP, RN, Aide  DME: ELLA  ELOS: 10/17      Briseida De Luna.  Kae Rodríguez MD 10/1/2020, 4:09 PM

## 2020-10-01 NOTE — PROGRESS NOTES
Occupational Therapy  Facility/Department: [address] IP REHAB  Daily Treatment Note  NAME: Natali Pickard  : 1945  MRN: [id number]    Date of Service: 10/1/2020    Discharge Recommendations:  24 hour supervision or assist, Home with Home health OT, Continue to assess pending progress  OT Equipment Recommendations  Other: TBD    Assessment   Performance deficits / Impairments: Decreased functional mobility ; Decreased ADL status; Decreased ROM; Decreased strength;Decreased endurance;Decreased balance;Decreased coordination;Decreased safe awareness;Decreased cognition;Decreased sensation;Decreased posture    Assessment: Pt tolerated tx well and is very hard working. Pt consistently requires heavy mod A for stand pivot transfers with max cues for transfer technique. Pt tolerated L UE therex/neuromuscular re-ed well and is very motivated to improve L side function. Pt tolerated sitting on edge of therapy mat x10 mins with SBA, though appeared fatigued at end of session. Cont per OT POC    Treatment Diagnosis: decreased ADL, balance, Left sided function  Prognosis: Good  OT Education: OT Role;Plan of Care;Transfer Training;Precautions; Home Exercise Program  Barriers to Learning: language, possible aphasia  REQUIRES OT FOLLOW UP: Yes  Activity Tolerance  Activity Tolerance: Patient Tolerated treatment well;Patient limited by fatigue  Activity Tolerance: pt appeared fatigued with closing eyes at times, but denied feeling tired  Safety Devices  Safety Devices in place: Yes  Type of devices: Call light within reach;Gait belt; Chair alarm in place; Left in chair;Patient at risk for falls         Patient Diagnosis(es): There were no encounter diagnoses. has a past medical history of Cerebral artery occlusion with cerebral infarction ([facility] [address] Diabetes mellitus ([address] Gallstone, GERD (gastroesophageal reflux disease), Hyperlipidemia, Hypertension, and Renal insufficiency.    has a past surgical history that includes Appendectomy; Cholecystectomy; Upper gastrointestinal endoscopy (06/08/2018); Upper gastrointestinal endoscopy (N/A, 2/28/2019); Upper gastrointestinal endoscopy (N/A, 4/23/2019); Upper gastrointestinal endoscopy (N/A, 4/23/2019); Upper gastrointestinal endoscopy (N/A, 4/29/2020); and Colonoscopy (N/A, 5/8/2020). Restrictions  Restrictions/Precautions  Restrictions/Precautions: Fall Risk  Required Braces or Orthoses?: No  Position Activity Restriction  Other position/activity restrictions: L sided weakness; speaks Sima Das - Son Ember Jessica here, offered translation phone but son prefers to translate  Subjective   General  Chart Reviewed: Yes, Progress Notes  Additional Pertinent Hx: Per Dr. Tiffany Quintero , \"The patient is a 76 y.o. male who presents to [facility] with unsteady on feet. Pt speaks Amharic, son at bedside helping getting history. According to son/pt, pt apparently sustained a fall yesterday and felt like he couldn't move his left side. Today he had difficulty ambulating using his lt side and hence son brought pt to the ER\"  Diagnosed with  Response to previous treatment: Patient with no complaints from previous session  Family / Caregiver Present: Jorden Villagomez)  Referring Practitioner: Angel Luis Santana  Diagnosis: Acute CVA - with left sided weakness  Subjective  Subjective: pt met in dept for OT. pt agreeable to therapy, c/o pain in his back but did not rate. son translating during session         Objective    Balance  Sitting Balance: Stand by assistance(seated side of therapy mat)  Standing Balance: Moderate assistance  Functional Mobility  Functional - Mobility Device: Wheelchair  Assist Level: Dependent/Total  Functional Mobility Comments: OT managed w/c during session. pt completed stand pivot transfers <> w/c (see transfers)  Wheelchair Bed Transfers  Wheelchair/Bed - Technique: Stand pivot  Equipment Used: Wheelchair; Other(therapy mat)  Level of Asssistance:  Moderate assistance  Wheelchair Transfers Comments: pt consistently requires heavy mod A for stand pivot transfers from w/c <> therapy mat; w/c > high back arm chair  Bed mobility  Supine to Sit: Moderate assistance(pt pulled on therapist to raise trunk, also required assist for L LE)  Sit to Supine: Minimal assistance(assist for LLE)  Scooting: Minimal assistance(max cues to scoot to EOM)           Cognition  Overall Cognitive Status: Exceptions(son assisted to interpret)  Following Commands: Follows one step commands with increased time; Follows one step commands with repetition  Attention Span: Attends with cues to redirect  Safety Judgement: Decreased awareness of need for safety  Problem Solving: Assistance required to implement solutions;Assistance required to generate solutions;Assistance required to identify errors made  Insights: Decreased awareness of deficits  Initiation: Requires cues for all  Sequencing: Requires cues for some                    Type of ROM/Therapeutic Exercise  Type of ROM/Therapeutic Exercise: PROM;AAROM  Exercises  Scapular Protraction: x10 AAROM  Scapular Retraction: x10 AAROM  Shoulder Depression: x10 AAROM  Shoulder Elevation: x10 AAROM  Shoulder Flexion: x10 PROM to 90* in supine (slight contraction felt)  Shoulder Extension: x10 PROM to 90* in supine  Shoulder ABduction: x10 AAROM in supine  Shoulder ADduction: x10 AAROM in supine  Horizontal ABduction: x10 PROM  Horizontal ADduction: x10 PROM  Elbow Flexion: x10 AAROM (very slight movement) - improved with tapping bicep muscle  Elbow Extension: x10 AAROM (very slight muscle contraction)  Supination: x10 PROM  Pronation: x10 AAROM  Wrist Flexion: x10 PROM  Wrist Extension: x10 PROM  Finger Flexion: x10 PROM  Finger Extension: x10 PROM  Other: pt completed some exercises seated in w/c, and finished supine on therapy mat. pt tolerated well, works very hard        PM Session:  Subjective: pt met in dept for therapy following speech therapy.  Seen as COTX with PT for fxl mobility. Pt seated in w/c ready for therapy.  present. Pt expressed he does not know why his left side is not working, OT/PT educated pt on stroke recovery    Objective:  Pt completed sit<>stand min Ax2 with cues for hand placement. Pt completed fxl mobility using hemiwalker x2 trials in therapy gym, mirror placed in front of pt to provide visual feedback. First trial pt required min/mod A from PT for balance and advancing LLE, min/mod A from OT for hemiwalker management and trunk control. On second trial, pt improved to min A/CGA from PT and min A/CGA from OT. Pt requires mod cueing to sequence with hemiwalker, and initiated with less cues during second trial.     Pt stated he is tired and requested to go back to his room to rest in bed. Transported back to room in w/c. Pt completed stand pivot from w/c > bed mod A. Pt completed bed mobility with mod Ax2. Pt expressed that he wants to go home and his sons will be able to care for him. Pt also concerned that his family does not know where he is. Therapists assured pt that his family knows he is here and has been visiting him, and that he needs to stay in the hospital to get stronger and more mobile before he can return home with his family. Pt positioned in bed for comfort with alarm engaged, needs in reach. Assessment: Pt limited by fatigue this session, but demo'd good improvement in fxl transfers and mobility. Pt expressed that he wants to go home and his sons will be able to assist him, but pt will benefit from continued therapy at [facility] in order to increase pt's independence and decrease caregiver burden.        Plan   Plan  Times per week: 5-6 days per week  Times per day: Twice a day  Plan weeks: 3-4 weeks  Current Treatment Recommendations: Functional Mobility Training, Endurance Training, Safety Education & Training, Self-Care / ADL, Strengthening, Balance Training, ROM, Neuromuscular Re-education, Cognitive/Perceptual

## 2020-10-01 NOTE — PROGRESS NOTES
Patient admitted to rehab with CVA. A&Ox1. Pt speaks Welsh and requires an . Transfers with stedy x1-2 assist. On minced to moist, no eggs or meat diet, tolerating well. Medications taken whole in applesauce and thin liquids. On ASA and Plavix for DVT prophylaxis. Pt skin intact with scattered bruising. On room air. Has been continent of bowel and bladder. LBM 10/1/2020. Pt denies pain/discomfort. Chair/bed alarms in use and call light in reach. Pt currently on telemetry. Will continue to monitor.

## 2020-10-01 NOTE — PROGRESS NOTES
Speech Language Pathology  ACUTE REHAB UNIT  SPEECH/LANGUAGE PATHOLOGY      [x] Daily  [] Weekly Care Conference Note  [] Discharge    Patient:Sylvester Duval      OMQ:[birth year]  WZK:[id number]  Rehab Dx/Hx: Right pontine stroke ([facility] [address] [I63.50]    Precautions: Dysphagia; Oh Dry is native language; Potential cognitive-communication deficitgs  Home situation: Lives with family  ST Dx: [] Aphasia  [x] Dysarthria  [] Apraxia   [x] Oropharyngeal dysphagia [x] Cognitive   Impairment  [] Other:   Initial Speech Therapy Assessment Diagnosis:   Per INitial Evaluations 9/26/2020:   1. Cognitive Diagnosis: Pt presents with impaired memory, orientation,  and problem solving for basic information. Grandson reports this is typical for pt. Per acute notes, pt's son also confirmed this is pt's baseline. 2. Aphasia Diagnosis: Further evaluation needed to clarify. 3. Speech Diagnosis: Further evaluation needed to clarify. 4. Communication Diagnosis: Pt requires and  to communicate. Pt appears to often have difficulty expressing himself accurately and with detail for typical conversation. 5. Dysphagia Diagnosis: Mild pharyngeal stage dysphagia;Mild to moderate oral stage dysphagia   Pt presents with no overt signs of aspiration or penetration. Swallow appears effortful at times and pt reports some difficulty. Oral skills for solids appear impaired with slow posterior propulsion and trace oral residue. Pt at risk for adequate intake due to his c/o intermittent nausea, prefers only warm food and drinks, and the food provided is not what pt is familiar with.   Date of Admit: 9/25/2020  Room #: A7G-7027/3260-01  Date: 10/1/2020       Current functional status (updated daily):         Current Diet Order:Dietary Nutrition Supplements: Clear Liquid Oral Supplement  DIET GENERAL; Carb Control: 4 carb choices (60 gms)/meal; Dysphagia Soft and Bite-Sized; Vegetarian (Lacto-Ovo)   Behavior: [x] Alert  [] Cooperative completed-most optimal with visual demonstration and tactile cues in addition to instruction    Max cues. Pt is achieving mild + left volitional rom and muscular engagement during varied tasks   n/a   3. New goal   The patient will tolerate skilled trials of regular food with thin liquid consistencies  10/10.  Not targeted as pt declined n/a     Cognitive-Communication goals       Goal 1:Modified goal   The pt will demonstrate improved recall of daily events; learned strategies with set up; structure and <max cues Modified goal    Temporal orientation/spatial orientation questions:   -Pt oriented to self and partially to place and situation  -max re-orientation to place/date and therapy schedule    Recall of 3 pc information after a delay/distraction: moderate repetition/re-direct n/a     Goal 2: Upgraded goal  The pt will demonstrate improved processing for short concrete questions regarding basic DL and / or to follow 1 step commands with max visual demonstrations;and with <moderate tactile cues   1 step commands without visual cues: remains inconsistent     1 step commands with visual demonstrations: >65%    1 step commands with visual demonstrations and tactile cues and tactile cues/ physical cues >80%    1 step commands within the context of functional activity with visual cues: no data taken    1 step motor commands for transfers with stedy: max cues (verbal/visual/tactile )    [place] Wh?: minimal to moderate repetition/clarification required n/a   Goal 3: New goal  Pt will convey basic needs/wants via verbal/pointing/gestures >50%   CFN ([employer]): 20 items targeted: 60% by label; an increase to 90% via label or by verbal description of fucntion     Open ended wh? (topics of modification of chair in room; basic wants like bathroom etc/food likes): >75%    Pt otem redundant in verbalizations regarding left sided weakness; problems urinating.     n/a   Other areas targeted: Cognition:  · Persistent problems in temporal and spatial orientation  · Persistent memory deficits session to session   · Easily distracted by internal /external stimuli requiring re-direct  · Reduced left visual attention and reduced safety awareness. · Pt is insightful that he needs to call to the to the bathroom but states \"sometimes they don't come\"  Oral Motor Speech  ·  reporting understanding >70% of pt's verbal responses; but there is slurring noted  · Pt is achieving some improved left oral motor rom during speech tasks    Education:   Pt ed ongoing    Safety Devices: [] Call light within reach  [] Chair alarm activated  [] Bed alarm activated  [x] Other: to therapy department  [] Call light within reach  [] Chair alarm activated  [] Bed alarm activated  [] Other:    Progress Assessment: 9/28/2020:  phone utilized. Difficult to assess as pt did not always respond to questions/commands; and verbal responses were not always intelligible. SLP completed a chart review; chart review does report history of cognitive decline in memory/orientation/PS. Pt reportedly lives with family (multi generations); needed assist with ADLs and homemaking; pt receives 24 hour supervision. 9/29/2020: Son voiced concern regarding communication changes in that pt is not able to use  phone; and has difficulty understanding what family say. SLP ed in use of context of an activity and visual cues/gestures to assist. Activities incorporating family; basic DL activities most optimal.  Will upgrade diet to soft/bite size food with thin liquids; oral care post meals. Pt/son ed in examples of food consistencies on soft/bite size food consistencies; and oral care. Toothettes placed in the room and SLP ed pt and family in uses.    9/30/2020 SLP discussed with RN (RN actually in room when addressing pt complaints regarding discomfort in current recliner) ; and  SLP discussed with team (at team meeting ) regarding pt height anc concern regarding current recliner in room (ie pt's feet dangle; pt complaints cannot get comfortable etc)  10/1/2020  present for session. Plan: Continue as per plan of care. Continued Tx Upon Discharge: ?    [] Yes [] No [x] TBD based on progress while on ARU [] Vital Stim indicated [] Other:   Estimated discharge date: TBD   Discharge recommendations:   [] Home independently  [x] Home with assistance [x]  24 hour supervision  [] ECF [] Other:     Additional information:     Interventions used during Rehab Stay:  [] Speech/Language Treatment  [] Instruction in HEP [] Group [x] Dysphagia Treatment [] Cognitive Treatment   [x] Other:     Adverse Reactions to Treatment/Significant Change in Status: n/a      Electronically Signed by    Warren Cueto. Prakash,MS,CCC,SLP [id number]  Speech and Language Pathologist

## 2020-10-01 NOTE — PROGRESS NOTES
5/8/2020). Restrictions  Restrictions/Precautions  Restrictions/Precautions: Fall Risk  Required Braces or Orthoses?: No  Position Activity Restriction  Other position/activity restrictions: L sided weakness; speaks Hailee Burns - Son Jerry El here, offered translation phone but son prefers to translate  Subjective   General  Chart Reviewed: Yes  Additional Pertinent Hx: Per Dr. Nehal Hester , \"The patient is a 76 y.o. male who presents to [facility] with unsteady on feet. Pt speaks Belarusian, son at bedside helping getting history. According to son/pt, pt apparently sustained a fall yesterday and felt like he couldn't move his left side. Today he had difficulty ambulating using his lt side and hence son brought pt to the ER\"  Diagnosed with CVA with L sided weakness. Response To Previous Treatment: Patient with no complaints from previous session. Family / Caregiver Present: Yes  Referring Practitioner: Rodger Medina  Subjective  Subjective: L shoulder moderately painful today      Objective      Transfers  Sit to Stand: Minimal Assistance  Stand to sit: Minimal Assistance; Moderate Assistance  Bed to Chair: Moderate assistance;Maximum assistance(to L)  Ambulation  Ambulation?: Yes  More Ambulation?: No  Ambulation 1  Surface: level tile  Device: Hemiwalker  Other Apparatus: Wheelchair follow;Slider(L arm in sling)  Assistance: Maximum assistance  Quality of Gait: Patient with left lean and having moderate difficulty shifting weight to the right. Poor control of left LE. He was able to extend left knee with verbal and tactile cueing, but does not attempt to extend his knee on his own. Requires assistance to advance left LE. Patient with very short right step length and having difficulty stepping forward. Pt was able to advance hemiwalker with cues this date.   Gait Deviations: Decreased step length;Decreased step height;Slow Janeth  Distance: 4'  Wheelchair Activities  Wheelchair Size: 18\"  Wheelchair Type: Standard  Pressure Relief Type: Lateral lean  Level of Assistance for pressure relief activities: Moderate assistance  Wheelchair Parts Management: No  Propulsion 1  Propulsion: Manual  Level: Level Tile  Method: RUE;RLE  Level of Assistance: Minimal assistance; Moderate assistance  Description/ Details: Pt required tactile cues to use R foot, better use of R hand, required min A to steer, mod A for one 90 degree turn  Distance: 80' with one turn     Balance  Posture: Fair  Sitting - Static: Fair  Sitting - Dynamic: Fair  Standing - Static: Poor  Standing - Dynamic: Poor  Exercises  Hip Flexion: x10 with visual target of PTs hand  Hip Abduction: x10 bilaterally with yellow theraband. Minimal movement in left LE, but able to initiate the movement. x10 hip add sets with increased resistance to right LE and cueing to hold for three seconds. Knee Long Arc Quad: x10 with to kick a beach ball. Patient achieving increased ROM, but unable to fully extend the knee on his own. Ankle Pumps: PROM with tapping to left anterior tibialis and gastroc/soleus during DF/PF. Dorsiflexion stretch and hamstring stretch performed. Comments: left LE ther ex done while seated in wheelchair  Other exercises  Other exercises?: Yes  Other exercises 1: standing weightshift R and L with mod A  Other exercises 2: seated balloon toss with R UE x 10, then standing balloon toss with R UE x 15 with mod A for posture and balance      Goals  Short term goals  Time Frame for Short term goals: 2 weeks  Short term goal 1: bed mobility SBA  Short term goal 2: transfers CGA  Short term goal 3: amb x 20 ft with RW and Min A x 1  Short term goal 4: tolerate LE Ther ex for increased LE strength.   Short term goal 5: curb step min A  Long term goals  Time Frame for Long term goals : 3-4 weeks  Long term goal 1: bed mobility SBA  Long term goal 2: transfers SBA  Long term goal 3: car transfer CGA  Long term goal 4: amb 48' with RW vs hemiwalker CGA to SBA  Long term goal 5: 4 steps B rails CGA  Patient Goals   Patient goals : pt's/family goal is some rehab before returning home. Plan    Plan  Times per week: 5 to 6  Times per day: Twice a day  Plan weeks: 3 to 4  Current Treatment Recommendations: Functional Mobility Training, Balance Training, Endurance Training, ROM, Cognitive/Perceptual Training, Transfer Training, Gait Training, Stair training, IADL Training, Neuromuscular Re-education  Safety Devices  Type of devices: All fall risk precautions in place, Gait belt  Restraints  Initially in place: No     Therapy Time   Individual Concurrent Group Co-treatment   Time In 1030         Time Out 1115         Minutes 45         Timed Code Treatment Minutes: Ree Mojica 1, [id number]     PM session: co-treat with OT for ambulation,  present. Sit to stand min to mod A x 2 first time, amb 12' with hemiwalker, L arm in sling, L foot wrapped in dorsiflexion. First walk, pt required min to mod A x 2 with cues to stand tall, and to move hemiwalker and take step with R foot when needed. Mirror used with cues to look up into it. Second walk pt did a  much better job of standing tall throughout walk, significantly less lean, and less forward flexion of trunk, pt able to advance both feet on his own with min A to place L foot without adduction, much less assist for weight shift . Pt given same three cues in sequence: \"walker, L foot, R foot\" repeatedly for about the first 5 steps, then pt able to remember sequence for the next few steps. Pt did not need cues for posture with second walk, walk required min A x 2. Pt reported being tired and wanted to return to room. Pt chose to go back to bed rather then recliner. Stand pivot transfer w/c to bed mod A x 2. Sit to supine mod to max A x 2. Pt wanted to know when his son was coming to take him home.   Encouraged pt through  that he was going to stay in the hospital and get stronger and more mobile before going home, although son would take care of him when he went home. Pt was worried his son did not know where he was: reassured pt that his son knew he was in the hospital and had visited him this morning. Pt left in bed, alarm on, call light and needs in reach, RN aware.  leaving after therapy.   Second Session Therapy Time     Individual Co-treatment   Time In  5277   Time Out  1600   Minutes  45     Electronically signed by Alonso Crandall PT on 10/1/2020 at 4:54 PM

## 2020-10-02 LAB
GLUCOSE BLD-MCNC: 119 MG/DL (ref 70–99)
GLUCOSE BLD-MCNC: 462 MG/DL (ref 70–99)
GLUCOSE BLD-MCNC: 536 MG/DL (ref 70–99)
GLUCOSE BLD-MCNC: 72 MG/DL (ref 70–99)
GLUCOSE BLD-MCNC: 97 MG/DL (ref 70–99)
PERFORMED ON: ABNORMAL
PERFORMED ON: NORMAL
PERFORMED ON: NORMAL

## 2020-10-02 PROCEDURE — 97535 SELF CARE MNGMENT TRAINING: CPT

## 2020-10-02 PROCEDURE — 97130 THER IVNTJ EA ADDL 15 MIN: CPT

## 2020-10-02 PROCEDURE — 97110 THERAPEUTIC EXERCISES: CPT

## 2020-10-02 PROCEDURE — 6370000000 HC RX 637 (ALT 250 FOR IP): Performed by: PHYSICAL MEDICINE & REHABILITATION

## 2020-10-02 PROCEDURE — 6360000002 HC RX W HCPCS: Performed by: PHYSICAL MEDICINE & REHABILITATION

## 2020-10-02 PROCEDURE — 1280000000 HC REHAB R&B

## 2020-10-02 PROCEDURE — 97530 THERAPEUTIC ACTIVITIES: CPT

## 2020-10-02 PROCEDURE — 92526 ORAL FUNCTION THERAPY: CPT

## 2020-10-02 PROCEDURE — 97116 GAIT TRAINING THERAPY: CPT

## 2020-10-02 PROCEDURE — 97129 THER IVNTJ 1ST 15 MIN: CPT

## 2020-10-02 PROCEDURE — 97112 NEUROMUSCULAR REEDUCATION: CPT

## 2020-10-02 RX ADMIN — DICLOFENAC 4 G: 10 GEL TOPICAL at 21:31

## 2020-10-02 RX ADMIN — SUCRALFATE 1 G: 1 TABLET ORAL at 14:20

## 2020-10-02 RX ADMIN — SUCRALFATE 1 G: 1 TABLET ORAL at 05:55

## 2020-10-02 RX ADMIN — SENNOSIDES-DOCUSATE SODIUM TAB 8.6-50 MG 1 TABLET: 8.6-5 TAB at 08:34

## 2020-10-02 RX ADMIN — ASPIRIN 81 MG: 81 TABLET, COATED ORAL at 08:34

## 2020-10-02 RX ADMIN — DICLOFENAC 4 G: 10 GEL TOPICAL at 08:34

## 2020-10-02 RX ADMIN — ENOXAPARIN SODIUM 40 MG: 40 INJECTION SUBCUTANEOUS at 21:31

## 2020-10-02 RX ADMIN — CLOPIDOGREL BISULFATE 75 MG: 75 TABLET ORAL at 08:34

## 2020-10-02 RX ADMIN — SUCRALFATE 1 G: 1 TABLET ORAL at 21:30

## 2020-10-02 RX ADMIN — ROSUVASTATIN CALCIUM 10 MG: 10 TABLET, FILM COATED ORAL at 08:34

## 2020-10-02 RX ADMIN — LOSARTAN POTASSIUM 12.5 MG: 25 TABLET, FILM COATED ORAL at 08:47

## 2020-10-02 RX ADMIN — PANTOPRAZOLE SODIUM 40 MG: 40 TABLET, DELAYED RELEASE ORAL at 16:25

## 2020-10-02 RX ADMIN — DULOXETINE HYDROCHLORIDE 30 MG: 30 CAPSULE, DELAYED RELEASE ORAL at 08:34

## 2020-10-02 RX ADMIN — DICLOFENAC 4 G: 10 GEL TOPICAL at 14:20

## 2020-10-02 RX ADMIN — PANTOPRAZOLE SODIUM 40 MG: 40 TABLET, DELAYED RELEASE ORAL at 05:55

## 2020-10-02 RX ADMIN — INSULIN LISPRO 18 UNITS: 100 INJECTION, SOLUTION INTRAVENOUS; SUBCUTANEOUS at 12:13

## 2020-10-02 RX ADMIN — TAMSULOSIN HYDROCHLORIDE 0.4 MG: 0.4 CAPSULE ORAL at 21:31

## 2020-10-02 RX ADMIN — SENNOSIDES-DOCUSATE SODIUM TAB 8.6-50 MG 1 TABLET: 8.6-5 TAB at 21:30

## 2020-10-02 ASSESSMENT — PAIN SCALES - GENERAL
PAINLEVEL_OUTOF10: 0
PAINLEVEL_OUTOF10: 0

## 2020-10-02 NOTE — PROGRESS NOTES
other family members)  Type of Home: House(bi-level)  Home Layout: Bed/Bath upstairs  Home Access: Stairs to enter with rails  Entrance Stairs - Number of Steps: 3 ADELINE access from outside and then 4-5 steps in the house to bedroom level (the pt gets assist for these steps)  Entrance Stairs - Rails: Both  Bathroom Shower/Tub: Tub/Shower unit  Bathroom Toilet: Standard  Bathroom Equipment: Grab bars in shower  Home Equipment: Cane  ADL Assistance: Needs assistance(supervision for bathing, dressing and toileting)  Homemaking Assistance: Needs assistance  Ambulation Assistance: Needs assistance(with cane with SBA from family)  Transfer Assistance: Needs assistance(SBA provided by family, pt sleeps in a regular bed)  Active : No  Patient's  Info: son  Occupation: Retired  Type of occupation: farmer, village leader  Leisure & Hobbies: sometimes watches TV, but gives him a headache  Additional Comments: Jaycee Franco reports someone is with pt at home all day to assist him. Restrictions  Restrictions/Precautions  Restrictions/Precautions: Fall Risk  Required Braces or Orthoses?: No  Position Activity Restriction  Other position/activity restrictions: L sided weakness; speaks Napali - uses  phone, family, and also in person interpretors     Subjective   General  Chart Reviewed: Yes  Additional Pertinent Hx: Per Dr. Brandt Edward , \"The patient is a 76 y.o. male who presents to [facility] with unsteady on feet. Pt speaks Kazakh, son at bedside helping getting history. According to son/pt, pt apparently sustained a fall yesterday and felt like he couldn't move his left side. Today he had difficulty ambulating using his lt side and hence son brought pt to the ER\"  Diagnosed with CVA with L sided weakness. Response To Previous Treatment: Patient with no complaints from previous session.   Family / Caregiver Present: Daniella Banerjee, present and interpreting)  Referring Practitioner: [facility]  Subjective  Subjective: Patient seated in wheelchair with son present upon arrival. He reports his right thigh and hip are sore, but he declines and pharmaceutical intervention with RN, Rolo Torres, asked. Objective   Bed mobility  Supine to Sit: Moderate assistance(with moderate verbal and tactile cueing. Difficulty pushing through right LE to assist up)  Sit to Supine: Minimal assistance(assist for left LE)  Scooting: Minimal assistance(and moderate verbal and tactile cueing)  Comment: bed mobility done on flat therapy mat    Transfers  Sit to Stand: Minimal Assistance; Moderate Assistance(moderate verbal and tactile cueing)  Stand to sit: Minimal Assistance; Moderate Assistance(patient would not follow cueing to reach back for chair)  Stand Pivot Transfers: Minimal Assistance; Moderate Assistance(min assist to stand, but mod assist to pivot. Poor control in left knee)    Ambulation  Ambulation?: No  More Ambulation?: No    Stairs/Curb  Stairs?: No    Wheelchair Activities  Wheelchair Size: 18\"  Wheelchair Type: Standard  Wheelchair Cushion: (waffle cushion)  Pressure Relief Type: Lateral lean  Level of Assistance for pressure relief activities: Moderate assistance  Wheelchair Parts Management: No  Propulsion: Yes  Propulsion 1  Propulsion: Manual  Level: Level Tile  Method: RUE;RLE  Level of Assistance: Minimal assistance; Moderate assistance  Description/ Details: patient with minimal use of right LE this date, despite hand over knee assist and frequent cueing. He requires frequent cueing to reach back on wheel to improve ROM and get better push. Patient min-mod assist to maintain straight path and solid moderate assistance for turns. patient cannot problem solve how to straighten path. He required mod assist o travel over threshold into therapy gym  Distance: 80' with two turns. Exercises  Hip Extension/Leg Presses: hip flexion and extension done with tapping to facilitate contraction. Patient with poor use of left LE causing left lean at times, but responds well to cueing. OT providing min assist at trunk. Patient with more upright positioning and attending to mirror used for visual input better. Patient unable to recall proper sequence during ambulation despite multiple cues and required step by step cueing. Stood for 2 minutes performing dynamic reaching task in front of mirror. Patient began as max assist for balance in standing secondary to trunk rotation to the right, hip flexion, and poor use of left LE. Improved to mod assist with repetition and was min assist when reaching up to the top of the mirror. Patient was unable to understand why holding to the mirror was not safe and required cueing to regain balance with hemiwalker. OT was min assist for balance also . Stand>sit min-mod assist secondary to poor technique. He requires hand over hand to bring hand back the chair before sitting. Patient was returned to room and transferred wheelchair> grey high back chair in room with min assist of one as PT moved waffle cushion to chair. Patient with limited use of left LE without prompting and he requires frequent cueing to extend knee and hip. He did have occassional left knee pain in SLS. Safety Device - Type of devices:  [x]  All fall risk precautions in place [] Bed alarm in place  [x] Call light within reach [x] Chair alarm in place [] Positioning belt [x] Gait belt [] Patient at risk for falls [] Left in bed [x] Left in chair [] Telesitter in use [] Sitter present [] Nurse notified []  None          Goals  Short term goals  Time Frame for Short term goals: 2 weeks  Short term goal 1: bed mobility SBA  Short term goal 2: transfers CGA  Short term goal 3: amb x 20 ft with RW and Min A x 1  Short term goal 4: tolerate LE Ther ex for increased LE strength.   Short term goal 5: curb step min A  Long term goals  Time Frame for Long term goals : 3-4 weeks  Long term goal 1: bed mobility SBA  Long term goal 2: transfers SBA  Long term goal 3: car transfer [address] [facility] term goal 4: amb 48' with RW vs hemiwalker CGA to SBA  Long term goal 5: 4 steps B rails CGA  Patient Goals   Patient goals : pt's/family goal is some rehab before returning home. Plan    Plan  Times per week: 5 to 6  Times per day: Twice a day  Plan weeks: 3 to 4  Current Treatment Recommendations: Functional Mobility Training, Balance Training, Endurance Training, ROM, Cognitive/Perceptual Training, Transfer Training, Gait Training, Stair training, IADL Training, Neuromuscular Re-education  Safety Devices  Type of devices: All fall risk precautions in place, Call light within reach, Chair alarm in place, Gait belt, Left in chair(son, Angelica, present.  Patient declining to eat lunch at this time.)  Restraints  Initially in place: No     Therapy Time   Individual Concurrent Group Co-treatment   Time In 1030         Time Out 1115         [address]   Time In  3512   Time Out  1600   Minutes  45          Electronically signed by Chitra Landaverde PT on 10/2/2020 at 12:32 PM

## 2020-10-02 NOTE — PLAN OF CARE
Problem: Falls - Risk of:  Goal: Will remain free from falls  Description: Will remain free from falls  10/2/2020 1534 by Ham Brush RN  Outcome: Ongoing  Note: Fall risk assessment completed. Fall precautions in place. Call light within reach. Pt educated on calling for assistance before getting up. Walkway free of clutter. Will continue to monitor. Problem: Skin Integrity:  Goal: Absence of new skin breakdown  Description: Absence of new skin breakdown  10/2/2020 1534 by Ham Brush RN  Outcome: Ongoing  Note: Patient's skin was assessed. Skin is currently intact with scattered bruising. No new findings were noted. Patient is being educated on importance of turning/repostioning at least every two hours. RN will continue to educate and monitor. Problem: Daily Care:  Goal: Daily care needs are met  Description: Daily care needs are met  10/2/2020 1534 by Ham Brush RN  Outcome: Ongoing  Note: Pt is A&O x1, pt speaks Upper sorbian and requires an  to communicate. Call light within reach at all times. RN/PCA doing hourly rounds. Needs are being met this shift. Will continue to monitor. Problem: Pain:  Goal: Control of acute pain  Description: Control of acute pain  10/2/2020 1534 by Ham Brush RN  Outcome: Ongoing  Note: Pt assessed for pain. Pt denies any pain at this time. Will continue to monitor pt and assess for pain throughout rest of shift. Problem: ACTIVITY INTOLERANCE/IMPAIRED MOBILITY  Goal: Mobility/activity is maintained at optimum level for patient  10/2/2020 1534 by Ham Brush RN  Outcome: Ongoing  Note: Pt mobility is increasing. Pt continues with therapy. Pt is stedy x1-2 assist. Will monitor.        Problem: Serum Glucose Level - Abnormal:  Goal: Ability to maintain appropriate glucose levels will improve  Description: Ability to maintain appropriate glucose levels will improve  10/2/2020 1534 by Ham Brush RN  Outcome:

## 2020-10-02 NOTE — PLAN OF CARE
Problem: Nutrition  Goal: Optimal nutrition therapy  Outcome: Ongoing   Nutrition Problem #1:  Biting/chewing (masticatory) difficulty  Intervention: Food and/or Nutrient Delivery: Continue Current Diet, Discontinue Oral Nutrition Supplement  Nutritional Goals: Consume >50% of meals and supplements

## 2020-10-02 NOTE — PROGRESS NOTES
Department of Physical Medicine & Rehabilitation  Progress Note    Patient Identification:  Iam Soto  [id number]  : 1945  Admit date: 2020    Chief Complaint: Right pontine stroke [facility])    Subjective:   No acute events overnight. Patient seen this afternoon sitting up in room. Having elevated blood sugars but is asymptomatic. ROS: No f/c, n/v, cp     Objective:  Patient Vitals for the past 24 hrs:   BP Temp Temp src Pulse Resp SpO2 Height Weight   10/02/20 0852 -- -- -- -- -- -- 5' 3\" (1.6 m) --   10/02/20 0845 (!) 163/101 98.1 °F (36.7 °C) Oral 80 18 94 % -- --   10/02/20 0359 (!) 140/77 97.7 °F (36.5 °C) Oral 88 16 95 % -- 125 lb 3.5 oz (56.8 kg)   10/02/20 0003 (!) 147/76 97.3 °F (36.3 °C) Oral 87 16 99 % -- --   10/01/20 2007 135/75 98.1 °F (36.7 °C) Oral 90 16 96 % -- --   10/01/20 1600 (!) 148/93 97.4 °F (36.3 °C) Axillary 88 16 95 % -- --   10/01/20 1300 (!) 151/94 97.5 °F (36.4 °C) Oral 95 16 94 % -- --     Const: Alert. No distress, pleasant. HEENT: Normocephalic, atraumatic. Normal sclera/conjunctiva. MMM. CV: Regular rate and rhythm. Resp: No respiratory distress. Lungs CTAB. Abd: Soft, nontender, nondistended, NABS+   Ext: No edema. Neuro: Alert, evidence of mild confusion (limited by language barrier), speech dysarthric, left hemiparesis (1-3/5)  Psych: Cooperative, appropriate mood and affect    Laboratory data: Available via EMR.    Last 24 hour lab  Recent Results (from the past 24 hour(s))   POCT Glucose    Collection Time: 10/01/20 12:02 PM   Result Value Ref Range    POC Glucose 247 (H) 70 - 99 mg/dl    Performed on ACCU-CHEK    POCT Glucose    Collection Time: 10/01/20  4:32 PM   Result Value Ref Range    POC Glucose 192 (H) 70 - 99 mg/dl    Performed on ACCU-CHEK    POCT Glucose    Collection Time: 10/01/20  7:41 PM   Result Value Ref Range    POC Glucose 159 (H) 70 - 99 mg/dl    Performed on ACCU-CHEK    POCT Glucose    Collection Time: 10/02/20  7:12 AM   Result Value Ref Range    POC Glucose 119 (H) 70 - 99 mg/dl    Performed on ACCU-CHEK        Therapy progress:  PT  Position Activity Restriction  Other position/activity restrictions: L sided weakness; speaks Napali - used  phone  Objective     Sit to Stand: Minimal Assistance  Stand to sit: Minimal Assistance, Moderate Assistance  Bed to Chair: Moderate assistance, Maximum assistance(to L)  Device: Hemiwalker  Other Apparatus: Wheelchair follow, Slider(L arm in sling)  Assistance: Maximum assistance  Distance: 4'  OT  PT Equipment Recommendations  Other: will monitor  Toilet - Technique: (via willis stedy)  Equipment Used: Standard toilet  Toilet Transfers Comments: use of willis stedy  Assessment        SLP  Diet Solids Recommendation: Dysphagia Minced and Moist (Dysphagia II)  Liquid Consistency Recommendation: Thin    Body mass index is 22.18 kg/m².     Assessment and Plan:    Impairments: left hemiparesis, decreased coordination, balance, endurance, cognition, dysarthria, dysphagia     Acute ischemic stroke  -Localization: Right son, posterior centrum semiolave, left frontal lobe deep white matter  -Etiology: small vessel vs embolic  -Telemetry in ARU, then event monitor at discharge  -Secondary ppx with DAPT x 3 weeks (then Plavix alone), statin, BP and glucose control  -PT/OT/SLP     H/o left PCA stroke with severe stenosis  -Secondary ppx as above     Dysphagia   -Dietitian and SLP consults.   -Upgrade to soft/bite sized + thins     HTN, uncontrolled  -Still allowing some permissive HTN due to worsening of symptoms with normalization of BP  -losartan (dose decreased)     DM2, uncontrolled  -Lantus (increased to 30 units qhs, home dose), add 10 units prandial with breakfast, continue high dose ISS     CKD  -Avoid nephrotoxins, renally dose meds  -Monitor      L1 compression fracture  -Pain control  -PT/OT     Bladder   -High risk retention   -Monitor PVRs, SC prn >300cc  -Flomax     Bowel   -High risk constipation   -senna+colace BID, PRN miralax, MoM, and bisacodyl supp.     Pain control  -prn tramadol     PPx  -DVT: lovenox  -GI: pantoprazole    Rehab Progress: Making gradual progress. Working on left side function, functional mobility, compensatory strategies (anticipating primarily wheelchair for mobility). SLP working on aphasia/apraxia, dysarthria, dysphagia, cognition. Anticipated Dispo: home with family, anticipate 24/7 assist  Services:  PT, OT, SLP, RN, Aide  DME: [facility]  ELOS: 10/17      Mac Sejal.  Krystal Ye MD 10/2/2020, 11:14 AM

## 2020-10-02 NOTE — PROGRESS NOTES
Pt resting in bed comfortably. A&O X1, forgetful. Admitted on 9/25/2020 with right pontine stroke. Left sided weakness. HR regular. Telemetry. Lungs sounds clear/diminished to base. Denies any chest pain or shortness of breaths. Abd soft and nontender. Active bowel sounds. Skin dry and intact. Transfers with stedy X1-2. HS medication given. Tolerated well. Education provided on pain management. Skin care, medication, and fall precaution. Call light in reach. Patient instructed to call if there is any needs or changes.  Electronically signed by Ethel Fournier RN on 10/1/2020 at 10:54 PM

## 2020-10-02 NOTE — PROGRESS NOTES
Comprehensive Nutrition Assessment    Type and Reason for Visit:  Reassess    Nutrition Recommendations/Plan:   Continue Carb control (4CHO/meal), Dysphagia soft and bite sized, vegetarian (lacto-ovo). D/C Ensure  Clear. Nutrition Assessment:  Pt diet recently upgraded to soft and bite sized. Pt with good intakes of specific foods and family brings in food from home d/t diet restrictions. Noted intakes varied over the week but >75% yesterday at all meals. Supplement on board but pt not favorable to it. Will D/C.     Malnutrition Assessment:  Malnutrition Status:  No malnutrition        Estimated Daily Nutrient Needs:  Energy (kcal):  1820-6179 kcal (28-32 kcal/kg)  Protein (g):  45-73g (0.8-1.3 g/kg)       Fluid (ml/day):  1 ml/kcal    Nutrition Related Findings:  no edema; BM 10/1; gluc remains elevated      Wounds:  None       Current Nutrition Therapies:    Dietary Nutrition Supplements: Clear Liquid Oral Supplement  DIET GENERAL; Carb Control: 4 carb choices (60 gms)/meal; Dysphagia Soft and Bite-Sized; Vegetarian (Lacto-Ovo)    Anthropometric Measures:  · Height: 5' 3\" (160 cm)  · Current Body Weight: 125 lb (56.7 kg)   · Usual Body Weight: 133 lb (60.3 kg)(per EMR)     · Ideal Body Weight: 124 lbs; % Ideal Body Weight 100.8 %   · BMI: 22.1  · BMI Categories: Underweight (BMI less than 22) age over 72       Nutrition Diagnosis:   · Biting/chewing (masticatory) difficulty related to altered GI function as evidenced by (need for altered texture)      Nutrition Interventions:   Food and/or Nutrient Delivery:  Continue Current Diet, Discontinue Oral Nutrition Supplement  Nutrition Education/Counseling:  No recommendation at this time   Coordination of Nutrition Care:  Continued Inpatient Monitoring    Goals:  Consume >50% of meals and supplements       Nutrition Monitoring and Evaluation:   Food/Nutrient Intake Outcomes:  Food and Nutrient Intake  Physical Signs/Symptoms Outcomes:  Biochemical Data, Chewing or Swallowing, Weight, Skin, Meal Time Behavior     Discharge Planning:    Continue current diet     Electronically signed by Romelia Cuevas RD, LD on 10/2/20 at 8:55 AM EDT    Contact: [phone]

## 2020-10-02 NOTE — PROGRESS NOTES
Patient admitted to rehab with CVA. A&Ox1. Pt speaks Frisian and requires an . Transfers with stedy x1-2 assist. On minced to moist, no eggs or meat diet, tolerating well. Medications taken whole in applesauce and thin liquids. On ASA and Plavix for DVT prophylaxis. Pt skin intact with scattered bruising. On room air. Has been continent of bowel and bladder. LBM 10/2/2020. Pt denies pain/discomfort. Chair/bed alarms in use and call light in reach. Pt currently on telemetry. Will continue to monitor.

## 2020-10-02 NOTE — PROGRESS NOTES
Occupational Therapy  Facility/Department: [address] IP REHAB  Daily Treatment Note  NAME: Gonsalo Boyce  : 1945  MRN: [id number]    Date of Service: 10/2/2020    Discharge Recommendations:  24 hour supervision or assist, Home with Home health OT, Continue to assess pending progress  OT Equipment Recommendations  Other: TBD    Assessment   Performance deficits / Impairments: Decreased functional mobility ; Decreased ADL status; Decreased ROM; Decreased strength;Decreased endurance;Decreased balance;Decreased coordination;Decreased safe awareness;Decreased cognition;Decreased sensation;Decreased posture  Assessment: Pt tolerated tx fair. Session made difficult d/t use of  phone and  only able to understand the pt ~25% of the time. Pt completed stand pivot transfers mod A without device, min A with use of grab bars and max cues for transfer technique. Pt improved in ADL performance, required setup for grooming, min A for UB dressing, setup for UB bathing, mod/max A for LB bathing, max A for LB dressing. Pt continues to perseverate on left side weakness throughout session. Cont per POC  Treatment Diagnosis: decreased ADL, balance, Left sided function  Prognosis: Good  OT Education: OT Role;Plan of Care;Transfer Training;Precautions; ADL Adaptive Strategies  Patient Education: ADL retraining, alondra techniques  Barriers to Learning: language, possible aphasia  REQUIRES OT FOLLOW UP: Yes  Activity Tolerance  Activity Tolerance: Patient Tolerated treatment well  Activity Tolerance: session limited d/t language barrier and  unable to understand the pt  Safety Devices  Safety Devices in place: Yes  Type of devices: Call light within reach;Gait belt; Chair alarm in place; Left in chair;Patient at risk for falls;Nurse notified         Patient Diagnosis(es): There were no encounter diagnoses.       has a past medical history of Cerebral artery occlusion with cerebral infarction [facility]), Diabetes mellitus ([facility] [address] Gallstone, GERD (gastroesophageal reflux disease), Hyperlipidemia, Hypertension, and Renal insufficiency. has a past surgical history that includes Appendectomy; Cholecystectomy; Upper gastrointestinal endoscopy (06/08/2018); Upper gastrointestinal endoscopy (N/A, 2/28/2019); Upper gastrointestinal endoscopy (N/A, 4/23/2019); Upper gastrointestinal endoscopy (N/A, 4/23/2019); Upper gastrointestinal endoscopy (N/A, 4/29/2020); and Colonoscopy (N/A, 5/8/2020). Restrictions  Restrictions/Precautions  Restrictions/Precautions: Fall Risk  Required Braces or Orthoses?: No  Position Activity Restriction  Other position/activity restrictions: L sided weakness; speaks [place] - uses  phone, family, and also in person interpretors  Subjective   General  Chart Reviewed: Yes, Progress Notes  Additional Pertinent Hx: Per Dr. Vinay Carr , \"The patient is a 76 y.o. male who presents to [facility] with unsteady on feet. Pt speaks Lithuanian, son at bedside helping getting history. According to son/pt, pt apparently sustained a fall yesterday and felt like he couldn't move his left side. Today he had difficulty ambulating using his lt side and hence son brought pt to the ER\"  Diagnosed with  Response to previous treatment: Patient with no complaints from previous session  Family / Caregiver Present: No(son arrived at end of session)  Referring Practitioner: Kristal Jang  Diagnosis: Acute CVA - with left sided weakness  Subjective  Subjective: pt met b/s for OT. pt seated in chair, used  phone and pt agreed to therapy and shower. pt c/o pain in back, left hand, left shoulder. OT notified RN.  pt requested to go to the bathroom      Orientation     Objective    ADL  Grooming: Setup(pt completed oral care seated at sink with setup)  UE Bathing: Minimal assistance(assist to bathe R UE)  LE Bathing: Maximum assistance  UE Dressing: Minimal assistance;Verbal cueing;Setup(pt doffed tshirt with cues. pt required assist to thread L UE through sleeve, and then able to don rest of shirt with cues)  LE Dressing: Maximum assistance(assist to thread briefs and shorts over L LE, pt able to thread over R LE. assist to pull up over hips and assist for both socks)  Toileting: Maximum assistance(pt attempted to have BM but unable to. pt states he feels like he has to have BM but has not been able to. notified RN. pt required assist for pants down/up)  Additional Comments: wet towel for non skid surface in shower. used  phone for most of shower but difficult as the  was unable to understand a lot of what the pt was saying. pt was hesitant to remove his briefs, but agreed to let OT place towel over his lap during shower for modesty        Balance  Sitting Balance: Stand by assistance  Standing Balance: Minimal assistance  Functional Mobility  Functional - Mobility Device: Wheelchair  Activity: To/from bathroom  Assist Level: Dependent/Total  Functional Mobility Comments: OT managed w/c during session d/t time constraints. pt completed stand pivot transfers <> w/c (see transfers)  Toilet Transfers  Toilet - Technique: Stand pivot  Equipment Used: Grab bars  Toilet Transfer: Minimal assistance; Moderate assistance  Toilet Transfers Comments: use of grab bars, max cues for technique  Shower Transfers  Shower - Transfer From: Wheelchair  Shower - Transfer Type: To and From  Shower - Transfer To: Transfer tub bench  Shower - Technique: Stand pivot  Shower Transfers: Moderate assistance;Minimal assistance  Shower Transfers Comments: min/mod A using grab bars, max cues  Wheelchair Bed Transfers  Wheelchair/Bed - Technique: Stand pivot  Equipment Used: Wheelchair; Other  Level of Asssistance:  Moderate assistance  Wheelchair Transfers Comments: high back arm chair > w/c > commode > w/c > TTB > high back arm chair all mod A without device (min A with use of grab bars at times)     Transfers  Sit to stand: Minimal assistance  Stand to sit: Minimal assistance  Transfer Comments: use of grab bars during shower and for LB dressing, assist for LUE/LLE placement        Cognition  Overall Cognitive Status: Exceptions(son assisted to interpret)  Following Commands: Follows one step commands with increased time; Follows one step commands with repetition  Attention Span: Attends with cues to redirect  Safety Judgement: Decreased awareness of need for safety  Problem Solving: Assistance required to implement solutions;Assistance required to generate solutions;Assistance required to identify errors made  Insights: Decreased awareness of deficits  Initiation: Requires cues for all  Sequencing: Requires cues for some  Cognition Comment: used  phone this session and  had a very hard time understanding the pt. pt also perseverating on L side weakness throughout session        PM Session:  Subjective: pt met in dept for therapy. Seen as co-treatment with PT in order to safely perform fxl mobility. Pt c/o pain in L knee with mobility but did not rate. Pt again perseverating that his left side does not move.  present. Pt in good spirits and laughing with therapists during session    Objective:  Pt completed sit>stand min Ax2 to hemiwalker. Bobath sling used to support    Pt completed fxl mobility with min A from OT for sequencing with hemiwalker and trunk control, mod A from PT to facilitate L LE movement. Mirror placed in front of pt to provide visual feedback. Pt completed second bout of mobility with same assist levels as above. Pt required cues to sequence hemiwalker each time, and required max verbal/tactile cues for hand placement during sit<>stand transitions. Pt appears very fearful of removing hand from hemiwalker. Pt stood ~2 mins with mod/max Ax1-min Ax1 for static standing balance d/t pt rotating to the left.  Pt reached overhead using R UE and placed colored rings on top of mirror in front of pt with min Ax2 for balance. Pt required cues to use hemiwalker to stabilize himself prn. Pt had one LOB to the left and required mod/max A to correct. Pt was returned to his room in w/c. Pt completed sit>stand min A, pivoted to high back arm chair with mod A. Pt was left positioned in chair for comfort with needs in reach, alarm engaged. Assessment: Pt tolerated tx fair though appeared more fatigued this date. Pt was unable to sequence hemiwalker during fxl mobility without max cues this date. Cont per POC    Plan   Plan  Times per week: 5-6 days per week  Times per day: Twice a day  Plan weeks: 3-4 weeks  Current Treatment Recommendations: Functional Mobility Training, Endurance Training, Safety Education & Training, Self-Care / ADL, Strengthening, Balance Training, ROM, Neuromuscular Re-education, Cognitive/Perceptual Training, Patient/Caregiver Education & Training, Equipment Evaluation, Education, & procurement, Cognitive Reorientation    Goals  Short term goals  Time Frame for Short term goals: 1 week pt will. Nathalie De La Garza term goal 1: bathe with mod assist and AD  Short term goal 2: dress UB with mod assist, LB with max assist and AD as needed  Short term goal 3: toilet with max assist and AD  Short term goal 4: transfer with mod assist and AD  Short term goal 5: functional mobilty with LRAD and mod assist  Short term goal 6: improve LUE function to assist with ADL, positioning 25%  Short term goal 7: improve left side awareness to require mod cues for safety with ADL and mobility  Long term goals  Time Frame for Long term goals : 3-4 weeks pt will. ..   Long term goal 1: bathe with min assist and AD  Long term goal 2: dress UB after set up, LB with min assist and AD as needed  Long term goal 3: toilet with CGA and AD as needed  Long term goal 4: transfer with CGA and AD as needed  Long term goal 5: functional mobility with CGA and LRAD  Patient Goals   Patient goals : \"I want to be normal again\"  With cues, pt agreed he wanted to improve his left UE strength, be able to bathe and dress himself, be able to stand and walk .   States he does not need to do anything in the kitchen       Therapy Time   Individual Concurrent Group Co-treatment   Time In 0900         Time Out 1005         Minutes 65            Therapy Time   Individual Co-treatment   Time In  [address] [place] [address]   Time Out  1600   Minutes  [address], OTR/L [id number]

## 2020-10-02 NOTE — PROGRESS NOTES
Distractible [] Motivated  [] Self-Limiting [] Anxious  [x] Other:  phone utilized. Endurance:  [x] Adequate for participation in SLP sessions  [] Reduced overall  [] Lethargic  [] Other:  Safety: [] No concerns at this time  [] Reduced insight into deficits  []  Reduced safety awareness [] Not following call light procedures  [] Unable to Assess  [] Other:  Swallowing Precautions: 90 degree positioning with all p.o. intake; small bites/sips; alternate textures through meal; reduce rate of intake;     Barriers toward pt/family plan for progress toward d/c plan: Medical status and caregiver support may be barriers           Date: 10/2/2020      Tx session 1 Tx session 2   Total Timed Code Min SLP Individual Minutes  Time In: 1430  Time Out: 8171  Minutes: 45  Coded treatment time  25 n/a     Group Treatment Minutes 0 0   Co-Treat Minutes 0 0   Variance/Reason:  n/a    Pain denied    Pain Intervention [] RN notified  [] Repositioned  [] Intervention offered and patient declined  [] N/A  [] Other: [] RN notified  [] Repositioned  [] Intervention offered and patient declined  [] N/A  [] Other:   Subjective     Pt seen bedside and in the treatment office   present for entire session  No family present  Pt in w/c receptive to therapy      Objective:  Goals       Dysphagia Goals:   1. Upgraded goal  The patient will tolerate dysphagia soft/bites size with thin liquid diet without observed clinical signs of aspiration,      Thin liquids via cup: No immediate overt clinical s/s post swallow    Pt with episodic delayed cough unclear if related or not (pt had an episodic cough at onset of session during oral motor ex also)    Soft/bite size : No overt clinical s/s of penetration/aspiration post swallow and no pt complaints post swallow. Pt with improved left oral motor muscular engagement during mastication and pt with improved lingual sweep.     n/a   2.  Upgraded goal  The patient will improve oral motor function via therapeutic oral motor exercises to 15/15 each trial. Left facial droop and weakness  Pt was agreeable to sensory stimulation (both icing and tactile stimulation)    -icing stimulation to exterior left facial structures: well tolerated  -resistance ex with engagement of left labial /buccal improved within the task to mild to moderate  -volitional rom during stimulation improved from minimal to mild to moderate for lip retraction; lip protrusion; lip rounding and with lip retraction while opening mouth wied      Also targeted with po: no anterior loss with cup presentations of thin liquid; no anterior loss during swish and swallow of thin liquids; no anterior loss during mastication. Pt demonstrating lingual sweep with oral suckling to clear oral pocketing   n/a   3. New goal   The patient will tolerate skilled trials of regular food with thin liquid consistencies  10/10.  Not targeted as pt again declined n/a     Cognitive-Communication goals       Goal 1:Modified goal   The pt will demonstrate improved recall of daily events; learned strategies with set up; structure and <max cues Modified goal    Temporal orientation/spatial orientation questions:   -Pt oriented to self and partially to place and situation  -max re-orientation to place/date and therapy schedule    Recall of 3 pc information after a delay/distraction: moderate to max repetition/re-direct    REcall of SLP oral motor ex; cognitive-communication ex over the past two session: max  cuues   n/a     Goal 2: Upgraded goal  The pt will demonstrate improved processing for short concrete questions regarding basic DL and / or to follow 1 step commands with max visual demonstrations;and with <moderate tactile cues   1 step commands without visual cues: remains inconsistent     1 step commands with visual demonstrations: >80%    1 step commands with visual demonstrations and tactile cues and tactile cues/ physical cues >90%    [place] Wh?: minimal to mild repetition/clarification required    Pt had difficulty making transitions between tasks; perseveration/redundancy of ideas/phrases. But overall is decreasing in severity  n/a   Goal 3: New goal  Pt will convey basic needs/wants via verbal/pointing/gestures >50%   CFN ([employer]): continued ; continues to demonstrate verbal ID by label or by description or by stating \"I do not know what it is\"    Open ended wh? (topics of therapy ex; family; LTM topics / reminiscing): >75% to 100%    Pt with less word finding or complaints of problems finding words; pt with less severity of redundancy of verbalizations     n/a   Other areas targeted: Cognition:  · Persistent problems in temporal and spatial orientation  · Persistent memory deficits session to session and gutierrez for working memory for new information within the session  · Reduced left visual attention and reduced safety awareness. Pt with improved sustained visual attention and visual localization to SLP sitting on the left side   · Pt was tearful and concerned if he will ever get better and get home  · Pt also tearful when talking about topic incorporating LTM  Oral Motor Speech  ·  reporting understanding >90% of pt's verbal responses;  · Pt is achieving daily improved left oral motor rom during speech tasks    Education:   Pt ed ongoing    Safety Devices: [] Call light within reach  [] Chair alarm activated  [] Bed alarm activated  [x] Other: to therapy department  [] Call light within reach  [] Chair alarm activated  [] Bed alarm activated  [] Other:    Progress Assessment: 9/28/2020:  phone utilized. Difficult to assess as pt did not always respond to questions/commands; and verbal responses were not always intelligible. SLP completed a chart review; chart review does report history of cognitive decline in memory/orientation/PS.  Pt reportedly lives with family (multi generations); needed assist with ADLs and homemaking; pt receives 24 hour supervision. 9/29/2020: Son voiced concern regarding communication changes in that pt is not able to use  phone; and has difficulty understanding what family say. SLP ed in use of context of an activity and visual cues/gestures to assist. Activities incorporating family; basic DL activities most optimal.  Will upgrade diet to soft/bite size food with thin liquids; oral care post meals. Pt/son ed in examples of food consistencies on soft/bite size food consistencies; and oral care. Toothettes placed in the room and SLP ed pt and family in uses. 9/30/2020 SLP discussed with RN (RN actually in room when addressing pt complaints regarding discomfort in current recliner) ; and  SLP discussed with team (at team meeting ) regarding pt height anc concern regarding current recliner in room (ie pt's feet dangle; pt complaints cannot get comfortable etc)  10/1/2020; 10/20/2020 :   present for session. Plan: Continue as per plan of care. Continued Tx Upon Discharge: ?    [] Yes [] No [x] TBD based on progress while on ARU [] Vital Stim indicated [] Other:   Estimated discharge date: TBD   Discharge recommendations:   [] Home independently  [x] Home with assistance [x]  24 hour supervision  [] ECF [] Other:     Additional information:     Interventions used during Rehab Stay:  [] Speech/Language Treatment  [] Instruction in HEP [] Group [x] Dysphagia Treatment [] Cognitive Treatment   [x] Other:     Adverse Reactions to Treatment/Significant Change in Status: n/a      Electronically Signed by    Remigio Jenkins. Prakash,MS,CCC,SLP [id number]  Speech and Language Pathologist

## 2020-10-03 LAB
GLUCOSE BLD-MCNC: 106 MG/DL (ref 70–99)
GLUCOSE BLD-MCNC: 157 MG/DL (ref 70–99)
GLUCOSE BLD-MCNC: 167 MG/DL (ref 70–99)
GLUCOSE BLD-MCNC: 326 MG/DL (ref 70–99)
GLUCOSE BLD-MCNC: 405 MG/DL (ref 70–99)
GLUCOSE BLD-MCNC: 63 MG/DL (ref 70–99)
PERFORMED ON: ABNORMAL

## 2020-10-03 PROCEDURE — 1280000000 HC REHAB R&B

## 2020-10-03 PROCEDURE — 6370000000 HC RX 637 (ALT 250 FOR IP): Performed by: PHYSICAL MEDICINE & REHABILITATION

## 2020-10-03 PROCEDURE — 94760 N-INVAS EAR/PLS OXIMETRY 1: CPT

## 2020-10-03 PROCEDURE — 6360000002 HC RX W HCPCS: Performed by: PHYSICAL MEDICINE & REHABILITATION

## 2020-10-03 RX ADMIN — LOSARTAN POTASSIUM 12.5 MG: 25 TABLET, FILM COATED ORAL at 07:25

## 2020-10-03 RX ADMIN — INSULIN GLARGINE 30 UNITS: 100 INJECTION, SOLUTION SUBCUTANEOUS at 21:02

## 2020-10-03 RX ADMIN — SENNOSIDES-DOCUSATE SODIUM TAB 8.6-50 MG 1 TABLET: 8.6-5 TAB at 07:25

## 2020-10-03 RX ADMIN — Medication 3 MG: at 21:07

## 2020-10-03 RX ADMIN — INSULIN LISPRO 3 UNITS: 100 INJECTION, SOLUTION INTRAVENOUS; SUBCUTANEOUS at 07:26

## 2020-10-03 RX ADMIN — ENOXAPARIN SODIUM 40 MG: 40 INJECTION SUBCUTANEOUS at 21:05

## 2020-10-03 RX ADMIN — ROSUVASTATIN CALCIUM 10 MG: 10 TABLET, FILM COATED ORAL at 07:26

## 2020-10-03 RX ADMIN — INSULIN LISPRO 12 UNITS: 100 INJECTION, SOLUTION INTRAVENOUS; SUBCUTANEOUS at 13:13

## 2020-10-03 RX ADMIN — TAMSULOSIN HYDROCHLORIDE 0.4 MG: 0.4 CAPSULE ORAL at 21:05

## 2020-10-03 RX ADMIN — SUCRALFATE 1 G: 1 TABLET ORAL at 13:12

## 2020-10-03 RX ADMIN — DULOXETINE HYDROCHLORIDE 30 MG: 30 CAPSULE, DELAYED RELEASE ORAL at 07:25

## 2020-10-03 RX ADMIN — SENNOSIDES-DOCUSATE SODIUM TAB 8.6-50 MG 1 TABLET: 8.6-5 TAB at 21:05

## 2020-10-03 RX ADMIN — INSULIN LISPRO 9 UNITS: 100 INJECTION, SOLUTION INTRAVENOUS; SUBCUTANEOUS at 21:03

## 2020-10-03 RX ADMIN — CLOPIDOGREL BISULFATE 75 MG: 75 TABLET ORAL at 07:25

## 2020-10-03 RX ADMIN — TRAMADOL HYDROCHLORIDE 50 MG: 50 TABLET ORAL at 21:07

## 2020-10-03 RX ADMIN — DICLOFENAC 4 G: 10 GEL TOPICAL at 13:28

## 2020-10-03 RX ADMIN — SUCRALFATE 1 G: 1 TABLET ORAL at 05:32

## 2020-10-03 RX ADMIN — DICLOFENAC 4 G: 10 GEL TOPICAL at 21:04

## 2020-10-03 RX ADMIN — PANTOPRAZOLE SODIUM 40 MG: 40 TABLET, DELAYED RELEASE ORAL at 05:32

## 2020-10-03 RX ADMIN — DICLOFENAC 4 G: 10 GEL TOPICAL at 07:26

## 2020-10-03 RX ADMIN — INSULIN LISPRO 10 UNITS: 100 INJECTION, SOLUTION INTRAVENOUS; SUBCUTANEOUS at 07:27

## 2020-10-03 RX ADMIN — SUCRALFATE 1 G: 1 TABLET ORAL at 21:05

## 2020-10-03 RX ADMIN — POLYETHYLENE GLYCOL 3350 17 G: 17 POWDER, FOR SOLUTION ORAL at 13:12

## 2020-10-03 RX ADMIN — ASPIRIN 81 MG: 81 TABLET, COATED ORAL at 07:26

## 2020-10-03 ASSESSMENT — PAIN SCALES - GENERAL
PAINLEVEL_OUTOF10: 6
PAINLEVEL_OUTOF10: 0
PAINLEVEL_OUTOF10: 0

## 2020-10-03 ASSESSMENT — PAIN DESCRIPTION - LOCATION: LOCATION: KNEE

## 2020-10-03 ASSESSMENT — PAIN DESCRIPTION - DESCRIPTORS: DESCRIPTORS: ACHING

## 2020-10-03 ASSESSMENT — PAIN DESCRIPTION - PAIN TYPE: TYPE: ACUTE PAIN

## 2020-10-03 ASSESSMENT — PAIN DESCRIPTION - FREQUENCY: FREQUENCY: OTHER (COMMENT)

## 2020-10-03 ASSESSMENT — PAIN DESCRIPTION - ORIENTATION: ORIENTATION: RIGHT;LEFT

## 2020-10-03 NOTE — PLAN OF CARE
Problem: Falls - Risk of:  Goal: Will remain free from falls  Description: Will remain free from falls  10/3/2020 0146 by Mely Mayorga RN  Outcome: Ongoing  10/2/2020 1534 by Kaila Martinez RN  Outcome: Ongoing  Note: Fall risk assessment completed. Fall precautions in place. Call light within reach. Pt educated on calling for assistance before getting up. Walkway free of clutter. Will continue to monitor. 10/2/2020 1508 by Kaila Martinez RN  Outcome: Ongoing  Note: Fall risk assessment completed. Fall precautions in place. Call light within reach. Pt educated on calling for assistance before getting up. Walkway free of clutter. Will continue to monitor. Goal: Absence of physical injury  Description: Absence of physical injury  10/3/2020 0146 by Mely Mayorga RN  Outcome: Ongoing  10/2/2020 1534 by Kaila Martinez RN  Outcome: Ongoing  10/2/2020 1508 by Kaila Martinez RN  Outcome: Ongoing     Problem: Skin Integrity:  Goal: Will show no infection signs and symptoms  Description: Will show no infection signs and symptoms  10/3/2020 0146 by Mely Mayorga RN  Outcome: Ongoing  10/2/2020 1534 by Kaila Martinez RN  Outcome: Ongoing  10/2/2020 1508 by Kaila Martinez RN  Outcome: Ongoing  Goal: Absence of new skin breakdown  Description: Absence of new skin breakdown  10/3/2020 0146 by Mely Mayorga RN  Outcome: Ongoing  10/2/2020 1534 by Kaila Martinez RN  Outcome: Ongoing  Note: Patient's skin was assessed. Skin is currently intact with scattered bruising. No new findings were noted. Patient is being educated on importance of turning/repostioning at least every two hours. RN will continue to educate and monitor. 10/2/2020 1508 by Kaila Martinez RN  Outcome: Ongoing  Note: Patient's skin was assessed. Skin is currently intact with scattered bruising. No new findings were noted. Patient is being educated on importance of turning/repostioning at least every two hours.  RN will continue to educate and monitor. Problem: Daily Care:  Goal: Daily care needs are met  Description: Daily care needs are met  10/3/2020 0146 by Hugh Chow RN  Outcome: Ongoing  10/2/2020 1534 by Lara Shaffer RN  Outcome: Ongoing  Note: Pt is A&O x1, pt speaks [place]ian and requires an  to communicate. Call light within reach at all times. RN/PCA doing hourly rounds. Needs are being met this shift. Will continue to monitor. 10/2/2020 1508 by Lara Shaffer RN  Outcome: Ongoing  Note: Pt is A&O x1. Pt speaks Albanian and requires  to communicate. Call light within reach at all times. RN/PCA doing hourly rounds. Needs are being met this shift. Will continue to monitor. Problem: Pain:  Goal: Patient's pain/discomfort is manageable  Description: Patient's pain/discomfort is manageable  10/3/2020 0146 by Hugh Chow RN  Outcome: Ongoing  10/2/2020 1534 by Lara Shaffer RN  Outcome: Ongoing  10/2/2020 1508 by Lara Shaffer RN  Outcome: Ongoing  Goal: Pain level will decrease  Description: Pain level will decrease  10/2/2020 1534 by Lara Shaffer RN  Outcome: Ongoing  10/2/2020 1508 by Lara Shaffer RN  Outcome: Ongoing  Goal: Control of acute pain  Description: Control of acute pain  10/2/2020 1534 by Lara Shaffer RN  Outcome: Ongoing  Note: Pt assessed for pain. Pt denies any pain at this time. Will continue to monitor pt and assess for pain throughout rest of shift. 10/2/2020 1508 by Lara Shaffer RN  Outcome: Ongoing  Note: Pt assessed for pain. Pt denies any pain at this time. Will continue to monitor pt and assess for pain throughout rest of shift.     Goal: Control of chronic pain  Description: Control of chronic pain  10/2/2020 1534 by Lara Shaffer RN  Outcome: Ongoing  10/2/2020 1508 by Lara Shaffer RN  Outcome: Ongoing     Problem: Discharge Planning:  Goal: Patients continuum of care needs are met  Description: Patients continuum of care needs are met  10/3/2020 0146 by Fran Medina RN  Outcome: Ongoing  10/2/2020 1534 by Benny Banegas RN  Outcome: Ongoing  10/2/2020 1508 by Benny Banegas RN  Outcome: Ongoing     Problem: Nutrition  Goal: Optimal nutrition therapy  10/3/2020 0146 by Fran Medina RN  Outcome: Ongoing  10/2/2020 1534 by Benny Banegas RN  Outcome: Ongoing  10/2/2020 1508 by Benny Banegas RN  Outcome: Ongoing     Problem: HEMODYNAMIC STATUS  Goal: Patient has stable vital signs and fluid balance  10/3/2020 0146 by Fran Medina RN  Outcome: Ongoing  10/2/2020 1534 by Benny Banegas RN  Outcome: Ongoing  10/2/2020 1508 by Benny Banegas RN  Outcome: Ongoing     Problem: ACTIVITY INTOLERANCE/IMPAIRED MOBILITY  Goal: Mobility/activity is maintained at optimum level for patient  10/3/2020 0146 by Fran Medina RN  Outcome: Ongoing  10/2/2020 1534 by Benny Banegas RN  Outcome: Ongoing  Note: Pt mobility is increasing. Pt continues with therapy. Pt is stedy x1-2 assist. Will monitor. 10/2/2020 1508 by Benny Banegas RN  Outcome: Ongoing     Problem: COMMUNICATION IMPAIRMENT  Goal: Ability to express needs and understand communication  10/2/2020 1534 by Benny Banegas RN  Outcome: Ongoing  10/2/2020 1508 by Benny Banegas RN  Outcome: Ongoing     Problem: Serum Glucose Level - Abnormal:  Goal: Ability to maintain appropriate glucose levels will improve  Description: Ability to maintain appropriate glucose levels will improve  10/2/2020 1534 by Benny Banegas RN  Outcome: Ongoing  Note: Blood glucose levels being monitored and treated per order. Dietary restrictions noted and followed. Instructed on signs/symptoms of hypoglycemia and hyperglycemia. DM education being given. Will continue to educate and monitor.    10/2/2020 1508 by Benny Banegas RN  Outcome: Ongoing     Problem: IP SWALLOWING  Goal: LTG - patient will tolerate the least restrictive diet consistency to allow for safe consumption of daily meals  10/2/2020 1534 by Silviano Lang RN  Outcome: Ongoing  10/2/2020 1508 by Silviano Lang RN  Outcome: Ongoing  Goal: STG - Patient will follow recommended swallowing strategies  10/2/2020 1534 by Silviano Lang RN  Outcome: Ongoing  10/2/2020 1508 by Silviano Lang RN  Outcome: Ongoing

## 2020-10-04 LAB
GLUCOSE BLD-MCNC: 131 MG/DL (ref 70–99)
GLUCOSE BLD-MCNC: 131 MG/DL (ref 70–99)
GLUCOSE BLD-MCNC: 313 MG/DL (ref 70–99)
GLUCOSE BLD-MCNC: 81 MG/DL (ref 70–99)
PERFORMED ON: ABNORMAL
PERFORMED ON: NORMAL

## 2020-10-04 PROCEDURE — 6370000000 HC RX 637 (ALT 250 FOR IP): Performed by: PHYSICAL MEDICINE & REHABILITATION

## 2020-10-04 PROCEDURE — 1280000000 HC REHAB R&B

## 2020-10-04 PROCEDURE — 94760 N-INVAS EAR/PLS OXIMETRY 1: CPT

## 2020-10-04 PROCEDURE — 6360000002 HC RX W HCPCS: Performed by: PHYSICAL MEDICINE & REHABILITATION

## 2020-10-04 RX ADMIN — CLOPIDOGREL BISULFATE 75 MG: 75 TABLET ORAL at 07:24

## 2020-10-04 RX ADMIN — ROSUVASTATIN CALCIUM 10 MG: 10 TABLET, FILM COATED ORAL at 07:25

## 2020-10-04 RX ADMIN — TRAMADOL HYDROCHLORIDE 100 MG: 50 TABLET, FILM COATED ORAL at 20:14

## 2020-10-04 RX ADMIN — ACETAMINOPHEN 650 MG: 325 TABLET ORAL at 07:28

## 2020-10-04 RX ADMIN — INSULIN LISPRO 6 UNITS: 100 INJECTION, SOLUTION INTRAVENOUS; SUBCUTANEOUS at 20:34

## 2020-10-04 RX ADMIN — LOSARTAN POTASSIUM 12.5 MG: 25 TABLET, FILM COATED ORAL at 07:25

## 2020-10-04 RX ADMIN — SUCRALFATE 1 G: 1 TABLET ORAL at 05:53

## 2020-10-04 RX ADMIN — DICLOFENAC 4 G: 10 GEL TOPICAL at 20:13

## 2020-10-04 RX ADMIN — TAMSULOSIN HYDROCHLORIDE 0.4 MG: 0.4 CAPSULE ORAL at 20:13

## 2020-10-04 RX ADMIN — PANTOPRAZOLE SODIUM 40 MG: 40 TABLET, DELAYED RELEASE ORAL at 17:42

## 2020-10-04 RX ADMIN — DICLOFENAC 4 G: 10 GEL TOPICAL at 07:25

## 2020-10-04 RX ADMIN — ASPIRIN 81 MG: 81 TABLET, COATED ORAL at 07:25

## 2020-10-04 RX ADMIN — SENNOSIDES-DOCUSATE SODIUM TAB 8.6-50 MG 1 TABLET: 8.6-5 TAB at 20:13

## 2020-10-04 RX ADMIN — INSULIN LISPRO 10 UNITS: 100 INJECTION, SOLUTION INTRAVENOUS; SUBCUTANEOUS at 07:28

## 2020-10-04 RX ADMIN — ENOXAPARIN SODIUM 40 MG: 40 INJECTION SUBCUTANEOUS at 20:13

## 2020-10-04 RX ADMIN — DULOXETINE HYDROCHLORIDE 30 MG: 30 CAPSULE, DELAYED RELEASE ORAL at 07:25

## 2020-10-04 RX ADMIN — DICLOFENAC 4 G: 10 GEL TOPICAL at 18:03

## 2020-10-04 RX ADMIN — PANTOPRAZOLE SODIUM 40 MG: 40 TABLET, DELAYED RELEASE ORAL at 05:53

## 2020-10-04 RX ADMIN — INSULIN GLARGINE 30 UNITS: 100 INJECTION, SOLUTION SUBCUTANEOUS at 20:34

## 2020-10-04 RX ADMIN — Medication 3 MG: at 20:13

## 2020-10-04 RX ADMIN — SUCRALFATE 1 G: 1 TABLET ORAL at 20:30

## 2020-10-04 ASSESSMENT — PAIN DESCRIPTION - LOCATION
LOCATION: KNEE
LOCATION: KNEE

## 2020-10-04 ASSESSMENT — PAIN DESCRIPTION - PROGRESSION
CLINICAL_PROGRESSION: RAPIDLY WORSENING

## 2020-10-04 ASSESSMENT — PAIN SCALES - GENERAL
PAINLEVEL_OUTOF10: 0
PAINLEVEL_OUTOF10: 0
PAINLEVEL_OUTOF10: 1
PAINLEVEL_OUTOF10: 8

## 2020-10-04 ASSESSMENT — PAIN DESCRIPTION - DESCRIPTORS: DESCRIPTORS: OTHER (COMMENT)

## 2020-10-04 ASSESSMENT — PAIN DESCRIPTION - ORIENTATION
ORIENTATION: RIGHT;LEFT
ORIENTATION: RIGHT;LEFT

## 2020-10-04 ASSESSMENT — PAIN DESCRIPTION - ONSET
ONSET: ON-GOING
ONSET: ON-GOING

## 2020-10-04 ASSESSMENT — PAIN DESCRIPTION - PAIN TYPE
TYPE: ACUTE PAIN

## 2020-10-04 ASSESSMENT — PAIN - FUNCTIONAL ASSESSMENT
PAIN_FUNCTIONAL_ASSESSMENT: ACTIVITIES ARE NOT PREVENTED
PAIN_FUNCTIONAL_ASSESSMENT: ACTIVITIES ARE NOT PREVENTED

## 2020-10-04 NOTE — PROGRESS NOTES
Patient admitted to rehab with CVA. A/A/O x 1. Transfers with [facility] x 1-2. On minced and mois diet, tolerating well. Medications taken whole in applesauce and thin liquids. On Tele with normal sinus rhythm. On ASA and Plavix for DVT prophylaxis. Skin intact. On room air. Has been continent of bowel and bladder. LBM 10/2. Chair/bed alarms in use and call light in reach. Will monitor for safety.

## 2020-10-04 NOTE — PLAN OF CARE
Problem: Falls - Risk of:  Goal: Will remain free from falls  Description: Will remain free from falls  Outcome: Ongoing  Note: Fall risk band on patient. Orange light on outside of room. Non skid footwear in place. Alarms used appropriately. Patient instructed to call and wait for staff before getting up. Rounding done to anticipate needs. Appropriate safety devices used for transfers.

## 2020-10-05 LAB
ANION GAP SERPL CALCULATED.3IONS-SCNC: 10 MMOL/L (ref 3–16)
BASOPHILS ABSOLUTE: 0 K/UL (ref 0–0.2)
BASOPHILS RELATIVE PERCENT: 0.7 %
BUN BLDV-MCNC: 13 MG/DL (ref 7–20)
CALCIUM SERPL-MCNC: 9 MG/DL (ref 8.3–10.6)
CHLORIDE BLD-SCNC: 102 MMOL/L (ref 99–110)
CO2: 25 MMOL/L (ref 21–32)
CREAT SERPL-MCNC: 1.3 MG/DL (ref 0.8–1.3)
EOSINOPHILS ABSOLUTE: 0.2 K/UL (ref 0–0.6)
EOSINOPHILS RELATIVE PERCENT: 3.4 %
GFR AFRICAN AMERICAN: >60
GFR NON-AFRICAN AMERICAN: 54
GLUCOSE BLD-MCNC: 109 MG/DL (ref 70–99)
GLUCOSE BLD-MCNC: 178 MG/DL (ref 70–99)
GLUCOSE BLD-MCNC: 223 MG/DL (ref 70–99)
GLUCOSE BLD-MCNC: 233 MG/DL (ref 70–99)
GLUCOSE BLD-MCNC: 98 MG/DL (ref 70–99)
HCT VFR BLD CALC: 41.6 % (ref 40.5–52.5)
HEMOGLOBIN: 13.6 G/DL (ref 13.5–17.5)
LYMPHOCYTES ABSOLUTE: 2.7 K/UL (ref 1–5.1)
LYMPHOCYTES RELATIVE PERCENT: 37.6 %
MCH RBC QN AUTO: 25.5 PG (ref 26–34)
MCHC RBC AUTO-ENTMCNC: 32.6 G/DL (ref 31–36)
MCV RBC AUTO: 78 FL (ref 80–100)
MONOCYTES ABSOLUTE: 0.5 K/UL (ref 0–1.3)
MONOCYTES RELATIVE PERCENT: 7.4 %
NEUTROPHILS ABSOLUTE: 3.6 K/UL (ref 1.7–7.7)
NEUTROPHILS RELATIVE PERCENT: 50.9 %
PDW BLD-RTO: 16.2 % (ref 12.4–15.4)
PERFORMED ON: ABNORMAL
PERFORMED ON: NORMAL
PLATELET # BLD: 272 K/UL (ref 135–450)
PMV BLD AUTO: 7.6 FL (ref 5–10.5)
POTASSIUM REFLEX MAGNESIUM: 4.1 MMOL/L (ref 3.5–5.1)
RBC # BLD: 5.33 M/UL (ref 4.2–5.9)
SODIUM BLD-SCNC: 137 MMOL/L (ref 136–145)
WBC # BLD: 7.1 K/UL (ref 4–11)

## 2020-10-05 PROCEDURE — 6370000000 HC RX 637 (ALT 250 FOR IP): Performed by: PHYSICAL MEDICINE & REHABILITATION

## 2020-10-05 PROCEDURE — 97530 THERAPEUTIC ACTIVITIES: CPT

## 2020-10-05 PROCEDURE — 36415 COLL VENOUS BLD VENIPUNCTURE: CPT

## 2020-10-05 PROCEDURE — 97129 THER IVNTJ 1ST 15 MIN: CPT

## 2020-10-05 PROCEDURE — 80048 BASIC METABOLIC PNL TOTAL CA: CPT

## 2020-10-05 PROCEDURE — 1280000000 HC REHAB R&B

## 2020-10-05 PROCEDURE — 97116 GAIT TRAINING THERAPY: CPT

## 2020-10-05 PROCEDURE — 92526 ORAL FUNCTION THERAPY: CPT

## 2020-10-05 PROCEDURE — 97110 THERAPEUTIC EXERCISES: CPT

## 2020-10-05 PROCEDURE — 85025 COMPLETE CBC W/AUTO DIFF WBC: CPT

## 2020-10-05 PROCEDURE — 97130 THER IVNTJ EA ADDL 15 MIN: CPT

## 2020-10-05 PROCEDURE — 6360000002 HC RX W HCPCS: Performed by: PHYSICAL MEDICINE & REHABILITATION

## 2020-10-05 PROCEDURE — 97112 NEUROMUSCULAR REEDUCATION: CPT

## 2020-10-05 RX ORDER — LIDOCAINE 4 G/G
1 PATCH TOPICAL DAILY
Status: DISCONTINUED | OUTPATIENT
Start: 2020-10-05 | End: 2020-10-17 | Stop reason: HOSPADM

## 2020-10-05 RX ADMIN — INSULIN LISPRO 6 UNITS: 100 INJECTION, SOLUTION INTRAVENOUS; SUBCUTANEOUS at 17:06

## 2020-10-05 RX ADMIN — INSULIN GLARGINE 30 UNITS: 100 INJECTION, SOLUTION SUBCUTANEOUS at 20:57

## 2020-10-05 RX ADMIN — SUCRALFATE 1 G: 1 TABLET ORAL at 20:58

## 2020-10-05 RX ADMIN — INSULIN LISPRO 2 UNITS: 100 INJECTION, SOLUTION INTRAVENOUS; SUBCUTANEOUS at 20:57

## 2020-10-05 RX ADMIN — TAMSULOSIN HYDROCHLORIDE 0.4 MG: 0.4 CAPSULE ORAL at 20:58

## 2020-10-05 RX ADMIN — ROSUVASTATIN CALCIUM 10 MG: 10 TABLET, FILM COATED ORAL at 08:30

## 2020-10-05 RX ADMIN — SUCRALFATE 1 G: 1 TABLET ORAL at 05:29

## 2020-10-05 RX ADMIN — DULOXETINE HYDROCHLORIDE 30 MG: 30 CAPSULE, DELAYED RELEASE ORAL at 08:31

## 2020-10-05 RX ADMIN — INSULIN LISPRO 3 UNITS: 100 INJECTION, SOLUTION INTRAVENOUS; SUBCUTANEOUS at 08:31

## 2020-10-05 RX ADMIN — DICLOFENAC 4 G: 10 GEL TOPICAL at 13:23

## 2020-10-05 RX ADMIN — PANTOPRAZOLE SODIUM 40 MG: 40 TABLET, DELAYED RELEASE ORAL at 05:29

## 2020-10-05 RX ADMIN — ASPIRIN 81 MG: 81 TABLET, COATED ORAL at 08:30

## 2020-10-05 RX ADMIN — INSULIN LISPRO 10 UNITS: 100 INJECTION, SOLUTION INTRAVENOUS; SUBCUTANEOUS at 08:31

## 2020-10-05 RX ADMIN — Medication 3 MG: at 20:58

## 2020-10-05 RX ADMIN — LOSARTAN POTASSIUM 12.5 MG: 25 TABLET, FILM COATED ORAL at 08:30

## 2020-10-05 RX ADMIN — SENNOSIDES-DOCUSATE SODIUM TAB 8.6-50 MG 1 TABLET: 8.6-5 TAB at 08:30

## 2020-10-05 RX ADMIN — ACETAMINOPHEN 650 MG: 325 TABLET ORAL at 20:58

## 2020-10-05 RX ADMIN — SUCRALFATE 1 G: 1 TABLET ORAL at 13:23

## 2020-10-05 RX ADMIN — ENOXAPARIN SODIUM 40 MG: 40 INJECTION SUBCUTANEOUS at 20:28

## 2020-10-05 RX ADMIN — SENNOSIDES-DOCUSATE SODIUM TAB 8.6-50 MG 1 TABLET: 8.6-5 TAB at 20:58

## 2020-10-05 RX ADMIN — CLOPIDOGREL BISULFATE 75 MG: 75 TABLET ORAL at 08:31

## 2020-10-05 RX ADMIN — DICLOFENAC 4 G: 10 GEL TOPICAL at 21:17

## 2020-10-05 RX ADMIN — DICLOFENAC 4 G: 10 GEL TOPICAL at 08:31

## 2020-10-05 RX ADMIN — PANTOPRAZOLE SODIUM 40 MG: 40 TABLET, DELAYED RELEASE ORAL at 17:05

## 2020-10-05 ASSESSMENT — PAIN DESCRIPTION - ORIENTATION: ORIENTATION: LEFT

## 2020-10-05 ASSESSMENT — PAIN SCALES - GENERAL
PAINLEVEL_OUTOF10: 3
PAINLEVEL_OUTOF10: 0
PAINLEVEL_OUTOF10: 0

## 2020-10-05 ASSESSMENT — PAIN DESCRIPTION - ONSET: ONSET: ON-GOING

## 2020-10-05 ASSESSMENT — PAIN DESCRIPTION - LOCATION: LOCATION: SHOULDER

## 2020-10-05 ASSESSMENT — PAIN - FUNCTIONAL ASSESSMENT: PAIN_FUNCTIONAL_ASSESSMENT: ACTIVITIES ARE NOT PREVENTED

## 2020-10-05 ASSESSMENT — PAIN DESCRIPTION - PAIN TYPE: TYPE: ACUTE PAIN;CHRONIC PAIN

## 2020-10-05 ASSESSMENT — PAIN DESCRIPTION - PROGRESSION: CLINICAL_PROGRESSION: NOT CHANGED

## 2020-10-05 NOTE — PROGRESS NOTES
\"tightness. \"  Patient with increased difficulty eliciting contraction of musculature in left LE as fatigue increased and required min assist of 2 to maintain upright. OT reporting she did have episodes of CGA, but patient could not sustain that. Patient taken outside on therapy terrace and performed dynamic standing balance reaching up over head. Patient with improved use of left LE with more consistent contraction in left glutes and quads as he reached up to the right. He continued to require min assist of PT to facilitate contraction shift over onto right LE. CGA of OT. Patient was returned to room and requested to return to grey, alma delia back chair. He attempted to push therapist away and stated he could do it on his own. Patient required cueing for correct sequence and min assist of one and CGA of one to stand and pivot to chair. Assessment: Patient showing improvements in mobility but continues to require moderate cueing for sequencing and safety with ambulation and transfer. Patient continues to be well below baseline and will benefit from continued skilled therapy for strengthening, functional mobility training, and safety training to allow for safe return home with assistance.  Patient has advanced enough that co-treatment will be discontinued at this time and patient will be seen for individual session of PT and OT       Safety Device - Type of devices:  [x]  All fall risk precautions in place [] Bed alarm in place  [x] Call light within reach [x] Chair alarm in place [] Positioning belt [x] Gait belt [] Patient at risk for falls [] Left in bed [x] Left in chair [] Telesitter in use [] Sitter present [] Nurse notified []  None      Therapy Time   Individual Co-treatment   Time In  3297   Time Out  1600   Minutes  45       Electronically signed by Amanda Tiwari PT on 10/5/2020 at 4:24 PM

## 2020-10-05 NOTE — PROGRESS NOTES
Speech Language Pathology  ACUTE REHAB UNIT  SPEECH/LANGUAGE PATHOLOGY      [x] Daily  [] Weekly Care Conference Note  [] Discharge    Patient:Sylvester Duval      YGQ:[birth year]  NPR:[id number]  Rehab Dx/Hx: Right pontine stroke ([facility] [address] [I63.50]    Precautions: Dysphagia; Mariam Bianchi is native language; Potential cognitive-communication deficitgs  Home situation: Lives with family  ST Dx: [] Aphasia  [x] Dysarthria  [] Apraxia   [x] Oropharyngeal dysphagia [x] Cognitive   Impairment  [] Other:   Initial Speech Therapy Assessment Diagnosis:   Per INitial Evaluations 9/26/2020:   1. Cognitive Diagnosis: Pt presents with impaired memory, orientation,  and problem solving for basic information. Grandson reports this is typical for pt. Per acute notes, pt's son also confirmed this is pt's baseline. 2. Aphasia Diagnosis: Further evaluation needed to clarify. 3. Speech Diagnosis: Further evaluation needed to clarify. 4. Communication Diagnosis: Pt requires and  to communicate. Pt appears to often have difficulty expressing himself accurately and with detail for typical conversation. 5. Dysphagia Diagnosis: Mild pharyngeal stage dysphagia;Mild to moderate oral stage dysphagia   Pt presents with no overt signs of aspiration or penetration. Swallow appears effortful at times and pt reports some difficulty. Oral skills for solids appear impaired with slow posterior propulsion and trace oral residue. Pt at risk for adequate intake due to his c/o intermittent nausea, prefers only warm food and drinks, and the food provided is not what pt is familiar with.   Date of Admit: 9/25/2020  Room #: B9Z-8044/3260-01  Date: 10/5/2020       Current functional status (updated daily):         Current Diet Order:DIET GENERAL; Carb Control: 4 carb choices (60 gms)/meal; Dysphagia Soft and Bite-Sized; Vegetarian (Lacto-Ovo)   Behavior: [x] Alert  [] Cooperative  [x]  Pleasant  [x] Confused  [] Agitated  [] Uncooperative  [] Distractible [] Motivated  [] Self-Limiting [] Anxious  [x] Other:  phone utilized. Endurance:  [x] Adequate for participation in SLP sessions  [] Reduced overall  [] Lethargic  [] Other:  Safety: [] No concerns at this time  [] Reduced insight into deficits  []  Reduced safety awareness [] Not following call light procedures  [] Unable to Assess  [] Other:  Swallowing Precautions: 90 degree positioning with all p.o. intake; small bites/sips; alternate textures through meal; reduce rate of intake;     Barriers toward pt/family plan for progress toward d/c plan: Medical status and caregiver support may be barriers           Date: 10/5/2020      Tx session 1 Tx session 2   Total Timed Code Min SLP Individual Minutes  Time In: 1430  Time Out: 9690  Minutes: 39  Coded treatment time  25 n/a     Group Treatment Minutes 0 0   Co-Treat Minutes 0 0   Variance/Reason:  n/a    Pain denied    Pain Intervention [] RN notified  [] Repositioned  [] Intervention offered and patient declined  [] N/A  [] Other: [] RN notified  [] Repositioned  [] Intervention offered and patient declined  [] N/A  [] Other:   Subjective     Pt seen bedside and in the treatment office   present for entire session  No family present  Pt in bed; receptive to getting out of bed and into w/c for therapy  Good effort in therapy  SLP and RN assisted pt out of bed and into w/c (stedy was utilized)    Objective:  Goals       Dysphagia Goals:   1. Upgraded goal  The patient will tolerate dysphagia soft/bites size with thin liquid diet without observed clinical signs of aspiration,      Thin liquids via cup: No immediate overt clinical s/s post swallow    Soft/bite size crunchy/dry food consistency : No overt clinical s/s of penetration/aspiration post swallow and no pt complaints post swallow. Pt continues to demonstrate improving left oral motor muscular engagement during mastication; demonstrating lingual sweep and oral suckling.  Pt )    LTM: pt with continued increase verbalizations regarding family members (children/siblings and where they are currently living) n/a     Goal 2: Upgraded goal  The pt will demonstrate improved processing for short concrete questions regarding basic DL and / or to follow 1 step commands with max visual demonstrations;and with <moderate tactile cues   1 step commands without visual cues: 66%;overall improves if given time to process direction and in a structured task    1 step commands with visual demonstrations: needs assist with left side attention    1 step commands with visual demonstrations and tactile cues and tactile cues/ physical cues >90%    Oscar Wh?: minimal  repetition/clarification required    Pt continues to require extra time in making transitions between tasks/topics n/a   Goal 3: New goal  Pt will convey basic needs/wants via verbal/pointing/gestures >50%   Open ended wh? (topics of therapy ex; family; LTM topics / reminiscing): >75% to 100%      Verbal describing therapist mobility: 100%    Pt continues to report less word finding or complaints of problems finding words; pt with less severity of redundancy of verbalizations with exception of left side being weak    Discussion regarding pt's complaints early after onset of slurred speech. Pt reports improvement.   reports improvement ( reportedly as seen pt in the past early after onset)     n/a   Other areas targeted: Cognition:  · Persistent problems in temporal and spatial orientation  · Persistent reduced visual attention left side of body requiring verbal/visual/tactile cues with tasks within SLP session  · Persistent memory deficits session to session and for working memory for learned tasks (ie mobility; use of stedy; etc)  Oral Motor Speech  ·  reporting understanding 100% of pt's verbal responses;  · Pt is achieving daily improved left oral motor rom during speech tasks    Education:   Pt ed ongoing Safety Devices: [] Call light within reach  [] Chair alarm activated  [] Bed alarm activated  [x] Other: to therapy department  [] Call light within reach  [] Chair alarm activated  [] Bed alarm activated  [] Other:    Progress Assessment: 9/28/2020:  phone utilized. Difficult to assess as pt did not always respond to questions/commands; and verbal responses were not always intelligible. SLP completed a chart review; chart review does report history of cognitive decline in memory/orientation/PS. Pt reportedly lives with family (multi generations); needed assist with ADLs and homemaking; pt receives 24 hour supervision. 9/29/2020: Son voiced concern regarding communication changes in that pt is not able to use  phone; and has difficulty understanding what family say. SLP ed in use of context of an activity and visual cues/gestures to assist. Activities incorporating family; basic DL activities most optimal.  Will upgrade diet to soft/bite size food with thin liquids; oral care post meals. Pt/son ed in examples of food consistencies on soft/bite size food consistencies; and oral care. Toothettes placed in the room and SLP ed pt and family in uses. 9/30/2020 SLP discussed with RN (RN actually in room when addressing pt complaints regarding discomfort in current recliner) ; and  SLP discussed with team (at team meeting ) regarding pt height anc concern regarding current recliner in room (ie pt's feet dangle; pt complaints cannot get comfortable etc)  10/1/2020; 10/20/2020 :   present for session. Plan: Continue as per plan of care.    Continued Tx Upon Discharge: ?    [] Yes [] No [x] TBD based on progress while on ARU [] Vital Stim indicated [] Other:   Estimated discharge date: TBD   Discharge recommendations:   [] Home independently  [x] Home with assistance [x]  24 hour supervision  [] ECF [] Other:     Additional information:     Interventions used during Rehab Stay:  [] Speech/Language Treatment  [] Instruction in HEP [] Group [x] Dysphagia Treatment [] Cognitive Treatment   [x] Other:     Adverse Reactions to Treatment/Significant Change in Status: n/a      Electronically Signed by    Jeremie Sanchez. MS Prakash,CCC,SLP [id number]  Speech and Language Pathologist

## 2020-10-05 NOTE — PROGRESS NOTES
Patient admitted to rehab [address]. A&Ox1. Pt speaks Macedonian and requires an  to communicate. Transfers with [facility] x1-2 assist. On carb control, minced to moist, no eggs or meat diet, tolerating well. Medications taken whole in applesauce and thin liquids. On ASA and Plavix for DVT prophylaxis. Pt skin intact with scattered bruising. On room air. Has been continent of bowel and bladder.  LBM 10/2/2020. Pt denies pain/discomfort. Chair/bed alarms in use and call light in reach. Pt currently on telemetry. Will continue to monitor.

## 2020-10-05 NOTE — PROGRESS NOTES
Occupational Therapy  Facility/Department: [address] IP REHAB  Daily Treatment Note  NAME: Pollo Layton  : 1945  MRN: [id number]    Date of Service: 10/5/2020    Discharge Recommendations:  24 hour supervision or assist, Home with Home health OT, Continue to assess pending progress  OT Equipment Recommendations  Other: TBD    Assessment   Performance deficits / Impairments: Decreased functional mobility ; Decreased ADL status; Decreased ROM; Decreased strength;Decreased endurance;Decreased balance;Decreased coordination;Decreased safe awareness;Decreased cognition;Decreased sensation;Decreased posture  Assessment: Pt tolerated tx well. Pt demo'd good improvement in fxl transfers, completed sit<>stand CGA/min A and pivoted with min/mod A. Pt demo'd improved command following and carryover of transfer techniques compared to last week. Pt tolerated L UE AROM/AAROM/PROM well. Pt demo'd improvement in shoulder flex/extension, elbow flex/extension, but still with very minimal hand/wrist movement. Cont per OT POC  Treatment Diagnosis: decreased ADL, balance, Left sided function  Prognosis: Good  OT Education: OT Role;Plan of Care;Transfer Training;ADL Adaptive Strategies  Barriers to Learning: language, possible aphasia  REQUIRES OT FOLLOW UP: Yes  Activity Tolerance  Activity Tolerance: Patient Tolerated treatment well  Safety Devices  Safety Devices in place: Yes  Type of devices: Call light within reach;Gait belt; Chair alarm in place; Left in chair;Patient at risk for falls         Patient Diagnosis(es): There were no encounter diagnoses. has a past medical history of Cerebral artery occlusion with cerebral infarction ([facility] [address] Diabetes mellitus ([facility] [address] Gallstone, GERD (gastroesophageal reflux disease), Hyperlipidemia, Hypertension, and Renal insufficiency. has a past surgical history that includes Appendectomy; Cholecystectomy; Upper gastrointestinal endoscopy (2018);  Upper gastrointestinal endoscopy (N/A, 2/28/2019); Upper gastrointestinal endoscopy (N/A, 4/23/2019); Upper gastrointestinal endoscopy (N/A, 4/23/2019); Upper gastrointestinal endoscopy (N/A, 4/29/2020); and Colonoscopy (N/A, 5/8/2020). Restrictions  Restrictions/Precautions  Restrictions/Precautions: Fall Risk  Required Braces or Orthoses?: No  Position Activity Restriction  Other position/activity restrictions: L sided weakness; speaks Napali - uses  phone, family, and also in person interpretors  Subjective   General  Chart Reviewed: Yes, Progress Notes  Additional Pertinent Hx: Per Dr. Nehal Hester , \"The patient is a 76 y.o. male who presents to [facility] with unsteady on feet. Pt speaks Telugu, son at bedside helping getting history. According to son/pt, pt apparently sustained a fall yesterday and felt like he couldn't move his left side. Today he had difficulty ambulating using his lt side and hence son brought pt to the ER\"  Diagnosed with  Response to previous treatment: Patient with no complaints from previous session  Family / Caregiver Present: No  Referring Practitioner: Rodger Medina  Diagnosis: Acute CVA - with left sided weakness  Subjective  Subjective: pt met b/s for OT. pt agreeable to therapy, c/o back pain and L shoulder pain but did not rate. pt also states he is having a sore throat, OT let Dr Rodger Medina know         Objective    Balance  Sitting Balance: Stand by assistance  Standing Balance: Contact guard assistance  Functional Mobility  Functional - Mobility Device: Wheelchair  Activity: Other  Functional Mobility Comments: pt completed stand pivot transfers <> w/c min/mod A. OT managed w/c during session in interest of time  Wheelchair Bed Transfers  Wheelchair/Bed - Technique: Stand pivot  Equipment Used: Bed;Wheelchair; Other(high back arm chair)  Level of Asssistance: Minimal assistance  Wheelchair Transfers Comments: pt completed sit<>stand CGA/min A, pivoted from bed > w/c > arm chair to the right each time with min/mod A. pt able to step with R LE to pivot and attempted to reach R UE back automatically during stand>sit  Bed mobility  Supine to Sit: Minimal assistance(HOB elevated, slight assist for LLE)  Scooting: Contact guard assistance(mod cues to scoot to EOB)                          Cognition  Overall Cognitive Status: Exceptions(use of  phone)  Following Commands: Follows one step commands consistently  Attention Span: Attends with cues to redirect  Safety Judgement: Decreased awareness of need for safety  Problem Solving: Assistance required to implement solutions;Assistance required to generate solutions;Assistance required to identify errors made  Insights: Decreased awareness of deficits  Initiation: Requires cues for some  Sequencing: Requires cues for some  Cognition Comment: pt in good spirits this session, not perseverating on L side weakness as much as last week and states he feels he is getting stronger                    Type of ROM/Therapeutic Exercise  Type of ROM/Therapeutic Exercise: PROM;AAROM  Exercises  Scapular Protraction: x10 AAROM  Scapular Retraction: x10 AAROM  Shoulder Depression: x10 AROM  Shoulder Elevation: x10 AROM  Shoulder Flexion: x10 AAROM  Shoulder Extension: x10 AAROM  Horizontal ABduction: x10 AAROM  Horizontal ADduction: x10 AAROM  Elbow Flexion: x10 AAROM  Elbow Extension: x10 AAROM  Supination: x10 PROM  Pronation: x10 AAROM  Wrist Flexion: x10 PROM  Wrist Extension: x10 PROM  Finger Flexion: x10 PROM  Finger Extension: x10 PROM  Other: pt completed exercises seated in arm chair, demo'd good improvement in L UE function this date        PM Session:  Subjective: pt met in dept for OT following speech therapy. Pt seated in w/c and ready for therapy.  present. Pt c/o pain in L LE, L hand, R knee during standing activities/fxl mobility.  He describes it as feeling \"pressure/tightness\" and resolves at rest    Objective:  Pt completed sit<>stand CGAx1/CGA-min Ax1 throughout (increased assist with fatigue). Pt continues to require mod vc's and hand-over-hand assist to remove hand from hemiwalker and push from w/c. Pt completed fxl mobility using R hemiwalker with CGAx1/min-mod Ax1. Pt initially required mod verbal and tactile cues for sequencing hemiwalker, but improved with practice. Pt completed weight bearing activity through L UE/L LE standing at high tabletop (x2 bouts) with CGA-min Ax1/min-modAx1. Pt initially demo'd increased tone in L hand and required significant assist to extend fingers and flatten palm on the table. Pt put together \"Perfection\" perceptual game using right hand with min/mod cues. Pt appropriately scanning to both sides to locate pieces. Pt required verbal/tactile cues to tighten LLE and correct lean to the left throughout. Pt required assist to position and facilitate WB through L UE. Pt was brought outside to the therapy terrace. Pt stood with min Ax1/CGAx1 and reached in right overhead plane using R UE to place colored rings on ROM tree. Pt was returned to the room, completed stand pivot to high back arm chair with CGAx1/min Ax1 with verbal/tactile cueing for sequencing. Pt did initially attempt to push therapist away and do it on his own, but pt is unsafe to try this yet. Pt left positioned in chair with alarm engaged, needs in reach. Assessment: Pt tolerated tx well. Pt is very hard-working and motivated to improve and return home with his family. Pt has shown good improvement in fxl transfers and mobility, so will discontinue cotx tomorrow.     Plan   Plan  Times per week: 5-6 days per week  Times per day: Twice a day  Plan weeks: 3-4 weeks  Current Treatment Recommendations: Functional Mobility Training, Endurance Training, Safety Education & Training, Self-Care / ADL, Strengthening, Balance Training, ROM, Neuromuscular Re-education, Cognitive/Perceptual Training, Patient/Caregiver Education & Training, Equipment Evaluation, Education, & procurement, Cognitive Reorientation    Goals  Short term goals  Time Frame for Short term goals: 1 week pt will. Liotyson Acostau term goal 1: bathe with mod assist and AD  Short term goal 2: dress UB with mod assist, LB with max assist and AD as needed  Short term goal 3: toilet with max assist and AD  Short term goal 4: transfer with mod assist and AD  Short term goal 5: functional mobilty with LRAD and mod assist  Short term goal 6: improve LUE function to assist with ADL, positioning 25%  Short term goal 7: improve left side awareness to require mod cues for safety with ADL and mobility -met  Long term goals  Time Frame for Long term goals : 3-4 weeks pt will. .. Long term goal 1: bathe with min assist and AD  Long term goal 2: dress UB after set up, LB with min assist and AD as needed  Long term goal 3: toilet with CGA and AD as needed  Long term goal 4: transfer with CGA and AD as needed  Long term goal 5: functional mobility with CGA and LRAD  Patient Goals   Patient goals : \"I want to be normal again\"  With cues, pt agreed he wanted to improve his left UE strength, be able to bathe and dress himself, be able to stand and walk .   States he does not need to do anything in the kitchen       Therapy Time   Individual Concurrent Group Co-treatment   Time In 0915         Time Out 0945         Minutes 30            Therapy Time   Individual Co-treatment   Time In  [address] [place] [address]   Time Out  1600   Minutes  [address] [facility], OTR/L [address]

## 2020-10-05 NOTE — PROGRESS NOTES
Physical Therapy  Facility/Department: [address] REHAB  Daily Treatment Note  NAME: Bijan Garcia  : 1945  MRN: [id number]    Date of Service: 10/5/2020    Discharge Recommendations:  Patient would benefit from continued therapy after discharge, Continue to assess pending progress        Assessment   Body structures, Functions, Activity limitations: Decreased functional mobility ; Decreased balance;Decreased strength  Assessment: Patient very hard working. Transfers sit to stand and standing balance with activities improving but limited with poor quad control and requires cueing to maintian knee extension. Pt beter able to use R LE for w/c propulsion and required only intermittent min A this date, also walking improved distance and decreased assistance required with better balance and weight shift to allow stepping of R LE. Patient continues to be well below baseline and will benefit from continued skilled therapy for strengthening, functional mobility training, and safety training to allow for safe return home with assistance. REQUIRES PT FOLLOW UP: Yes  Activity Tolerance  Activity Tolerance: Patient limited by endurance; Patient limited by pain     Patient Diagnosis(es): There were no encounter diagnoses. has a past medical history of Cerebral artery occlusion with cerebral infarction ([facility] [address] Diabetes mellitus ([facility] [address] Gallstone, GERD (gastroesophageal reflux disease), Hyperlipidemia, Hypertension, and Renal insufficiency. has a past surgical history that includes Appendectomy; Cholecystectomy; Upper gastrointestinal endoscopy (2018); Upper gastrointestinal endoscopy (N/A, 2019); Upper gastrointestinal endoscopy (N/A, 2019); Upper gastrointestinal endoscopy (N/A, 2019); Upper gastrointestinal endoscopy (N/A, 2020); and Colonoscopy (N/A, 2020).     Restrictions  Restrictions/Precautions  Restrictions/Precautions: Fall Risk  Required Braces or Orthoses?: No  Position Activity Restriction  Other position/activity restrictions: L sided weakness; speaks Napali - uses  phone, family, and also in person interpretors  Subjective   General  Chart Reviewed: Yes  Additional Pertinent Hx: Per Dr. Vinay Carr , \"The patient is a 76 y.o. male who presents to [facility] with unsteady on feet. Pt speaks Frisian, son at bedside helping getting history. According to son/pt, pt apparently sustained a fall yesterday and felt like he couldn't move his left side. Today he had difficulty ambulating using his lt side and hence son brought pt to the ER\"  Diagnosed with CVA with L sided weakness. Response To Previous Treatment: Patient with no complaints from previous session. Family / Caregiver Present: Carmenza Sharma present and interprets, declines  phone)  Referring Practitioner: Kristal Jang  Subjective  Subjective: Patient seated in wheelchair with son present upon arrival. He reports his left rib cage is sore, MD aware. Gait belt kept low and tight on hips to avoid sore spot. Orientation     Cognition      Objective      Transfers  Sit to Stand: Minimal Assistance  Stand to sit: Minimal Assistance  Stand Pivot Transfers: Minimal Assistance(to R and L)  Ambulation  Ambulation?: Yes  More Ambulation?: No  Ambulation 1  Surface: level tile  Device: Hemiwalker  Other Apparatus: Wheelchair follow;Slider(L arm sling)  Assistance: Moderate assistance  Quality of Gait: Much less lean this simona, pt able to advance hemiwalker with min to SBA, advance L LE with m in A to prvent adduction, and advance R LE with occasional assist for weigt shift, pt initially given verbal cues through interpreted and tactile cues of PT tappng hand for \"walker, L leg, R leg\" sequence, pt able to progress to PT only tapping R hand, L hip and R hip for cues. Better weight shift and R step length.   Gait Deviations: Slow Janeth;Decreased step length;Decreased step height  Distance: Training, Balance Training, Endurance Training, ROM, Cognitive/Perceptual Training, Transfer Training, Gait Training, Stair training, IADL Training, Neuromuscular Re-education  Safety Devices  Type of devices:  All fall risk precautions in place, Call light within reach, Chair alarm in place, Gait belt, Left in chair, Nurse notified  Restraints  Initially in place: No     Therapy Time   Individual Concurrent Group Co-treatment   Time In 1115         Time Out 1200         Minutes 45         Timed Code Treatment Minutes: Ree Mojica 1, [address]

## 2020-10-05 NOTE — PLAN OF CARE
Problem: Falls - Risk of:  Goal: Will remain free from falls  Description: Will remain free from falls  10/5/2020 0326 by Simone Miller RN  Outcome: Ongoing  Note: Fall risk band on patient. Orange light on outside of room. Non skid footwear in place. Alarms used appropriately. Patient instructed to call and wait for staff before getting up. Rounding done to anticipate needs. Appropriate safety devices used for transfers.

## 2020-10-05 NOTE — PROGRESS NOTES
mmol/L    Chloride 102 99 - 110 mmol/L    CO2 25 21 - 32 mmol/L    Anion Gap 10 3 - 16    Glucose 109 (H) 70 - 99 mg/dL    BUN 13 7 - 20 mg/dL    CREATININE 1.3 0.8 - 1.3 mg/dL    GFR Non-African American 54 (A) >60    GFR African American >60 >60    Calcium 9.0 8.3 - 10.6 mg/dL   CBC Auto Differential    Collection Time: 10/05/20  6:03 AM   Result Value Ref Range    WBC 7.1 4.0 - 11.0 K/uL    RBC 5.33 4.20 - 5.90 M/uL    Hemoglobin 13.6 13.5 - 17.5 g/dL    Hematocrit 41.6 40.5 - 52.5 %    MCV 78.0 (L) 80.0 - 100.0 fL    MCH 25.5 (L) 26.0 - 34.0 pg    MCHC 32.6 31.0 - 36.0 g/dL    RDW 16.2 (H) 12.4 - 15.4 %    Platelets 378 910 - 166 K/uL    MPV 7.6 5.0 - 10.5 fL    Neutrophils % 50.9 %    Lymphocytes % 37.6 %    Monocytes % 7.4 %    Eosinophils % 3.4 %    Basophils % 0.7 %    Neutrophils Absolute 3.6 1.7 - 7.7 K/uL    Lymphocytes Absolute 2.7 1.0 - 5.1 K/uL    Monocytes Absolute 0.5 0.0 - 1.3 K/uL    Eosinophils Absolute 0.2 0.0 - 0.6 K/uL    Basophils Absolute 0.0 0.0 - 0.2 K/uL   POCT Glucose    Collection Time: 10/05/20  8:26 AM   Result Value Ref Range    POC Glucose 178 (H) 70 - 99 mg/dl    Performed on ACCU-CHEK    POCT Glucose    Collection Time: 10/05/20 11:59 AM   Result Value Ref Range    POC Glucose 98 70 - 99 mg/dl    Performed on ACCU-CHEK        Therapy progress:  PT  Position Activity Restriction  Other position/activity restrictions: L sided weakness; speaks Napali - uses  phone, family, and also in person interpretors  Objective     Sit to Stand: Minimal Assistance  Stand to sit: Minimal Assistance  Bed to Chair: Moderate assistance, Maximum assistance(to L)  Device: Hemiwalker  Other Apparatus: Wheelchair follow, Slider(L arm sling)  Assistance:  Moderate assistance  Distance: 10'  OT  PT Equipment Recommendations  Other: will monitor  Toilet - Technique: Stand pivot  Equipment Used: Grab bars  Toilet Transfers Comments: use of grab bars, max cues for technique  Assessment SLP  Diet Solids Recommendation: Dysphagia Minced and Moist (Dysphagia II)  Liquid Consistency Recommendation: Thin    Body mass index is 21.87 kg/m². Assessment and Plan:    Impairments: left hemiparesis, decreased coordination, balance, endurance, cognition, dysarthria, dysphagia     Acute ischemic stroke  -Localization: Right son, posterior centrum semiolave, left frontal lobe deep white matter  -Etiology: small vessel vs embolic  -Telemetry in ARU, then event monitor at discharge  -Secondary ppx with DAPT x 3 weeks (then Plavix alone), statin, BP and glucose control  -PT/OT/SLP     H/o left PCA stroke with severe stenosis  -Secondary ppx as above     Dysphagia   -Dietitian and SLP consults.   -Upgrade to soft/bite sized + thins     HTN, uncontrolled  -Still allowing some permissive HTN due to worsening of symptoms with normalization of BP  -losartan (dose decreased)     DM2, uncontrolled  -Lantus (increased to 30 units qhs, home dose), added 10 units prandial with breakfast, continue high dose ISS     CKD  -Avoid nephrotoxins, renally dose meds  -Monitor      L1 compression fracture  -Pain control  -PT/OT     Bladder   -High risk retention   -Monitor PVRs, SC prn >300cc  -Flomax     Bowel   -High risk constipation   -senna+colace BID, PRN miralax, MoM, and bisacodyl supp.     Pain control  -prn tramadol     PPx  -DVT: lovenox  -GI: pantoprazole    Rehab Progress: Making gradual progress. Working on left side function, functional mobility, compensatory strategies (anticipating primarily wheelchair for mobility). SLP working on aphasia/apraxia, dysarthria, dysphagia, cognition. Anticipated Dispo: home with family, anticipate 24/7 assist  Services: HH PT, OT, SLP, RN, Aide  DME: [facility]  ELOS: 10/17      Michelle Barrios.  Niya Coleman MD 10/5/2020, 4:18 PM

## 2020-10-06 LAB
GLUCOSE BLD-MCNC: 148 MG/DL (ref 70–99)
GLUCOSE BLD-MCNC: 268 MG/DL (ref 70–99)
GLUCOSE BLD-MCNC: 92 MG/DL (ref 70–99)
GLUCOSE BLD-MCNC: 95 MG/DL (ref 70–99)
PERFORMED ON: ABNORMAL
PERFORMED ON: ABNORMAL
PERFORMED ON: NORMAL
PERFORMED ON: NORMAL

## 2020-10-06 PROCEDURE — 97129 THER IVNTJ 1ST 15 MIN: CPT

## 2020-10-06 PROCEDURE — 1280000000 HC REHAB R&B

## 2020-10-06 PROCEDURE — 97116 GAIT TRAINING THERAPY: CPT

## 2020-10-06 PROCEDURE — 92526 ORAL FUNCTION THERAPY: CPT

## 2020-10-06 PROCEDURE — 97130 THER IVNTJ EA ADDL 15 MIN: CPT

## 2020-10-06 PROCEDURE — 6370000000 HC RX 637 (ALT 250 FOR IP): Performed by: PHYSICAL MEDICINE & REHABILITATION

## 2020-10-06 PROCEDURE — 6360000002 HC RX W HCPCS: Performed by: PHYSICAL MEDICINE & REHABILITATION

## 2020-10-06 PROCEDURE — 97530 THERAPEUTIC ACTIVITIES: CPT

## 2020-10-06 PROCEDURE — 97535 SELF CARE MNGMENT TRAINING: CPT

## 2020-10-06 PROCEDURE — 97110 THERAPEUTIC EXERCISES: CPT

## 2020-10-06 PROCEDURE — 97112 NEUROMUSCULAR REEDUCATION: CPT

## 2020-10-06 RX ADMIN — SENNOSIDES-DOCUSATE SODIUM TAB 8.6-50 MG 1 TABLET: 8.6-5 TAB at 07:58

## 2020-10-06 RX ADMIN — ENOXAPARIN SODIUM 40 MG: 40 INJECTION SUBCUTANEOUS at 22:22

## 2020-10-06 RX ADMIN — TAMSULOSIN HYDROCHLORIDE 0.4 MG: 0.4 CAPSULE ORAL at 22:22

## 2020-10-06 RX ADMIN — SUCRALFATE 1 G: 1 TABLET ORAL at 06:35

## 2020-10-06 RX ADMIN — PANTOPRAZOLE SODIUM 40 MG: 40 TABLET, DELAYED RELEASE ORAL at 06:35

## 2020-10-06 RX ADMIN — INSULIN LISPRO 3 UNITS: 100 INJECTION, SOLUTION INTRAVENOUS; SUBCUTANEOUS at 07:57

## 2020-10-06 RX ADMIN — PANTOPRAZOLE SODIUM 40 MG: 40 TABLET, DELAYED RELEASE ORAL at 16:09

## 2020-10-06 RX ADMIN — Medication 3 MG: at 22:22

## 2020-10-06 RX ADMIN — DICLOFENAC 4 G: 10 GEL TOPICAL at 07:59

## 2020-10-06 RX ADMIN — SUCRALFATE 1 G: 1 TABLET ORAL at 12:24

## 2020-10-06 RX ADMIN — INSULIN LISPRO 10 UNITS: 100 INJECTION, SOLUTION INTRAVENOUS; SUBCUTANEOUS at 07:57

## 2020-10-06 RX ADMIN — DICLOFENAC 4 G: 10 GEL TOPICAL at 22:29

## 2020-10-06 RX ADMIN — LOSARTAN POTASSIUM 12.5 MG: 25 TABLET, FILM COATED ORAL at 07:58

## 2020-10-06 RX ADMIN — DULOXETINE HYDROCHLORIDE 30 MG: 30 CAPSULE, DELAYED RELEASE ORAL at 07:58

## 2020-10-06 RX ADMIN — DICLOFENAC 4 G: 10 GEL TOPICAL at 12:25

## 2020-10-06 RX ADMIN — ASPIRIN 81 MG: 81 TABLET, COATED ORAL at 07:58

## 2020-10-06 RX ADMIN — INSULIN LISPRO 9 UNITS: 100 INJECTION, SOLUTION INTRAVENOUS; SUBCUTANEOUS at 17:18

## 2020-10-06 RX ADMIN — CLOPIDOGREL BISULFATE 75 MG: 75 TABLET ORAL at 07:58

## 2020-10-06 RX ADMIN — INSULIN GLARGINE 30 UNITS: 100 INJECTION, SOLUTION SUBCUTANEOUS at 22:22

## 2020-10-06 RX ADMIN — SENNOSIDES-DOCUSATE SODIUM TAB 8.6-50 MG 1 TABLET: 8.6-5 TAB at 22:21

## 2020-10-06 RX ADMIN — ROSUVASTATIN CALCIUM 10 MG: 10 TABLET, FILM COATED ORAL at 07:58

## 2020-10-06 RX ADMIN — SUCRALFATE 1 G: 1 TABLET ORAL at 22:22

## 2020-10-06 ASSESSMENT — PAIN SCALES - WONG BAKER: WONGBAKER_NUMERICALRESPONSE: 0

## 2020-10-06 ASSESSMENT — PAIN SCALES - GENERAL
PAINLEVEL_OUTOF10: 0
PAINLEVEL_OUTOF10: 0

## 2020-10-06 NOTE — PLAN OF CARE
Problem: Falls - Risk of:  Goal: Will remain free from falls  Description: Will remain free from falls  Outcome: Ongoing  Patient calls out in his Lithuanian language, makes gestures, bed alarm is on with frequent rounding to ancipitate his needs     Problem: Falls - Risk of:  Goal: Absence of physical injury  Description: Absence of physical injury  Outcome: Ongoing     Problem: Pain:  Goal: Patient's pain/discomfort is manageable  Description: Patient's pain/di  C/o left shoulder pain, Tylenol, Diclofenac gel and the lidocaine patch are effective     Problem: Pain:  Goal: Control of chronic pain  Description: Control of chronic pain  Outcome: Ongoing

## 2020-10-06 NOTE — PROGRESS NOTES
PHYSICAL THERAPY  Progress Note   Second Session    Patient Name: Bishop Camacho  Medical Record Number: [id number]    Treatment Diagnosis: decreased functional mobility following CVA with L alondra      Chart Reviewed: Yes   Restrictions/Precautions: Fall Risk Other position/activity restrictions: L sided weakness; speaks Napali - uses  phone, family, and also in person interpretors   Additional Pertinent Hx: Per Dr. Tiffany Quintero , \"The patient is a 76 y.o. male who presents to [facility] with unsteady on feet. Pt speaks Georgian, son at bedside helping getting history. According to son/pt, pt apparently sustained a fall yesterday and felt like he couldn't move his left side. Today he had difficulty ambulating using his lt side and hence son brought pt to the ER\"  Diagnosed with CVA with L sided weakness. PM session:   S: Pt met in room sleeping in bed. No interpretor was present. Pt was pleasant and easily awoken. Phone interpretor was contacted for translation. Pt is agreeable to PT this afternoon    O: Pt performed supine to sit transfer with Laure. Pt sit<>stand with CGA throughout. Pt performed stand pivot transfer with CGA to R side and using no device. Pt wheeled down to therapy gym. PT placed mirror in front of patient for standing balance activity. Pt was instructed, through phone , to reach into bucket for bean bag and toss it over the mirror. Chair with bucket was progressively moved further away so that patient could reach further to the right for bean bag and increase weight shift to the right. This activity caused pt to extend LEs for improved posture and balance. Pt stood with one therapist cuing L LE, via tactile cues, to extend hip and knee as well as shifting weight. This PT provided Laure-ModA to maintain balance and Max cues to take pt through reaching into bucket, grabbing bean bag, and tossing it over mirror.   Another PT held  phone and

## 2020-10-06 NOTE — PROGRESS NOTES
Physical Therapy  Facility/Department: [address] REHAB  Daily Treatment Note  NAME: Joy Coronado  : 1945  MRN: [id number]    Date of Service: 10/6/2020    Discharge Recommendations:  Patient would benefit from continued therapy after discharge, Continue to assess pending progress   PT Equipment Recommendations  Equipment Needed: Yes  Mobility Devices: Wheelchair  Wheelchair: Standard  Other: standard wheelchair with standard cushion, standard leg rests, removable arm rests    Assessment   Body structures, Functions, Activity limitations: Decreased functional mobility ; Decreased balance;Decreased strength  Assessment: Patient very hard working. Transfers sit to stand and standing balance with activities improving but limited with poor quad control and requires cueing to maintian knee extension and to push from the chair. Pt continues to have difficulty using R LE for w/c propulsion despite cueing. Ambulation continues to improve with increased distance and decreased assistance required. Improving weight shift to allow for advancement of left LE. No DF assist required this date. Patient continues to be well below baseline and will benefit from continued skilled therapy for strengthening, functional mobility training, and safety training to allow for safe return home with assistance. Treatment Diagnosis: decreased functional mobility following CVA with L alondra  Prognosis: Good;Fair  REQUIRES PT FOLLOW UP: Yes  Activity Tolerance  Activity Tolerance: Patient limited by endurance; Patient limited by pain  Activity Tolerance: pain in left knee limiting ambulation     Patient Diagnosis(es): There were no encounter diagnoses. has a past medical history of Cerebral artery occlusion with cerebral infarction ([facility] [address] Diabetes mellitus ([facility] [address] Gallstone, GERD (gastroesophageal reflux disease), Hyperlipidemia, Hypertension, and Renal insufficiency.    has a past surgical history that includes Appendectomy; Cholecystectomy; Upper gastrointestinal endoscopy (06/08/2018); Upper gastrointestinal endoscopy (N/A, 2/28/2019); Upper gastrointestinal endoscopy (N/A, 4/23/2019); Upper gastrointestinal endoscopy (N/A, 4/23/2019); Upper gastrointestinal endoscopy (N/A, 4/29/2020); and Colonoscopy (N/A, 5/8/2020). Social/Functional History  Lives With: Family(wife, son, grand children and other family members)  Type of Home: House(bi-level)  Home Layout: Bed/Bath upstairs  Home Access: Stairs to enter with rails  Entrance Stairs - Number of Steps: 3 ADELINE access from outside and then 4-5 steps in the house to bedroom level (the pt gets assist for these steps)  Entrance Stairs - Rails: Both  Bathroom Shower/Tub: Tub/Shower unit  Bathroom Toilet: Standard  Bathroom Equipment: Grab bars in [id number]: Cane  ADL Assistance: Needs assistance(supervision for bathing, dressing and toileting)  Homemaking Assistance: Needs assistance  Ambulation Assistance: Needs assistance(with cane with SBA from family)  Transfer Assistance: Needs assistance(SBA provided by family, pt sleeps in a regular bed)  Active : No  Patient's  Info: son  Occupation: Retired  Type of occupation: farmer, village leader  Leisure & Hobbies: sometimes watches TV, but gives him a headache  Additional Comments: Brandi Corona reports someone is with pt at home all day to assist him. Restrictions  Restrictions/Precautions  Restrictions/Precautions: Fall Risk  Required Braces or Orthoses?: No  Position Activity Restriction  Other position/activity restrictions: L sided weakness; speaks Napali - uses  phone, family, and also in person interpretors     Subjective   General  Chart Reviewed: Yes  Additional Pertinent Hx: Per Dr. Risa Mcgowan , \"The patient is a 76 y.o. male who presents to [facility] with unsteady on feet. Pt speaks Vietnamese, son at bedside helping getting history.  According to son/pt, pt apparently sustained a fall yesterday and felt like he couldn't move his left side. Today he had difficulty ambulating using his lt side and hence son brought pt to the ER\"  Diagnosed with CVA with L sided weakness. Response To Previous Treatment: Patient with no complaints from previous session. Family / Caregiver Present: Dixon Resendez, Present and interpreting)  Referring Practitioner: Krystal Ye  Subjective  Subjective: Patient seated in wheelchair with son present upon arrival. No complaints initially, but does report some left hip discomfort with mobility. Objective   Bed mobility  Rolling to Right: Minimal assistance(He requires assistance to bring left UE over body.)  Supine to Sit: Minimal assistance;Contact guard assistance(tactile and verbal cues. Much improved use of right UE to push and bring trunk up)  Sit to Supine: Minimal assistance(PT attempted to educate patient to use right LE to assist left LE, but patient has difficulty sequencing this while laying back onto the bed.)  Scooting: Contact guard assistance(moderate verbal cueing with some tactile cueing to move side to side in bed)  Comment: bed mobility done on flat therapy mat    Transfers  Sit to Stand: Contact guard assistance(moderate cueing to push from the chair, and required tactile cueing as he continues to try and pull on hemiwalker)  Stand to sit: Minimal Assistance(verbal cues for safety and hand over hand for hand placement)  Bed to Chair: Minimal assistance(with alondra walker)    Ambulation  Ambulation?: Yes  More Ambulation?: No  Ambulation 1  Surface: level tile  Device: Hemiwalker  Other Apparatus: Wheelchair follow;Slider(L arm sling)  Assistance: Minimal assistance  Quality of Gait: Patient with improved sequencing this date, but does still require tactile cueing for correct sequence. He was able to advance left LE but limited control of hip abduction and required min assist to prevent adduction.   He was able to clear toes of left foot and able to extend knee, but required facilitation and assistance to maintain knee flexion as fatigue increased. Patient complained of left knee pain causing a buckling in weight bearing. Improved weight shifting, but requires increased assist to weight shift onto left LE as he advances the right as fatigue increases. Gait Deviations: Slow Janeth;Decreased step length;Decreased step height  Distance: 8' with one turn, 21' with partial turn  Comments: Patient with great difficulty problem solving how to turn and walk along edge of bed then turn to sit on bed. Stairs/Curb  Stairs?: Yes  Stairs  # Steps : 4  Stairs Height: 6\"  Rails: Bilateral(right railing ascending and descending)  Device: No Device  Assistance: Moderate assistance  Comment: Patient ascended and descended 4 step with right hand on railing and PT supporting left UE. Patient with improved weight shifting onto right LE to allow for advancement of left LE and was able to clear toes of left foot with very minimal assistance. He required facilitation to extend left knee in weight bearing and frequent reminders. He requires moderate tactile cues and verbal cues for sequence and safety. He required assistance to prevent adduction of left LE when descending. Patient with improved upright posture, but began to flex at his hips and rotate trunk as fatigue increased with descending last two steps. Wheelchair Activities  Wheelchair Size: 18\"  Wheelchair Type: Standard  Pressure Relief Type: Push up  Level of Assistance for pressure relief activities: Minimal assistance  Wheelchair Parts Management: No  Propulsion: Yes  Propulsion 1  Propulsion: Manual  Level: Level Tile  Method: RUE;RLE  Level of Assistance: Minimal assistance;Stand by assistance  Description/ Details: Patient continues to have difficulty with use of right LE to assist with path and required frequent tactile and verbal cueing. He was unable to maintain a straight path this date.   Patient was min assist most of the time. Distance: 80' with two turns            Goals  Short term goals  Time Frame for Short term goals: 2 weeks  Short term goal 1: bed mobility SBA  Short term goal 2: transfers CGA  Short term goal 3: amb x 20 ft with RW and Min A x 1  Short term goal 4: tolerate LE Ther ex for increased LE strength. Short term goal 5: curb step min A  Long term goals  Time Frame for Long term goals : 3-4 weeks  Long term goal 1: bed mobility SBA  Long term goal 2: transfers SBA  Long term goal 3: car transfer CGA  Long term goal 4: amb 48' with RW vs hemiwalker CGA to SBA  Long term goal 5: 4 steps B rails CGA  Patient Goals   Patient goals : pt's/family goal is some rehab before returning home. Plan    Plan  Times per week: 5 to 6  Times per day: Twice a day  Plan weeks: 3 to 4  Current Treatment Recommendations: Functional Mobility Training, Balance Training, Endurance Training, ROM, Cognitive/Perceptual Training, Transfer Training, Gait Training, Stair training, IADL Training, Neuromuscular Re-education  Safety Devices  Type of devices:  All fall risk precautions in place, Call light within reach, Chair alarm in place, Gait belt, Left in chair(son, Angelica, present)  Restraints  Initially in place: No     Therapy Time   Individual Concurrent Group Co-treatment   Time In 1030         Time Out 1115         Minutes 45                 Electronically signed by Jennifer Sabillon PT on 10/6/2020 at 11:56 AM

## 2020-10-06 NOTE — PROGRESS NOTES
Patient admitted to rehab with CVA. A/A/O x1. Transfers with stedy x1. On minced and moist diet, tolerating well. Medications taken whole in applesauce. On ASA, plavix for DVT prophylaxis. Skin warm, dry. On room air. Has been continent of bowel and bladder. LBM 10/6. Chair/bed alarms in use and call light in reach. Tele, NSR. Will monitor for safety.

## 2020-10-06 NOTE — PLAN OF CARE
Problem: Falls - Risk of:  Goal: Will remain free from falls  Description: Will remain free from falls  10/6/2020 1023 by Angelia Merida RN  Outcome: Ongoing     Problem: Skin Integrity:  Goal: Will show no infection signs and symptoms  Description: Will show no infection signs and symptoms  10/6/2020 1023 by Angelia Merida RN  Outcome: Ongoing     Problem: Pain:  Goal: Control of chronic pain  Description: Control of chronic pain  10/6/2020 1023 by Angelia Merida RN  Outcome: Ongoing     Problem: Nutrition  Goal: Optimal nutrition therapy  10/6/2020 1023 by Angelia Merida RN  Outcome: Ongoing     Problem: Serum Glucose Level - Abnormal:  Goal: Ability to maintain appropriate glucose levels will improve  Description: Ability to maintain appropriate glucose levels will improve  10/6/2020 1023 by Angelia Merida RN  Outcome: Ongoing

## 2020-10-06 NOTE — PROGRESS NOTES
Patient called for help to go to the bathroom, he had a BM, needed assistance to complete his hussain-care, followed by washing his hands and face. Requested for a snack, jello provided and he ate 100%. C/o discomfort to the L shoulder, Tylenol, Diclofenac gel and Lidocaine patch to the L shoulder are affective. L side weakness and leans to the left side when he is up. He is  Mohawk speaking able to communicate using gestures. Does not use the call light appropriately, but calls out in his language.

## 2020-10-06 NOTE — PROGRESS NOTES
Distractible [] Motivated  [] Self-Limiting [] Anxious  [x] Other:  phone utilized. Endurance:  [x] Adequate for participation in SLP sessions  [] Reduced overall  [] Lethargic  [] Other:  Safety: [] No concerns at this time  [] Reduced insight into deficits  []  Reduced safety awareness [] Not following call light procedures  [] Unable to Assess  [] Other:  Swallowing Precautions: 90 degree positioning with all p.o. intake; small bites/sips; alternate textures through meal; reduce rate of intake;     Barriers toward pt/family plan for progress toward d/c plan: Medical status and caregiver support may be barriers           Date: 10/6/2020      Tx session 1 Tx session 2   Total Timed Code Min SLP Individual Minutes  Time In: 1430  Time Out: 4856  Minutes: 45  Coded treatment time  25 n/a     Group Treatment Minutes 0 0   Co-Treat Minutes 0 0   Variance/Reason:  n/a    Pain denied    Pain Intervention [] RN notified  [] Repositioned  [] Intervention offered and patient declined  [] N/A  [] Other: [] RN notified  [] Repositioned  [] Intervention offered and patient declined  [] N/A  [] Other:   Subjective     Pt seen in the treatment office  No  present in person   phone utilized   for entire session  No family present  Good effort in therapy      Objective:  Goals       Dysphagia Goals:   1. Upgraded goal  The patient will tolerate dysphagia soft/bites size with thin liquid diet without observed clinical signs of aspiration,      Thin liquids via cup: No immediate overt clinical s/s post swallow    Soft/bite size crunchy/dry food consistency : goal met       n/a   2.  Upgraded goal  The patient will improve oral motor function via therapeutic oral motor exercises to 15/15 each trial. Left facial droop and weakness  · Pt tolerated sensory stimulation (both icing and tactile stimulation)    -icing stimulation to exterior left facial structures: well tolerated  -resistance ex with engagement of left labial /buccal musculature engagement improved  to moderate: >10 reps completed  -volitional rom during stimulation improved from mild to moderate for lip retraction; lip protrusion; lip rounding and with lip retraction while opening mouth wide. As ex / stimulation improved; left rom was almost symmetrical with right side      Also targeted with po: no anterior loss with cup presentations of thin liquid; no anterior loss during swish and swallow of thin liquids; no anterior loss during mastication. Pt demonstrating lingual sweep with oral suckling to clear oral pocketing    Improving; goal partially met to met   n/a   3. New goal   The patient will tolerate skilled trials of regular food with thin liquid consistencies  10/10. Small sample. Pt may be ready for upgrade in the next 1-2 days n/a     Cognitive-Communication goals       Goal 1:Modified goal   The pt will demonstrate improved recall of daily events; learned strategies with set up; structure and <max cues Temporal orientation/spatial orientation questions:   -Pt oriented to self and partially to place and situation  -max re-orientation to place/date and therapy schedule  -max review of oral motor ex and speech ex from yesterday 's session  -max review of rationale of ex and inconsistent reminders for lingual sweep between bites of food    Recall of other therapy ex completed in other therapies prior to this therapy session: Pt with vague responses.      n/a     Goal 2: Upgraded goal  The pt will demonstrate improved processing for short concrete questions regarding basic DL and / or to follow 1 step commands with max visual demonstrations;and with <moderate tactile cues   1 step commands without visual cues: mild to moderate verbal cues    1 step commands with visual demonstrations: continues to need left side attention and variable need for verbal cues to accompany visual cues    1 step commands with visual demonstrations and tactile phone; and has difficulty understanding what family say. SLP ed in use of context of an activity and visual cues/gestures to assist. Activities incorporating family; basic DL activities most optimal.  Will upgrade diet to soft/bite size food with thin liquids; oral care post meals. Pt/son ed in examples of food consistencies on soft/bite size food consistencies; and oral care. Toothettes placed in the room and SLP ed pt and family in uses. 9/30/2020 SLP discussed with RN (RN actually in room when addressing pt complaints regarding discomfort in current recliner) ; and  SLP discussed with team (at team meeting ) regarding pt height anc concern regarding current recliner in room (ie pt's feet dangle; pt complaints cannot get comfortable etc)  10/1/2020; 10/20/2020 :   present for session. 10/6/2020: Request diet upgrade to regular consistency   Plan: Continue as per plan of care. Continued Tx Upon Discharge: ?    [] Yes [] No [x] TBD based on progress while on ARU [] Vital Stim indicated [] Other:   Estimated discharge date: TBD   Discharge recommendations:   [] Home independently  [x] Home with assistance [x]  24 hour supervision  [] ECF [] Other:     Additional information:     Interventions used during Rehab Stay:  [] Speech/Language Treatment  [] Instruction in HEP [] Group [x] Dysphagia Treatment [] Cognitive Treatment   [x] Other:     Adverse Reactions to Treatment/Significant Change in Status: n/a      Electronically Signed by    Ramses Dorado. Prakash,MS,CCC,SLP [id number]  Speech and Language Pathologist

## 2020-10-06 NOTE — PATIENT CARE CONFERENCE
[facility]  Inpatient Rehabilitation  Weekly Team Conference Note      Date: 10/7/2020  Patient Name:  Betsey Mcfarland    MRN: [id number]  : 1945  Gender: male  Physician: Dr Radha Bernstein  Diagnosis: Right pontine stroke [facility]) [I63.50]    CASE MANAGEMENT  Assessment:   Goal is home       PHYSICAL THERAPY    Bed mobility  Rolling to Left: Minimal assistance  Rolling to Right: Minimal assistance(He requires assistance to bring left UE over body.)  Supine to Sit: Minimal assistance, Contact guard assistance(tactile and verbal cues. Much improved use of right UE to push and bring trunk up)  Sit to Supine: Minimal assistance(PT attempted to educate patient to use right LE to assist left LE, but patient has difficulty sequencing this while laying back onto the bed.)  Scooting: Contact guard assistance(moderate verbal cueing with some tactile cueing to move side to side in bed)  Comment: bed mobility done on flat therapy mat    Transfers:  Sit to Stand: Contact guard assistance(moderate cueing to push from the chair, and required tactile cueing as he continues to try and pull on hemiwalker)  Stand to sit: Minimal Assistance(verbal cues for safety and hand over hand for hand placement)  Bed to Chair: Minimal assistance(with alondra walker)    Ambulation 1  Surface: level tile  Device: Hemiwalker  Other Apparatus: Wheelchair follow, Slider(L arm sling)  Assistance: Minimal assistance  Quality of Gait: Patient with improved sequencing this date, but does still require tactile cueing for correct sequence. He was able to advance left LE but limited control of hip abduction and required min assist to prevent adduction. He was able to clear toes of left foot and able to extend knee, but required facilitation and assistance to maintain knee flexion as fatigue increased. Patient complained of left knee pain causing a buckling in weight bearing.   Improved weight shifting, but requires increased assist to weight shift onto left LE as he advances curry rigth as fatigue increases. Gait Deviations: Slow Janeth, Decreased step length, Decreased step height  Distance: 10' with one turn, [address]' with partial turn  Comments: Patient with great difficulty problem solving how to turn and walk along edge of bed then turn to sit on bed. Propulsion: Manual  Level: Level Tile  Method: RUE, RLE  Level of Assistance: Minimal assistance, Stand by assistance  Description/ Details: Patient continues to have difficulty with use of right LE to assist with path and required frequent tactile and verbal cueing. He was unable to maintain a straight path this date. Patient was min assist most of the time. Distance: 80' with two turns      Stairs  # Steps : 4  Stairs Height: 6\"  Rails: Bilateral(right railing ascending and descending)  Curbs: (unsafe at this time)  Device: No Device  Assistance: Moderate assistance  Comment: Patient ascended and descended 4 step with right hand on railing and PT supporting left UE. Patient with improved weight shifting onto right LE to allow for advancement of left LE and was able to clear toes of left foot with very minimal assistance. He required facilitation to extend left knee in weight bearing and frequent reminders. He requires moderate tactile cues and verbal cues for sequence and safety. He required assistance to prevent adduction of left LE when descending. Patient with improved upright posture, but began to flex at his hips and rotate trunk as fatigue increased with descending last two steps. Assessment: Patient very hard working. Transfers sit to stand and standing balance with activities improving but limited with poor quad control and requires cueing to maintian knee extension and to push from the chair. Pt continues to have difficulty using R LE for w/c propulsion despite cueing. Ambulation continues to improve with increased distance and decreased assistance required.  Improving weight shift to allow for advancement of left LE. No DF assist required this date. Patient continues to be well below baseline and will benefit from continued skilled therapy for strengthening, functional mobility training, and safety training to allow for safe return home with assistance. SPEECH THERAPY (intentionally left blank if not actively being seen by this service):  Assessment/progress: Pt has participated in therapy. An in person  OR  phone has been utilized for all sessions. Progress in oral phase of the swallow as evidenced by improved left oral motor muscular engagement during bolus preparation and bolus manipulation with less oral pocketing and improved sensation of oral pocketing for clearance. Diet upgrade to regular consistency was recommended. Pt also with improvement in content of verbal responses to questions; naming tasks; and reminiscing tasks. Pt also is using gestures at times. Pt is following directions better ; but is most optimal with one step commands with visual cues. A gradual decrease in tactile cues. Pt with residual concerns for memory deficits which was something identified prior to onset. Pt does need cues / conditioning session to session for varied repeated activities/tasks. Overall pt reporting finding words better and speech and voice is more clear and stronger. Will continue therapy but potential d/c in 2-3 sessions  Vidhi Randall,MS,CCC,SLP [id number]  Speech and Language Pathologist      OCCUPATIONAL THERAPY  ADL  Feeding: Setup  Grooming: Minimal assistance(assist to shave face (very thick), pt performed oral care with setup)  UE Bathing: Minimal assistance(assist to bathe R UE. pt managed HH shower with cues)  LE Bathing: Moderate assistance(assist for quality of feet, assist for posterior periarea)  UE Dressing: Minimal assistance, Verbal cueing, Setup(pt able to doff shirt wiith cues.  assist to thread L UE, cues for alondra-technique, and assist to adjust in UE to assist with bathing/dressing tasks    Assessment: Pt tolerated tx well and is making slow but steady progress with therapy. Pt completed fxl transfers min/mod A with improved transfer technique and decreased cues needed for sequencing. Pt required min A for UB dressing, min A grooming, max A LB dressing, min A UB bathing, mod A LB bathing, max A toileting. Pt is very hard-working and motivated to return home. Pt will benefit from as much time as able on ARU in order to increase independence in fxl transfers, self-care, and decrease caregiver burden. Pt has a very large and involved family who may wish to bring him home sooner. NUTRITION  Most recent weightWeight: 124 lb 9 oz (56.5 kg)  BSA (Calculated - sq m): 1.6 sq meters  BMI (Calculated): 22.1  Please see nutrition note for details. NURSING  Continent of bladder and bowel. Monitor and maintain skin integrity. Family Education: Patient education: CVA, diet and swallowing precautions, diabetic needs, skin care and prevention, pain control, medications, communication needs. MEDICAL  Blood sugars improving with adjustments in insulin regimen    TEAM SUMMARY AND DISCHARGE PLAN  Estimated Length of Stay: anticipate DC 10/17/20  Destination: home with home care and 24 hour assist/supervision from family   · Anticipated Services at Discharge:    [x] OT  [x] PT   [x] SLP ? [x] RN   [x] Home Health aide?   []   [employer]: _______________________________  Equipment recommendations:  [] Hospital bed [x] Tub bench  [] Shower chair [] Hand held shower  [] Raised toilet seat [] Toilet safety frame [x] Bedside commode   [x] W/C: _standard wheelchair with standard cushion, standard leg rests, removable arm rests  [] Rolling Walker [] Standard walker [] Gait belt [] cane: _________  [] Sliding board [] Alternate seating/furniture [] O2 [] Hip Kit: _______  [] Life Line [x] Other: _hemiwalker_  Factors facilitating achievement of predicted outcomes: Family support, Motivated, Cooperative and Pleasant  Barriers to the achievement of predicted outcomes/Interventions:   Left hemiparesis, left \"inattention\"- strengthening, conditioning, adaptive equipment, alondra techniques and other compensatory strategies for safe self care and mobility, reinforce with family education,  Left regard activities, cues to attend to left side        Interdisciplinary Individualized Plan of Care Review:    · Continue Current Plan of Care: Yes    · Modifications:_____________________________    Special Needs in the Upcoming Week :    [x] Family/Caregiver Education  [] Home visit  []Therapeutic Pass   [] Consults:_______    [] Other;_______    Patient Rehab Team Goals for the Upcoming Week:  1. Functional transfers in room without a device with CGA  2. ADLs with min A across all disciplines   3. Pt will tolerate diet upgrade to regular consistency food and thin liquid diet without complications or complaints  4. Pt will demonstrate improved recall to repeated training in new learning strategies for mobility and for verbal direction of care ( with use of  (in person or via phone)           Team Members Present at Conference:  Physician: Dr Avani Mullen   : [place], [place]    Occupational Therapist: Hong Enamorado, OTR/L [facility]  Physical Therapist:Gracia Clark, [id number]  Speech Therapist: Yamilex Sims. Prakash,MS,CCC,SLP [id number]  Nurse: Angelina Lowry RN, [address] [place] [address]   Holly Badillo, PT, MPT       I led this team conference and I approve the established interdisciplinary plan of care as documented within the medical record of Joy Coronado. MD: Skylar Dubois.  Avani Mullen MD 10/7/2020, 4:13 PM

## 2020-10-06 NOTE — PROGRESS NOTES
Department of Physical Medicine & Rehabilitation  Progress Note    Patient Identification:  Loree Mesa  [id number]  : 1945  Admit date: 2020    Chief Complaint: Right pontine stroke [facility])    Subjective:   No acute events overnight. Patient seen this afternoon on his way to therapy.  not available. Per PT and SLP, he is making progress. PT has noted some knee pain with ambulation. ROS: No f/c, n/v, cp     Objective:  Patient Vitals for the past 24 hrs:   BP Temp Temp src Pulse Resp SpO2 Weight   10/06/20 0750 138/88 97.3 °F (36.3 °C) Oral 95 16 93 % --   10/06/20 0439 119/76 98.1 °F (36.7 °C) Oral 80 16 94 % 124 lb 9 oz (56.5 kg)   10/06/20 0030 129/78 98 °F (36.7 °C) Oral 88 16 -- --   10/05/20 2033 115/68 97.6 °F (36.4 °C) Oral 88 16 94 % --   10/05/20 1621 124/81 97.8 °F (36.6 °C) Oral 84 16 94 % --   10/05/20 1315 124/75 97.9 °F (36.6 °C) Oral 83 16 95 % --     Const: Alert. No distress, pleasant. HEENT: Normocephalic, atraumatic. Normal sclera/conjunctiva. MMM. CV: Regular rate and rhythm. Resp: No respiratory distress. Lungs CTAB. Abd: Soft, nontender, nondistended, NABS+   Ext: No edema. Neuro: Alert, evidence of mild confusion (limited by language barrier), speech dysarthric, left hemiparesis (1-3/5)  Psych: Cooperative, appropriate mood and affect    Laboratory data: Available via EMR.    Last 24 hour lab  Recent Results (from the past 24 hour(s))   POCT Glucose    Collection Time: 10/05/20 11:59 AM   Result Value Ref Range    POC Glucose 98 70 - 99 mg/dl    Performed on ACCU-CHEK    POCT Glucose    Collection Time: 10/05/20  4:52 PM   Result Value Ref Range    POC Glucose 223 (H) 70 - 99 mg/dl    Performed on ACCU-CHEK    POCT Glucose    Collection Time: 10/05/20  8:35 PM   Result Value Ref Range    POC Glucose 233 (H) 70 - 99 mg/dl    Performed on ACCU-CHEK    POCT Glucose    Collection Time: 10/06/20  6:56 AM   Result Value Ref Range    POC Glucose 148 (H) 70 - 99 mg/dl    Performed on ACCU-CHEK        Therapy progress:  PT  Position Activity Restriction  Other position/activity restrictions: L sided weakness; speaks [place] - uses  phone, family, and also in person interpretors  Objective     Sit to Stand: Contact guard assistance(moderate cueing to push from the chair, and required tactile cueing as he continues to try and pull on hemiwalker)  Stand to sit: Minimal Assistance(verbal cues for safety and hand over hand for hand placement)  Bed to Chair: Minimal assistance(with alondra walker)  Device: Hemiwalker  Other Apparatus: Wheelchair follow, Slider(L arm sling)  Assistance: Minimal assistance  Distance: 8' with one turn  OT  PT Equipment Recommendations  Other: will monitor  Toilet - Technique: Stand pivot  Equipment Used: Grab bars  Toilet Transfers Comments: cues for safety, pt attempting to sit too early  Assessment        SLP  Diet Solids Recommendation: Dysphagia Minced and Moist (Dysphagia II)  Liquid Consistency Recommendation: Thin    Body mass index is 22.06 kg/m².     Assessment and Plan:    Impairments: left hemiparesis, decreased coordination, balance, endurance, cognition, dysarthria, dysphagia     Acute ischemic stroke  -Localization: Right son, posterior centrum semiolave, left frontal lobe deep white matter  -Etiology: small vessel vs embolic  -Telemetry in ARU, then event monitor at discharge  -Secondary ppx with DAPT x 3 weeks (then Plavix alone), statin, BP and glucose control  -PT/OT/SLP    Spasticity  -Emerging tone in LUE  -Monitor, consider medication     H/o left PCA stroke with severe stenosis  -Secondary ppx as above     Dysphagia   -Dietitian and SLP consults.   -Upgrade to regular + thins     HTN, uncontrolled  -Still allowing some permissive HTN due to worsening of symptoms with normalization of BP  -losartan (dose decreased)     DM2, uncontrolled  -Lantus (increased to 30 units qhs, home dose), added 10 units prandial with

## 2020-10-06 NOTE — PROGRESS NOTES
Occupational Therapy  Facility/Department: [address] IP REHAB  Daily Treatment Note  NAME: Stephane Dixon  : 1945  MRN: [id number]    Date of Service: 10/6/2020    Discharge Recommendations:  24 hour supervision or assist, Home with Home health OT, Continue to assess pending progress  OT Equipment Recommendations  Other: TBD    Assessment   Performance deficits / Impairments: Decreased functional mobility ; Decreased ADL status; Decreased ROM; Decreased strength;Decreased endurance;Decreased balance;Decreased coordination;Decreased safe awareness;Decreased cognition;Decreased sensation;Decreased posture  Assessment: Pt tolerated tx well and is making slow but steady progress with therapy. Pt completed fxl transfers min/mod A with improved transfer technique and decreased cues needed for sequencing. Pt required min A for UB dressing, min A grooming, max A LB dressing, min A UB bathing, mod A LB bathing, max A toileting. Pt is very hard-working and motivated to return home. Cont per OT POC  Treatment Diagnosis: decreased ADL, balance, Left sided function  Prognosis: Good  OT Education: OT Role;Plan of Care;Transfer Training;ADL Adaptive Strategies  Patient Education: ADL retraining, alondra techniques  Barriers to Learning: language, possible aphasia  REQUIRES OT FOLLOW UP: Yes  Activity Tolerance  Activity Tolerance: Patient Tolerated treatment well  Safety Devices  Safety Devices in place: Yes  Type of devices: Call light within reach;Gait belt; Chair alarm in place; Left in chair;Patient at risk for falls;Nurse notified         Patient Diagnosis(es): There were no encounter diagnoses. has a past medical history of Cerebral artery occlusion with cerebral infarction ([facility] [address] Diabetes mellitus ([facility] [address] Gallstone, GERD (gastroesophageal reflux disease), Hyperlipidemia, Hypertension, and Renal insufficiency. has a past surgical history that includes Appendectomy; Cholecystectomy;  Upper gastrointestinal endoscopy (06/08/2018); Upper gastrointestinal endoscopy (N/A, 2/28/2019); Upper gastrointestinal endoscopy (N/A, 4/23/2019); Upper gastrointestinal endoscopy (N/A, 4/23/2019); Upper gastrointestinal endoscopy (N/A, 4/29/2020); and Colonoscopy (N/A, 5/8/2020). Restrictions  Restrictions/Precautions  Restrictions/Precautions: Fall Risk  Required Braces or Orthoses?: No  Position Activity Restriction  Other position/activity restrictions: L sided weakness; speaks [place] - uses  phone, family, and also in person interpretors  Subjective   General  Chart Reviewed: Yes, Progress Notes  Additional Pertinent Hx: Per Dr. Filomena Alcarza , \"The patient is a 76 y.o. male who presents to [facility] with unsteady on feet. Pt speaks Turkmen, son at bedside helping getting history. According to son/pt, pt apparently sustained a fall yesterday and felt like he couldn't move his left side. Today he had difficulty ambulating using his lt side and hence son brought pt to the ER\"  Diagnosed with  Response to previous treatment: Patient with no complaints from previous session  Family / Caregiver Present: No  Referring Practitioner: Phuong Oconnell  Diagnosis: Acute CVA - with left sided weakness  Subjective  Subjective: pt met b/s for OT. pt seated in chair and agreeable to therapy&shower. used  phone this session. pt states his pain is getting better      Orientation     Objective    ADL  Grooming: Minimal assistance(assist to shave face (very thick), pt performed oral care with setup)  UE Bathing: Minimal assistance(assist to bathe R UE. pt managed HH shower with cues)  LE Bathing: Moderate assistance(assist for quality of feet, assist for posterior periarea)  UE Dressing: Minimal assistance;Verbal cueing;Setup  LE Dressing: Maximum assistance  Toileting: Maximum assistance(assist to manage pants up/down while pt stood with CGA for balance.  pt had BM and performed pericare seated)  Additional Comments: wet towel for non skid surface in shower. use of  phone more successful this date. pt able to manage [facility] shower with cues        Balance  Sitting Balance: Stand by assistance  Standing Balance: Contact guard assistance  Standing Balance  Time: multiple stands, untimed  Activity: use of grab bars  Functional Mobility  Functional - Mobility Device: Wheelchair  Activity: To/from bathroom  Assist Level: Dependent/Total  Functional Mobility Comments: OT managed w/c during session in interest of time  Toilet Transfers  Toilet - Technique: Stand pivot  Equipment Used: Grab bars  Toilet Transfer: Minimal assistance  Toilet Transfers Comments: cues for safety, pt attempting to sit too early  Shower Transfers  Shower - Transfer From: Wheelchair  Shower - Transfer Type: To and From  Shower - Transfer To: Transfer tub bench  Shower - Technique: Stand pivot  Shower Transfers: Minimal assistance  Shower Transfers Comments: cues for sequencing and safety  Wheelchair Bed Transfers  Wheelchair/Bed - Technique: Stand pivot  Equipment Used: Wheelchair; Other  Level of Asssistance: Minimal assistance; Moderate assistance  Wheelchair Transfers Comments: arm chair > w/c to the left mod A;     Transfers  Sit to stand: Contact guard assistance  Stand to sit: Minimal assistance  Transfer Comments: pulling up on grab bar                       Cognition  Overall Cognitive Status: Exceptions(use of  phone)  Following Commands:  Follows one step commands consistently  Attention Span: Attends with cues to redirect  Safety Judgement: Decreased awareness of need for safety  Problem Solving: Assistance required to implement solutions;Assistance required to generate solutions;Assistance required to identify errors made  Insights: Decreased awareness of deficits  Initiation: Requires cues for some  Sequencing: Requires cues for some  Cognition Comment: difficulty following commands at times, becomes very stuck on one thing and have difficulty moving to the next task      PM Session:  Subjective: pt met in dept for OT following speech. Phone  used throughout session. Pt c/o pain in L hand and shoulder but did not rate    Objective:  Pt completed stand pivot transfer from w/c > therapy mat min A with cues for hand placement. Pt sat on edge of therapy mat x10 mins for therex/neuromuscular re-ed. Pt completed x10 reps L UE: AAROM elbow flexion/extension, internal/external rotation, supination/pronation. PROM: wrist flexion/extension, finger flexion/extension. In supine: pt completed x10 reps AAROM abduction/adduction, elbow flexion/extension. Pt responded well to tapping to bicep muscle with increase in strength. Pt completed sit>supine min A for LLE. Supine> sit min A for trunk and cues for sequencing. Pt attempting to push up through L UE to raise trunk. Returned to room in w/c. Stand pivot from w/c > high back arm chair min A with cues for hand placement and sequencing. Pt left positioned for comfort with alarm engaged, needs in reach. Assessment: pt tolerated tx well and continues to work hard in therapy. Pt states he is feeling more comfortable here and sleeping better through the night, which seems to be helping his activity tolerance. Cont per OT POC    Plan   Plan  Times per week: 5-6 days per week  Times per day: Twice a day  Plan weeks: 3-4 weeks  Current Treatment Recommendations: Functional Mobility Training, Endurance Training, Safety Education & Training, Self-Care / ADL, Strengthening, Balance Training, ROM, Neuromuscular Re-education, Cognitive/Perceptual Training, Patient/Caregiver Education & Training, Equipment Evaluation, Education, & procurement, Cognitive Reorientation    Goals  Short term goals  Time Frame for Short term goals: 1 week pt will. Matt De Los Santos term goal 1: bathe with mod assist and AD -met  Short term goal 2: dress UB with mod assist, LB with max assist and AD as needed -met  Short term goal 3: toilet with max assist and AD -met  Short term goal 4: transfer with mod assist and AD -met  Short term goal 5: functional mobilty with LRAD and mod assist  Short term goal 6: improve LUE function to assist with ADL, positioning 25%  Short term goal 7: improve left side awareness to require mod cues for safety with ADL and mobility -met  Long term goals  Time Frame for Long term goals : 3-4 weeks pt will. .. Long term goal 1: bathe with min assist and AD  Long term goal 2: dress UB after set up, LB with min assist and AD as needed  Long term goal 3: toilet with CGA and AD as needed  Long term goal 4: transfer with CGA and AD as needed  Long term goal 5: functional mobility with CGA and LRAD  Patient Goals   Patient goals : \"I want to be normal again\"  With cues, pt agreed he wanted to improve his left UE strength, be able to bathe and dress himself, be able to stand and walk .   States he does not need to do anything in the kitchen       Therapy Time   Individual Concurrent Group Co-treatment   Time In 0900         Time Out 1020         Minutes 80            Therapy Time   Individual Co-treatment   Time In 1515    Time Out 1555    Minutes [address], OTR/L [id number]

## 2020-10-07 LAB
GLUCOSE BLD-MCNC: 113 MG/DL (ref 70–99)
GLUCOSE BLD-MCNC: 159 MG/DL (ref 70–99)
GLUCOSE BLD-MCNC: 353 MG/DL (ref 70–99)
GLUCOSE BLD-MCNC: 66 MG/DL (ref 70–99)
GLUCOSE BLD-MCNC: 72 MG/DL (ref 70–99)
GLUCOSE BLD-MCNC: 90 MG/DL (ref 70–99)
PERFORMED ON: ABNORMAL
PERFORMED ON: NORMAL
PERFORMED ON: NORMAL

## 2020-10-07 PROCEDURE — 97112 NEUROMUSCULAR REEDUCATION: CPT

## 2020-10-07 PROCEDURE — 97116 GAIT TRAINING THERAPY: CPT

## 2020-10-07 PROCEDURE — 97530 THERAPEUTIC ACTIVITIES: CPT

## 2020-10-07 PROCEDURE — 94760 N-INVAS EAR/PLS OXIMETRY 1: CPT

## 2020-10-07 PROCEDURE — 92526 ORAL FUNCTION THERAPY: CPT

## 2020-10-07 PROCEDURE — 97110 THERAPEUTIC EXERCISES: CPT

## 2020-10-07 PROCEDURE — 97129 THER IVNTJ 1ST 15 MIN: CPT

## 2020-10-07 PROCEDURE — 6360000002 HC RX W HCPCS: Performed by: PHYSICAL MEDICINE & REHABILITATION

## 2020-10-07 PROCEDURE — 1280000000 HC REHAB R&B

## 2020-10-07 PROCEDURE — 6370000000 HC RX 637 (ALT 250 FOR IP): Performed by: PHYSICAL MEDICINE & REHABILITATION

## 2020-10-07 PROCEDURE — 97130 THER IVNTJ EA ADDL 15 MIN: CPT

## 2020-10-07 RX ADMIN — SUCRALFATE 1 G: 1 TABLET ORAL at 05:59

## 2020-10-07 RX ADMIN — TAMSULOSIN HYDROCHLORIDE 0.4 MG: 0.4 CAPSULE ORAL at 22:07

## 2020-10-07 RX ADMIN — CLOPIDOGREL BISULFATE 75 MG: 75 TABLET ORAL at 08:33

## 2020-10-07 RX ADMIN — DICLOFENAC 4 G: 10 GEL TOPICAL at 08:40

## 2020-10-07 RX ADMIN — ROSUVASTATIN CALCIUM 10 MG: 10 TABLET, FILM COATED ORAL at 08:33

## 2020-10-07 RX ADMIN — ASPIRIN 81 MG: 81 TABLET, COATED ORAL at 08:34

## 2020-10-07 RX ADMIN — INSULIN LISPRO 15 UNITS: 100 INJECTION, SOLUTION INTRAVENOUS; SUBCUTANEOUS at 12:30

## 2020-10-07 RX ADMIN — SENNOSIDES-DOCUSATE SODIUM TAB 8.6-50 MG 1 TABLET: 8.6-5 TAB at 08:33

## 2020-10-07 RX ADMIN — INSULIN LISPRO 3 UNITS: 100 INJECTION, SOLUTION INTRAVENOUS; SUBCUTANEOUS at 17:40

## 2020-10-07 RX ADMIN — Medication 3 MG: at 22:07

## 2020-10-07 RX ADMIN — ENOXAPARIN SODIUM 40 MG: 40 INJECTION SUBCUTANEOUS at 22:14

## 2020-10-07 RX ADMIN — LOSARTAN POTASSIUM 12.5 MG: 25 TABLET, FILM COATED ORAL at 08:35

## 2020-10-07 RX ADMIN — PANTOPRAZOLE SODIUM 40 MG: 40 TABLET, DELAYED RELEASE ORAL at 05:59

## 2020-10-07 RX ADMIN — SENNOSIDES-DOCUSATE SODIUM TAB 8.6-50 MG 1 TABLET: 8.6-5 TAB at 22:06

## 2020-10-07 RX ADMIN — PANTOPRAZOLE SODIUM 40 MG: 40 TABLET, DELAYED RELEASE ORAL at 16:07

## 2020-10-07 RX ADMIN — DICLOFENAC 4 G: 10 GEL TOPICAL at 16:03

## 2020-10-07 RX ADMIN — DULOXETINE HYDROCHLORIDE 30 MG: 30 CAPSULE, DELAYED RELEASE ORAL at 08:33

## 2020-10-07 RX ADMIN — SUCRALFATE 1 G: 1 TABLET ORAL at 22:07

## 2020-10-07 RX ADMIN — DICLOFENAC 4 G: 10 GEL TOPICAL at 22:13

## 2020-10-07 RX ADMIN — SUCRALFATE 1 G: 1 TABLET ORAL at 16:03

## 2020-10-07 NOTE — PROGRESS NOTES
Department of Physical Medicine & Rehabilitation  Progress Note    Patient Identification:  Sharon West  [id number]  : 1945  Admit date: 2020    Chief Complaint: Right pontine stroke [facility])    Subjective:   No acute events overnight. Patient  With some low blood sugars but has been labile. Encouraged consistent po intake. ROS: No f/c, n/v, cp     Objective:  Patient Vitals for the past 24 hrs:   BP Temp Temp src Pulse Resp SpO2 Weight   10/07/20 1559 136/86 97.3 °F (36.3 °C) Oral 89 18 96 % --   10/07/20 1200 (!) 151/95 98.4 °F (36.9 °C) Oral 78 18 98 % --   10/07/20 0830 -- -- -- -- -- 96 % --   10/07/20 0815 (!) 148/87 98.3 °F (36.8 °C) Oral 88 16 97 % --   10/07/20 0710 130/83 97.8 °F (36.6 °C) Oral 80 18 96 % --   10/07/20 0433 135/75 97.1 °F (36.2 °C) Oral 96 16 96 % 123 lb 10.9 oz (56.1 kg)   10/07/20 0031 119/70 98.1 °F (36.7 °C) Oral 95 16 99 % --   10/06/20 1951 129/75 97.7 °F (36.5 °C) Oral 98 16 97 % --     Const: Alert. No distress, pleasant. HEENT: Normocephalic, atraumatic. Normal sclera/conjunctiva. MMM. CV: Regular rate and rhythm. Resp: No respiratory distress. Lungs CTAB. Abd: Soft, nontender, nondistended, NABS+   Ext: No edema. Neuro: Alert, evidence of mild confusion (limited by language barrier), speech dysarthric, left hemiparesis (1-3/5)  Psych: Cooperative, appropriate mood and affect    Laboratory data: Available via EMR.    Last 24 hour lab  Recent Results (from the past 24 hour(s))   POCT Glucose    Collection Time: 10/06/20  4:34 PM   Result Value Ref Range    POC Glucose 268 (H) 70 - 99 mg/dl    Performed on ACCU-CHEK    POCT Glucose    Collection Time: 10/06/20  7:39 PM   Result Value Ref Range    POC Glucose 95 70 - 99 mg/dl    Performed on ACCU-CHEK    POCT Glucose    Collection Time: 10/07/20  2:52 AM   Result Value Ref Range    POC Glucose 72 70 - 99 mg/dl    Performed on ACCU-CHEK    POCT Glucose    Collection Time: 10/07/20  7:06 AM   Result Value Ref Range    POC Glucose 90 70 - 99 mg/dl    Performed on ACCU-CHEK    POCT Glucose    Collection Time: 10/07/20 11:56 AM   Result Value Ref Range    POC Glucose 353 (H) 70 - 99 mg/dl    Performed on ACCU-CHEK    POCT Glucose    Collection Time: 10/07/20  4:01 PM   Result Value Ref Range    POC Glucose 159 (H) 70 - 99 mg/dl    Performed on ACCU-CHEK        Therapy progress:  PT  Position Activity Restriction  Other position/activity restrictions: L sided weakness; speaks [place] - uses  phone, family, and also in person interpretors  Objective     Sit to Stand: Contact guard assistance(moderate cueing to push from the chair, and required tactile cueing to place grab hemiwalker once in standing)  Stand to sit: Contact guard assistance(verbal and tactile cues for hand placement. Verbal and tactile cues to stand upright.)  Bed to Chair: Minimal assistance(with alondra walker)  Device: Hemiwalker  Other Apparatus: Wheelchair follow(L arm sling)  Assistance: Minimal assistance, Moderate assistance(ModA to correct LoB. Laure to remain upright and facilitate ambulation)  Distance: 21' with no turns  OT  PT Equipment Recommendations  Equipment Needed: Yes  Mobility Devices: Wheelchair  Wheelchair: Standard  Other: standard wheelchair with standard cushion, standard leg rests, removable arm rests  Toilet - Technique: Stand pivot  Equipment Used: Grab bars  Toilet Transfers Comments: cues for safety, pt attempting to sit too early  Assessment        SLP  Diet Solids Recommendation: Dysphagia Minced and Moist (Dysphagia II)  Liquid Consistency Recommendation: Thin    Body mass index is 21.91 kg/m².     Assessment and Plan:    Impairments: left hemiparesis, decreased coordination, balance, endurance, cognition, dysarthria, dysphagia     Acute ischemic stroke  -Localization: Right son, posterior centrum semiolave, left frontal lobe deep white matter  -Etiology: small vessel vs embolic  -Telemetry in ARU, then event monitor at discharge  -Secondary ppx with DAPT x 3 weeks (then Plavix alone), statin, BP and glucose control  -PT/OT/SLP    Spasticity  -Emerging tone in LUE  -Monitor, consider medication     H/o left PCA stroke with severe stenosis  -Secondary ppx as above     Dysphagia   -Dietitian and SLP consults.   -Upgrade to regular + thins     HTN, uncontrolled  -Still allowing some permissive HTN due to worsening of symptoms with normalization of BP  -losartan (dose decreased)     DM2, uncontrolled  -Lantus (increased to 30 units qhs, home dose), added 10 units prandial with breakfast, continue high dose ISS     CKD  -Avoid nephrotoxins, renally dose meds  -Monitor      L1 compression fracture  -Pain control  -PT/OT     Bladder   -High risk retention   -Monitor PVRs, SC prn >300cc  -Flomax     Bowel   -High risk constipation   -senna+colace BID, PRN miralax, MoM, and bisacodyl supp.     Pain control  -prn tramadol  -diclofenac gel for shoulder and knee pain     PPx  -DVT: lovenox  -GI: pantoprazole    Rehab Progress: Interdisciplinary team conference was held today with entire rehab treatment team including PT, OT, SLP, Dietician, RN, and SW. Discussion focused on progress toward rehab goals and discharge planning. Making steady progress. Working on left side function, functional mobility, compensatory strategies (anticipating primarily wheelchair for mobility). SLP working on aphasia/apraxia, dysarthria, dysphagia, cognition. Separate conference then held with patient/family, questions answered and concerns addressed. Total treatment time >35 min with greater than 50% spent in care coordination. Anticipated Dispo: home with family, anticipate 24/7 assist  Services:  PT, OT, RN, Aide, (?SLP)  DME: wheelchair, alondra-walker, BSC, TTB  ELOS: 10/17    Priscila Cole was evaluated today and a DME order was entered for a standard wheelchair because he requires this to successfully complete daily living tasks of ambulating.   A standard manual wheelchair is necessary due to patient's impaired ambulation and mobility restrictions and would be unable to resolve these daily living tasks using a cane or walker. The patient is capable of using a standard wheelchair safely in their home and can maneuver within their home with adequate access. There is a caregiver available to provide necessary assistance. The need for this equipment was discussed with the patient and he understands, is in agreement, and has not expressed an unwillingness to use the wheelchair. Verna Cain.  Jaylyn Hardy MD 10/7/2020, 4:09 PM

## 2020-10-07 NOTE — PROGRESS NOTES
Patient admitted to rehab with CVA. Lt sided  weaknessm  A/A/O x 1. Can be confused at times. Denies pain this am. Transfers with stedy x 1. On general diet, tolerating well. Medications taken whole in applesauce. On plavix for DVT prophylaxis. Skin warm and dry. On room air. Has been continent of bowel and bladder. LBM 10/7. Chair/bed alarms in use and call light in reach. Tele NSR. Will monitor for safety.

## 2020-10-07 NOTE — PLAN OF CARE
completed on admission and every shift. Barrier wipes used in the event of incontinence. Pressure relief techniques used as needed while in chair and in bed. Position changes encouraged at least every two hours while in bed. 10/7/2020 0246 by Leander Tompkins RN  Outcome: Ongoing     Problem: Daily Care:  Goal: Daily care needs are met  Description: Daily care needs are met  10/7/2020 1052 by Delbra Bloch, RN  Outcome: Ongoing  10/7/2020 0246 by Leander Tompkins RN  Outcome: Ongoing     Problem: Pain:  Goal: Patient's pain/discomfort is manageable  Description: Patient's pain/discomfort is manageable  10/7/2020 0246 by Leander Tompkins RN  Outcome: Ongoing  Goal: Pain level will decrease  Description: Pain level will decrease  10/7/2020 0246 by Leander Tompkins RN  Outcome: Ongoing  Goal: Control of acute pain  Description: Control of acute pain  10/7/2020 0246 by Leander Tompkins RN  Outcome: Ongoing  Goal: Control of chronic pain  Description: Control of chronic pain  10/7/2020 1052 by Delbra Bloch, RN  Outcome: Ongoing  10/7/2020 0246 by Leander Tompkins RN  Outcome: Ongoing     Problem: Discharge Planning:  Goal: Patients continuum of care needs are met  Description: Patients continuum of care needs are met  10/7/2020 0246 by Leander Tompkins RN  Outcome: Ongoing     Problem: Nutrition  Goal: Optimal nutrition therapy  10/7/2020 0246 by Leander Tompkins RN  Outcome: Ongoing     Problem: HEMODYNAMIC STATUS  Goal: Patient has stable vital signs and fluid balance  10/7/2020 1027 by Delbra Bloch, RN  Outcome: Ongoing  10/7/2020 0246 by Leander Tompkins RN  Outcome: Ongoing     Problem: ACTIVITY INTOLERANCE/IMPAIRED MOBILITY  Goal: Mobility/activity is maintained at optimum level for patient  10/7/2020 1052 by Delbra Bloch, RN  Outcome: Ongoing  Note: Patient working with therapy at least 3 hours/day to obtain maximal mobility. Safety devices used.     10/7/2020 1027 by Delbra Bloch, RN  Outcome: Ongoing  Note: Patient working with therapy at least 3 hours/day to obtain maximal mobility. Safety devices used. 10/7/2020 0246 by Suzy Yip RN  Outcome: Ongoing     Problem: COMMUNICATION IMPAIRMENT  Goal: Ability to express needs and understand communication  10/7/2020 0246 by Suzy Yip RN  Outcome: Ongoing     Problem: Serum Glucose Level - Abnormal:  Goal: Ability to maintain appropriate glucose levels will improve  Description: Ability to maintain appropriate glucose levels will improve  10/7/2020 1052 by Cathy Brown RN  Outcome: Ongoing  Note: Blood glucose levels being monitored and treated per order. Dietary restrictions noted and followed. Instructed on signs/symptoms of hypoglycemia and hyperglycemia.     10/7/2020 0246 by Suzy Yip RN  Outcome: Ongoing     Problem: IP SWALLOWING  Goal: LTG - patient will tolerate the least restrictive diet consistency to allow for safe consumption of daily meals  10/7/2020 0246 by Suzy Yip RN  Outcome: Ongoing  Goal: STG - Patient will follow recommended swallowing strategies  10/7/2020 0246 by Suzy Yip RN  Outcome: Ongoing

## 2020-10-07 NOTE — PROGRESS NOTES
Physical Therapy  Facility/Department: [address] REHAB  Daily Treatment Note  NAME: Preethi Sweeney  : 1945  MRN: [id number]    Date of Service: 10/7/2020    Discharge Recommendations:  Patient would benefit from continued therapy after discharge, Continue to assess pending progress   PT Equipment Recommendations  Equipment Needed: Yes  Mobility Devices: Wheelchair  Wheelchair: Standard  Other: standard wheelchair with standard cushion, standard leg rests, removable arm rests    Assessment   Body structures, Functions, Activity limitations: Decreased functional mobility ; Decreased balance;Decreased strength  Assessment: Pt continues to be motivated and works hard with therapy. Pt demonstrates improved transfers from Sit<>stand, but is still requiring cuing to maintain upright posture and extension in L hip and knee. Pt is initially unsteady when upright, and requires increased assistance to stay upright to prevent falling. Pt struggles when using R LE to assist propulsion and steering of w/c. He requires frequent tactile and verbal cuing on sequencing of events. Pt continues to be well below baseline and would benefit from skilled therapy to strengthen muscles, increase safety awareness and impove functional mobility for safe transition to home. Treatment Diagnosis: decreased functional mobility following CVA with L alondra  Prognosis: Good;Fair  History: as noted  Exam: as above  Clinical Presentation: evolving  PT Education: General Safety;Transfer Training;Gait Training;Functional Mobility Training  Barriers to Learning: language  REQUIRES PT FOLLOW UP: Yes  Activity Tolerance  Activity Tolerance: Patient limited by endurance; Patient Tolerated treatment well     Patient Diagnosis(es): There were no encounter diagnoses.      has a past medical history of Cerebral artery occlusion with cerebral infarction ([facility] [address] Diabetes mellitus ([facility] [address] Gallstone, GERD (gastroesophageal reflux disease), Hyperlipidemia, Hypertension, and Renal insufficiency. has a past surgical history that includes Appendectomy; Cholecystectomy; Upper gastrointestinal endoscopy (06/08/2018); Upper gastrointestinal endoscopy (N/A, 2/28/2019); Upper gastrointestinal endoscopy (N/A, 4/23/2019); Upper gastrointestinal endoscopy (N/A, 4/23/2019); Upper gastrointestinal endoscopy (N/A, 4/29/2020); and Colonoscopy (N/A, 5/8/2020). Restrictions  Restrictions/Precautions  Restrictions/Precautions: Fall Risk  Required Braces or Orthoses?: No  Position Activity Restriction  Other position/activity restrictions: L sided weakness; speaks [place] - uses  phone, family, and also in person interpretors  Subjective   General  Chart Reviewed: Yes  Additional Pertinent Hx: Per Dr. Nelsy Gonzalez , \"The patient is a 76 y.o. male who presents to [facility] with unsteady on feet. Pt speaks [place], son at bedside helping getting history. According to son/pt, pt apparently sustained a fall yesterday and felt like he couldn't move his left side. Today he had difficulty ambulating using his lt side and hence son brought pt to the ER\"  Diagnosed with CVA with L sided weakness. Response To Previous Treatment: Patient with no complaints from previous session. Family / Caregiver Present: Milagro Raman, Present and interpreting)  Referring Practitioner: Skylar Britton  Subjective  Subjective: Pt met seated in gray, high back chair with son present. Pt reports no complaints and is agreaable to PT this AM.     Objective   Transfers  Sit to Stand: Contact guard assistance(moderate cueing to push from the chair, and required tactile cueing to place grab hemiwalker once in standing)  Stand to sit: Contact guard assistance(verbal and tactile cues for hand placement. Verbal and tactile cues to stand upright.)  Stand Pivot Transfers: Minimal Assistance(To L side.   Pt unstable and cuing to pivot on R and for hand placement)  Ambulation  Ambulation?: Yes  More Ambulation?: No  Ambulation 1  Surface: level tile  Device: Hemiwalker  Other Apparatus: Wheelchair follow(L arm sling)  Assistance: Minimal assistance; Moderate assistance(ModA to correct LoB. Laure to remain upright and facilitate ambulation)  Quality of Gait: Patient demonstrates good sequencing initially, but does still require tactile cueing for correct sequence. Able to advance left LE with improved control of hip abduction, but still requires tactile cuing to advance L LE and to fully extend in stance phase. He was able to clear toes of left foot and able to extend knee, but required facilitation and assistance to maintain knee flexion as fatigue increased. One moderate LoB early on that required Laure-ModA for steadying assistance. Good weight shifting, but requires increased assist to weight shift onto R LE as he advances LLE as fatigue increases. Gait Deviations: Slow Janeth;Decreased step length;Decreased step height  Distance: 20' with no turns  Comments: Patient needs max cuing to ambulate  Wheelchair Activities  Wheelchair Size: 18\"  Wheelchair Type: Standard  Wheelchair Cushion: (waffle cushion)  Wheelchair Parts Management: No  Propulsion: Yes  Propulsion 1  Propulsion: Manual  Level: Level Tile  Curbs: (therapy gym threshold)  Method: RUE;RLE  Level of Assistance: Minimal assistance;Stand by assistance(Laure with tactile cuing when patient has dificulty progressing)  Description/ Details: Patient continues to have difficulty with use of right LE to assist with path and required frequent tactile and verbal cueing. Decreased speed. He was unable to maintain a straight path this date. Patient was SBA 75% of time. Distance: 80' with two turns     PM session:   S: pt met in room sitting in gray, high back chair. Pt was alert and waved at therapists as they entered. No family present. Phone interpretor was called. Agreeable to PT this afternoon.   O: Pt performed stand-pivot transfer from gray chair to w/c with CGA with cues for hand placement. Pt requires cuing to pivot on R LE to get into position for sitting in w/c. Sit<>stand with CGA. PT used magic wand massage device to stimulate muscle contractions in L LE. Pt was seated in w/c for activity. Pt was first asked to contract DFs and PFs as therapist rubbed massager up leg from distal to proximal on respective muscle belly. Pt required frequent verbal and tactile cuing from therapist to get patient to contract DFs, followed by contraction of PFs. Therapist performed massaging with wand on distal to proximal quads. Pt again required max cues for contraction. Pt given seated rest break after activity. Pt sit<>stand with CGA and cues for hand placement. Pt then ambulated 10' using hemiwalker and MinAx2. Pt required max verbal and tactile cues on sequencing of events. Pt also needed increasing assistance (Laure-ModA) to stay upright as he fatigued and began shifting to L. Pt has difficulty yohan hip and knee extensors when upright. Pt needs max cues to contract LE extensors. As patient fatigues, his legs begin to buckle and he flexes at hips and knees. Pt requested to sit and rest after activity secondary to fatigue. Pt performed bean bag reaching and tossing over mirror. Pt sit>stand with CGA with cues for hand placement. Pt was then instructed to reach with RUE into bucket of bean bags. Bucket was incrementally moved further away after each retrieval to challenge dynamic standing balance. He then placed bean bag at top of mirror and pushed it off to the opposite side. He required ModA to stand upright with max cues to extend L hip and knee for sufficient quads and glut contraction. Pt stood for first trial for ~3 minutes. Stand>sit with CGA. Pt performed activity once more (sit<>stand with CGA), but had increased difficulty standing upright. Pt stood for another 3-4 minutes.    L arm was placed in sling for ambulation and standing activity. A:  Pt tolerated PM session well. He requires frequent cuing on sequencing and posturing. Pt is easily fatigued and begins to become increasingly unsteady the longer he stands. Pt had minor LoBs throughout afternoon session that were corrected through Laure-ModA. Pt would continue to benefit from skilled PT to improve balance, increase activity tolerance and strengthen muscles for success with functional mobility. Safety Device - Type of devices:  [x]  All fall risk precautions in place [] Bed alarm in place  [] Call light within reach [] Chair alarm in place [] Positioning belt [x] Gait belt [x] Patient at risk for falls [] Left in bed [x] Left in chair [] Telesitter in use [] Sitter present [] Nurse notified []  None     Goals  Short term goals  Time Frame for Short term goals: 2 weeks  Short term goal 1: bed mobility SBA  Short term goal 2: transfers CGA  Short term goal 3: amb x 20 ft with RW and Min A x 1  Short term goal 4: tolerate LE Ther ex for increased LE strength. Short term goal 5: curb step min A  Long term goals  Time Frame for Long term goals : 3-4 weeks  Long term goal 1: bed mobility SBA  Long term goal 2: transfers SBA  Long term goal 3: car transfer CGA  Long term goal 4: amb 48' with RW vs hemiwalker CGA to SBA  Long term goal 5: 4 steps B rails CGA  Patient Goals   Patient goals : pt's/family goal is some rehab before returning home. Plan    Plan  Times per week: 5 to 6  Times per day: Twice a day  Plan weeks: 3 to 4  Current Treatment Recommendations: Functional Mobility Training, Balance Training, Endurance Training, ROM, Cognitive/Perceptual Training, Transfer Training, Gait Training, Stair training, IADL Training, Neuromuscular Re-education  Safety Devices  Type of devices:  All fall risk precautions in place, Gait belt, Left in chair, Patient at risk for falls(son, Mar Salcedompse, present)  Restraints  Initially in place: No     Therapy Time   Individual Concurrent Group Co-treatment   Time In 1045         Time Out 1115         Minutes 30             Second Session Therapy Time     Individual Co-treatment   Time In 1345    Time Out 1430    Minutes 45       Electronically signed by GENE Rodriguez on 10/7/2020 at 12:53 PM   Therapist was present, directed the patient's care, made skilled judgement, and was responsible for assessment and treatment of the patient.   Electronically signed by Jennifer Sabillon PT on 10/7/2020 at 4:17 PM

## 2020-10-07 NOTE — PROGRESS NOTES
Speech Language Pathology  ACUTE REHAB UNIT  SPEECH/LANGUAGE PATHOLOGY      [x] Daily  [] Weekly Care Conference Note  [] Discharge    Patient:Sylvester Duval      ETW:[birth year]  [facility]:[id number]  Rehab Dx/Hx: Right pontine stroke ([facility] [address] [I63.50]    Precautions: Dysphagia; Coco Florian is native language; Potential cognitive-communication deficitgs  Home situation: Lives with family  ST Dx: [] Aphasia  [x] Dysarthria  [] Apraxia   [x] Oropharyngeal dysphagia [x] Cognitive   Impairment  [] Other:   Initial Speech Therapy Assessment Diagnosis:   Per INitial Evaluations 9/26/2020:   1. Cognitive Diagnosis: Pt presents with impaired memory, orientation,  and problem solving for basic information. Grandson reports this is typical for pt. Per acute notes, pt's son also confirmed this is pt's baseline. 2. Aphasia Diagnosis: Further evaluation needed to clarify. 3. Speech Diagnosis: Further evaluation needed to clarify. 4. Communication Diagnosis: Pt requires and  to communicate. Pt appears to often have difficulty expressing himself accurately and with detail for typical conversation. 5. Dysphagia Diagnosis: Mild pharyngeal stage dysphagia;Mild to moderate oral stage dysphagia   Pt presents with no overt signs of aspiration or penetration. Swallow appears effortful at times and pt reports some difficulty. Oral skills for solids appear impaired with slow posterior propulsion and trace oral residue. Pt at risk for adequate intake due to his c/o intermittent nausea, prefers only warm food and drinks, and the food provided is not what pt is familiar with.   Date of Admit: 9/25/2020  Room #: L6V-2819/3260-01  Date: 10/7/2020       Current functional status (updated daily):         Current Diet Order:DIET GENERAL; Carb Control: 4 carb choices (60 gms)/meal; Vegetarian (Lacto-Ovo)   Behavior: [x] Alert  [] Cooperative  [x]  Pleasant  [x] Confused  [] Agitated  [] Uncooperative  [] Distractible [] Motivated  [] Self-Limiting [] Anxious  [x] Other:  phone utilized. Endurance:  [x] Adequate for participation in SLP sessions  [] Reduced overall  [] Lethargic  [] Other:  Safety: [] No concerns at this time  [] Reduced insight into deficits  []  Reduced safety awareness [] Not following call light procedures  [] Unable to Assess  [] Other:  Swallowing Precautions: 90 degree positioning with all p.o. intake; small bites/sips; alternate textures through meal; reduce rate of intake;     Barriers toward pt/family plan for progress toward d/c plan: Medical status and caregiver support may be barriers           Date: 10/7/2020      Tx session 1 Tx session 2   Total Timed Code Min SLP Individual Minutes  Time In: 1430  Time Out: 0646  Minutes: 45  Coded treatment time  25 n/a     Group Treatment Minutes 0 0   Co-Treat Minutes 0 0   Variance/Reason:  n/a    Pain denied    Pain Intervention [] RN notified  [] Repositioned  [] Intervention offered and patient declined  [] N/A  [] Other: [] RN notified  [] Repositioned  [] Intervention offered and patient declined  [] N/A  [] Other:   Subjective     Pt seen in the treatment office  No  present in person   phone utilized   for entire session  No family present  Good effort in therapy      Objective:  Goals       Dysphagia Goals:   1. Upgraded goal  The patient will tolerate regular food consistency with thin liquid diet without observed clinical signs of aspiration,      Not targeted no new complaints     n/a   2.  Upgraded goal  The patient will improve oral motor function via therapeutic oral motor exercises to 15/15 each trial. Left facial droop and weakness  · Pt tolerated sensory stimulation ( tactile stimulation)    -resistance ex with engagement of left labial /buccal musculature engagement : >10 reps completed  -volitional rom during stimulation improved from mild to moderate for lip retraction; lip protrusion; lip rounding and with lip retraction Cognition:  · Persistent problems in temporal and spatial orientation  · Persistent reduced visual attention left side of body  And table top  · Persistent memory deficits session to session and for working memory for learned tasks (ie mobility; use of stedy; etc)  Oral Motor Speech  · Data last taken 10/6/2020. Education:   Pt ed ongoing    Safety Devices: [] Call light within reach  [] Chair alarm activated  [] Bed alarm activated  [x] Other: to therapy department  [] Call light within reach  [] Chair alarm activated  [] Bed alarm activated  [] Other:    Progress Assessment: 9/28/2020:  phone utilized. Difficult to assess as pt did not always respond to questions/commands; and verbal responses were not always intelligible. SLP completed a chart review; chart review does report history of cognitive decline in memory/orientation/PS. Pt reportedly lives with family (multi generations); needed assist with ADLs and homemaking; pt receives 24 hour supervision. 9/29/2020: Son voiced concern regarding communication changes in that pt is not able to use  phone; and has difficulty understanding what family say. SLP ed in use of context of an activity and visual cues/gestures to assist. Activities incorporating family; basic DL activities most optimal.  Will upgrade diet to soft/bite size food with thin liquids; oral care post meals. Pt/son ed in examples of food consistencies on soft/bite size food consistencies; and oral care. Toothettes placed in the room and SLP ed pt and family in uses. 9/30/2020 SLP discussed with RN (RN actually in room when addressing pt complaints regarding discomfort in current recliner) ; and  SLP discussed with team (at team meeting ) regarding pt height anc concern regarding current recliner in room (ie pt's feet dangle; pt complaints cannot get comfortable etc)  10/1/2020; 10/20/2020 :   present for session.   10/6/2020: Request diet upgrade to regular consistency   Plan: Continue as per plan of care. Continued Tx Upon Discharge: ?    [] Yes [] No [x] TBD based on progress while on ARU [] Vital Stim indicated [] Other:   Estimated discharge date: TBD   Discharge recommendations:   [] Home independently  [x] Home with assistance [x]  24 hour supervision  [] ECF [] Other:     Additional information:     Interventions used during Rehab Stay:  [] Speech/Language Treatment  [] Instruction in HEP [] Group [x] Dysphagia Treatment [] Cognitive Treatment   [x] Other:     Adverse Reactions to Treatment/Significant Change in Status: n/a      Electronically Signed by    Kaiden Rudolph. Prakash,MS,CCC,SLP [id number]  Speech and Language Pathologist

## 2020-10-07 NOTE — PROGRESS NOTES
Occupational Therapy  Facility/Department: [address] IP REHAB  Daily Treatment Note  NAME: Milton Romero  : 1945  MRN: [id number]    Date of Service: 10/7/2020    Discharge Recommendations:  24 hour supervision or assist, Home with Home health OT, Continue to assess pending progress  OT Equipment Recommendations  Other: TBD    Assessment   Performance deficits / Impairments: Decreased functional mobility ; Decreased ADL status; Decreased ROM; Decreased strength;Decreased endurance;Decreased balance;Decreased coordination;Decreased safe awareness;Decreased cognition;Decreased sensation;Decreased posture  Assessment: Pt tolerated tx well. Pt continues to demo improvement in R UE function through use of PROM/AAROM/neuromuscular re-ed. Pt is consistently completing sit<>stand transfers with min A though continues to require cueing for technique. Pt completed stand pivot transfer to his strong side min A. Pt is hard-working and pleasant during each session. Cont per POC  Treatment Diagnosis: decreased ADL, balance, Left sided function  Prognosis: Good  OT Education: OT Role;Plan of Care;Transfer Training;Home Exercise Program  Barriers to Learning: language, possible aphasia  REQUIRES OT FOLLOW UP: Yes  Activity Tolerance  Activity Tolerance: Patient Tolerated treatment well  Safety Devices  Safety Devices in place: Yes  Type of devices: Call light within reach;Gait belt; Chair alarm in place; Left in chair;Patient at risk for falls([facility]         Patient Diagnosis(es): There were no encounter diagnoses. has a past medical history of Cerebral artery occlusion with cerebral infarction ([facility] [address] Diabetes mellitus ([facility] [address] Gallstone, GERD (gastroesophageal reflux disease), Hyperlipidemia, Hypertension, and Renal insufficiency. has a past surgical history that includes Appendectomy; Cholecystectomy; Upper gastrointestinal endoscopy (2018);  Upper gastrointestinal endoscopy (N/A, 2/28/2019); Upper gastrointestinal endoscopy (N/A, 4/23/2019); Upper gastrointestinal endoscopy (N/A, 4/23/2019); Upper gastrointestinal endoscopy (N/A, 4/29/2020); and Colonoscopy (N/A, 5/8/2020). Restrictions  Restrictions/Precautions  Restrictions/Precautions: Fall Risk  Required Braces or Orthoses?: No  Position Activity Restriction  Other position/activity restrictions: L sided weakness; speaks [place] - uses  phone, family, and also in person interpretors  Subjective   General  Chart Reviewed: Yes, Progress Notes  Additional Pertinent Hx: Per Dr. Aleja Montoya , \"The patient is a 76 y.o. male who presents to [facility] with unsteady on feet. Pt speaks Serbian, son at bedside helping getting history. According to son/pt, pt apparently sustained a fall yesterday and felt like he couldn't move his left side. Today he had difficulty ambulating using his lt side and hence son brought pt to the ER\"  Diagnosed with  Response to previous treatment: Patient with no complaints from previous session  Family / Caregiver Present: Hola Dasilva)  Referring Practitioner: Cheo Rdz  Diagnosis: Acute CVA - with left sided weakness  Subjective  Subjective: pt met in dept for OT. pt agreeable, son, Bishop Young translating. no c/o pain      Orientation     Objective             Balance  Sitting Balance: Supervision  Standing Balance: Contact guard assistance  Standing Balance  Time: x3 mins  Activity: neuromuscular re-ed WB through L UE at tabletop  Comment: pt completed clothespins tree using R UE while OT assisted to facilitate WB through L UE. tolerated well, moments of unsteadiness required min A and corrected with cues  Functional Mobility  Functional - Mobility Device: Wheelchair  Assist Level: Dependent/Total  Functional Mobility Comments: OT managed w/c during session in interest of time  Wheelchair Bed Transfers  Wheelchair/Bed - Technique: Stand pivot  Equipment Used: Wheelchair; Other(recliner)  Level of Asssistance: Minimal assistance  Wheelchair Transfers Comments: w/c > arm chair the right     Transfers  Sit to stand: Minimal assistance  Stand to sit: Minimal assistance  Transfer Comments: verbal/tactile cues for R hand placement                       Cognition  Overall Cognitive Status: Exceptions(son interpreting)  Following Commands: Follows one step commands consistently  Attention Span: Attends with cues to redirect  Safety Judgement: Decreased awareness of need for safety  Problem Solving: Assistance required to implement solutions;Assistance required to generate solutions;Assistance required to identify errors made  Insights: Decreased awareness of deficits  Initiation: Requires cues for some  Sequencing: Requires cues for some                    Type of ROM/Therapeutic Exercise  Type of ROM/Therapeutic Exercise: PROM;AAROM  Exercises  Scapular Protraction: x10 AAROM  Scapular Retraction: x10 AAROM  Shoulder Depression: x10 AROM  Shoulder Elevation: x10 AROM  Shoulder Flexion: x10 AAROM  Shoulder Extension: x10 AAROM  Horizontal ABduction: x10 AAROM  Horizontal ADduction: x10 AAROM  Elbow Flexion: x10 AAROM. pt able to achieve 0-45* AROM  Elbow Extension: x10 AAROM  Supination: x10 PROM  Pronation: x10 AAROM  Wrist Flexion: x10 PROM  Wrist Extension: x10 PROM  Finger Flexion: x10 PROM. use of gentle vibration elicited slight thumb flexion/extension  Finger Extension: x10 PROM use of gentle vibration elicited slight thumb flexion/extension  Other: pt completed exercises seated in arm chair, demo'd good improvement in L UE function this date            PM Session:  Pt met in dept for OT. Use of  phone this session. No c/o pain and agreeable to therapy. Pt completed stand pivot transfer from w/c > therapy mat min A.    Sit>supine min/mod A for B LEs. Pt completed x10 reps abduction/adduction AAROM in supine, shoulder flexion/extension, elbow flexion/extension, internal/external rotation. Supine>sit min A for trunk and assist for positioning in order to WB through L UE. Pt completed stand pivot transfer from mat <> BSC > w/c min A each time with cues and assist for hand positioning and sequencing. Pt states he is agreeable to using BSC at home. Attempted to complete tub transfer however pt perseverating on wondering where his son is and unable to focus on the task, so returned to the room. Pt requested to return to bed. Stand pivot from w/c > bed min A. Pt left positioned for comfort in bed with alarm engaged and needs in reach. Pt tolerated tx well but appeared more fatigued this afternoon. Cont per POC     Plan   Plan  Times per week: 5-6 days per week  Times per day: Twice a day  Plan weeks: 3-4 weeks  Current Treatment Recommendations: Functional Mobility Training, Endurance Training, Safety Education & Training, Self-Care / ADL, Strengthening, Balance Training, ROM, Neuromuscular Re-education, Cognitive/Perceptual Training, Patient/Caregiver Education & Training, Equipment Evaluation, Education, & procurement, Cognitive Reorientation    Goals  Short term goals  Time Frame for Short term goals: 1 week pt will. Benja Anshu term goal 1: bathe with mod assist and AD -met  Short term goal 2: dress UB with mod assist, LB with max assist and AD as needed -met  Short term goal 3: toilet with max assist and AD -met  Short term goal 4: transfer with mod assist and AD -met  Short term goal 5: functional mobilty with LRAD and mod assist  Short term goal 6: improve LUE function to assist with ADL, positioning 25%  Short term goal 7: improve left side awareness to require mod cues for safety with ADL and mobility -met  Long term goals  Time Frame for Long term goals : 3-4 weeks pt will. ..   Long term goal 1: bathe with min assist and AD  Long term goal 2: dress UB after set up, LB with min assist and AD as needed  Long term goal 3: toilet with CGA and AD as needed  Long term goal 4: transfer with CGA and AD as needed  Long term goal 5: functional mobility with CGA and LRAD  Patient Goals   Patient goals : \"I want to be normal again\"  With cues, pt agreed he wanted to improve his left UE strength, be able to bathe and dress himself, be able to stand and walk .   States he does not need to do anything in the kitchen       Therapy Time   Individual Concurrent Group Co-treatment   Time In 1115         Time Out 1200         Minutes 45               Therapy Time   Individual Co-treatment   Time In 52 [facility]    Time Out 1600    Minutes David 57, OTR/L [id number]

## 2020-10-08 LAB
ANION GAP SERPL CALCULATED.3IONS-SCNC: 11 MMOL/L (ref 3–16)
BASOPHILS ABSOLUTE: 0.1 K/UL (ref 0–0.2)
BASOPHILS RELATIVE PERCENT: 1.1 %
BUN BLDV-MCNC: 11 MG/DL (ref 7–20)
CALCIUM SERPL-MCNC: 9.3 MG/DL (ref 8.3–10.6)
CHLORIDE BLD-SCNC: 103 MMOL/L (ref 99–110)
CO2: 24 MMOL/L (ref 21–32)
CREAT SERPL-MCNC: 1.2 MG/DL (ref 0.8–1.3)
EOSINOPHILS ABSOLUTE: 0.2 K/UL (ref 0–0.6)
EOSINOPHILS RELATIVE PERCENT: 3.4 %
GFR AFRICAN AMERICAN: >60
GFR NON-AFRICAN AMERICAN: 59
GLUCOSE BLD-MCNC: 110 MG/DL (ref 70–99)
GLUCOSE BLD-MCNC: 164 MG/DL (ref 70–99)
GLUCOSE BLD-MCNC: 169 MG/DL (ref 70–99)
GLUCOSE BLD-MCNC: 185 MG/DL (ref 70–99)
GLUCOSE BLD-MCNC: 219 MG/DL (ref 70–99)
GLUCOSE BLD-MCNC: 245 MG/DL (ref 70–99)
HCT VFR BLD CALC: 41.9 % (ref 40.5–52.5)
HEMOGLOBIN: 13.8 G/DL (ref 13.5–17.5)
LYMPHOCYTES ABSOLUTE: 2.3 K/UL (ref 1–5.1)
LYMPHOCYTES RELATIVE PERCENT: 37.9 %
MCH RBC QN AUTO: 25.6 PG (ref 26–34)
MCHC RBC AUTO-ENTMCNC: 33 G/DL (ref 31–36)
MCV RBC AUTO: 77.4 FL (ref 80–100)
MONOCYTES ABSOLUTE: 0.4 K/UL (ref 0–1.3)
MONOCYTES RELATIVE PERCENT: 6.1 %
NEUTROPHILS ABSOLUTE: 3.2 K/UL (ref 1.7–7.7)
NEUTROPHILS RELATIVE PERCENT: 51.5 %
PDW BLD-RTO: 15.9 % (ref 12.4–15.4)
PERFORMED ON: ABNORMAL
PLATELET # BLD: 278 K/UL (ref 135–450)
PMV BLD AUTO: 7.5 FL (ref 5–10.5)
POTASSIUM REFLEX MAGNESIUM: 4.1 MMOL/L (ref 3.5–5.1)
RBC # BLD: 5.41 M/UL (ref 4.2–5.9)
SODIUM BLD-SCNC: 138 MMOL/L (ref 136–145)
WBC # BLD: 6.2 K/UL (ref 4–11)

## 2020-10-08 PROCEDURE — 6370000000 HC RX 637 (ALT 250 FOR IP): Performed by: PHYSICAL MEDICINE & REHABILITATION

## 2020-10-08 PROCEDURE — 97116 GAIT TRAINING THERAPY: CPT

## 2020-10-08 PROCEDURE — 97130 THER IVNTJ EA ADDL 15 MIN: CPT

## 2020-10-08 PROCEDURE — 6360000002 HC RX W HCPCS: Performed by: PHYSICAL MEDICINE & REHABILITATION

## 2020-10-08 PROCEDURE — 97530 THERAPEUTIC ACTIVITIES: CPT

## 2020-10-08 PROCEDURE — 97542 WHEELCHAIR MNGMENT TRAINING: CPT

## 2020-10-08 PROCEDURE — 85025 COMPLETE CBC W/AUTO DIFF WBC: CPT

## 2020-10-08 PROCEDURE — 36415 COLL VENOUS BLD VENIPUNCTURE: CPT

## 2020-10-08 PROCEDURE — 92526 ORAL FUNCTION THERAPY: CPT

## 2020-10-08 PROCEDURE — 1280000000 HC REHAB R&B

## 2020-10-08 PROCEDURE — 97110 THERAPEUTIC EXERCISES: CPT

## 2020-10-08 PROCEDURE — 80048 BASIC METABOLIC PNL TOTAL CA: CPT

## 2020-10-08 PROCEDURE — 97112 NEUROMUSCULAR REEDUCATION: CPT

## 2020-10-08 PROCEDURE — 97129 THER IVNTJ 1ST 15 MIN: CPT

## 2020-10-08 PROCEDURE — 97535 SELF CARE MNGMENT TRAINING: CPT

## 2020-10-08 RX ADMIN — DICLOFENAC 4 G: 10 GEL TOPICAL at 08:25

## 2020-10-08 RX ADMIN — LOSARTAN POTASSIUM 12.5 MG: 25 TABLET, FILM COATED ORAL at 08:22

## 2020-10-08 RX ADMIN — DULOXETINE HYDROCHLORIDE 30 MG: 30 CAPSULE, DELAYED RELEASE ORAL at 08:23

## 2020-10-08 RX ADMIN — ASPIRIN 81 MG: 81 TABLET, COATED ORAL at 08:23

## 2020-10-08 RX ADMIN — INSULIN LISPRO 3 UNITS: 100 INJECTION, SOLUTION INTRAVENOUS; SUBCUTANEOUS at 08:40

## 2020-10-08 RX ADMIN — ENOXAPARIN SODIUM 40 MG: 40 INJECTION SUBCUTANEOUS at 21:31

## 2020-10-08 RX ADMIN — SUCRALFATE 1 G: 1 TABLET ORAL at 16:26

## 2020-10-08 RX ADMIN — SENNOSIDES-DOCUSATE SODIUM TAB 8.6-50 MG 1 TABLET: 8.6-5 TAB at 21:31

## 2020-10-08 RX ADMIN — PANTOPRAZOLE SODIUM 40 MG: 40 TABLET, DELAYED RELEASE ORAL at 16:26

## 2020-10-08 RX ADMIN — INSULIN LISPRO 6 UNITS: 100 INJECTION, SOLUTION INTRAVENOUS; SUBCUTANEOUS at 17:30

## 2020-10-08 RX ADMIN — INSULIN LISPRO 3 UNITS: 100 INJECTION, SOLUTION INTRAVENOUS; SUBCUTANEOUS at 21:30

## 2020-10-08 RX ADMIN — ROSUVASTATIN CALCIUM 10 MG: 10 TABLET, FILM COATED ORAL at 08:23

## 2020-10-08 RX ADMIN — CLOPIDOGREL BISULFATE 75 MG: 75 TABLET ORAL at 08:21

## 2020-10-08 RX ADMIN — PANTOPRAZOLE SODIUM 40 MG: 40 TABLET, DELAYED RELEASE ORAL at 05:11

## 2020-10-08 RX ADMIN — DICLOFENAC 4 G: 10 GEL TOPICAL at 16:27

## 2020-10-08 RX ADMIN — INSULIN LISPRO 10 UNITS: 100 INJECTION, SOLUTION INTRAVENOUS; SUBCUTANEOUS at 08:40

## 2020-10-08 RX ADMIN — SENNOSIDES-DOCUSATE SODIUM TAB 8.6-50 MG 1 TABLET: 8.6-5 TAB at 08:23

## 2020-10-08 RX ADMIN — INSULIN GLARGINE 30 UNITS: 100 INJECTION, SOLUTION SUBCUTANEOUS at 21:30

## 2020-10-08 RX ADMIN — DICLOFENAC 4 G: 10 GEL TOPICAL at 21:34

## 2020-10-08 RX ADMIN — SUCRALFATE 1 G: 1 TABLET ORAL at 05:11

## 2020-10-08 RX ADMIN — TAMSULOSIN HYDROCHLORIDE 0.4 MG: 0.4 CAPSULE ORAL at 21:31

## 2020-10-08 RX ADMIN — SUCRALFATE 1 G: 1 TABLET ORAL at 21:31

## 2020-10-08 NOTE — PROGRESS NOTES
Nutrition Assessment     Type and Reason for Visit: Reassess    Nutrition Recommendations/Plan:   Continue Carb control (4CHO/meal), Vegetarian (lacto-ovo). Nutrition Assessment:  Follow-up. Pt diet upgraded to regular texture. Intakes appear to be well >75% more consistently. No further nutrition interventions at this time.     Malnutrition Assessment:  Malnutrition Status: No malnutrition    Nutrition Related Findings: BM 10/6; elevated Gluc      Current Nutrition Therapies:    DIET GENERAL; Carb Control: 4 carb choices (60 gms)/meal; Vegetarian (Lacto-Ovo)    Anthropometric Measures:  · Height: 5' 3\" (160 cm)  · Current Body Wt: 123 lb (55.8 kg)   · BMI: 21.8    Nutrition Diagnosis:   · Biting/chewing (masticatory) difficulty related to altered GI function as evidenced by (need for altered texture)      Nutrition Interventions:   Food and/or Nutrient Delivery:  Continue Current Diet  Nutrition Education/Counseling:  No recommendation at this time   Coordination of Nutrition Care:  Continued Inpatient Monitoring    Discharge Planning:    Continue current diet     Electronically signed by Asya Looney RD, LD on 10/8/20 at 8:32 AM EDT    Contact: [phone]

## 2020-10-08 NOTE — PROGRESS NOTES
Occupational Therapy  Facility/Department: [address] REHAB  Daily Treatment Note  NAME: Taty Grossman  : 1945  MRN: [id number]    Date of Service: 10/8/2020    Discharge Recommendations:  24 hour supervision or assist, Home with Home health OT, Continue to assess pending progress  OT Equipment Recommendations  Equipment Needed: Yes  Mobility Devices: ADL Assistive Devices  ADL Assistive Devices: Transfer Tub Bench; Toileting - 3-in-1 Commode    Assessment   Performance deficits / Impairments: Decreased functional mobility ; Decreased ADL status; Decreased ROM; Decreased strength;Decreased endurance;Decreased balance;Decreased coordination;Decreased safe awareness;Decreased cognition;Decreased sensation;Decreased posture  Assessment: Pt tolerated tx well. Pt demo'd improvement in ADL performance and requires decreased cues for initiating/sequencing. Pt completed grooming with setup, UB bathing min A, LB bathing SBA, UB dressing mod A, LB dressing max A. Pt completed sit<>stand using grab bar with CGA and stand pivot transfers with min A. Cont per POC  Treatment Diagnosis: decreased ADL, balance, Left sided function  Prognosis: Good  OT Education: OT Role;Plan of Care;Transfer Training;ADL Adaptive Strategies  Barriers to Learning: language, possible aphasia  REQUIRES OT FOLLOW UP: Yes  Activity Tolerance  Activity Tolerance: Patient Tolerated treatment well  Safety Devices  Safety Devices in place: Yes  Type of devices: Call light within reach;Gait belt; Chair alarm in place; Left in chair;Patient at risk for falls         Patient Diagnosis(es): There were no encounter diagnoses. has a past medical history of Cerebral artery occlusion with cerebral infarction ([address] Diabetes mellitus ([address] Gallstone, GERD (gastroesophageal reflux disease), Hyperlipidemia, Hypertension, and Renal insufficiency. has a past surgical history that includes Appendectomy; Cholecystectomy;  Upper gastrointestinal endoscopy Dressing: Verbal cueing; Increased time to complete;Setup; Moderate assistance(pt doffed shirt with cues. assist to thread L UE and adjust in back. mod cues for alondra technique)  LE Dressing: Maximum assistance;Setup;Verbal cueing(pt able to reach to thread pants over L LE but required assist to pull all the way up. pt assisted with pulling pants up over R hip in standing. assist for strap-on depends, both socks, both shoes. pt attempted using LH shoe horn for shoes)  Toileting: (declined need)  Additional Comments: wet towel for non skid surface in shower        Balance  Sitting Balance: Supervision  Standing Balance: Contact guard assistance  Functional Mobility  Functional - Mobility Device: Wheelchair  Activity: To/from bathroom  Assist Level: Dependent/Total  Functional Mobility Comments: OT managed w/c during session in interest of time. pt completed stand pivot transfers (see transfers)  Shower Transfers  Shower - Transfer From: Wheelchair  Shower - Transfer Type: To and From  Shower - Transfer To: Transfer tub bench  Shower - Technique: Stand pivot  Shower Transfers: Minimal assistance  Wheelchair Bed Transfers  Wheelchair/Bed - Technique: Stand pivot  Equipment Used: Wheelchair; Other(arm chair)  Level of Asssistance: Minimal assistance     Transfers  Sit to stand: Contact guard assistance  Stand to sit: Contact guard assistance  Transfer Comments: use of grab bar           Cognition  Overall Cognitive Status: Exceptions(son interpreting)  Following Commands:  Follows one step commands consistently  Attention Span: Attends with cues to redirect  Safety Judgement: Decreased awareness of need for safety  Problem Solving: Assistance required to implement solutions;Assistance required to generate solutions;Assistance required to identify errors made  Insights: Decreased awareness of deficits  Initiation: Requires cues for some  Sequencing: Requires cues for some     PM Session:  Subjective: pt met in dept for OT following speech. Pt agreeable to therapy, c/o pain in LLE.  present. Pt states \"I am more fun today\"    Objective:  Pt completed sit>stand CGA with max verbal/tactile cues to push from w/c. Pt stood x3 mins for tabletop neuromuscular re-ed activity. Pt initially CGA but required up to min/mod A d/t leaning to the left and LLE buckling. OT facilitated WB through L UE while pt put together 1 simple large pegboard pattern. Pt states he feels \"weak\" and requested to sit. Pt required hand over hand assist to reach R hand back and mod A to sit safely in chair. Pt completed DRY tub transfer to TTB. Pt completed stand pivot from w/c <> TTB min A both ways. Pt used R UE to bring L LE over the tub, scooted on TTB with SBA and cueing. Pt appeared very fatigued, agreed to return to his room to rest.    Pt completed stand pivot from w/c > recliner CGA to the right with cues for hand placement and sequencing. Pt completed x10 reps: AROM shoulder shrugs, AAROM shoulder flexion/extension to 90*, PROM finger flexion/extension. Pt demo'd very slight thumb movement. Pt again expressed that his left side does not work, and OT provided education regarding stroke recovery and timeline. Pt was left positioned in arm chair with alarm engaged and needs in reach. Grandson present. Assessment: pt tolerated tx fair but was more fatigued after a long day of therapy. Pt able to complete tub transfer with min A, and receptive to using TTB at home. Will need to initiate family training next week as pt will require 24/7 assist at home.  Cont per OT POC    Plan   Plan  Times per week: 5-6 days per week  Times per day: Twice a day  Plan weeks: 3-4 weeks  Current Treatment Recommendations: Functional Mobility Training, Endurance Training, Safety Education & Training, Self-Care / ADL, Strengthening, Balance Training, ROM, Neuromuscular Re-education, Cognitive/Perceptual Training, Patient/Caregiver Education &

## 2020-10-08 NOTE — PROGRESS NOTES
Patient admitted to rehab with CVA. Lt sided  weakness  A/A/O x 1. Denies pain this am. Up sitting in chair. Transfers with stedy x 1. On general diet, tolerating well. Medications taken whole in applesauce. On plavix for DVT prophylaxis. Skin warm and dry. lidocaine patch applied to Lt shoulder. On room air. Has been continent of bowel and bladder. LBM 10/7. Chair/bed alarms in use and call light in reach. Tele NSR. Will monitor for safety.

## 2020-10-08 NOTE — PLAN OF CARE
Problem: Falls - Risk of:  Goal: Will remain free from falls  Description: Will remain free from falls  10/8/2020 0957 by Bard Fariba RN  Outcome: Ongoing  Note: Fall risk band on patient. Orange light on outside of room. Non skid footwear in place. Alarms used appropriately. Patient instructed to call and wait for staff before getting up. Rounding done to anticipate needs. Appropriate safety devices used for transfers. 10/8/2020 0322 by Esmer Becker RN  Outcome: Ongoing  Note: Fall risk band on patient. Orange light on outside of room. Non skid footwear in place. Alarms used appropriately. Patient instructed to call and wait for staff before getting up. Rounding done to anticipate needs. Appropriate safety devices used for transfers. Goal: Absence of physical injury  Description: Absence of physical injury  10/8/2020 0322 by Esmer Becker RN  Outcome: Ongoing     Problem: Skin Integrity:  Goal: Will show no infection signs and symptoms  Description: Will show no infection signs and symptoms  10/8/2020 0957 by Bard Fariba RN  Outcome: Ongoing  10/8/2020 0322 by Esmer Becker RN  Outcome: Ongoing  Goal: Absence of new skin breakdown  Description: Absence of new skin breakdown  10/8/2020 0957 by Bard Fariba RN  Note: Skin assessment completed on admission and every shift. Barrier wipes used in the event of incontinence. Pressure relief techniques used as needed while in chair and in bed. Position changes encouraged at least every two hours while in bed.     10/8/2020 0322 by Esmer Becker RN  Outcome: Ongoing     Problem: Daily Care:  Goal: Daily care needs are met  Description: Daily care needs are met  10/8/2020 0322 by Esmer Becker RN  Outcome: Ongoing     Problem: Pain:  Goal: Patient's pain/discomfort is manageable  Description: Patient's pain/discomfort is manageable  10/8/2020 0322 by Esmer Becker RN  Outcome: Ongoing  Goal: Pain level will decrease  Description: Pain level will decrease  10/8/2020 4277 by Ghada Martinez RN  Outcome: Ongoing  Goal: Control of acute pain  Description: Control of acute pain  10/8/2020 0322 by Ghada Martinez RN  Outcome: Ongoing  Goal: Control of chronic pain  Description: Control of chronic pain  10/8/2020 0322 by Ghada Martinez RN  Outcome: Ongoing     Problem: Discharge Planning:  Goal: Patients continuum of care needs are met  Description: Patients continuum of care needs are met  10/8/2020 0322 by Ghada Martinez RN  Outcome: Ongoing     Problem: HEMODYNAMIC STATUS  Goal: Patient has stable vital signs and fluid balance  10/8/2020 0957 by Marilee Morales RN  Outcome: Ongoing  Note: Vital signs monitored per unit routine. Monitoring intake and output. Daily weight. Dietary restrictions noted and followed. 10/8/2020 0322 by Ghada Martinez RN  Outcome: Ongoing     Problem: ACTIVITY INTOLERANCE/IMPAIRED MOBILITY  Goal: Mobility/activity is maintained at optimum level for patient  10/8/2020 0957 by Marilee Morales RN  Outcome: Ongoing  Note: Patient working with therapy at least 3 hours/day to obtain maximal mobility. Safety devices used.     10/8/2020 0322 by Ghada Martinez RN  Outcome: Ongoing     Problem: COMMUNICATION IMPAIRMENT  Goal: Ability to express needs and understand communication  10/8/2020 0322 by Ghada Martinez RN  Outcome: Ongoing     Problem: Serum Glucose Level - Abnormal:  Goal: Ability to maintain appropriate glucose levels will improve  Description: Ability to maintain appropriate glucose levels will improve  10/8/2020 0322 by Ghada Martinez RN  Outcome: Ongoing     Problem: IP SWALLOWING  Goal: LTG - patient will tolerate the least restrictive diet consistency to allow for safe consumption of daily meals  10/8/2020 0322 by Ghada Martinez RN  Outcome: Ongoing  Goal: STG - Patient will follow recommended swallowing strategies  10/8/2020 0322 by Ghada Martinez RN  Outcome: Ongoing

## 2020-10-08 NOTE — PROGRESS NOTES
with engagement of left labial /buccal musculature engagement : >10 reps completed ; with improved pt carryover!  -volitional rom during stimulation improved from mild to moderate for lip retraction; lip protrusion; lip rounding and with lip retraction while opening mouth wide. EAch completed 10 to 15 times . Overall improved pt carryover with ex     n/a     Cognitive-Communication goals       Goal 1:Modified goal   The pt will demonstrate improved recall of daily events; learned strategies with set up; structure and <max cues Temporal orientation/spatial orientation questions:   -Pt oriented to self and partially to place and situation  -max re-orientation to therapy and rationale of SLP therapy and of post stroke therapies    -recall of oral motor ex ; swallow ex and speech ex from yesterday 's session: set up and mild to max cues    Working memory for ex for 10 to 15  reps: set up and minimal to moderate cues for sustained ex     Recall of previous therapy activities (PT): set up ; external prompting for pt to identify 2 activities   n/a     Goal 2: Upgraded goal  The pt will demonstrate improved processing for short concrete questions regarding basic DL and / or to follow 1 step commands with max visual demonstrations;and with <moderate tactile cues   1 step commands for task instructions:  Set up and conditioning to each task    1 step commands with visual demonstrations   · Completed 10 to 15 without need for tactile cues    1 step commands for stimulus discrimination on table top:concrete matching   · Items left of midline: >85%  · items right of midline: 100%    [place] Wh?: minimal  repetition/clarification required  · Improved today; less redundancy of ideas.   Improving ability to make transitions in thinking task to task and topic to topic   n/a   Goal 3: Upgraded goal  Pt will convey basic needs/wants via verbal/pointing/gestures >70% and provide concept/event explanations >50%   CFN of playing cards: 100% for suit ; pt at times using english and Thai for the numbers    [place] concept/event explanation:    · Verbal explanation of concept / eventt: quite variable need for expansion/clarification questions pending topic  ·  daily therapy events: Moderate + external prompting     n/a   Other areas targeted: Cognition:  · Persistent problems in temporal and recall of daily activities: but is improving with repeated tasks session to session with less frequent re-direct   · Gradual improvement in visual localizing left; use of table top for left of midline; and for body scheme with verbal cues    Oral Motor Speech  · Goal appears met; but will f/u with family    Education:   Pt ed ongoing    Safety Devices: [] Call light within reach  [] Chair alarm activated  [] Bed alarm activated  [x] Other: to therapy department  [] Call light within reach  [] Chair alarm activated  [] Bed alarm activated  [] Other:    Progress Assessment: 9/28/2020:  phone utilized. Difficult to assess as pt did not always respond to questions/commands; and verbal responses were not always intelligible. SLP completed a chart review; chart review does report history of cognitive decline in memory/orientation/PS. Pt reportedly lives with family (multi generations); needed assist with ADLs and homemaking; pt receives 24 hour supervision. 9/29/2020: Son voiced concern regarding communication changes in that pt is not able to use  phone; and has difficulty understanding what family say. SLP ed in use of context of an activity and visual cues/gestures to assist. Activities incorporating family; basic DL activities most optimal.  Will upgrade diet to soft/bite size food with thin liquids; oral care post meals. Pt/son ed in examples of food consistencies on soft/bite size food consistencies; and oral care. Toothettes placed in the room and SLP ed pt and family in uses.    9/30/2020 SLP discussed with RN (RN actually in room when addressing pt complaints regarding discomfort in current recliner) ; and  SLP discussed with team (at team meeting ) regarding pt height anc concern regarding current recliner in room (ie pt's feet dangle; pt complaints cannot get comfortable etc)  10/1/2020; 10/20/2020 :   present for session. 10/6/2020: Request diet upgrade to regular consistency   Plan: Continue as per plan of care. Continued Tx Upon Discharge: ?    [] Yes [] No [x] TBD based on progress while on ARU [] Vital Stim indicated [] Other:   Estimated discharge date: TBD   Discharge recommendations:   [] Home independently  [x] Home with assistance [x]  24 hour supervision  [] ECF [] Other:     Additional information:     Interventions used during Rehab Stay:  [] Speech/Language Treatment  [] Instruction in HEP [] Group [x] Dysphagia Treatment [] Cognitive Treatment   [x] Other:     Adverse Reactions to Treatment/Significant Change in Status: n/a      Electronically Signed by    Ness Cabral. Prakash,MS,CCC,SLP [id number]  Speech and Language Pathologist

## 2020-10-08 NOTE — PROGRESS NOTES
tactile cueing to place grab hemiwalker once in standing)  Stand to sit: Contact guard assistance(verbal and tactile cues for hand placement. Verbal and tactile cues to stand upright.)  Bed to Chair: Minimal assistance(with alondra walker)  Device: Hemiwalker  Other Apparatus: Wheelchair follow(L arm sling)  Assistance: Minimal assistance, Moderate assistance(ModA to correct LoB. Laure to remain upright and facilitate ambulation)  Distance: 21' with no turns  OT  PT Equipment Recommendations  Equipment Needed: Yes  Mobility Devices: Wheelchair  Wheelchair: Standard  Other: standard wheelchair with standard cushion, standard leg rests, removable arm rests  Toilet - Technique: Stand pivot  Equipment Used: Grab bars  Toilet Transfers Comments: cues for safety, pt attempting to sit too early  Assessment        SLP  Diet Solids Recommendation: Dysphagia Minced and Moist (Dysphagia II)  Liquid Consistency Recommendation: Thin    Body mass index is 21.91 kg/m².     Assessment and Plan:    Impairments: left hemiparesis, decreased coordination, balance, endurance, cognition, dysarthria, dysphagia     Acute ischemic stroke  -Localization: Right son, posterior centrum semiolave, left frontal lobe deep white matter  -Etiology: small vessel vs embolic  -Telemetry in ARU, then event monitor at discharge  -Secondary ppx with DAPT x 3 weeks (then Plavix alone), statin, BP and glucose control  -PT/OT/SLP    Spasticity  -Emerging tone in LUE  -Monitor, consider medication     H/o left PCA stroke with severe stenosis  -Secondary ppx as above     Dysphagia   -Dietitian and SLP consults.   -Upgrade to regular + thins     HTN, uncontrolled  -Still allowing some permissive HTN due to worsening of symptoms with normalization of BP  -losartan (dose decreased)     DM2, uncontrolled  -Lantus (increased to 30 units qhs, home dose), added 10 units prandial with breakfast, continue high dose ISS     CKD  -Avoid nephrotoxins, renally dose meds  -Monitor      L1 compression fracture  -Pain control  -PT/OT     Bladder   -High risk retention   -Monitor PVRs, SC prn >300cc  -Flomax     Bowel   -High risk constipation   -senna+colace BID, PRN miralax, MoM, and bisacodyl supp.     Pain control  -prn tramadol  -diclofenac gel for shoulder and knee pain     PPx  -DVT: lovenox  -GI: pantoprazole    Rehab Progress:  Making steady progress. Working on left side function, functional mobility, compensatory strategies (anticipating primarily wheelchair for mobility). SLP working on aphasia/apraxia, dysarthria, dysphagia, cognition. Anticipated Dispo: home with family, anticipate 24/7 assist  Services:  PT, OT, RN, Aide, (?SLP)  DME: wheelchair, alondra-walker, BSC, TTB  ELOS: 10/17      Camilo Franco.  Joana Diane MD 10/8/2020, 10:07 AM

## 2020-10-09 LAB
GLUCOSE BLD-MCNC: 134 MG/DL (ref 70–99)
GLUCOSE BLD-MCNC: 157 MG/DL (ref 70–99)
GLUCOSE BLD-MCNC: 218 MG/DL (ref 70–99)
GLUCOSE BLD-MCNC: 244 MG/DL (ref 70–99)
PERFORMED ON: ABNORMAL

## 2020-10-09 PROCEDURE — 6370000000 HC RX 637 (ALT 250 FOR IP): Performed by: PHYSICAL MEDICINE & REHABILITATION

## 2020-10-09 PROCEDURE — 94760 N-INVAS EAR/PLS OXIMETRY 1: CPT

## 2020-10-09 PROCEDURE — 92526 ORAL FUNCTION THERAPY: CPT

## 2020-10-09 PROCEDURE — 97530 THERAPEUTIC ACTIVITIES: CPT

## 2020-10-09 PROCEDURE — 6360000002 HC RX W HCPCS: Performed by: PHYSICAL MEDICINE & REHABILITATION

## 2020-10-09 PROCEDURE — 97130 THER IVNTJ EA ADDL 15 MIN: CPT

## 2020-10-09 PROCEDURE — 1280000000 HC REHAB R&B

## 2020-10-09 PROCEDURE — 97129 THER IVNTJ 1ST 15 MIN: CPT

## 2020-10-09 PROCEDURE — 97116 GAIT TRAINING THERAPY: CPT

## 2020-10-09 RX ADMIN — INSULIN LISPRO 6 UNITS: 100 INJECTION, SOLUTION INTRAVENOUS; SUBCUTANEOUS at 16:48

## 2020-10-09 RX ADMIN — ROSUVASTATIN CALCIUM 10 MG: 10 TABLET, FILM COATED ORAL at 09:31

## 2020-10-09 RX ADMIN — ENOXAPARIN SODIUM 40 MG: 40 INJECTION SUBCUTANEOUS at 20:46

## 2020-10-09 RX ADMIN — ASPIRIN 81 MG: 81 TABLET, COATED ORAL at 09:29

## 2020-10-09 RX ADMIN — LOSARTAN POTASSIUM 12.5 MG: 25 TABLET, FILM COATED ORAL at 09:29

## 2020-10-09 RX ADMIN — SUCRALFATE 1 G: 1 TABLET ORAL at 15:20

## 2020-10-09 RX ADMIN — INSULIN GLARGINE 30 UNITS: 100 INJECTION, SOLUTION SUBCUTANEOUS at 20:47

## 2020-10-09 RX ADMIN — DICLOFENAC 4 G: 10 GEL TOPICAL at 21:18

## 2020-10-09 RX ADMIN — DULOXETINE HYDROCHLORIDE 30 MG: 30 CAPSULE, DELAYED RELEASE ORAL at 09:31

## 2020-10-09 RX ADMIN — INSULIN LISPRO 3 UNITS: 100 INJECTION, SOLUTION INTRAVENOUS; SUBCUTANEOUS at 11:51

## 2020-10-09 RX ADMIN — Medication 3 MG: at 20:46

## 2020-10-09 RX ADMIN — SUCRALFATE 1 G: 1 TABLET ORAL at 06:26

## 2020-10-09 RX ADMIN — INSULIN LISPRO 10 UNITS: 100 INJECTION, SOLUTION INTRAVENOUS; SUBCUTANEOUS at 07:50

## 2020-10-09 RX ADMIN — SENNOSIDES-DOCUSATE SODIUM TAB 8.6-50 MG 1 TABLET: 8.6-5 TAB at 20:46

## 2020-10-09 RX ADMIN — INSULIN LISPRO 3 UNITS: 100 INJECTION, SOLUTION INTRAVENOUS; SUBCUTANEOUS at 20:47

## 2020-10-09 RX ADMIN — DICLOFENAC 4 G: 10 GEL TOPICAL at 15:23

## 2020-10-09 RX ADMIN — SENNOSIDES-DOCUSATE SODIUM TAB 8.6-50 MG 1 TABLET: 8.6-5 TAB at 09:38

## 2020-10-09 RX ADMIN — PANTOPRAZOLE SODIUM 40 MG: 40 TABLET, DELAYED RELEASE ORAL at 16:09

## 2020-10-09 RX ADMIN — PANTOPRAZOLE SODIUM 40 MG: 40 TABLET, DELAYED RELEASE ORAL at 06:26

## 2020-10-09 RX ADMIN — SUCRALFATE 1 G: 1 TABLET ORAL at 20:46

## 2020-10-09 RX ADMIN — CLOPIDOGREL BISULFATE 75 MG: 75 TABLET ORAL at 09:31

## 2020-10-09 RX ADMIN — DICLOFENAC 4 G: 10 GEL TOPICAL at 10:41

## 2020-10-09 RX ADMIN — TAMSULOSIN HYDROCHLORIDE 0.4 MG: 0.4 CAPSULE ORAL at 20:46

## 2020-10-09 RX ADMIN — TRAMADOL HYDROCHLORIDE 50 MG: 50 TABLET ORAL at 20:46

## 2020-10-09 ASSESSMENT — PAIN DESCRIPTION - DESCRIPTORS: DESCRIPTORS: OTHER (COMMENT)

## 2020-10-09 ASSESSMENT — PAIN DESCRIPTION - ORIENTATION: ORIENTATION: LEFT

## 2020-10-09 ASSESSMENT — PAIN SCALES - WONG BAKER: WONGBAKER_NUMERICALRESPONSE: 2

## 2020-10-09 ASSESSMENT — PAIN SCALES - GENERAL
PAINLEVEL_OUTOF10: 3
PAINLEVEL_OUTOF10: 0
PAINLEVEL_OUTOF10: 0

## 2020-10-09 ASSESSMENT — PAIN - FUNCTIONAL ASSESSMENT: PAIN_FUNCTIONAL_ASSESSMENT: ACTIVITIES ARE NOT PREVENTED

## 2020-10-09 ASSESSMENT — PAIN DESCRIPTION - LOCATION: LOCATION: SHOULDER

## 2020-10-09 ASSESSMENT — PAIN DESCRIPTION - ONSET: ONSET: ON-GOING

## 2020-10-09 ASSESSMENT — PAIN DESCRIPTION - PAIN TYPE: TYPE: CHRONIC PAIN

## 2020-10-09 ASSESSMENT — PAIN DESCRIPTION - PROGRESSION: CLINICAL_PROGRESSION: NOT CHANGED

## 2020-10-09 NOTE — PROGRESS NOTES
Tolerance  Activity Tolerance: Patient Tolerated treatment well;Patient limited by fatigue  Activity Tolerance: Patient's legs begin to buckle as he fatigues and requests to sit down     Patient Diagnosis(es): There were no encounter diagnoses. has a past medical history of Cerebral artery occlusion with cerebral infarction ([facility] Diabetes mellitus ([facility] Gallstone, GERD (gastroesophageal reflux disease), Hyperlipidemia, Hypertension, and Renal insufficiency. has a past surgical history that includes Appendectomy; Cholecystectomy; Upper gastrointestinal endoscopy (06/08/2018); Upper gastrointestinal endoscopy (N/A, 2/28/2019); Upper gastrointestinal endoscopy (N/A, 4/23/2019); Upper gastrointestinal endoscopy (N/A, 4/23/2019); Upper gastrointestinal endoscopy (N/A, 4/29/2020); and Colonoscopy (N/A, 5/8/2020). Restrictions  Restrictions/Precautions  Restrictions/Precautions: Fall Risk  Required Braces or Orthoses?: No  Position Activity Restriction  Other position/activity restrictions: L sided weakness; speaks Napali - uses  phone, family, and also in person interpretors  Subjective   General  Chart Reviewed: Yes  Additional Pertinent Hx: Per Dr. Nehal Hester , \"The patient is a 76 y.o. male who presents to [facility] with unsteady on feet. Pt speaks Sinhala, son at bedside helping getting history. According to son/pt, pt apparently sustained a fall yesterday and felt like he couldn't move his left side. Today he had difficulty ambulating using his lt side and hence son brought pt to the ER\"  Diagnosed with CVA with L sided weakness. Response To Previous Treatment: Patient with no complaints from previous session. Family / Caregiver Present: No  Referring Practitioner: Rodger Medina  Subjective  Subjective: Pt met in therapy gym seated in w/c.   No family present this AM.  Pt is agreeable to PT this AM.  General Comment  Comments: Phone  dialed and interpreted for AM therapy session    Objective   Transfers  Sit to Stand: Contact guard assistance  Stand to sit: Contact guard assistance  Stand Pivot Transfers: Minimal Assistance(To L side. Pt unstable and cuing to pivot on R and for hand placement. Assistance to shift hips and facilitate pivot)  Ambulation  Ambulation?: Yes  More Ambulation?: No  Ambulation 1  Surface: level tile  Device: Hemiwalker  Other Apparatus: Wheelchair follow(L arm sling)  Assistance: Minimal assistance  Quality of Gait: Patient demonstrates improved sequencing but still requires tactile and verbal cueing for correct sequence. Able to advance left LE with improved control of hip abduction, but still requires tactile cuing to advance L LE and to fully extend in stance phase. He was able to clear toes of left foot and able to extend knee, but required facilitation and assistance to maintain knee extension as fatigue increased. Pt still demonstrates difficulty achieving sufficient hip extension and upright posture despite cuing. No substantial LoB, but when patient fatigues he assumes forward flexed posture and knees begin to buckle. Requires increased assist to weight shift onto B LE when advances swing LE as fatigue increases.   Therapist foot between patient's feet to prevent hip adduction and maintain good Juhi  Gait Deviations: Slow Janeth;Decreased step length;Decreased step height  Distance: 15' with no turns  Comments: Pt able to communicate the word \"walk\" this AM to state that he wanted to ambulate  Wheelchair Activities  Wheelchair Size: 18\"  Wheelchair Type: Standard  Wheelchair Cushion: (waffle cushion)  Pressure Relief Type: Push up  Level of Assistance for pressure relief activities: Minimal assistance(pt requires cuing and assistance to scoot forward and back in chair)  Wheelchair Parts Management: No  Propulsion: Yes  Propulsion 1  Propulsion: Manual  Level: Level Tile  Curbs: (therapy gym threshold)  Method: RUE;RLE  Level of Assistance: Minimal assistance;Stand by assistance(Laure with tactile cuing when patient has dificulty progressing)  Description/ Details: Patient continues to have difficulty with use of right LE to assist with path and required frequent tactile and verbal cueing. Decreased speed. Unable to maintain straight path and frequently propels towards hallways and obstacles. Cannot simultaneously steer and propel wheel at the same time. He was unable to maintain a straight path this date. Patient was SBA 75% of time. Distance: 80' with two turns        Other exercises  Other exercises?: Yes  Other exercises 4: Weightshifting in // bars. Pt requires max cues to keep feet planted as therapist facilitated weight shifting onto BLEs. Pt stood for 3 minutes in // bars. Occassionally requires repositioning of feet as he attempts to step forward. (Pt confused this exercise with walking and wanted to take steps. Required increased cuing to keep feet planted throughout activity)         Other Activities: Other (see comment)  Comment: At start of PT session, pt began speaking in [place]. Phone  was dialed and interpreted that pt requested to go to bathroom for bowel movement. Pt was wheeled to bathroom in therapy gym and performed stand-pivot transfer from chair to toilet seat with [address] [place] [address] Road. Pt required MaxA doffing pants and briefs. Once pt was seated, he requested that therapists exit room. Door was kept slightly open so that therapists could keep eye on patient. Pt complained that he could not go, and was unsuccessful this AM.  Pt performed pericare with supervision. Pt performed sit>stand with CGA, and PT donned pants and briefs with MaxA. Pt performed stand-pivot transfer back into chair with Laure. Pt was wheeled to sink to wash hands. PT turned water on for pt and put soap in his hands. Pt required MaxA to bring L arm up to wash hands.   Pt began to wash his face in sink and put soap near his R eye before PT could hip abduction, but still requires tactile cuing to advance L LE and to fully extend in stance phase. He was able to clear toes of left foot and able to extend knee, but required facilitation and assistance to maintain knee extension as fatigue increased. Pt still demonstrates difficulty achieving sufficient hip extension and upright posture despite cuing. No substantial LoB, but when patient fatigues he assumes forward flexed posture and knees begin to buckle. Requires increased assist to weight shift onto B LE when advances swing LE as fatigue increases. Therapist foot between patient's feet to prevent hip adduction and maintain good Juhi. Pt given rest break in chair after walk to recover from fatigue. A: Pt tolerated treatment well this afternoon. Pt demonstrated improved performance on stairs and is continuing to show small improvements in ambulation. Pt is able to take bigger steps with less rotation in hip/knee. Pt would continue to benefit from skilled PT to increase activity tolerance, strengthen LEs, and improve gait sequencing for functional mobility at home. Safety Device - Type of devices:  [x]  All fall risk precautions in place [] Bed alarm in place  [] Call light within reach [] Chair alarm in place [] Positioning belt [x] Gait belt [x] Patient at risk for falls [] Left in bed [x] Left in chair [] Telesitter in use [] Sitter present [] Nurse notified []  None       Goals  Short term goals  Time Frame for Short term goals: 2 weeks  Short term goal 1: bed mobility SBA  Short term goal 2: transfers CGA  Short term goal 3: amb x 20 ft with RW and Min A x 1  Short term goal 4: tolerate LE Ther ex for increased LE strength.   Short term goal 5: curb step min A  Long term goals  Time Frame for Long term goals : 3-4 weeks  Long term goal 1: bed mobility SBA  Long term goal 2: transfers SBA  Long term goal 3: car transfer CGA  Long term goal 4: amb 48' with RW vs hemiwalker CGA to SBA  Long term goal 5: 4 steps B rails CGA  Patient Goals   Patient goals : pt's/family goal is some rehab before returning home. Plan    Plan  Times per week: 5 to 6  Times per day: Twice a day  Plan weeks: 3 to 4  Current Treatment Recommendations: Functional Mobility Training, Balance Training, Endurance Training, ROM, Cognitive/Perceptual Training, Transfer Training, Gait Training, Stair training, IADL Training, Neuromuscular Re-education, Wheelchair Mobility Training, Strengthening, ADL/Self-care Training  Safety Devices  Type of devices: All fall risk precautions in place, Gait belt, Left in chair, Patient at risk for falls, Call light within reach, Chair alarm in place  Restraints  Initially in place: No     Therapy Time   Individual Concurrent Group Co-treatment   Time In 0815         Time Out 0900         Minutes 39             Second Session Therapy Time     Individual    Time In 1345    Time Out 1430    Minutes 45         Electronically signed by GENE Richards on 10/9/2020 at 11:25 AM   Therapist was present, directed the patient's care, made skilled judgement, and was responsible for assessment and treatment of the patient.         Electronically signed by Zacarias Manzano PT on 10/9/2020 at 4:04 PM

## 2020-10-09 NOTE — PROGRESS NOTES
Activity Restriction  Other position/activity restrictions: L sided weakness; speaks Napali - uses  phone, family, and also in person interpretors  Objective     Sit to Stand: Contact guard assistance  Stand to sit: Contact guard assistance  Bed to Chair: Minimal assistance(Needs assistance to shift hips and facilitate turning. Also requires cuing for hand placement)  Device: Hemiwalker  Other Apparatus: Wheelchair follow(L arm sling)  Assistance: Minimal assistance  Distance: 15' with no turns  OT  PT Equipment Recommendations  Equipment Needed: Yes  Mobility Devices: Wheelchair  Wheelchair: Standard  Other: standard wheelchair with standard cushion, standard leg rests, removable arm rests  Toilet - Technique: Stand pivot  Equipment Used: Grab bars  Toilet Transfers Comments: cues for safety, pt attempting to sit too early  Assessment        SLP  Diet Solids Recommendation: Dysphagia Minced and Moist (Dysphagia II)  Liquid Consistency Recommendation: Thin    Body mass index is 22.18 kg/m².     Assessment and Plan:    Impairments: left hemiparesis, decreased coordination, balance, endurance, cognition, dysarthria, dysphagia     Acute ischemic stroke  -Localization: Right son, posterior centrum semiolave, left frontal lobe deep white matter  -Etiology: small vessel vs embolic  -Telemetry in ARU, then event monitor at discharge  -Secondary ppx with DAPT x 3 weeks (then Plavix alone), statin, BP and glucose control  -PT/OT/SLP    Spasticity  -Emerging tone in LUE  -Monitor, consider medication     H/o left PCA stroke with severe stenosis  -Secondary ppx as above     Dysphagia   -Dietitian and SLP consults.   -Upgrade to regular + thins     HTN, uncontrolled  -Still allowing some permissive HTN due to worsening of symptoms with normalization of BP  -losartan (dose decreased)     DM2, uncontrolled  -Lantus (increased to 30 units qhs, home dose), added 10 units prandial with breakfast, continue high dose ISS     CKD  -Avoid nephrotoxins, renally dose meds  -Monitor      L1 compression fracture  -Pain control  -PT/OT     Bladder   -High risk retention   -Monitor PVRs, SC prn >300cc  -Flomax     Bowel   -High risk constipation   -senna+colace BID, PRN miralax, MoM, and bisacodyl supp.     Pain control  -prn tramadol  -diclofenac gel for shoulder and knee pain     PPx  -DVT: lovenox  -GI: pantoprazole    Rehab Progress:  Making steady progress. Working on left side function, functional mobility, compensatory strategies (anticipating primarily wheelchair for mobility). SLP working on aphasia/apraxia, dysarthria, dysphagia, cognition. Anticipated Dispo: home with family, anticipate 24/7 assist  Services:  PT, OT, RN, Aide, (?SLP)  DME: wheelchair, alondra-walker, BSC, TTB  ELOS: 10/17      Camilo Franco.  Joana Diane MD 10/9/2020, 12:13 PM

## 2020-10-09 NOTE — PLAN OF CARE
Problem: Falls - Risk of:  Goal: Will remain free from falls  Description: Will remain free from falls  Outcome: Ongoing  Note: Pt AAO X1, forgetful. Admitted with right pontine stroke. Free of fall. Transfers with assist of stedy X1 and gait belt. Yellow socks on. Wheels locked. Bed lowest position. Alarm on. Call light, over the bed table, and personal belonging in reach. Hourly rounding. Patient instructed to call for needs or changes. Problem: Skin Integrity:  Goal: Will show no infection signs and symptoms  Description: Will show no infection signs and symptoms  Outcome: Ongoing  Note: No new skin breakdown. Repositioned. Pillow support. Stocking removed. Clean and dry skin. Ambulated pt in the room. Heels elevated.

## 2020-10-09 NOTE — PROGRESS NOTES
Pt resting in bed comfortably. Admitted on 9/25/2020 with right pontine stroke. Left sided weakness. HR regular. Telemetry. Lungs sounds clear/diminished to base. Denies any chest pain or shortness of breaths. Abd soft and nontender. Active bowel sounds. Skin dry and intact. Transfers with stedy X1. HS medication given. Tolerated well. Education provided on pain management. Skin care, medication, and fall precaution. Call light in reach. Patient instructed to call if there is any needs or changes.  Electronically signed by Narciso Molina RN on 10/8/2020 at 11:59 PM

## 2020-10-09 NOTE — PROGRESS NOTES
Speech Language Pathology  ACUTE REHAB UNIT  SPEECH/LANGUAGE PATHOLOGY      [x] Daily  [] Weekly Care Conference Note  [] Discharge    Patient:Sylvester Duval      SJU:[id number]  GRK:[id number]  Rehab Dx/Hx: Right pontine stroke ([facility] [address] [I63.50]    Precautions: Dysphagia; Coco Florian is native language; Potential cognitive-communication deficitgs  Home situation: Lives with family  ST Dx: [] Aphasia  [x] Dysarthria  [] Apraxia   [x] Oropharyngeal dysphagia [x] Cognitive   Impairment  [] Other:   Initial Speech Therapy Assessment Diagnosis:   Per INitial Evaluations 9/26/2020:   1. Cognitive Diagnosis: Pt presents with impaired memory, orientation,  and problem solving for basic information. Grandson reports this is typical for pt. Per acute notes, pt's son also confirmed this is pt's baseline. 2. Aphasia Diagnosis: Further evaluation needed to clarify. 3. Speech Diagnosis: Further evaluation needed to clarify. 4. Communication Diagnosis: Pt requires and  to communicate. Pt appears to often have difficulty expressing himself accurately and with detail for typical conversation. 5. Dysphagia Diagnosis: Mild pharyngeal stage dysphagia;Mild to moderate oral stage dysphagia   Pt presents with no overt signs of aspiration or penetration. Swallow appears effortful at times and pt reports some difficulty. Oral skills for solids appear impaired with slow posterior propulsion and trace oral residue. Pt at risk for adequate intake due to his c/o intermittent nausea, prefers only warm food and drinks, and the food provided is not what pt is familiar with.   Date of Admit: 9/25/2020  Room #: W5Y-3504/3260-01  Date: 10/9/2020       Current functional status (updated daily):         Current Diet Order:DIET GENERAL; Carb Control: 4 carb choices (60 gms)/meal; Vegetarian (Lacto-Ovo)   Behavior: [x] Alert  [] Cooperative  [x]  Pleasant  [x] Confused  [] Agitated  [] Uncooperative  [] Distractible [] Motivated  [] Self-Limiting [] Anxious  [x] Other:  phone utilized. Endurance:  [x] Adequate for participation in SLP sessions  [] Reduced overall  [] Lethargic  [] Other:  Safety: [] No concerns at this time  [] Reduced insight into deficits  []  Reduced safety awareness [] Not following call light procedures  [] Unable to Assess  [] Other:  Swallowing Precautions: 90 degree positioning with all p.o. intake; small bites/sips; alternate textures through meal; reduce rate of intake;     Barriers toward pt/family plan for progress toward d/c plan: Medical status and caregiver support may be barriers           Date: 10/9/2020      Tx session 1 Tx session 2   Total Timed Code Min SLP Individual Minutes  Time In: 1430  Time Out: 9774  Minutes: 45  Coded treatment time  35 n/a     Group Treatment Minutes 0 0   Co-Treat Minutes 0 0   Variance/Reason:  n/a    Pain denied    Pain Intervention [] RN notified  [] Repositioned  [] Intervention offered and patient declined  [] N/A  [] Other: [] RN notified  [] Repositioned  [] Intervention offered and patient declined  [] N/A  [] Other:   Subjective     Pt seen in the treatment office   present in person   for entire session  No family present  Good effort in therapy      Objective:  Goals       Dysphagia Goals:   1. Upgraded goal  The patient will tolerate regular food consistency with thin liquid diet without observed clinical signs of aspiration,      Lat targeted 10/8/2020 session    No new complaints     n/a   2.  Upgraded goal  The patient will improve oral motor function via therapeutic oral motor exercises to 15/15 each trial. Left facial droop and weakness  · Pt tolerated sensory stimulation ( tactile stimulation)    -resistance ex with engagement of left labial /buccal musculature engagement : >10 to 12  reps completed ; improved strength and duration of muscular engagement left labial/buccal  -volitional rom during stimulation improved from mild to moderate for lip retraction; lip protrusion; lip rounding and with lip retraction while opening mouth wide. EAch completed 10 to 15 times . Overall improved pt carryover with ex     n/a     Cognitive-Communication goals       Goal 1:Modified goal   The pt will demonstrate improved recall of daily events; learned strategies with set up; structure and <max cues Temporal orientation/spatial orientation questions:   -Pt oriented to self and partially to place ; date and situation  -moderate re-orientation to therapy and rationale of SLP therapy and of post stroke therapies    -recall of oral motor ex ; swallow ex and speech ex from yesterday 's session: set up and mild cues    Working memory for ex for 10 to 15  reps: set up and minimal to mild cues for sustained ex     Recall of previous therapy activities (PT): set up ; external prompting for pt to identify 2 activities    REcall of card game activity 10/8: set up prompt and moderate prompting   n/a     Goal 2: Upgraded goal  The pt will demonstrate improved processing for short concrete questions regarding basic DL and / or to follow 1 step commands with max visual demonstrations;and with <moderate tactile cues   1 step commands for task instructions:  Set up and conditioning to each task    1 step commands with visual demonstrations   · Completed 10 to 15 without need for tactile cues    1 step commands for stimulus discrimination on table top:card game  · Items left of midline: >85%; improved with visual cue  · items right of midline: 100%    Card game: set up; and cues to use card lott and cues for left of midline. Pt was IND for basic concept to rules of game. External cues for details    [place]?: minimal  repetition/clarification required  · Improved today; less redundancy of ideas.   Improving ability to make transitions in thinking task to task and topic to topic   n/a   Goal 3: Upgraded goal  Pt will convey basic needs/wants via verbal/pointing/gestures >70% and provide concept/event explanations >50%   [place] concept/event explanation:    · Verbal explanation of concept / eventt: quite variable need for expansion/clarification questions pending topic  ·  verbal directions of activity which is known to pt: set up and moderate expansion cues     n/a   Other areas targeted: Cognition:  · Persistent problems in temporal/spatial orientation. BUt overall less confusion  · Improving carryover of repeated activities session to session   · Increase in verbal initiation of needs/wants and in initiating social interaction  · Gradual improvement in visual localizing left; use of table top for left of midline; and for body scheme with verbal cues    Oral Motor Speech  · Goal appears met; but will f/u with family    Education:   Pt ed ongoing    Safety Devices: [] Call light within reach  [] Chair alarm activated  [] Bed alarm activated  [x] Other: to therapy department  [] Call light within reach  [] Chair alarm activated  [] Bed alarm activated  [] Other:    Progress Assessment: 9/28/2020:  phone utilized. Difficult to assess as pt did not always respond to questions/commands; and verbal responses were not always intelligible. SLP completed a chart review; chart review does report history of cognitive decline in memory/orientation/PS. Pt reportedly lives with family (multi generations); needed assist with ADLs and homemaking; pt receives 24 hour supervision. 9/29/2020: Son voiced concern regarding communication changes in that pt is not able to use  phone; and has difficulty understanding what family say. SLP ed in use of context of an activity and visual cues/gestures to assist. Activities incorporating family; basic DL activities most optimal.  Will upgrade diet to soft/bite size food with thin liquids; oral care post meals. Pt/son ed in examples of food consistencies on soft/bite size food consistencies; and oral care.  [place] placed in the room and SLP ed pt and family in uses. 9/30/2020 SLP discussed with RN (RN actually in room when addressing pt complaints regarding discomfort in current recliner) ; and  SLP discussed with team (at team meeting ) regarding pt height anc concern regarding current recliner in room (ie pt's feet dangle; pt complaints cannot get comfortable etc)  10/1/2020; 10/20/2020 :   present for session. 10/6/2020: Request diet upgrade to regular consistency   Plan: Continue as per plan of care. Continued Tx Upon Discharge: ?    [] Yes [] No [x] TBD based on progress while on ARU [] Vital Stim indicated [] Other:   Estimated discharge date: TBD   Discharge recommendations:   [] Home independently  [x] Home with assistance [x]  24 hour supervision  [] ECF [] Other:     Additional information:     Interventions used during Rehab Stay:  [] Speech/Language Treatment  [] Instruction in HEP [] Group [x] Dysphagia Treatment [] Cognitive Treatment   [x] Other:     Adverse Reactions to Treatment/Significant Change in Status: n/a      Electronically Signed by    Maurizio Moore. Prakash,MS,CCC,SLP [id number]  Speech and Language Pathologist

## 2020-10-10 LAB
GLUCOSE BLD-MCNC: 103 MG/DL (ref 70–99)
GLUCOSE BLD-MCNC: 107 MG/DL (ref 70–99)
GLUCOSE BLD-MCNC: 218 MG/DL (ref 70–99)
GLUCOSE BLD-MCNC: 222 MG/DL (ref 70–99)
GLUCOSE BLD-MCNC: 90 MG/DL (ref 70–99)
PERFORMED ON: ABNORMAL
PERFORMED ON: NORMAL

## 2020-10-10 PROCEDURE — 94760 N-INVAS EAR/PLS OXIMETRY 1: CPT

## 2020-10-10 PROCEDURE — 1280000000 HC REHAB R&B

## 2020-10-10 PROCEDURE — 6360000002 HC RX W HCPCS: Performed by: PHYSICAL MEDICINE & REHABILITATION

## 2020-10-10 PROCEDURE — 6370000000 HC RX 637 (ALT 250 FOR IP): Performed by: PHYSICAL MEDICINE & REHABILITATION

## 2020-10-10 RX ADMIN — PANTOPRAZOLE SODIUM 40 MG: 40 TABLET, DELAYED RELEASE ORAL at 16:45

## 2020-10-10 RX ADMIN — INSULIN GLARGINE 30 UNITS: 100 INJECTION, SOLUTION SUBCUTANEOUS at 21:48

## 2020-10-10 RX ADMIN — DICLOFENAC 4 G: 10 GEL TOPICAL at 14:37

## 2020-10-10 RX ADMIN — SUCRALFATE 1 G: 1 TABLET ORAL at 20:31

## 2020-10-10 RX ADMIN — INSULIN LISPRO 10 UNITS: 100 INJECTION, SOLUTION INTRAVENOUS; SUBCUTANEOUS at 08:51

## 2020-10-10 RX ADMIN — SENNOSIDES-DOCUSATE SODIUM TAB 8.6-50 MG 1 TABLET: 8.6-5 TAB at 20:31

## 2020-10-10 RX ADMIN — CLOPIDOGREL BISULFATE 75 MG: 75 TABLET ORAL at 08:45

## 2020-10-10 RX ADMIN — DULOXETINE HYDROCHLORIDE 30 MG: 30 CAPSULE, DELAYED RELEASE ORAL at 08:45

## 2020-10-10 RX ADMIN — SENNOSIDES-DOCUSATE SODIUM TAB 8.6-50 MG 1 TABLET: 8.6-5 TAB at 08:47

## 2020-10-10 RX ADMIN — LOSARTAN POTASSIUM 12.5 MG: 25 TABLET, FILM COATED ORAL at 08:43

## 2020-10-10 RX ADMIN — DICLOFENAC 4 G: 10 GEL TOPICAL at 09:09

## 2020-10-10 RX ADMIN — SUCRALFATE 1 G: 1 TABLET ORAL at 05:37

## 2020-10-10 RX ADMIN — INSULIN LISPRO 6 UNITS: 100 INJECTION, SOLUTION INTRAVENOUS; SUBCUTANEOUS at 12:05

## 2020-10-10 RX ADMIN — TAMSULOSIN HYDROCHLORIDE 0.4 MG: 0.4 CAPSULE ORAL at 20:32

## 2020-10-10 RX ADMIN — TRAMADOL HYDROCHLORIDE 50 MG: 50 TABLET ORAL at 09:13

## 2020-10-10 RX ADMIN — PANTOPRAZOLE SODIUM 40 MG: 40 TABLET, DELAYED RELEASE ORAL at 05:37

## 2020-10-10 RX ADMIN — DICLOFENAC 4 G: 10 GEL TOPICAL at 21:48

## 2020-10-10 RX ADMIN — SUCRALFATE 1 G: 1 TABLET ORAL at 14:37

## 2020-10-10 RX ADMIN — ASPIRIN 81 MG: 81 TABLET, COATED ORAL at 08:43

## 2020-10-10 RX ADMIN — ROSUVASTATIN CALCIUM 10 MG: 10 TABLET, FILM COATED ORAL at 08:45

## 2020-10-10 RX ADMIN — ENOXAPARIN SODIUM 40 MG: 40 INJECTION SUBCUTANEOUS at 20:31

## 2020-10-10 RX ADMIN — INSULIN LISPRO 3 UNITS: 100 INJECTION, SOLUTION INTRAVENOUS; SUBCUTANEOUS at 21:48

## 2020-10-10 RX ADMIN — TRAMADOL HYDROCHLORIDE 50 MG: 50 TABLET ORAL at 20:31

## 2020-10-10 ASSESSMENT — PAIN DESCRIPTION - ONSET: ONSET: ON-GOING

## 2020-10-10 ASSESSMENT — PAIN DESCRIPTION - PAIN TYPE
TYPE: ACUTE PAIN
TYPE: CHRONIC PAIN

## 2020-10-10 ASSESSMENT — PAIN DESCRIPTION - ORIENTATION: ORIENTATION: LEFT

## 2020-10-10 ASSESSMENT — PAIN SCALES - GENERAL
PAINLEVEL_OUTOF10: 0
PAINLEVEL_OUTOF10: 2
PAINLEVEL_OUTOF10: 5

## 2020-10-10 ASSESSMENT — PAIN - FUNCTIONAL ASSESSMENT: PAIN_FUNCTIONAL_ASSESSMENT: ACTIVITIES ARE NOT PREVENTED

## 2020-10-10 ASSESSMENT — PAIN DESCRIPTION - LOCATION
LOCATION: THROAT
LOCATION: SHOULDER

## 2020-10-10 ASSESSMENT — PAIN DESCRIPTION - PROGRESSION: CLINICAL_PROGRESSION: NOT CHANGED

## 2020-10-10 NOTE — PLAN OF CARE
Problem: Falls - Risk of:  Goal: Will remain free from falls  Description: Will remain free from falls  10/10/2020 1732 by Lyubov Greer RN  Outcome: Ongoing  Note: Patient free of falls this shift, all safety precautions in place. Problem: Falls - Risk of:  Goal: Absence of physical injury  Description: Absence of physical injury  10/10/2020 1732 by Lyubov Greer RN  Outcome: Ongoing  Note: All safety precautions in place including nonskid socks on feet, bed exit alarm on and posey clip attached to patient when in chair, phones and call light in reach, will continue to monitor. Problem: Skin Integrity:  Goal: Will show no infection signs and symptoms  Description: Will show no infection signs and symptoms  10/10/2020 1732 by Lyubov Greer RN  Outcome: Ongoing  Note: Skin assessment performed for signs and symptoms of infection. Problem: Skin Integrity:  Goal: Absence of new skin breakdown  Description: Absence of new skin breakdown  10/10/2020 1732 by Lyubov Greer RN  Outcome: Ongoing  Note: Skin check performed, no signs or symptoms of new skin break down, patient tolerating well. Problem: Daily Care:  Goal: Daily care needs are met  Description: Daily care needs are met  10/10/2020 1732 by Lyubov Greer RN  Outcome: Ongoing  Note: Participates in hourly rounding, states needs as they arise by using call light appropriately, and does call in advance of needs. Problem: Pain:  Goal: Patient's pain/discomfort is manageable  Description: Patient's pain/discomfort is manageable  10/10/2020 1732 by Lyubov Greer RN  Outcome: Ongoing  Note: Patient uses pain scale appropriately.        Problem: Discharge Planning:  Goal: Patients continuum of care needs are met  Description: Patients continuum of care needs are met  10/10/2020 1732 by Lyubov Greer RN  Outcome: Ongoing  Note: Discharge planning started upon admission,  consult in place. Problem: HEMODYNAMIC STATUS  Goal: Patient has stable vital signs and fluid balance  10/10/2020 1732 by Violetta Scott RN  Outcome: Ongoing  Note: Daily weight taken and charted in the morning. Problem: ACTIVITY INTOLERANCE/IMPAIRED MOBILITY  Goal: Mobility/activity is maintained at optimum level for patient  10/10/2020 1732 by Violetta Scott RN  Outcome: Ongoing  Note: Participates in all scheduled therapies, is cooperative and has a positive attitude towards achieving set goals. Problem: COMMUNICATION IMPAIRMENT  Goal: Ability to express needs and understand communication  10/10/2020 1732 by Violetta Scott RN  Outcome: Ongoing  Note: Encouragement of the use of communication board      Problem: Serum Glucose Level - Abnormal:  Goal: Ability to maintain appropriate glucose levels will improve  Description: Ability to maintain appropriate glucose levels will improve  10/10/2020 1732 by Violetta Scott RN  Outcome: Ongoing  Note: Order for Blood Glucose checks before each meal and at hour of sleep in place. Problem: IP SWALLOWING  Goal: LTG - patient will tolerate the least restrictive diet consistency to allow for safe consumption of daily meals  10/10/2020 1732 by Violetta Scott RN  Outcome: Ongoing  Note: Up in chair for all meals, returned verbal acknowledgement of all safety precautions in place, will continue to monitor.

## 2020-10-10 NOTE — PROGRESS NOTES
Department of Physical Medicine & Rehabilitation  Progress Note    Patient Identification:  Gigi Alcaraz  [id number]  : 1945  Admit date: 2020    Chief Complaint: Right pontine stroke [facility])    Subjective:   Sitting up in chair, family at bedside, per family no new changes or needs today     ROS: No f/c, n/v, cp     Objective:  Patient Vitals for the past 24 hrs:   BP Temp Temp src Pulse Resp SpO2 Weight   10/10/20 0801 126/83 97.8 °F (36.6 °C) Oral 75 16 97 % --   10/10/20 0755 -- -- -- -- -- 96 % --   10/10/20 0318 120/80 97.8 °F (36.6 °C) Oral 74 16 94 % 128 lb 15.5 oz (58.5 kg)   10/09/20 2043 123/77 97.4 °F (36.3 °C) Oral 79 16 95 % --   10/09/20 1517 (!) 145/89 97.8 °F (36.6 °C) Oral 91 18 97 % --     Const: Alert. No distress, pleasant. HEENT: Normocephalic, atraumatic. CV: Warm, Well perfused   Resp: No respiratory distress. .   Abd: Soft, nondistended  Ext: No edema. Neuro: Alert, oriented, appropriately interactive. Psych: Cooperative, appropriate mood and affect      Laboratory data: Available via EMR.    Last 24 hour lab  Recent Results (from the past 24 hour(s))   POCT Glucose    Collection Time: 10/09/20  4:29 PM   Result Value Ref Range    POC Glucose 218 (H) 70 - 99 mg/dl    Performed on ACCU-CHEK    POCT Glucose    Collection Time: 10/09/20  8:06 PM   Result Value Ref Range    POC Glucose 244 (H) 70 - 99 mg/dl    Performed on ACCU-CHEK    POCT Glucose    Collection Time: 10/10/20  2:28 AM   Result Value Ref Range    POC Glucose 103 (H) 70 - 99 mg/dl    Performed on ACCU-CHEK    POCT Glucose    Collection Time: 10/10/20  7:16 AM   Result Value Ref Range    POC Glucose 90 70 - 99 mg/dl    Performed on ACCU-CHEK    POCT Glucose    Collection Time: 10/10/20 11:52 AM   Result Value Ref Range    POC Glucose 222 (H) 70 - 99 mg/dl    Performed on ACCU-CHEK        Therapy progress:  PT  Position Activity Restriction  Other position/activity restrictions: L sided weakness; speaks [place] - uses  phone, family, and also in person interpretors  Objective     Sit to Stand: Contact guard assistance  Stand to sit: Contact guard assistance  Bed to Chair: Minimal assistance(Needs assistance to shift hips and facilitate turning. Also requires cuing for hand placement)  Device: Hemiwalker  Other Apparatus: Wheelchair follow(L arm sling)  Assistance: Minimal assistance  Distance: 15' with no turns  OT  PT Equipment Recommendations  Equipment Needed: Yes  Mobility Devices: Wheelchair  Wheelchair: Standard  Other: standard wheelchair with standard cushion, standard leg rests, removable arm rests  Toilet - Technique: Stand pivot  Equipment Used: Grab bars  Toilet Transfers Comments: cues for safety, pt attempting to sit too early  Assessment        SLP  Diet Solids Recommendation: Dysphagia Minced and Moist (Dysphagia II)  Liquid Consistency Recommendation: Thin    Body mass index is 22.85 kg/m².     Assessment and Plan:    Impairments: left hemiparesis, decreased coordination, balance, endurance, cognition, dysarthria, dysphagia     Acute ischemic stroke  -Localization: Right son, posterior centrum semiolave, left frontal lobe deep white matter  -Etiology: small vessel vs embolic  -Telemetry in ARU, then event monitor at discharge  -Secondary ppx with DAPT x 3 weeks (then Plavix alone), statin, BP and glucose control  -PT/OT/SLP    Spasticity  -Emerging tone in LUE  -Monitor, consider medication     H/o left PCA stroke with severe stenosis  -Secondary ppx as above     Dysphagia   -Dietitian and SLP consults.   -Upgrade to regular + thins     HTN, uncontrolled  -Still allowing some permissive HTN due to worsening of symptoms with normalization of BP  -losartan (dose decreased)     DM2, uncontrolled  -Lantus (increased to 30 units qhs, home dose), added 10 units prandial with breakfast, continue high dose ISS     CKD  -Avoid nephrotoxins, renally dose meds  -Monitor      L1 compression fracture  -Pain control  -PT/OT     Bladder   -High risk retention   -Monitor PVRs, SC prn >300cc  -Flomax     Bowel   -High risk constipation   -senna+colace BID, PRN miralax, MoM, and bisacodyl supp.     Pain control  -prn tramadol  -diclofenac gel for shoulder and knee pain     PPx  -DVT: lovenox  -GI: pantoprazole    Bry Simpson, APRN - CNP  10/10/20  1:07 PM    The patient was seen in conjunction with the nurse practitioner with independent history, evaluation and examination performed on the same day as her encounter. I agree with the note which has been adjusted to reflect my findings. I have had face to face contact with the patient and performed a substantive portion of the E/M visit. Niko Marinelli MD 10/11/2020, 6:52 PM

## 2020-10-10 NOTE — PLAN OF CARE
Problem: Falls - Risk of:  Goal: Will remain free from falls  Description: Will remain free from falls  10/10/2020 0432 by Marylin Rodriguez RN  Outcome: Ongoing     Problem: Falls - Risk of:  Goal: Absence of physical injury  Description: Absence of physical injury  10/10/2020 0432 by Marylin Rodriguez RN  Outcome: Ongoing     Problem: Pain:  Goal: Patient's pain/discomfort is manageable  Description: Patient's pain/discomfort is manageable  10/10/2020 0432 by Marylin Rodriguez RN  Outcome: Ongoing     Problem: Pain:  Goal: Pain level will decrease  Description: Pain level will decrease  10/10/2020 0432 by Marylin Rodriguez RN  Outcome: Ongoing  L shoulder pain/discomfort, diclofenac gel and lidocaine patch to the L shoulder area are effective.      Problem: COMMUNICATION IMPAIRMENT  Goal: Ability to express needs and understand communication  10/10/2020 0432 by Marylin Rodriguez RN  Outcome: Ongoing  Vietnamese speaking uses gestures appropriately

## 2020-10-10 NOTE — PLAN OF CARE
Problem: Falls - Risk of:  Goal: Will remain free from falls  Description: Will remain free from falls  10/10/2020 0339 by Nadeem Rosario RN  Outcome: Ongoing   Patient does not use the call light appropriately, Kinyarwanda speaking  Problem: Falls - Risk of:  Goal: Absence of physical injury  Description: Absence of physical injury  10/10/2020 0339 by Nadeem Rosario RN  Outcome: Ongoing   Bed alarm is in use, tele-camera in use as of tonight.   Problem: Pain:  Goal: Patient's pain/discomfort is manageable  Description: Patient's pain/discomfort is manageable  10/10/2020 0339 by Nadeem Rosario RN  Outcome: Ongoing  C/o chronic pain to the l shoulder, Diclofenac gel and lidocaine patch to the  L shoulder area and they are effective    Problem: Pain:  Goal: Pain level will decrease  Description: Pain level will decrease  10/10/2020 0339 by Nadeem Rosario RN  Outcome: Ongoing

## 2020-10-10 NOTE — PROGRESS NOTES
Lt sided  weakness  A/A/O x 1. Hebrew speaking, uses gestures appropriately. Can be confused at times. Tele-camera in use started tonight for safety. Has discomfort to the L shoulder every with movement, Tramadol 50mg was given at bedtime and was effective. On general diet, tolerating well. Medications taken whole in applesauce. On plavix for DVT prophylaxis. Skin warm and dry. Has been continent of bowel and bladder with stand pivot transfer to the [facility] to the . [facility] 10/9. Bed alarm and NICOLE CAM in use and call light in reach. Tele NSR. Will monitor for safety.

## 2020-10-11 LAB
GLUCOSE BLD-MCNC: 103 MG/DL (ref 70–99)
GLUCOSE BLD-MCNC: 149 MG/DL (ref 70–99)
GLUCOSE BLD-MCNC: 194 MG/DL (ref 70–99)
GLUCOSE BLD-MCNC: 254 MG/DL (ref 70–99)
PERFORMED ON: ABNORMAL

## 2020-10-11 PROCEDURE — 94760 N-INVAS EAR/PLS OXIMETRY 1: CPT

## 2020-10-11 PROCEDURE — 6360000002 HC RX W HCPCS: Performed by: PHYSICAL MEDICINE & REHABILITATION

## 2020-10-11 PROCEDURE — 6370000000 HC RX 637 (ALT 250 FOR IP): Performed by: PHYSICAL MEDICINE & REHABILITATION

## 2020-10-11 PROCEDURE — 1280000000 HC REHAB R&B

## 2020-10-11 RX ADMIN — SENNOSIDES-DOCUSATE SODIUM TAB 8.6-50 MG 1 TABLET: 8.6-5 TAB at 09:55

## 2020-10-11 RX ADMIN — ACETAMINOPHEN 650 MG: 325 TABLET ORAL at 20:20

## 2020-10-11 RX ADMIN — LOSARTAN POTASSIUM 12.5 MG: 25 TABLET, FILM COATED ORAL at 09:54

## 2020-10-11 RX ADMIN — DICLOFENAC 4 G: 10 GEL TOPICAL at 20:24

## 2020-10-11 RX ADMIN — INSULIN LISPRO 3 UNITS: 100 INJECTION, SOLUTION INTRAVENOUS; SUBCUTANEOUS at 16:45

## 2020-10-11 RX ADMIN — CLOPIDOGREL BISULFATE 75 MG: 75 TABLET ORAL at 09:53

## 2020-10-11 RX ADMIN — ACETAMINOPHEN 650 MG: 325 TABLET ORAL at 05:48

## 2020-10-11 RX ADMIN — INSULIN LISPRO 5 UNITS: 100 INJECTION, SOLUTION INTRAVENOUS; SUBCUTANEOUS at 20:21

## 2020-10-11 RX ADMIN — PANTOPRAZOLE SODIUM 40 MG: 40 TABLET, DELAYED RELEASE ORAL at 05:48

## 2020-10-11 RX ADMIN — INSULIN LISPRO 10 UNITS: 100 INJECTION, SOLUTION INTRAVENOUS; SUBCUTANEOUS at 09:59

## 2020-10-11 RX ADMIN — SUCRALFATE 1 G: 1 TABLET ORAL at 14:14

## 2020-10-11 RX ADMIN — TRAMADOL HYDROCHLORIDE 50 MG: 50 TABLET ORAL at 10:03

## 2020-10-11 RX ADMIN — TAMSULOSIN HYDROCHLORIDE 0.4 MG: 0.4 CAPSULE ORAL at 20:21

## 2020-10-11 RX ADMIN — ROSUVASTATIN CALCIUM 10 MG: 10 TABLET, FILM COATED ORAL at 09:54

## 2020-10-11 RX ADMIN — SENNOSIDES-DOCUSATE SODIUM TAB 8.6-50 MG 1 TABLET: 8.6-5 TAB at 20:20

## 2020-10-11 RX ADMIN — MAGNESIUM HYDROXIDE 30 ML: 400 SUSPENSION ORAL at 20:20

## 2020-10-11 RX ADMIN — INSULIN LISPRO 3 UNITS: 100 INJECTION, SOLUTION INTRAVENOUS; SUBCUTANEOUS at 11:29

## 2020-10-11 RX ADMIN — DICLOFENAC 4 G: 10 GEL TOPICAL at 14:14

## 2020-10-11 RX ADMIN — ASPIRIN 81 MG: 81 TABLET, COATED ORAL at 09:53

## 2020-10-11 RX ADMIN — PANTOPRAZOLE SODIUM 40 MG: 40 TABLET, DELAYED RELEASE ORAL at 15:22

## 2020-10-11 RX ADMIN — SUCRALFATE 1 G: 1 TABLET ORAL at 05:48

## 2020-10-11 RX ADMIN — INSULIN GLARGINE 30 UNITS: 100 INJECTION, SOLUTION SUBCUTANEOUS at 20:21

## 2020-10-11 RX ADMIN — DICLOFENAC 4 G: 10 GEL TOPICAL at 09:56

## 2020-10-11 RX ADMIN — ENOXAPARIN SODIUM 40 MG: 40 INJECTION SUBCUTANEOUS at 20:24

## 2020-10-11 RX ADMIN — DULOXETINE HYDROCHLORIDE 30 MG: 30 CAPSULE, DELAYED RELEASE ORAL at 09:53

## 2020-10-11 RX ADMIN — SUCRALFATE 1 G: 1 TABLET ORAL at 20:21

## 2020-10-11 ASSESSMENT — PAIN SCALES - GENERAL
PAINLEVEL_OUTOF10: 2
PAINLEVEL_OUTOF10: 0
PAINLEVEL_OUTOF10: 5
PAINLEVEL_OUTOF10: 2
PAINLEVEL_OUTOF10: 0
PAINLEVEL_OUTOF10: 2

## 2020-10-11 ASSESSMENT — PAIN DESCRIPTION - ONSET: ONSET: ON-GOING

## 2020-10-11 ASSESSMENT — PAIN DESCRIPTION - ORIENTATION
ORIENTATION: LEFT
ORIENTATION: LEFT

## 2020-10-11 ASSESSMENT — PAIN DESCRIPTION - FREQUENCY: FREQUENCY: CONTINUOUS

## 2020-10-11 ASSESSMENT — PAIN DESCRIPTION - LOCATION
LOCATION: SHOULDER
LOCATION: SHOULDER

## 2020-10-11 ASSESSMENT — PAIN DESCRIPTION - PROGRESSION: CLINICAL_PROGRESSION: NOT CHANGED

## 2020-10-11 ASSESSMENT — PAIN DESCRIPTION - PAIN TYPE
TYPE: ACUTE PAIN
TYPE: ACUTE PAIN

## 2020-10-11 ASSESSMENT — PAIN - FUNCTIONAL ASSESSMENT: PAIN_FUNCTIONAL_ASSESSMENT: ACTIVITIES ARE NOT PREVENTED

## 2020-10-11 NOTE — PLAN OF CARE
Problem: Falls - Risk of:  Goal: Will remain free from falls  Description: Will remain free from falls  10/10/2020 1732 by Kristy Haider RN  Outcome: Ongoing  Note: Patient free of falls this shift, all safety precautions in place. Problem: Falls - Risk of:  Goal: Absence of physical injury  Description: Absence of physical injury  10/10/2020 1732 by Kristy Haider RN  Outcome: Ongoing  Note: All safety precautions in place including nonskid socks on feet, bed exit alarm on and posey clip attached to patient when in chair, phones and call light in reach, will continue to monitor. Problem: Skin Integrity:  Goal: Will show no infection signs and symptoms  Description: Will show no infection signs and symptoms  10/10/2020 1732 by Kristy Haider RN  Outcome: Ongoing  Note: Skin assessment performed for signs and symptoms of infection. Problem: Skin Integrity:  Goal: Absence of new skin breakdown  Description: Absence of new skin breakdown  10/10/2020 1732 by Kristy Haider RN  Outcome: Ongoing  Note: Skin check performed, no signs or symptoms of new skin break down, patient tolerating well. Problem: Daily Care:  Goal: Daily care needs are met  Description: Daily care needs are met  10/10/2020 1732 by Kristy Haider RN  Outcome: Ongoing  Note: Participates in hourly rounding, states needs as they arise by using call light appropriately, and does call in advance of needs. Problem: Pain:  Goal: Patient's pain/discomfort is manageable  Description: Patient's pain/discomfort is manageable  10/10/2020 1732 by Kristy Haider RN  Outcome: Ongoing  Note: Patient uses pain scale appropriately.        Problem: Discharge Planning:  Goal: Patients continuum of care needs are met  Description: Patients continuum of care needs are met  10/10/2020 1732 by Kristy Haider RN  Outcome: Ongoing  Note: Discharge planning started upon admission,  consult in place. Problem: HEMODYNAMIC STATUS  Goal: Patient has stable vital signs and fluid balance  10/10/2020 1732 by Violetta Scott RN  Outcome: Ongoing  Note: Daily weight taken and charted in the morning. Problem: ACTIVITY INTOLERANCE/IMPAIRED MOBILITY  Goal: Mobility/activity is maintained at optimum level for patient  10/10/2020 1732 by Violetta Scott RN  Outcome: Ongoing  Note: Participates in all scheduled therapies, is cooperative and has a positive attitude towards achieving set goals. Problem: COMMUNICATION IMPAIRMENT  Goal: Ability to express needs and understand communication  10/10/2020 1732 by Violetta Scott RN  Outcome: Ongoing  Note: Encouragement of the use of communication board      Problem: Serum Glucose Level - Abnormal:  Goal: Ability to maintain appropriate glucose levels will improve  Description: Ability to maintain appropriate glucose levels will improve  10/10/2020 1732 by Violetta Scott RN  Outcome: Ongoing  Note: Order for Blood Glucose checks before each meal and at hour of sleep in place. Problem: IP SWALLOWING  Goal: LTG - patient will tolerate the least restrictive diet consistency to allow for safe consumption of daily meals  10/10/2020 1732 by Violetta Scott RN  Outcome: Ongoing  Note: Up in chair for all meals, returned verbal acknowledgement of all safety precautions in place, will continue to monitor.

## 2020-10-11 NOTE — PROGRESS NOTES
Pt awake and alert to self. Pt does not speak [place] but [place]. Will use  phone for meds and needs. Pt did tell staff PCA that his throat hurt after this writer offered him water to drink. Pt does not give a pain scale number. Pt s/p CVA with L alondra. L arm is weak. Repositioned for comfort. Lungs clear and decreased. Nos ob or cough. On RA. Belly softly distended with active BS. LBM yesterday. On telemetry. No edema noted. Call light in reach. Bed alarm on. Avasys in room.

## 2020-10-11 NOTE — PROGRESS NOTES
used at this time to give pt meds and pain medication Ultram for sore throat and L shoulder pain. No other needs voiced per . Will monitor.

## 2020-10-12 LAB
ANION GAP SERPL CALCULATED.3IONS-SCNC: 9 MMOL/L (ref 3–16)
BASOPHILS ABSOLUTE: 0 K/UL (ref 0–0.2)
BASOPHILS RELATIVE PERCENT: 0.8 %
BUN BLDV-MCNC: 15 MG/DL (ref 7–20)
CALCIUM SERPL-MCNC: 9 MG/DL (ref 8.3–10.6)
CHLORIDE BLD-SCNC: 104 MMOL/L (ref 99–110)
CO2: 27 MMOL/L (ref 21–32)
CREAT SERPL-MCNC: 1.3 MG/DL (ref 0.8–1.3)
EOSINOPHILS ABSOLUTE: 0.2 K/UL (ref 0–0.6)
EOSINOPHILS RELATIVE PERCENT: 3.9 %
GFR AFRICAN AMERICAN: >60
GFR NON-AFRICAN AMERICAN: 54
GLUCOSE BLD-MCNC: 302 MG/DL (ref 70–99)
GLUCOSE BLD-MCNC: 323 MG/DL (ref 70–99)
GLUCOSE BLD-MCNC: 78 MG/DL (ref 70–99)
GLUCOSE BLD-MCNC: 82 MG/DL (ref 70–99)
GLUCOSE BLD-MCNC: 84 MG/DL (ref 70–99)
HCT VFR BLD CALC: 37 % (ref 40.5–52.5)
HEMOGLOBIN: 12.5 G/DL (ref 13.5–17.5)
LYMPHOCYTES ABSOLUTE: 1.8 K/UL (ref 1–5.1)
LYMPHOCYTES RELATIVE PERCENT: 35.8 %
MCH RBC QN AUTO: 26.1 PG (ref 26–34)
MCHC RBC AUTO-ENTMCNC: 33.9 G/DL (ref 31–36)
MCV RBC AUTO: 76.9 FL (ref 80–100)
MONOCYTES ABSOLUTE: 0.4 K/UL (ref 0–1.3)
MONOCYTES RELATIVE PERCENT: 8 %
NEUTROPHILS ABSOLUTE: 2.5 K/UL (ref 1.7–7.7)
NEUTROPHILS RELATIVE PERCENT: 51.5 %
PDW BLD-RTO: 16 % (ref 12.4–15.4)
PERFORMED ON: ABNORMAL
PERFORMED ON: ABNORMAL
PERFORMED ON: NORMAL
PERFORMED ON: NORMAL
PLATELET # BLD: 257 K/UL (ref 135–450)
PMV BLD AUTO: 7.3 FL (ref 5–10.5)
POTASSIUM REFLEX MAGNESIUM: 4.2 MMOL/L (ref 3.5–5.1)
RBC # BLD: 4.81 M/UL (ref 4.2–5.9)
SODIUM BLD-SCNC: 140 MMOL/L (ref 136–145)
WBC # BLD: 4.9 K/UL (ref 4–11)

## 2020-10-12 PROCEDURE — 97530 THERAPEUTIC ACTIVITIES: CPT

## 2020-10-12 PROCEDURE — 97110 THERAPEUTIC EXERCISES: CPT

## 2020-10-12 PROCEDURE — 97112 NEUROMUSCULAR REEDUCATION: CPT

## 2020-10-12 PROCEDURE — 80048 BASIC METABOLIC PNL TOTAL CA: CPT

## 2020-10-12 PROCEDURE — 92526 ORAL FUNCTION THERAPY: CPT

## 2020-10-12 PROCEDURE — 94760 N-INVAS EAR/PLS OXIMETRY 1: CPT

## 2020-10-12 PROCEDURE — 1280000000 HC REHAB R&B

## 2020-10-12 PROCEDURE — 97130 THER IVNTJ EA ADDL 15 MIN: CPT

## 2020-10-12 PROCEDURE — 97129 THER IVNTJ 1ST 15 MIN: CPT

## 2020-10-12 PROCEDURE — 6360000002 HC RX W HCPCS: Performed by: PHYSICAL MEDICINE & REHABILITATION

## 2020-10-12 PROCEDURE — 97116 GAIT TRAINING THERAPY: CPT

## 2020-10-12 PROCEDURE — 6370000000 HC RX 637 (ALT 250 FOR IP): Performed by: PHYSICAL MEDICINE & REHABILITATION

## 2020-10-12 PROCEDURE — 36415 COLL VENOUS BLD VENIPUNCTURE: CPT

## 2020-10-12 PROCEDURE — 85025 COMPLETE CBC W/AUTO DIFF WBC: CPT

## 2020-10-12 RX ORDER — INSULIN GLARGINE 100 [IU]/ML
26 INJECTION, SOLUTION SUBCUTANEOUS NIGHTLY
Status: DISCONTINUED | OUTPATIENT
Start: 2020-10-12 | End: 2020-10-13

## 2020-10-12 RX ADMIN — SENNOSIDES-DOCUSATE SODIUM TAB 8.6-50 MG 1 TABLET: 8.6-5 TAB at 20:42

## 2020-10-12 RX ADMIN — DICLOFENAC 4 G: 10 GEL TOPICAL at 20:43

## 2020-10-12 RX ADMIN — ROSUVASTATIN CALCIUM 10 MG: 10 TABLET, FILM COATED ORAL at 08:46

## 2020-10-12 RX ADMIN — PANTOPRAZOLE SODIUM 40 MG: 40 TABLET, DELAYED RELEASE ORAL at 05:53

## 2020-10-12 RX ADMIN — CLOPIDOGREL BISULFATE 75 MG: 75 TABLET ORAL at 08:45

## 2020-10-12 RX ADMIN — INSULIN GLARGINE 26 UNITS: 100 INJECTION, SOLUTION SUBCUTANEOUS at 20:43

## 2020-10-12 RX ADMIN — ENOXAPARIN SODIUM 40 MG: 40 INJECTION SUBCUTANEOUS at 20:42

## 2020-10-12 RX ADMIN — ASPIRIN 81 MG: 81 TABLET, COATED ORAL at 08:47

## 2020-10-12 RX ADMIN — DULOXETINE HYDROCHLORIDE 30 MG: 30 CAPSULE, DELAYED RELEASE ORAL at 08:46

## 2020-10-12 RX ADMIN — DICLOFENAC 4 G: 10 GEL TOPICAL at 15:30

## 2020-10-12 RX ADMIN — PANTOPRAZOLE SODIUM 40 MG: 40 TABLET, DELAYED RELEASE ORAL at 15:28

## 2020-10-12 RX ADMIN — SENNOSIDES-DOCUSATE SODIUM TAB 8.6-50 MG 1 TABLET: 8.6-5 TAB at 08:46

## 2020-10-12 RX ADMIN — SUCRALFATE 1 G: 1 TABLET ORAL at 20:42

## 2020-10-12 RX ADMIN — INSULIN LISPRO 6 UNITS: 100 INJECTION, SOLUTION INTRAVENOUS; SUBCUTANEOUS at 20:43

## 2020-10-12 RX ADMIN — SUCRALFATE 1 G: 1 TABLET ORAL at 15:28

## 2020-10-12 RX ADMIN — SUCRALFATE 1 G: 1 TABLET ORAL at 05:53

## 2020-10-12 RX ADMIN — DICLOFENAC 4 G: 10 GEL TOPICAL at 08:47

## 2020-10-12 RX ADMIN — INSULIN LISPRO 12 UNITS: 100 INJECTION, SOLUTION INTRAVENOUS; SUBCUTANEOUS at 11:43

## 2020-10-12 RX ADMIN — LOSARTAN POTASSIUM 12.5 MG: 25 TABLET, FILM COATED ORAL at 08:46

## 2020-10-12 RX ADMIN — INSULIN LISPRO 10 UNITS: 100 INJECTION, SOLUTION INTRAVENOUS; SUBCUTANEOUS at 08:55

## 2020-10-12 RX ADMIN — TAMSULOSIN HYDROCHLORIDE 0.4 MG: 0.4 CAPSULE ORAL at 20:43

## 2020-10-12 ASSESSMENT — PAIN SCALES - GENERAL: PAINLEVEL_OUTOF10: 0

## 2020-10-12 NOTE — PLAN OF CARE
Problem: Falls - Risk of:  Goal: Will remain free from falls  Description: Will remain free from falls  Outcome: Ongoing  Note: Fall risk assessment completed. Fall precautions in place. Call light within reach. Pt educated on calling for assistance before getting up. Walkway free of clutter. NICOLE cam in use for pt's safety. Will continue to monitor. Problem: Skin Integrity:  Goal: Absence of new skin breakdown  Description: Absence of new skin breakdown  Outcome: Ongoing  Note: Patient's skin was assessed. Skin is currently intact with scattered bruising. No new findings were noted. Patient is being educated on importance of turning/repostioning at least every two hours. RN will continue to educate and monitor. Problem: Daily Care:  Goal: Daily care needs are met  Description: Daily care needs are met  Outcome: Ongoing  Note: Pt is A&O x1, pt speaks [place] and requires an  to communicate. Call light within reach at all times. RN/PCA doing hourly rounds. Needs are being met this shift. Will continue to monitor. Problem: Pain:  Goal: Control of acute pain  Description: Control of acute pain  Outcome: Ongoing  Note: Pt assessed for pain. Pt denies any pain at this time. Will continue to monitor pt and assess for pain throughout rest of shift. Problem: ACTIVITY INTOLERANCE/IMPAIRED MOBILITY  Goal: Mobility/activity is maintained at optimum level for patient  Outcome: Ongoing  Note: Pt mobility is increasing. Pt continues with therapy. Pt is stedy x1 assist. Will monitor. Problem: Serum Glucose Level - Abnormal:  Goal: Ability to maintain appropriate glucose levels will improve  Description: Ability to maintain appropriate glucose levels will improve  Outcome: Ongoing  Note: Blood glucose levels being monitored and treated per order. Dietary restrictions noted and followed. Instructed on signs/symptoms of hypoglycemia and hyperglycemia. DM education being given.  Will continue to educate and monitor.

## 2020-10-12 NOTE — PROGRESS NOTES
Department of Physical Medicine & Rehabilitation  Progress Note    Patient Identification:  Nati Hernandez  [id number]  : 1945  Admit date: 2020    Chief Complaint: Right pontine stroke [facility])    Subjective:   No acute events overnight. Patient seen this am sitting up in room. Had low blood sugar this morning, denies symptoms. Reports some ongoing left shoulder pain. Using diclofenac and lidoderm for this. Asks about when weakness will improve. Provided more education to patient and son on stroke recovery time line and functional prognosis. I did tell him that with this degree of weakness, I expect some long term deficits. ROS: No f/c, n/v, cp     Objective:  Patient Vitals for the past 24 hrs:   BP Temp Temp src Pulse Resp SpO2 Weight   10/12/20 0816 (!) 153/88 97.5 °F (36.4 °C) Oral 87 18 97 % --   10/12/20 0422 136/84 98.1 °F (36.7 °C) Oral 80 16 91 % 128 lb 1.4 oz (58.1 kg)   10/12/20 0017 127/75 97.5 °F (36.4 °C) Oral 79 16 94 % --   10/11/20 1958 136/78 97.8 °F (36.6 °C) Oral 78 16 95 % --   10/11/20 1508 130/79 98 °F (36.7 °C) Oral 77 17 96 % --   10/11/20 1221 138/82 98 °F (36.7 °C) Oral 76 18 93 % --     Const: Alert. No distress, pleasant. HEENT: Normocephalic, atraumatic. Normal sclera/conjunctiva. MMM. CV: Regular rate and rhythm. Resp: No respiratory distress. Lungs CTAB. Abd: Soft, nontender, nondistended, NABS+   Ext: No edema. Neuro: Alert, evidence of mild confusion (limited by language barrier), speech dysarthric, left hemiparesis (1-3/5)  Psych: Cooperative, appropriate mood and affect    Laboratory data: Available via EMR.    Last 24 hour lab  Recent Results (from the past 24 hour(s))   POCT Glucose    Collection Time: 10/11/20 11:02 AM   Result Value Ref Range    POC Glucose 194 (H) 70 - 99 mg/dl    Performed on ACCU-CHEK    POCT Glucose    Collection Time: 10/11/20  4:20 PM   Result Value Ref Range    POC Glucose 149 (H) 70 - 99 mg/dl    Performed on ACCU-CHEK    POCT Glucose    Collection Time: 10/11/20  8:04 PM   Result Value Ref Range    POC Glucose 254 (H) 70 - 99 mg/dl    Performed on ACCU-CHEK    Basic Metabolic Panel w/ Reflex to MG    Collection Time: 10/12/20  7:21 AM   Result Value Ref Range    Sodium 140 136 - 145 mmol/L    Potassium reflex Magnesium 4.2 3.5 - 5.1 mmol/L    Chloride 104 99 - 110 mmol/L    CO2 27 21 - 32 mmol/L    Anion Gap 9 3 - 16    Glucose 84 70 - 99 mg/dL    BUN 15 7 - 20 mg/dL    CREATININE 1.3 0.8 - 1.3 mg/dL    GFR Non-African American 54 (A) >60    GFR African American >60 >60    Calcium 9.0 8.3 - 10.6 mg/dL   CBC Auto Differential    Collection Time: 10/12/20  7:21 AM   Result Value Ref Range    WBC 4.9 4.0 - 11.0 K/uL    RBC 4.81 4.20 - 5.90 M/uL    Hemoglobin 12.5 (L) 13.5 - 17.5 g/dL    Hematocrit 37.0 (L) 40.5 - 52.5 %    MCV 76.9 (L) 80.0 - 100.0 fL    MCH 26.1 26.0 - 34.0 pg    MCHC 33.9 31.0 - 36.0 g/dL    RDW 16.0 (H) 12.4 - 15.4 %    Platelets 539 400 - 215 K/uL    MPV 7.3 5.0 - 10.5 fL    Neutrophils % 51.5 %    Lymphocytes % 35.8 %    Monocytes % 8.0 %    Eosinophils % 3.9 %    Basophils % 0.8 %    Neutrophils Absolute 2.5 1.7 - 7.7 K/uL    Lymphocytes Absolute 1.8 1.0 - 5.1 K/uL    Monocytes Absolute 0.4 0.0 - 1.3 K/uL    Eosinophils Absolute 0.2 0.0 - 0.6 K/uL    Basophils Absolute 0.0 0.0 - 0.2 K/uL   POCT Glucose    Collection Time: 10/12/20  7:31 AM   Result Value Ref Range    POC Glucose 78 70 - 99 mg/dl    Performed on ACCU-CHEK        Therapy progress:  PT  Position Activity Restriction  Other position/activity restrictions: L sided weakness; speaks [place] - uses  phone, family, and also in person interpretors  Objective     Sit to Stand: Contact guard assistance  Stand to sit: Contact guard assistance  Bed to Chair: Minimal assistance(Needs assistance to shift hips and facilitate turning.   Also requires cuing for hand placement)  Device: Hemiwalker  Other Apparatus: Wheelchair follow(L arm sling)  Assistance: Minimal assistance  Distance: 15' with no turns  OT  PT Equipment Recommendations  Equipment Needed: Yes  Mobility Devices: Wheelchair  Wheelchair: Standard  Other: standard wheelchair with standard cushion, standard leg rests, removable arm rests  Toilet - Technique: Stand pivot  Equipment Used: Grab bars  Toilet Transfers Comments: cues for safety, pt attempting to sit too early  Assessment        SLP  Diet Solids Recommendation: Dysphagia Minced and Moist (Dysphagia II)  Liquid Consistency Recommendation: Thin    Body mass index is 22.69 kg/m². Assessment and Plan:    Impairments: left hemiparesis, decreased coordination, balance, endurance, cognition, dysarthria, dysphagia     Acute ischemic stroke  -Localization: Right son, posterior centrum semiolave, left frontal lobe deep white matter  -Etiology: small vessel vs embolic  -Telemetry in ARU, then event monitor at discharge  -Secondary ppx with DAPT x 3 weeks (then Plavix alone), statin, BP and glucose control  -PT/OT/SLP    Spasticity  -Emerging tone in LUE  -Monitor, consider medication     H/o left PCA stroke with severe stenosis  -Secondary ppx as above     Dysphagia   -Dietitian and SLP consults.   -Upgrade to regular + thins     HTN, uncontrolled  -Now improved on losartan (dose decreased to avoid hypotension)      DM2, uncontrolled  -Lantus (decrease to 26 qhs), added 10 units prandial with breakfast, continue high dose ISS     CKD  -Avoid nephrotoxins, renally dose meds  -Monitor      L1 compression fracture  -Pain control  -PT/OT     Bladder   -High risk retention   -Monitor PVRs, SC prn >300cc  -Flomax     Bowel   -High risk constipation   -senna+colace BID, PRN miralax, MoM, and bisacodyl supp.     Pain control  -prn tramadol  -diclofenac gel for shoulder and knee pain     PPx  -DVT: lovenox  -GI: pantoprazole    Rehab Progress:  Making steady progress.  Working on left side function, functional mobility, compensatory strategies (anticipating primarily wheelchair for mobility). SLP working on aphasia/apraxia, dysarthria, dysphagia, cognition. Anticipated Dispo: home with family, anticipate 24/7 assist  Services:  PT, OT, RN, Aide, (?SLP)  DME: wheelchair, alondra-walker, BSC, TTB  ELOS: 10/17      Serafin Adorno.  Kristal Jang MD 10/12/2020, 10:43 AM

## 2020-10-12 NOTE — PLAN OF CARE
Problem: Falls - Risk of:  Goal: Will remain free from falls  Description: Will remain free from falls  Note: Patient free from falls this shift. Fall precautions in place. Bed in low position with brake and alarm on. Non skid socks in place. NICOLE cam in place. Call light and belongings within reach. Will continue to monitor for safety.

## 2020-10-12 NOTE — PROGRESS NOTES
OCCUPATIONAL THERAPY  Progress Note   Second Session    Patient Name: Rod Howard  Medical Record Number: [id number]    Treatment Diagnosis: decreased functional mobility following CVA with L alondra    General  Chart Reviewed: Yes, Progress Notes  Additional Pertinent Hx: Per Dr. Lovely Neal , \"The patient is a 76 y.o. male who presents to [facility] with unsteady on feet. Pt speaks Polish, son at bedside helping getting history. According to son/pt, pt apparently sustained a fall yesterday and felt like he couldn't move his left side. Today he had difficulty ambulating using his lt side and hence son brought pt to the ER\"  Diagnosed with  Response to previous treatment: Patient with no complaints from previous session  Family / Caregiver Present: No  Referring Practitioner: Maximo Gilliland  Diagnosis: Acute CVA - with left sided weakness     Restrictions/Precautions  Restrictions/Precautions: Fall Risk  Required Braces or Orthoses?: No        Position Activity Restriction  Other position/activity restrictions: L sided weakness; speaks [place] - uses  phone, family, and also in person interpretors    Subjective: pt met in dept for OT. Pt agreeable to therapy, c/o L shoulder pain. Use of  phone throughout session    Objective:  Pt completed stand pivot from w/c > therapy mat CGA to the right. Pt sat on edge of mat x10 mins with SBA. Pt completed x10 reps AROM shoulder elevation/depression, AAROM elbow flexion/extension, AAROM shoulder flexion/extension, AAROM internal/external rotation. Pt very talkative and having a hard time following OT's commands. Pt attempts multiple times to use his R hand to move his L UE in multiple directions, required cues to not pull on his L UE to reduce risk of injury. Attempted use of gentle vibration to elicit finger flexion/extension, noted to have slight thumb movement. Pt completed sit>supine CGA with cues for positioning.  Pt completed x10 reps AAROM abduction/adduction. Pt completed supine>sit min A. Pt initially engaging L UE and pushing up on elbow but then reached for therapist and pulled on therapist's shoulder to raise his trunk. Pt completed stand pivot from mat > w/c min A to the left. Pt completed sit>stand from w/c min A. Pt stood in front of whiteboard and used his right hand to write his name on the board while OT facilitated WB through L UE. Pt's writing was not recognizable to this writer, though he reports that he wrote his name in [place]. Handoff with ILYA Duffy    Assessment: pt tolerated tx well though limited by distractibility this session even in quieter room. Pt is very motivated to improve his L side function.  Cont per OT POC     Safety Device - Type of devices:  []  All fall risk precautions in place [] Bed alarm in place  [] Call light within reach [] Chair alarm in place [] Positioning belt [] Gait belt [] Patient at risk for falls [] Left in bed [] Left in chair [] Telesitter in use [] Sitter present [] Nurse notified []  None    Therapy Time   Individual Co-treatment   Time In 1345    Time Out 1430    Minutes 45          Electronically signed by Farrel Saint, OT on 10/12/2020 at 4:02 PM

## 2020-10-12 NOTE — PROGRESS NOTES
Patient admitted to rehab [address]. A&Ox1. Pt speaks Pashto and requires an  to communicate. [facility]. On carb control, no eggs or meat diet, tolerating well. Medications taken whole in applesauce and thin liquids. On ASA and Plavix for DVT prophylaxis. Pt skin intact with scattered bruising. On room air. Has been continent of bowel and bladder. LBM 10/12/2020. Pt denies pain/discomfort. Chair/bed alarms in use and call light in reach. Pt currently on telemetry and NICOLE cam in use for patient's safety.  Will continue to [employer]

## 2020-10-12 NOTE — PROGRESS NOTES
Pt transferred from toilet to bed with stedy x1. Pt passed urine and small BM. Able to wipe himself. Assist with pants up and down. Son at bedside. Call light in reach. Bed alarm on. Avasys in room.

## 2020-10-12 NOTE — PROGRESS NOTES
Speech Language Pathology  ACUTE REHAB UNIT  SPEECH/LANGUAGE PATHOLOGY      [x] Daily  [] Weekly Care Conference Note  [] Discharge    Patient:Sylvester Duval      EPU:[id number]  AHI:[id number]  Rehab Dx/Hx: Right pontine stroke ([address] [I63.50]    Precautions: Dysphagia; [place] is native language; Potential cognitive-communication deficitgs  Home situation: Lives with family  ST Dx: [] Aphasia  [x] Dysarthria  [] Apraxia   [x] Oropharyngeal dysphagia [x] Cognitive   Impairment  [] Other:   Initial Speech Therapy Assessment Diagnosis:   Per INitial Evaluations 9/26/2020:   1. Cognitive Diagnosis: Pt presents with impaired memory, orientation,  and problem solving for basic information. Grandson reports this is typical for pt. Per acute notes, pt's son also confirmed this is pt's baseline. 2. Aphasia Diagnosis: Further evaluation needed to clarify. 3. Speech Diagnosis: Further evaluation needed to clarify. 4. Communication Diagnosis: Pt requires and  to communicate. Pt appears to often have difficulty expressing himself accurately and with detail for typical conversation. 5. Dysphagia Diagnosis: Mild pharyngeal stage dysphagia;Mild to moderate oral stage dysphagia   Pt presents with no overt signs of aspiration or penetration. Swallow appears effortful at times and pt reports some difficulty. Oral skills for solids appear impaired with slow posterior propulsion and trace oral residue. Pt at risk for adequate intake due to his c/o intermittent nausea, prefers only warm food and drinks, and the food provided is not what pt is familiar with.   Date of Admit: 9/25/2020  Room #: N8W-3967/3260-01  Date: 10/12/2020       Current functional status (updated daily):         Current Diet Order:DIET GENERAL; Carb Control: 4 carb choices (60 gms)/meal; Vegetarian (Lacto-Ovo)  Dietary Nutrition Supplements: Clear Liquid Oral Supplement   Behavior: [x] Alert  [] Cooperative  [x]  Pleasant  [x] Confused  [] Agitated  [] Uncooperative  [] Distractible [] Motivated  [] Self-Limiting [] Anxious  [x] Other:  phone utilized. Endurance:  [x] Adequate for participation in SLP sessions  [] Reduced overall  [] Lethargic  [] Other:  Safety: [] No concerns at this time  [] Reduced insight into deficits  []  Reduced safety awareness [] Not following call light procedures  [] Unable to Assess  [] Other:  Swallowing Precautions: 90 degree positioning with all p.o. intake; small bites/sips; alternate textures through meal; reduce rate of intake;     Barriers toward pt/family plan for progress toward d/c plan: Medical status and caregiver support may be barriers           Date: 10/12/2020      Tx session 1 Tx session 2   Total Timed Code Min SLP Individual Minutes  Time In: 1430  Time Out: 7367  Minutes: 50  Coded treatment time  35 n/a     Group Treatment Minutes 0 0   Co-Treat Minutes 0 0   Variance/Reason:  n/a    Pain denied    Pain Intervention [] RN notified  [] Repositioned  [] Intervention offered and patient declined  [] N/A  [] Other: [] RN notified  [] Repositioned  [] Intervention offered and patient declined  [] N/A  [] Other:   Subjective     Pt seen in the treatment office  And then in pt room   phone utilized  No family present  Good effort in therapy      Objective:  Goals       Dysphagia Goals:   1. Upgraded goal  The patient will tolerate regular food consistency with thin liquid diet without observed clinical signs of aspiration,          No new complaints by staff    Pt reports warm drinks are favored and no problems. Pt states \"when people give me cold drinks they make me cough sometimes\"    Pt reporting \"less biting of cheek when chewing\" \"Not so much now\"     n/a   2.  Upgraded goal  The patient will improve oral motor function via therapeutic oral motor exercises to 15/15 each trial. Left facial droop and weakness    -resistance ex with engagement of left labial /buccal musculature engagement : continues to demonstrate improved strength and duration of muscular engagement left labial/buccal  -volitional rom for  · lip retraction mild tomoderate  · lip protrusion moderate  ·  lip rounding moderate      n/a     Cognitive-Communication goals       Goal 1:Modified goal   The pt will demonstrate improved recall of daily events; learned strategies with set up; structure and <max cues Temporal orientation/spatial orientation questions:   -Pt oriented to self and partially to place ;  Situation and date  -recall of today's therapy activities via Y/N?  Response accuracy: 70%    Working memory for rom of ex targeted oral motor rom:  10 to 15  reps: set up and minimal cues for sustained ex ; increase cues when completing 2 step rom ex     n/a     Goal 2: Upgraded goal  The pt will demonstrate improved processing for short concrete questions regarding basic DL and / or to follow 1 step commands with max visual demonstrations;and with <moderate tactile cues   1 step commands for task instructions:  Set up and conditioning to each task      [place] Wh? (topics included: likes/dislikes; daily therapy excercises and activities; regarding pictured stimuli and regarding high frequency ADLs: 0 to mild   repetition/clarification required     n/a   Goal 3: Upgraded goal  Pt will convey basic needs/wants via verbal/pointing/gestures >70% and provide concept/event explanations >50%   [place] concept/event explanation:    · Verbal explanation of concept / event: o to moderate clarification across various topics  ·  verbal directions of activity which is known to pt: set up and moderate to max expansion cues  · Pt is conveying needs / wants like bathroom; whether fatigued etc spontaneously    CFN (>25 high frequency colored photos of DL items and places and body parts): 70% by label; and improved to 85% by pointing/gesture or verbalized function n/a   Other areas targeted:   Oral Motor Speech  · Goal appears met; but will f/u with family    Education:   Pt ed ongoing    Safety Devices: [] Call light within reach  [] Chair alarm activated  [] Bed alarm activated  [x] Other: SLP returned pt to room; alerted RN for bathroom assist as pt is verbalizing need for the bathroom [] Call light within reach  [] Chair alarm activated  [] Bed alarm activated  [] Other:    Progress Assessment: 9/28/2020:  phone utilized. Difficult to assess as pt did not always respond to questions/commands; and verbal responses were not always intelligible. SLP completed a chart review; chart review does report history of cognitive decline in memory/orientation/PS. Pt reportedly lives with family (multi generations); needed assist with ADLs and homemaking; pt receives 24 hour supervision. 9/29/2020: Son voiced concern regarding communication changes in that pt is not able to use  phone; and has difficulty understanding what family say. SLP ed in use of context of an activity and visual cues/gestures to assist. Activities incorporating family; basic DL activities most optimal.  Will upgrade diet to soft/bite size food with thin liquids; oral care post meals. Pt/son ed in examples of food consistencies on soft/bite size food consistencies; and oral care. [place] placed in the room and SLP ed pt and family in uses. 9/30/2020 SLP discussed with RN (RN actually in room when addressing pt complaints regarding discomfort in current recliner) ; and  SLP discussed with team (at team meeting ) regarding pt height anc concern regarding current recliner in room (ie pt's feet dangle; pt complaints cannot get comfortable etc)  10/1/2020; 10/20/2020 :   present for session. 10/6/2020: Request diet upgrade to regular consistency   Plan: Continue as per plan of care.    Continued Tx Upon Discharge: ?    [] Yes [] No [x] TBD based on progress while on ARU [] Vital Stim indicated [] Other:   Estimated discharge date: TBD   Discharge recommendations:   [] Home independently  [x] Home with assistance [x]  24 hour supervision  [] ECF [] Other:     Additional information:     Interventions used during Rehab Stay:  [] Speech/Language Treatment  [] Instruction in HEP [] Group [x] Dysphagia Treatment [] Cognitive Treatment   [x] Other:     Adverse Reactions to Treatment/Significant Change in Status: n/a      Electronically Signed by    Edwige Narayanan. Prakash,MS,CCC,SLP [id number]  Speech and Language Pathologist

## 2020-10-12 NOTE — PROGRESS NOTES
Physical Therapy  Facility/Department: [address] REHAB  Daily Treatment Note  NAME: Rod Howard  : 1945  MRN: [id number]    Date of Service: 10/12/2020    Discharge Recommendations:  Patient would benefit from continued therapy after discharge, Continue to assess pending progress, Home with Home health PT, 24 hour supervision or assist   PT Equipment Recommendations  Equipment Needed: Yes  Mobility Devices: Wheelchair  Wheelchair: Standard  Other: standard wheelchair with standard cushion, standard leg rests, removable arm rests    Assessment   Body structures, Functions, Activity limitations: Decreased functional mobility ; Decreased balance;Decreased strength;Decreased ADL status; Decreased posture;Decreased safe awareness;Decreased endurance  Assessment: Pt tolerated AM PT session well. He continues to demonstrate improved ambulation ability. Pt is better able to sequence movement of hemiwalker, followed by LLE, followed by RLE with less time for processing. However, pt still requires tactile and verbal cuing on sequence of events. Pt also able to achieve increased contraction of L glutes this visit. Pt still has tendnecy to flex forward and requires cuing to extend at hip and knee. Pt also has trouble weight shifting to allow for safe swing phase. Pt has difficulty understanding instructions, even with , for exercises and requires tactile feedback to facilitate desired motion. Pt perseverates at points when communicating with . Pt would continue to benefit from skilled PT to strengthen muscles, increase safety awareness, and improve functional mobility for safe transition to home.   Treatment Diagnosis: decreased functional mobility following CVA with L alondra  Prognosis: Good;Fair  History: as noted  Exam: as above  Clinical Presentation: evolving  PT Education: General Safety;Transfer Training;Gait Training;Functional Mobility Training;Equipment;Weight-bearing Education  Barriers to Learning: language  REQUIRES PT FOLLOW UP: Yes  Activity Tolerance  Activity Tolerance: Patient Tolerated treatment well;Patient limited by fatigue  Activity Tolerance: Patient's legs begin to buckle as he fatigues and requests to sit down     Patient Diagnosis(es): There were no encounter diagnoses. has a past medical history of Cerebral artery occlusion with cerebral infarction ([facility] [address] Diabetes mellitus ([facility] [address] Gallstone, GERD (gastroesophageal reflux disease), Hyperlipidemia, Hypertension, and Renal insufficiency. has a past surgical history that includes Appendectomy; Cholecystectomy; Upper gastrointestinal endoscopy (06/08/2018); Upper gastrointestinal endoscopy (N/A, 2/28/2019); Upper gastrointestinal endoscopy (N/A, 4/23/2019); Upper gastrointestinal endoscopy (N/A, 4/23/2019); Upper gastrointestinal endoscopy (N/A, 4/29/2020); and Colonoscopy (N/A, 5/8/2020). Restrictions  Restrictions/Precautions  Restrictions/Precautions: Fall Risk  Required Braces or Orthoses?: No  Position Activity Restriction  Other position/activity restrictions: L sided weakness; speaks [place] - uses  phone, family, and also in person interpretors  Subjective   General  Chart Reviewed: Yes  Additional Pertinent Hx: Per Dr. Cameron Lawson , \"The patient is a 76 y.o. male who presents to [facility] with unsteady on feet. Pt speaks Czech, son at bedside helping getting history. According to son/pt, pt apparently sustained a fall yesterday and felt like he couldn't move his left side. Today he had difficulty ambulating using his lt side and hence son brought pt to the ER\"  Diagnosed with CVA with L sided weakness. Response To Previous Treatment: Patient with no complaints from previous session. Family / Caregiver Present: No  Referring Practitioner: Juana Post  Subjective  Subjective: Pt met in therapy gym seated in w/c without shoes.   Pt complains of left sided weakness and for DF and of gastroc for PF from distal to proximal to stimulate increased muscle contraction)      PM session:   S: Pt met in room sleeping in gray, high back chair. Pt' son, Joslyn Virk, was present in room for first 5 minutes of session and left. Pt states that he is agreeable to PT this afternoon  O:Pt performed stand-pivot transfer from gray high back chair to w/c with CGA. Pt sit<>stand with CGA. Pt demonstrates improved pivot from chair to w/c. Pt wheeled 160' out in hallway from room to gym. Patient continues to have difficulty with use of right LE to assist with path navigation and required frequent tactile and verbal cueing. Decreased speed. Unable to maintain straight path and frequently propels towards hallways walls and obstacles. Cannot simultaneously steer and propel wheel at the same time. Pt demonstrated improved ability to maintain straight path, but still experienced increased swaying. Patient was SBA for propulsion down hallway. Pt needed Laure to propel over therapy gym threshold. Pt performed standing mirror bean bag exercise. Pt instructed to stand from w/c using Hemiwalker for support once standing. Pt sit<>stand with CGA for remainder of appointment. Pt instructed to  front of mirror as visual cue for balance and upright standing posture. Pt was then instructed to reach into bucket to his R and pick out a bean bag using his R hand. Pt then would place bag on top of mirror, then flick it off to the other side. Bucket was incrementally placed further away with patient with each attempt. Pt had increased difficulty performing this at first and had trouble maintaining hip and knee extension. Pt needs frequent cuing on sequencing of activity. Pt stood for about 7 minutes with CGA-Laure to maintain upright. Pt began to experience knee buckling in RLE and was asked if he wanted to sit. Pt sat down with cues for hand placement, which he ignored.   Pt was given seated rest break to weeks: 3 to 4  Current Treatment Recommendations: Functional Mobility Training, Balance Training, Endurance Training, ROM, Cognitive/Perceptual Training, Transfer Training, Gait Training, Stair training, IADL Training, Neuromuscular Re-education, Wheelchair Mobility Training, Strengthening, ADL/Self-care Training  Safety Devices  Type of devices: All fall risk precautions in place, Gait belt, Left in chair, Patient at risk for falls(Pt left in care of OT, Redia Plan, at completion of PT session)  Restraints  Initially in place: No     Therapy Time   Individual Concurrent Group Co-treatment   Time In 0900         Time Out 0945         Minutes 39              Second Session Therapy Time     Individual Co-treatment   Time In 1300    Time Out 1345    Minutes 45       Electronically signed by GENE Wyatt on 10/12/2020 at 12:36 PM  Therapist was present, directed the patient's care, made skilled judgement, and was responsible for assessment and treatment of the patient.         Electronically signed by Barry Boston PT on 10/12/2020 at 2:57 PM

## 2020-10-13 LAB
GLUCOSE BLD-MCNC: 124 MG/DL (ref 70–99)
GLUCOSE BLD-MCNC: 149 MG/DL (ref 70–99)
GLUCOSE BLD-MCNC: 335 MG/DL (ref 70–99)
GLUCOSE BLD-MCNC: 85 MG/DL (ref 70–99)
PERFORMED ON: ABNORMAL
PERFORMED ON: NORMAL

## 2020-10-13 PROCEDURE — 1280000000 HC REHAB R&B

## 2020-10-13 PROCEDURE — 97530 THERAPEUTIC ACTIVITIES: CPT

## 2020-10-13 PROCEDURE — 6370000000 HC RX 637 (ALT 250 FOR IP): Performed by: PHYSICAL MEDICINE & REHABILITATION

## 2020-10-13 PROCEDURE — 97130 THER IVNTJ EA ADDL 15 MIN: CPT

## 2020-10-13 PROCEDURE — 97535 SELF CARE MNGMENT TRAINING: CPT

## 2020-10-13 PROCEDURE — 6360000002 HC RX W HCPCS: Performed by: PHYSICAL MEDICINE & REHABILITATION

## 2020-10-13 PROCEDURE — 97110 THERAPEUTIC EXERCISES: CPT

## 2020-10-13 PROCEDURE — 92526 ORAL FUNCTION THERAPY: CPT

## 2020-10-13 PROCEDURE — 97129 THER IVNTJ 1ST 15 MIN: CPT

## 2020-10-13 PROCEDURE — 97112 NEUROMUSCULAR REEDUCATION: CPT

## 2020-10-13 PROCEDURE — 97116 GAIT TRAINING THERAPY: CPT

## 2020-10-13 RX ORDER — INSULIN GLARGINE 100 [IU]/ML
22 INJECTION, SOLUTION SUBCUTANEOUS NIGHTLY
Status: DISCONTINUED | OUTPATIENT
Start: 2020-10-13 | End: 2020-10-15

## 2020-10-13 RX ADMIN — INSULIN LISPRO 10 UNITS: 100 INJECTION, SOLUTION INTRAVENOUS; SUBCUTANEOUS at 07:47

## 2020-10-13 RX ADMIN — DULOXETINE HYDROCHLORIDE 30 MG: 30 CAPSULE, DELAYED RELEASE ORAL at 07:51

## 2020-10-13 RX ADMIN — SUCRALFATE 1 G: 1 TABLET ORAL at 05:56

## 2020-10-13 RX ADMIN — DICLOFENAC 4 G: 10 GEL TOPICAL at 20:25

## 2020-10-13 RX ADMIN — SENNOSIDES-DOCUSATE SODIUM TAB 8.6-50 MG 1 TABLET: 8.6-5 TAB at 20:16

## 2020-10-13 RX ADMIN — INSULIN LISPRO 6 UNITS: 100 INJECTION, SOLUTION INTRAVENOUS; SUBCUTANEOUS at 20:16

## 2020-10-13 RX ADMIN — ACETAMINOPHEN 650 MG: 325 TABLET ORAL at 20:16

## 2020-10-13 RX ADMIN — SENNOSIDES-DOCUSATE SODIUM TAB 8.6-50 MG 1 TABLET: 8.6-5 TAB at 07:51

## 2020-10-13 RX ADMIN — TAMSULOSIN HYDROCHLORIDE 0.4 MG: 0.4 CAPSULE ORAL at 20:16

## 2020-10-13 RX ADMIN — SUCRALFATE 1 G: 1 TABLET ORAL at 16:01

## 2020-10-13 RX ADMIN — PANTOPRAZOLE SODIUM 40 MG: 40 TABLET, DELAYED RELEASE ORAL at 05:56

## 2020-10-13 RX ADMIN — LOSARTAN POTASSIUM 12.5 MG: 25 TABLET, FILM COATED ORAL at 07:52

## 2020-10-13 RX ADMIN — CLOPIDOGREL BISULFATE 75 MG: 75 TABLET ORAL at 07:51

## 2020-10-13 RX ADMIN — ENOXAPARIN SODIUM 40 MG: 40 INJECTION SUBCUTANEOUS at 20:16

## 2020-10-13 RX ADMIN — DICLOFENAC 4 G: 10 GEL TOPICAL at 09:37

## 2020-10-13 RX ADMIN — ASPIRIN 81 MG: 81 TABLET, COATED ORAL at 07:51

## 2020-10-13 RX ADMIN — SUCRALFATE 1 G: 1 TABLET ORAL at 20:16

## 2020-10-13 RX ADMIN — ROSUVASTATIN CALCIUM 10 MG: 10 TABLET, FILM COATED ORAL at 07:51

## 2020-10-13 RX ADMIN — INSULIN GLARGINE 22 UNITS: 100 INJECTION, SOLUTION SUBCUTANEOUS at 20:16

## 2020-10-13 RX ADMIN — INSULIN LISPRO 3 UNITS: 100 INJECTION, SOLUTION INTRAVENOUS; SUBCUTANEOUS at 17:21

## 2020-10-13 RX ADMIN — DICLOFENAC 4 G: 10 GEL TOPICAL at 16:02

## 2020-10-13 RX ADMIN — PANTOPRAZOLE SODIUM 40 MG: 40 TABLET, DELAYED RELEASE ORAL at 16:01

## 2020-10-13 ASSESSMENT — PAIN DESCRIPTION - DESCRIPTORS: DESCRIPTORS: ACHING

## 2020-10-13 ASSESSMENT — PAIN DESCRIPTION - FREQUENCY: FREQUENCY: CONTINUOUS

## 2020-10-13 ASSESSMENT — PAIN SCALES - GENERAL
PAINLEVEL_OUTOF10: 0
PAINLEVEL_OUTOF10: 0
PAINLEVEL_OUTOF10: 3
PAINLEVEL_OUTOF10: 0

## 2020-10-13 ASSESSMENT — PAIN SCALES - WONG BAKER
WONGBAKER_NUMERICALRESPONSE: 0

## 2020-10-13 ASSESSMENT — PAIN DESCRIPTION - PAIN TYPE: TYPE: ACUTE PAIN

## 2020-10-13 ASSESSMENT — PAIN DESCRIPTION - ONSET: ONSET: ON-GOING

## 2020-10-13 ASSESSMENT — PAIN DESCRIPTION - PROGRESSION: CLINICAL_PROGRESSION: NOT CHANGED

## 2020-10-13 ASSESSMENT — PAIN DESCRIPTION - LOCATION: LOCATION: SHOULDER

## 2020-10-13 ASSESSMENT — PAIN DESCRIPTION - ORIENTATION: ORIENTATION: LEFT

## 2020-10-13 NOTE — PROGRESS NOTES
Speech Language Pathology  ACUTE REHAB UNIT  SPEECH/LANGUAGE PATHOLOGY      [x] Daily  [x] Weekly Care Conference Note  [] Discharge    Patient:Sylvester Duval      BVE:[birth year]  [facility]:[id number]  Rehab Dx/Hx: Right pontine stroke ([facility] [address] [I63.50]    Precautions: Dysphagia; Umm Hoover is native language; Potential cognitive-communication deficitgs  Home situation: Lives with family  ST Dx: [] Aphasia  [x] Dysarthria  [] Apraxia   [x] Oropharyngeal dysphagia [x] Cognitive   Impairment  [] Other:   Initial Speech Therapy Assessment Diagnosis:   Per INitial Evaluations 9/26/2020:   1. Cognitive Diagnosis: Pt presents with impaired memory, orientation,  and problem solving for basic information. Grandson reports this is typical for pt. Per acute notes, pt's son also confirmed this is pt's baseline. 2. Aphasia Diagnosis: Further evaluation needed to clarify. 3. Speech Diagnosis: Further evaluation needed to clarify. 4. Communication Diagnosis: Pt requires and  to communicate. Pt appears to often have difficulty expressing himself accurately and with detail for typical conversation. 5. Dysphagia Diagnosis: Mild pharyngeal stage dysphagia;Mild to moderate oral stage dysphagia   Pt presents with no overt signs of aspiration or penetration. Swallow appears effortful at times and pt reports some difficulty. Oral skills for solids appear impaired with slow posterior propulsion and trace oral residue. Pt at risk for adequate intake due to his c/o intermittent nausea, prefers only warm food and drinks, and the food provided is not what pt is familiar with.   Date of Admit: 9/25/2020  Room #: J1F-5436/3260-01  Date: 10/13/2020       Current functional status (updated daily):         Current Diet Order:DIET GENERAL; Carb Control: 4 carb choices (60 gms)/meal; Vegetarian (Lacto-Ovo)  Dietary Nutrition Supplements: Clear Liquid Oral Supplement   Behavior: [x] Alert  [] Cooperative  [x]  Pleasant  [x] Confused  [] Agitated  [] Uncooperative  [] Distractible [] Motivated  [] Self-Limiting [] Anxious  [x] Other:  phone utilized. Endurance:  [x] Adequate for participation in SLP sessions  [] Reduced overall  [] Lethargic  [] Other:  Safety: [] No concerns at this time  [] Reduced insight into deficits  []  Reduced safety awareness [] Not following call light procedures  [] Unable to Assess  [] Other:  Swallowing Precautions: 90 degree positioning with all p.o. intake; small bites/sips; alternate textures through meal; reduce rate of intake;     Barriers toward pt/family plan for progress toward d/c plan: Medical status and caregiver support may be barriers           Date: 10/13/2020      Tx session 1 Tx session 2   Individual tx minuetes SLP individual Minutes 45 minutes  Time In: 1430  Time out 1515  Coded treatment minutes: 25 minutes n/a        Group Treatment Minutes 0 0   Co-Treat Minutes 0 0   Variance/Reason:  n/a    Pain denied    Pain Intervention [] RN notified  [] Repositioned  [] Intervention offered and patient declined  [] N/A  [] Other: - RN notified  - Repositioned  - Intervention offered and patient declined  - N/A  - Other:   Subjective     Pt seen in the treatment office   And then in pt room  In person  present  Grandson present  Good effort in therapy      Objective:  Goals       Dysphagia Goals:   1. Upgraded goal  The patient will tolerate regular food consistency with thin liquid diet without observed clinical signs of aspiration,          Isolated thin liquids (warm temp): no anterior loss; no overt clinical s/s post swallow    Regular dry/sticky food consistency: pt impulsive with self feeding;and taking large bites with an episode of coughing/choking. · Isolated bites / small bites better tolerated     n/a   2.  Upgraded goal  The patient will improve oral motor function via therapeutic oral motor exercises to 15/15 each trial. Left facial droop and weakness    -resistance ex with engagement of left labial /buccal musculature engagement : x 12 reps  -volitional rom for  · lip retraction mild tomoderate  · lip protrusion moderate  ·  lip rounding moderate   -paired labial/buccal rom ex: pt maintaining mild to moderate rom for 10-12 reps    PO presentations:   -pt is achieving adequate labial seal for cup presentations  -pt is achieving labial closure during mastication with exception when overfilling mouth ; left anterior loss.    n/a     Cognitive-Communication goals       Goal 1:Modified goal   The pt will demonstrate improved recall of daily events; learned strategies with set up; structure and <max cues Temporal orientation/spatial orientation questions:   -Pt oriented to self ; place and partially to daily therapies by activities    Working memory for rom of ex targeted oral motor rom:  10 to 15  reps: set up and minimal cues for sustained ex ; increase cues when completing 2 step rom ex   n/a     Goal 2: Upgraded goal  The pt will demonstrate improved processing for short concrete questions regarding basic DL and / or to follow 1 step commands with max visual demonstrations;and with <moderate tactile cues   1 step commands for task instructions:  Set up and conditioning to each task  · Pt needed extra time for making transitions between tasks    [place] Wh? (topics included: likes/dislikes; daily therapy excercises and activities; regarding pictured stimuli and regarding high frequency ADLs: continues to require 0 to mild   repetition/clarification required     n/a   Goal 3: Upgraded goal  Pt will convey basic needs/wants via verbal/pointing/gestures >70% and provide concept/event explanations >50%   [place] concept/event explanation:    · Verbal explanation of concept / event: o to mild clarification across various topics  ·  verbal directions of activity which is known to pt: set up and mild to max expansion cues  · Pt is conveying needs / wants like bathroom again today when this SLP returned pt to room    CFN (>25 high frequency colored photos of DL items and places and body parts): last targeted 10/12/2020 n/a   Other areas targeted:   Oral Motor Speech  · Goal appears met. Family reporting speech and communication has improved as compared to 1-2 weeks ago. Mark Culver continues to report known history of memory problems . Grandson reported pt has history of Alzheimer's memory    Education:   Pt ed ongoing    Safety Devices: [] Call light within reach  [] Chair alarm activated  [] Bed alarm activated  [x] Other: SLP returned pt to room; alerted RN for bathroom assist as pt is verbalizing need for the bathroom - Call light within reach  - Chair alarm activated  - Bed alarm activated  - Other:    Progress Assessment: 9/28/2020:  phone utilized. Difficult to assess as pt did not always respond to questions/commands; and verbal responses were not always intelligible. SLP completed a chart review; chart review does report history of cognitive decline in memory/orientation/PS. Pt reportedly lives with family (multi generations); needed assist with ADLs and homemaking; pt receives 24 hour supervision. 9/29/2020: Son voiced concern regarding communication changes in that pt is not able to use  phone; and has difficulty understanding what family say. SLP ed in use of context of an activity and visual cues/gestures to assist. Activities incorporating family; basic DL activities most optimal.  Will upgrade diet to soft/bite size food with thin liquids; oral care post meals. Pt/son ed in examples of food consistencies on soft/bite size food consistencies; and oral care. Toothettes placed in the room and SLP ed pt and family in uses.    9/30/2020 SLP discussed with RN (RN actually in room when addressing pt complaints regarding discomfort in current recliner) ; and  SLP discussed with team (at team meeting ) regarding pt height anc concern regarding current recliner in room (ie pt's feet dangle; pt complaints cannot get comfortable etc)  10/1/2020; 10/20/2020 :   present for session. 10/6/2020: Request diet upgrade to regular consistency  10/13/2020: Family ed with grandson who reports speech and swallowing has improved. Grandson reports pt has a history of memmory deficits and Alzheimer. SLP relayed event in therapy session and recommended continue to monitor and encourage moist items; small bites/sips; reducing rate of intake   Plan: Continue as per plan of care. Continued Tx Upon Discharge: ?    - Yes - No - TBD based on progress while on ARU - Vital Stim indicated - Other:   Estimated discharge date: TBD   Discharge recommendations:   - Home independently  - Home with assistance -  24 hour supervision  - ECF - Other:     Additional information:     Interventions used during Rehab Stay:  [] Speech/Language Treatment  [] Instruction in HEP [] Group [x] Dysphagia Treatment [] Cognitive Treatment   [x] Other:     Adverse Reactions to Treatment/Significant Change in Status: n/a      Electronically Signed by    Yosef Avalos. Prakash,MS,CCC,SLP [id number]  Speech and Language Pathologist

## 2020-10-13 NOTE — PROGRESS NOTES
Patient admitted to rehab with CVA. A/A/O x1. Speaks Portuguese and requires an  to communicate. Transfers with stedy x1. On carb control diet, no milk, eggs, or cold drinks. Medications taken whole in applesauce. On Lovenox for DVT prophylaxis. Skin warm, dry. Scattered bruising. On room air. Has been continent of bowel and bladder. LBM 10/12. Chair/bed alarms in use and call light in reach. Tele and NICOLE cam in use. Will monitor for safety.

## 2020-10-13 NOTE — CARE COORDINATION
Substance Use Topics    Alcohol use: No    Drug use: No       ALLERGIES    Allergies   Allergen Reactions    Atorvastatin Other (See Comments)     Myalgia -- stopped 11/2019 to see if pain improves. Possible PMR diagnosis as well. Will follow-up in 3 mo.        MEDICATIONS     insulin glargine  22 Units Subcutaneous Nightly    lidocaine  1 patch Transdermal Daily    insulin lispro  10 Units Subcutaneous Daily with breakfast    diclofenac sodium  4 g Topical TID    insulin lispro  0-18 Units Subcutaneous TID WC    insulin lispro  0-9 Units Subcutaneous Nightly    losartan  12.5 mg Oral Daily    aspirin  81 mg Oral Daily    Or    aspirin  300 mg Rectal Daily    enoxaparin  40 mg Subcutaneous Nightly    sennosides-docusate sodium  1 tablet Oral BID    clopidogrel  75 mg Oral Daily    DULoxetine  30 mg Oral Daily    pantoprazole  40 mg Oral BID AC    rosuvastatin  10 mg Oral Daily    sucralfate  1 g Oral 3 times per day    tamsulosin  0.4 mg Oral Nightly       Objective        Patient Active Problem List   Diagnosis Code    Chest pain R07.9    Hypertension I10    Hyperlipidemia E78.5    Diabetes mellitus (HCC) E11.9    Unexplained weight loss R63.4    Esophagitis K20.90    Granulomatous adenopathy R59.9    Mediastinal lymphadenopathy R59.0    History of arterial ischemic stroke Z86.73    Abdominal pain R10.9    Acute cerebrovascular accident ([address] I63.9    Right pontine stroke (HCC) I63.50        BP (!) 152/97   Pulse 87   Temp 97.5 °F (36.4 °C) (Oral)   Resp 16   Ht 5' 3\" (1.6 m)   Wt 128 lb 8.5 oz (58.3 kg)   SpO2 97%   BMI 22.77 kg/m²     HgBA1c:    Lab Results   Component Value Date    LABA1C 9.8 09/23/2020       Recent Labs     10/12/20  1628 10/12/20  2031 10/13/20  0701 10/13/20  1132   POCGLU 82 302* 85 124*       BUN/Creatinine:    Lab Results   Component Value Date    BUN 15 10/12/2020    CREATININE 1.3 10/12/2020       Assessment        Diabetes Management and

## 2020-10-13 NOTE — PLAN OF CARE
Problem: Falls - Risk of:  Goal: Will remain free from falls  Description: Will remain free from falls  10/13/2020 0808 by Aletha Knapp RN  Outcome: Ongoing     Problem: Daily Care:  Goal: Daily care needs are met  Description: Daily care needs are met  10/13/2020 0808 by Aletha Knapp RN  Outcome: Ongoing     Problem: Pain:  Goal: Patient's pain/discomfort is manageable  Description: Patient's pain/discomfort is manageable  10/13/2020 0808 by Aletha Knapp RN  Outcome: Ongoing     Problem: HEMODYNAMIC STATUS  Goal: Patient has stable vital signs and fluid balance  10/13/2020 0808 by Aletha Knapp RN  Outcome: Ongoing     Problem: ACTIVITY INTOLERANCE/IMPAIRED MOBILITY  Goal: Mobility/activity is maintained at optimum level for patient  10/13/2020 0808 by Aletha Knapp RN  Outcome: Ongoing     Problem: COMMUNICATION IMPAIRMENT  Goal: Ability to express needs and understand communication  10/13/2020 0808 by Aletha Knapp RN  Outcome: Ongoing     Problem: Serum Glucose Level - Abnormal:  Goal: Ability to maintain appropriate glucose levels will improve  Description: Ability to maintain appropriate glucose levels will improve  10/13/2020 0808 by Aletha Knapp RN  Outcome: Ongoing

## 2020-10-13 NOTE — PATIENT CARE CONFERENCE
[facility]  Inpatient Rehabilitation  Weekly Team Conference Note      Date: 10/13/2020  Patient Name:  Sharon West    MRN: [id number]  : 1945  Gender: male  Physician: Dr Cheo Rdz  Diagnosis: Right pontine stroke [facility]) [I63.50]    CASE MANAGEMENT  Assessment:   Goal is home. PHYSICAL THERAPY    Bed mobility  Bridging: Unable to assess  Rolling to Left: Stand by assistance(pt able to use RUE and RLE to help him roll over on L side)  Rolling to Right: Minimal assistance(He requires assistance to bring left UE over body)  Supine to Sit: Contact guard assistance(required assistance bringing LE off EoB, but able to push up with UE into upright.)  Sit to Supine: Minimal assistance(PT attempted to educate patient to use right LE to assist left LE, but patient has difficulty sequencing this while laying back onto the bed.)  Scooting: Stand by assistance(cues to use LEs and UEs)  Comment: bed mobility done on flat therapy mat    Transfers:  Sit to Stand: Contact guard assistance  Stand to sit: Contact guard assistance  Bed to Chair: Minimal assistance(Needs assistance to shift hips and facilitate turning. Also requires cuing for hand placement)  Comment: cues for hand placement and sequencing of transfer. Pt's grandson educated that it may be easier to have patient grab onto caregiver's arm for support instead of door frame. Pt grabs onto door frame, which prevents performance of transfer. This will allow him to transfer from chair to car more efficiently without having to cue patient to adjust hand placement. Pt's grandson verbalized understanding. Ambulation 1  Surface: level tile  Device: Hemiwalker  Other Apparatus: Wheelchair follow(L arm sling)  Assistance: Minimal assistance  Quality of Gait: Patient demonstrates good sequencing but still requires tactile and verbal cueing for correct sequence. Able to advance advance L LE and to fully extend in stance phase with less cuing. He was able to clear toes of left foot and able to extend knee, but required facilitation and assistance to maintain hip and knee extension as fatigue increased. Pt demonstrates improved hip extension and upright posture despite cuing. No LoBs, but when patient fatigues he assumes forward flexed posture and knees begin to buckle. Improved weight shift onto B LE when advancing to swing LE, and improve hip and knee flexion on LLE. Improved control of L hip adduction  Gait Deviations: Slow Janeth, Decreased step length, Decreased step height  Distance: 20' with no turns  Comments: Pt able to communicate the word \"walk\" this AM to state that he wanted to ambulate    Propulsion: Manual  Level: Level Tile  Method: RUE, RLE  Level of Assistance: Minimal assistance, Stand by assistance(Laure with tactile cuing when patient has dificulty progressing)  Description/ Details: Patient continues to have difficulty with use of right LE to assist with path navigation and required frequent tactile and verbal cueing to use RLE and RUE together. Decreased speed. Unable to maintain straight path and frequently propels towards hallway walls and obstacles. Cannot simultaneously steer and propel wheel at the same time. He was unable to maintain a straight path this date. Therapist demonstrated how to turn in w/c using only RLE, but was not able to replicate despite tactile cuing. Pt was not able to turn by himself using RUE and RLE. Pt not able to propel over therapy gym threshold without assistance. On this visit pt did not have tray for L UE in place and continually leaned forward in chair, which put patient in bad position for propulsion. Pt continued to assume this position despite frequent cues to sit back in chair. Pt complained of RUE pain/fatigue when turning in hallway.   He was asked if wanted to rest, but pt continued to propel in w/c as he wanted to continue  Distance: 189 with 3 turns      Stairs  # Steps : 4  Stairs Height: 6\"  Rails: Bilateral(right railing ascending and descending)  Curbs: (unsafe at this time)  Device: No Device(L arm sling)  Assistance: Minimal assistance, Contact guard assistance  Comment: Patient ascended and descended 4 step with right hand on railing and left UE supported in sling. Patient demonstrated weight shifting onto right LE to allow for advancement of left LE and was able to clear toes of left foot with no assistance. Pt performed L hip and knee extension when ascending with minimal verbal and tactile cues, however requires more cuing and facilitation when descending. He requires moderate tactile cues and verbal cues for sequence and safety. Pt descended first step with wrong LE. Patient with improved upright posture, but began to flex at his hips and rotate trunk as fatigue increased with descending steps. Pt ascends/descends with non-reciprocal pattern and needs cuing on starting leg for ascension and descension. On top step, pt encountered one significant LoB when pivoting on R LE that required Laure from therapist to correct. Car Transfer: Minimal Assistance(To L  side. Pt unstable and cuing to pivot on R and for hand placement. Assistance to shift hips and facilitate pivot)      Assessment: Pt tolerated AM PT session well. He continues to be motivated and works hard with therapy. Pt demonstrated improved ability on stairs. Was able to control LLE when ascending/descending stairs and achieved sufficient foot/toe clearance without assistance. Occassionally required tactile and verbal cues to extend at hip and knees. W/c mobility continues to be increasingly difficult for patient secondary to inability to combine turning of wheel while steering with RLE. Pt also had difficulty when being instructed how to turn using RLE only, and could not demonstrate understanding. Pt's grandson was educated that pt should be sent home with w/c for ease of transport and safety. Pt's grandson verbalized agreement. Selena Buenrostro, states that he and his family will pressure patient to continue in therapy after discharge. Pt perseverates at points when communicating with . Pt would continue to benefit from skilled PT to strengthen muscles, increase safety awareness, and improve functional mobility for safe transition to home. SPEECH THERAPY (intentionally left blank if not actively being seen by this service):  Assessment/ Progress: SLP sessions have been conducted with  (either via phone or in person). Pt has demonstrated improvement in oral / pharyngeal phases of the swallow that diet was upgraded to regular consistency as desired. Pt with improving left oral motor rom/strength/coordination and endurance for oral phase of the swallow with less oral pocketing. Pt does have to be careful with dry/solid foods as he can be impulsive and overfill mouth which can result in risks. FAmily ed completed 10/13/2020 with grandson. Pt is also demonstrating improvement in verbal expression both in structured tasks and in initiation of basic needs/wants. Pt is also demonstrating gradual improvement in repeated tasks session to session with less tactile cues and less re-direct within the task. This SLP did f/u with family member 10/13/2020 who also reported improvement in speech with family members. Family member reports history of memory problems as he explained pt has \"Alzheimers memory problems for some time\". Anticipate discussion of SLP discontinuing skilled therapy atthis time. If status changes please re-order. Ese Randall,MS,CCC,SLP [id number]  Speech and Language Pathologist      OCCUPATIONAL THERAPY  ADL  Feeding: Setup  Grooming: Setup(all seated at sink, cues for one-handed techniques for oral care.  pt shaved face without assist)  UE Bathing: Minimal assistance, Setup(pt attempting to actively use L UE to assist but is unable to grasp washcloth, so hand over hand provided to bathe R underarm)  LE Bathing: Stand by assistance, Verbal cueing, Setup(cues to use LH sponge for feet)  UE Dressing: Verbal cueing, Increased time to complete, Setup, Moderate assistance(doffed tshirt per self. cues for alondra technique and assist to thread L UE)  LE Dressing: Maximum assistance, Setup, Verbal cueing(max cues for alondra technique and pt not following despite  repitition, so required assist to thread boxers/pants over LLE. assist to pull up over hips in standing. assist for both socks and shoes)  Toileting: (toileting with nursing prior to session)  Additional Comments: wet towel for non skid surface in shower. pt attempting to actively use L UE to assist but unable to grasp washcloth, tolerated hand over hand assist well    Bed mobility  Bridging: Unable to assess  Rolling to Left: Stand by assistance(pt able to use RUE and RLE to help him roll over on L side)  Rolling to Right: Minimal assistance(He requires assistance to bring left UE over body)  Supine to Sit: Contact guard assistance(required assistance bringing LE off EoB, but able to push up with UE into upright.)  Sit to Supine: Minimal assistance(PT attempted to educate patient to use right LE to assist left LE, but patient has difficulty sequencing this while laying back onto the bed.)  Scooting: Stand by assistance(cues to use LEs and UEs)  Comment: bed mobility done on flat therapy mat    Functional Transfers: Toilet Transfers  Toilet - Technique: Stand pivot  Equipment Used: Standard bedside commode  Toilet Transfer: Contact guard assistance, Minimal assistance  Toilet Transfers Comments: completed stand pivot from EOB <> BSC x2 trials with amarilys assisting with 2nd transfer. consistently CGA to the right and min A to the left with mod/max verbal and tactile cues for hand placement  Tub Transfers  Tub - Transfer From: Wheelchair  Tub - Transfer Type: To and From  Tub - Transfer To:  Transfer tub bench  Tub - Technique: Stand pivot  Tub Transfers: Minimal assistance  Tub Transfers Comments: Completed transfer in both directions with min assist.  Completed dry simulated transfer. Shower Transfers  Shower - Transfer From: Wheelchair  Shower - Transfer Type: To and From  Shower - Transfer To: Transfer tub bench  Shower - Technique: Stand pivot  Shower Transfers: Minimal assistance, Moderate assistance  Shower Transfers Comments: min A from w/c > TTB using grab bar. mod A from TTB > w/c d/t pt's LEs buckled and required max cues to sit back on TTB for safety when exiting shower    Perception  Overall Perceptual Status: Impaired  Unilateral Attention: Cues to attend to left side of body, Cues to maintain midline in standing, Cues to maintain midline in sitting(appears to be apraxic)       UE Function:  R UE: WFL  L UE: pt actively engaging to assist with positioning, stabilizing    Assessment: Pt tolerated tx well. Pt completed fxl transfers grossly min A but required mod A at times d/t decreased safety and decreased command following. Pt completed shower with min A for UB bathing, SBA for LB bathing but with cues for sequencing tasks and recalling alondra techniques. Pt required mod A for UB dressing and max A for LB dressing. Pt had significant difficulty following commands for alondra techniques and more impulsive this date as pt trying very hard to complete tasks without assist. Pt completed grooming with setup. Cont per OT POC      NUTRITION  Most recent weightWeight: 128 lb 8.5 oz (58.3 kg)  BSA (Calculated - sq m): 1.6 sq meters  BMI (Calculated): 22.8   Diet Order: DIET GENERAL; Carb Control: 4 carb choices (60 gms)/meal; Vegetarian (Lacto-Ovo)  Dietary Nutrition Supplements: Clear Liquid Oral Supplement  PO Meals Eaten (%): 76 - 100%  Please see nutrition note for details. NURSING  Continent of bladder and bowel. Monitor and maintain skin integrity.    Family Education: Patient education: CVA, communication needs, burden  3. Tolerate diet without complications          Team Members Present at Conference:  Physician: Dr Jeanette Garcia  : [place], [place]   Occupational Therapist: Lucie Shipman, OTR/L 21   Physical Therapist: GENE Mccabe/Gracia Tellez, [id number]   Speech Therapist: Scout Ken. Prakash,MS,CCC,SLP [id number]  Speech and Language Pathologist  Nurse: Kimmy Tinoco RN, CRRN  Dietician: Karla Rosas RD,LD   Charlie Johnson, MPT :      I led this team conference and I approve the established interdisciplinary plan of care as documented within the medical record of Rodrigo Eldridge MD: Swapnil Shah.  Jeanette Garcia MD 10/14/2020, 10:36 AM

## 2020-10-13 NOTE — PROGRESS NOTES
Department of Physical Medicine & Rehabilitation  Progress Note    Patient Identification:  Nati Hernandez  [id number]  : 1945  Admit date: 2020    Chief Complaint: Right pontine stroke [facility])    Subjective:   No acute events overnight. Patient seen this afternoon sitting up in gym. Asks about left arm/hand weakness. Provided education on stroke recovery timeline and functional prognosis. Grandson assisting with translation today. ROS: No f/c, n/v, cp     Objective:  Patient Vitals for the past 24 hrs:   BP Temp Temp src Pulse Resp SpO2 Weight   10/13/20 0545 (!) 152/97 97.5 °F (36.4 °C) Oral 87 16 -- 128 lb 8.5 oz (58.3 kg)   10/13/20 0009 (!) 141/87 97.8 °F (36.6 °C) Oral 80 16 97 % --   10/12/20 2035 (!) 147/84 97.9 °F (36.6 °C) Oral 80 18 94 % --   10/12/20 1554 (!) 158/88 98.2 °F (36.8 °C) Oral 83 17 96 % --   10/12/20 1227 138/86 98 °F (36.7 °C) Oral 83 18 97 % --     Const: Alert. No distress, pleasant. HEENT: Normocephalic, atraumatic. Normal sclera/conjunctiva. MMM. CV: Regular rate and rhythm. Resp: No respiratory distress. Lungs CTAB. Abd: Soft, nontender, nondistended, NABS+   Ext: No edema. Neuro: Alert, evidence of mild confusion (limited by language barrier), speech dysarthric, left hemiparesis (1-3/5)  Psych: Cooperative, appropriate mood and affect    Laboratory data: Available via EMR.    Last 24 hour lab  Recent Results (from the past 24 hour(s))   POCT Glucose    Collection Time: 10/12/20 11:27 AM   Result Value Ref Range    POC Glucose 323 (H) 70 - 99 mg/dl    Performed on ACCU-CHEK    POCT Glucose    Collection Time: 10/12/20  4:28 PM   Result Value Ref Range    POC Glucose 82 70 - 99 mg/dl    Performed on ACCU-CHEK    POCT Glucose    Collection Time: 10/12/20  8:31 PM   Result Value Ref Range    POC Glucose 302 (H) 70 - 99 mg/dl    Performed on ACCU-CHEK    POCT Glucose    Collection Time: 10/13/20  7:01 AM   Result Value Ref Range    POC Glucose 85 70 - 99 mg/dl Performed on ACCU-CHEK        Therapy progress:  PT  Position Activity Restriction  Other position/activity restrictions: L sided weakness; speaks [place] - uses  phone, family, and also in person interpretors  Objective     Sit to Stand: Contact guard assistance  Stand to sit: Contact guard assistance  Bed to Chair: Minimal assistance(Needs assistance to shift hips and facilitate turning. Also requires cuing for hand placement)  Device: Hemiwalker  Other Apparatus: Wheelchair follow(L arm sling)  Assistance: Minimal assistance  Distance: 15' with no turns  OT  PT Equipment Recommendations  Equipment Needed: Yes  Mobility Devices: Wheelchair  Wheelchair: Standard  Other: standard wheelchair with standard cushion, standard leg rests, removable arm rests  Toilet - Technique: Stand pivot  Equipment Used: Grab bars  Toilet Transfers Comments: cues for safety, pt attempting to sit too early  Assessment        SLP  Diet Solids Recommendation: Dysphagia Minced and Moist (Dysphagia II)  Liquid Consistency Recommendation: Thin    Body mass index is 22.77 kg/m².     Assessment and Plan:    Impairments: left hemiparesis, decreased coordination, balance, endurance, cognition, dysarthria, dysphagia     Acute ischemic stroke  -Localization: Right son, posterior centrum semiolave, left frontal lobe deep white matter  -Etiology: small vessel vs embolic  -Telemetry in ARU, then event monitor at discharge  -Secondary ppx with DAPT x 3 weeks (then Plavix alone), statin, BP and glucose control  -PT/OT/SLP    Spasticity  -Emerging tone in LUE  -Monitor, consider medication     H/o left PCA stroke with severe stenosis  -Secondary ppx as above     Dysphagia   -Dietitian and SLP consults.   -Upgrade to regular + thins     HTN, uncontrolled  -Now improved on losartan (dose decreased to avoid hypotension)      DM2, uncontrolled  -Lantus (decrease to 22 qhs), added 10 units prandial with breakfast, continue high dose ISS  -Diabetes

## 2020-10-13 NOTE — PROGRESS NOTES
noted  Exam: as above  Clinical Presentation: evolving  PT Education: General Safety;Transfer Training;Gait Training;Functional Mobility Training;Equipment;Weight-bearing Education  Barriers to Learning: language  REQUIRES PT FOLLOW UP: Yes  Activity Tolerance  Activity Tolerance: Patient limited by fatigue;Patient limited by endurance  Activity Tolerance: Patient's legs begin to buckle as he fatigues and requests to sit down     Patient Diagnosis(es): There were no encounter diagnoses. has a past medical history of Cerebral artery occlusion with cerebral infarction ([facility] [address] Diabetes mellitus ([facility] [address] Gallstone, GERD (gastroesophageal reflux disease), Hyperlipidemia, Hypertension, and Renal insufficiency. has a past surgical history that includes Appendectomy; Cholecystectomy; Upper gastrointestinal endoscopy (06/08/2018); Upper gastrointestinal endoscopy (N/A, 2/28/2019); Upper gastrointestinal endoscopy (N/A, 4/23/2019); Upper gastrointestinal endoscopy (N/A, 4/23/2019); Upper gastrointestinal endoscopy (N/A, 4/29/2020); and Colonoscopy (N/A, 5/8/2020). Restrictions  Restrictions/Precautions  Restrictions/Precautions: Fall Risk  Required Braces or Orthoses?: No  Position Activity Restriction  Other position/activity restrictions: L sided weakness; speaks Napali - uses  phone, family, and also in person interpretors  Subjective   General  Chart Reviewed: Yes  Additional Pertinent Hx: Per Dr. Marquez Lizama , \"The patient is a 76 y.o. male who presents to [facility] with unsteady on feet. Pt speaks Albanian, son at bedside helping getting history. According to son/pt, pt apparently sustained a fall yesterday and felt like he couldn't move his left side. Today he had difficulty ambulating using his lt side and hence son brought pt to the ER\"  Diagnosed with CVA with L sided weakness. Response To Previous Treatment: Patient with no complaints from previous session.   Family / Caregiver Present: Yes(Pt's grandsonBrenda,  present)  Referring Practitioner: Rodger Medina  Subjective  Subjective: Pt met in therapy gym sitting in w/c with grandson present and translating. Pt states that he is fine. Pt complains of pain down L side of torso. Pt's shirt exposed belly that is brusied. Pt is agreeable to PT this AM.  General Comment  Comments: Pt's grandfayeBrenda, present and translating to patient    Objective   Transfers  Sit to Stand: Contact guard assistance  Stand to sit: Contact guard assistance  Stand Pivot Transfers: Minimal Assistance(To L and R side. Pt unstable and cuing to pivot on R and for hand placement. Assistance to shift hips and facilitate pivot)  Car Transfer: Minimal Assistance(To L  side. Pt unstable and cuing to pivot on R and for hand placement. Assistance to shift hips and facilitate pivot)  Comment: cues for hand placement and sequencing of transfer. Pt's grandson educated that it may be easier to have patient grab onto caregiver's arm for support instead of door frame. Pt grabs onto door frame, which prevents performance of transfer. This will allow him to transfer from chair to car more efficiently without having to cue patient to adjust hand placement. Pt's grandson verbalized understanding. Stairs/Curb  Stairs?: Yes  Stairs  # Steps : 4  Stairs Height: 6\"  Rails: Bilateral(right railing ascending and descending)  Curbs: (unsafe at this time)  Device: No Device(L arm sling)  Assistance: Minimal assistance;Contact guard assistance  Comment: Patient ascended and descended 4 step with right hand on railing and left UE supported in sling. Patient demonstrated weight shifting onto right LE to allow for advancement of left LE and was able to clear toes of left foot with no assistance. Pt performed L hip and knee extension when ascending with minimal verbal and tactile cues, however requires more cuing and facilitation when descending.   He requires moderate tactile cues and verbal cues for sequence and safety. Pt descended first step with wrong LE. Patient with improved upright posture, but began to flex at his hips and rotate trunk as fatigue increased with descending steps. Pt ascends/descends with non-reciprocal pattern and needs cuing on starting leg for ascension and descension. On top step, pt encountered one significant LoB when pivoting on R LE that required Laure from therapist to correct. Wheelchair Activities  Wheelchair Size: 18\"  Wheelchair Type: Standard  Wheelchair Cushion: (waffle cushion)  Pressure Relief Type: Push up  Level of Assistance for pressure relief activities: Minimal assistance(pt requires cuing and assistance to scoot forward and back in chair)  Wheelchair Parts Management: No  Propulsion: Yes  Propulsion 1  Propulsion: Manual  Level: Level Tile  Curbs: (therapy gym threshold)  Method: RUE;RLE  Level of Assistance: Minimal assistance;Stand by assistance(Laure with tactile cuing when patient has dificulty progressing)  Description/ Details: Patient continues to have difficulty with use of right LE to assist with path navigation and required frequent tactile and verbal cueing to use RLE and RUE together. Decreased speed. Unable to maintain straight path and frequently propels towards hallway walls and obstacles. Cannot simultaneously steer and propel wheel at the same time. He was unable to maintain a straight path this date. Therapist demonstrated how to turn in w/c using only RLE, but was not able to replicate despite tactile cuing. Pt was not able to turn by himself using RUE and RLE. Pt not able to propel over therapy gym threshold without assistance. On this visit pt did not have tray for L UE in place and continually leaned forward in chair, which put patient in bad position for propulsion. Pt continued to assume this position despite frequent cues to sit back in chair. Pt complained of RUE pain/fatigue when turning in hallway.   He was asked if wanted to rest, but pt continued to propel in w/c as he wanted to continue  Distance: 189 with 3 turns     PM session:   S: Pt was met in his room sitting in w/c. His grandson, Dash Mesa, present in the room and available for translation this afternoon, but  did arrive later in session. He states that he feels fine, but is tired this AM.  Pt is agreeable to PT this afternoon. O: Pt was wheeled to ADL room to perform bed mobility in ADL bed. HoB flat with two pillows. Pt sit<>stand with CGA throughout. Pt performed stand-pivot transfer with Laure. Pt entered bed from R side. Pt sit>supine with Laure with moderate cues for hand placement and sequencing. Pt scooted in bed in short bouts, and required multiple attempts to get in center of bed. Unable to bridge. Pt was able to roll L with SBA, but needed Laure to roll R.  Pt supine>sit via log roll technique with SBA, but Laure to get LEs off of bed. Stand pivot from EoB to w/c with SBA and cues for hand placement and sequencing. Pt does not reach back for arm rest of w/c despite regular cuing. Pt ambulated 18' using hemiwallker and CGA-Laure. W/c follow used. Pt with improved ability to extend L hip and knee. Pt continues to demonstrate excessive hip adduction with stepping on L LE, and requires adjustment from therapist to correct. Pt is also impulsive and will take a step before having good Juhi. Pt experienced 2 mild LoBs that required Laure to correct. Pt step length has improved this visit with good foot clearance of LLE. He required max cuing on sequencing of hemiwalker and LE movement. Pt requested to sit down secondary to fatigue. Pt sat with CGA and cues for hand placement. Pt given seated rest break in w/c. Pt ambulated another 20' after seated rest break with hemiwalker and CGA- Laure. W/c follow used. Pt demonstrated improved ability to prevent excessive hip adduction when stepping on LLE.   Pt was also taking improved steps with increased hip and knee flexion. He requires max cuing on sequencing. Pt knees began to buckle at end of walk and pt assumed forward flexed posture. Pt was asked if he wanted to sit down and he confirmed. Pt sat down with CGA and cues for hand placement   A: Pt tolerated treatment well this afternoon. He was able to ambulate with improved mechanics and does better taking cues and feedback when upright. Pt has difficulty using L LE and L UE to assist with bed mobility and transfers. Pt would continue to benefit from skilled PT to increase activity tolerance, enhance safety awareness, and improve gait mechanics for functional mobility. Safety Device - Type of devices:  [x]  All fall risk precautions in place [] Bed alarm in place  [] Call light within reach [] Chair alarm in place [] Positioning belt [x] Gait belt [x] Patient at risk for falls [] Left in bed [x] Left in chair [] Telesitter in use [] Sitter present [] Nurse notified []  None       Goals  Short term goals  Time Frame for Short term goals: 2 weeks  Short term goal 1: bed mobility SBA  Short term goal 2: transfers CGA  Short term goal 3: amb x 20 ft with RW and Min A x 1  Short term goal 4: tolerate LE Ther ex for increased LE strength. Short term goal 5: curb step min A  Long term goals  Time Frame for Long term goals : 3-4 weeks  Long term goal 1: bed mobility SBA  Long term goal 2: transfers SBA  Long term goal 3: car transfer CGA  Long term goal 4: amb 48' with RW vs hemiwalker CGA to SBA  Long term goal 5: 4 steps B rails CGA  Patient Goals   Patient goals : pt's/family goal is some rehab before returning home.     Plan    Plan  Times per week: 5 to 6  Times per day: Twice a day  Plan weeks: 3 to 4  Current Treatment Recommendations: Functional Mobility Training, Balance Training, Endurance Training, ROM, Cognitive/Perceptual Training, Transfer Training, Gait Training, Stair training, IADL Training, Neuromuscular Re-education, Wheelchair Mobility

## 2020-10-13 NOTE — PROGRESS NOTES
Patient admitted to rehab with CVA. A/A/O x 1. Patient speaks Rajinder Amado and requires  to communicate. [facility]. On carb control diet (no eggs or meat), tolerating well. Medications taken whole in applesauce. On Lovenox for DVT prophylaxis. Skin intact with scattered bruising. On room air. Has been continent of bowel and bladder. LBM 10/12/2020. Chair/bed alarms in use and call light in reach. Telemetry and NICOLE cam in use for patient's safety.

## 2020-10-13 NOTE — PROGRESS NOTES
Occupational Therapy  Facility/Department: [address] REHAB  Daily Treatment Note  NAME: Kraig Gray  : 1945  MRN: [id number]    Date of Service: 10/13/2020    Discharge Recommendations:  24 hour supervision or assist, Home with Home health OT, Continue to assess pending progress  OT Equipment Recommendations  Equipment Needed: Yes  Mobility Devices: ADL Assistive Devices  ADL Assistive Devices: Transfer Tub Bench; Toileting - 3-in-1 Commode    Assessment   Performance deficits / Impairments: Decreased functional mobility ; Decreased ADL status; Decreased ROM; Decreased strength;Decreased endurance;Decreased balance;Decreased coordination;Decreased safe awareness;Decreased cognition;Decreased sensation;Decreased posture  Assessment: Pt tolerated tx well. Pt completed fxl transfers grossly min A but required mod A at times d/t decreased safety and decreased command following. Pt completed grooming with setup. Pt completed shower with min A for UB bathing, SBA for LB bathing but with cues for sequencing tasks and recalling alondra techniques. Pt required mod A for UB dressing and max A for LB dressing. Pt had significant difficulty following commands for alondra techniques and more impulsive this date as pt trying very hard to complete tasks without assist. Cont per OT POC  Treatment Diagnosis: decreased ADL, balance, Left sided function  Prognosis: Good  OT Education: OT Role;Plan of Care;Transfer Training;ADL Adaptive Strategies  Patient Education: ADL retraining, alondra techniques  REQUIRES OT FOLLOW UP: Yes  Activity Tolerance  Activity Tolerance: Patient Tolerated treatment well  Safety Devices  Type of devices: Call light within reach; Chair alarm in place; Left in chair;Gait belt;Nurse notified         Patient Diagnosis(es): There were no encounter diagnoses.       has a past medical history of Cerebral artery occlusion with cerebral infarction ([facility] [address] Diabetes mellitus ([facility] [address] Gallstone, GERD (gastroesophageal reflux disease), Hyperlipidemia, Hypertension, and Renal insufficiency. has a past surgical history that includes Appendectomy; Cholecystectomy; Upper gastrointestinal endoscopy (06/08/2018); Upper gastrointestinal endoscopy (N/A, 2/28/2019); Upper gastrointestinal endoscopy (N/A, 4/23/2019); Upper gastrointestinal endoscopy (N/A, 4/23/2019); Upper gastrointestinal endoscopy (N/A, 4/29/2020); and Colonoscopy (N/A, 5/8/2020). Restrictions  Restrictions/Precautions  Restrictions/Precautions: Fall Risk  Required Braces or Orthoses?: No  Position Activity Restriction  Other position/activity restrictions: L sided weakness; speaks Napali - uses  phone, family, and also in person interpretors  Subjective   General  Chart Reviewed: Yes, Progress Notes  Additional Pertinent Hx: Per Dr. Theodore Perez , \"The patient is a 76 y.o. male who presents to [facility] with unsteady on feet. Pt speaks Danish, son at bedside helping getting history. According to son/pt, pt apparently sustained a fall yesterday and felt like he couldn't move his left side. Today he had difficulty ambulating using his lt side and hence son brought pt to the ER\"  Diagnosed with  Response to previous treatment: Patient with no complaints from previous session  Family / Caregiver Present: No  Referring Practitioner: Kae Rodríguez  Diagnosis: Acute CVA - with left sided weakness  Subjective  Subjective: pt met b/s for OT. pt agreeable to shower, c/o pain in L shoulder and L LE. used  phone during session         Objective    ADL  Grooming: Setup(all seated at sink, cues for one-handed techniques for oral care. pt shaved face without assist)  UE Bathing: Minimal assistance;Setup(pt attempting to actively use L UE to assist but is unable to grasp washcloth, so hand over hand provided to bathe R underarm)  LE Bathing: Stand by assistance;Verbal cueing;Setup(cues to use LH sponge for feet)  UE Dressing: Verbal cueing; Increased time to complete;Setup; Moderate assistance(doffed tshirt per self. cues for alondra technique and assist to thread L UE)  LE Dressing: Maximum assistance;Setup;Verbal cueing(max cues for alondra technique and pt not following despite  repitition, so required assist to thread boxers/pants over LLE. assist to pull up over hips in standing. assist for both socks and shoes)  Toileting: (toileting with nursing prior to session)  Additional Comments: wet towel for non skid surface in shower. pt attempting to actively use L UE to assist but unable to grasp washcloth, tolerated hand over hand assist well        Balance  Sitting Balance: Supervision  Standing Balance: Contact guard assistance(use of grab bars)  Functional Mobility  Functional - Mobility Device: Wheelchair  Activity: To/from bathroom  Assist Level: Dependent/Total  Functional Mobility Comments: OT managed w/c during session in interest of time. pt completed stand pivot transfers (see transfers)  Shower Transfers  Shower - Transfer From: Wheelchair  Shower - Transfer Type: To and From  Shower - Transfer To: Transfer tub bench  Shower - Technique: Stand pivot  Shower Transfers: Minimal assistance; Moderate assistance  Shower Transfers Comments: min A from w/c > TTB using grab bar. mod A from TTB > w/c d/t pt's LEs buckled and required max cues to sit back on TTB for safety when exiting shower  Wheelchair Bed Transfers  Wheelchair/Bed - Technique: Stand pivot  Equipment Used: Wheelchair; Other(high back arm chair)  Level of Asssistance: Minimal assistance  Wheelchair Transfers Comments: pt attempting to pull with L UE to assist but required hand over hand     Transfers  Sit to stand: Contact guard assistance  Stand to sit: Contact guard assistance  Transfer Comments: use of grab bar during bathing and LB dressing                       Cognition  Overall Cognitive Status: Exceptions(son interpreting)  Following Commands:  Follows one step commands  present. Assessment: Pt tolerated tx well. Pt continues to demo steady improvement in L UE function. Pt continues to perseverate in left side weakness but reports he will be happy to go home and see his family this weekend. Pt practiced multiple stand pivot transfers <> BSC with CGA/min A. Cont per OTPOCPt first completed stand pivot from EOB <> BSC with BSC placed directly in front of the pt with CGA/min A and cues for hand placement. Pt then completed stand pivot from EOB <> BSC with BSC positioned at 45* angle - CGA to the right and min A to the left with cues for hand placement. Cont per POC    Plan   Plan  Times per week: 5-6 days per week  Times per day: Twice a day  Plan weeks: 3-4 weeks  Current Treatment Recommendations: Functional Mobility Training, Endurance Training, Safety Education & Training, Self-Care / ADL, Strengthening, Balance Training, ROM, Neuromuscular Re-education, Cognitive/Perceptual Training, Patient/Caregiver Education & Training, Equipment Evaluation, Education, & procurement, Cognitive Reorientation       Goals  Short term goals  Time Frame for Short term goals: 1 week pt will. Priscila Butts term goal 1: bathe with mod assist and AD -met  Short term goal 2: dress UB with mod assist, LB with max assist and AD as needed -met  Short term goal 3: toilet with max assist and AD -met  Short term goal 4: transfer with mod assist and AD -met  Short term goal 5: functional mobilty with LRAD and mod assist  Short term goal 6: improve LUE function to assist with ADL, positioning 25%  Short term goal 7: improve left side awareness to require mod cues for safety with ADL and mobility -met  Long term goals  Time Frame for Long term goals : 3-4 weeks pt will. ..   Long term goal 1: bathe with min assist and AD  Long term goal 2: dress UB after set up, LB with min assist and AD as needed  Long term goal 3: toilet with CGA and AD as needed  Long term goal 4: transfer with CGA and AD as needed  Long term goal 5: functional mobility with CGA and LRAD  Patient Goals   Patient goals : \"I want to be normal again\"  With cues, pt agreed he wanted to improve his left UE strength, be able to bathe and dress himself, be able to stand and walk .   States he does not need to do anything in the kitchen       Therapy Time   Individual Concurrent Group Co-treatment   Time In 0815         Time Out 0930         Minutes 75            Therapy Time   Individual Co-treatment   Time In [address] [facility]    Time Out 1430    Minutes [address] [place], OTR/L [id number]

## 2020-10-14 ENCOUNTER — APPOINTMENT (OUTPATIENT)
Dept: GENERAL RADIOLOGY | Age: 75
DRG: 045 | End: 2020-10-14
Attending: PHYSICAL MEDICINE & REHABILITATION
Payer: MEDICAID

## 2020-10-14 LAB
GLUCOSE BLD-MCNC: 123 MG/DL (ref 70–99)
GLUCOSE BLD-MCNC: 132 MG/DL (ref 70–99)
GLUCOSE BLD-MCNC: 188 MG/DL (ref 70–99)
GLUCOSE BLD-MCNC: 92 MG/DL (ref 70–99)
PERFORMED ON: ABNORMAL
PERFORMED ON: NORMAL

## 2020-10-14 PROCEDURE — 97112 NEUROMUSCULAR REEDUCATION: CPT

## 2020-10-14 PROCEDURE — 6370000000 HC RX 637 (ALT 250 FOR IP): Performed by: PHYSICAL MEDICINE & REHABILITATION

## 2020-10-14 PROCEDURE — 97530 THERAPEUTIC ACTIVITIES: CPT

## 2020-10-14 PROCEDURE — 97129 THER IVNTJ 1ST 15 MIN: CPT

## 2020-10-14 PROCEDURE — 97130 THER IVNTJ EA ADDL 15 MIN: CPT

## 2020-10-14 PROCEDURE — 94760 N-INVAS EAR/PLS OXIMETRY 1: CPT

## 2020-10-14 PROCEDURE — 71045 X-RAY EXAM CHEST 1 VIEW: CPT

## 2020-10-14 PROCEDURE — 1280000000 HC REHAB R&B

## 2020-10-14 PROCEDURE — 6360000002 HC RX W HCPCS: Performed by: PHYSICAL MEDICINE & REHABILITATION

## 2020-10-14 PROCEDURE — 97116 GAIT TRAINING THERAPY: CPT

## 2020-10-14 PROCEDURE — 97110 THERAPEUTIC EXERCISES: CPT

## 2020-10-14 RX ORDER — LOSARTAN POTASSIUM 25 MG/1
25 TABLET ORAL DAILY
Status: DISCONTINUED | OUTPATIENT
Start: 2020-10-15 | End: 2020-10-17 | Stop reason: HOSPADM

## 2020-10-14 RX ADMIN — DULOXETINE HYDROCHLORIDE 30 MG: 30 CAPSULE, DELAYED RELEASE ORAL at 08:08

## 2020-10-14 RX ADMIN — INSULIN LISPRO 5 UNITS: 100 INJECTION, SOLUTION INTRAVENOUS; SUBCUTANEOUS at 17:10

## 2020-10-14 RX ADMIN — ENOXAPARIN SODIUM 40 MG: 40 INJECTION SUBCUTANEOUS at 20:58

## 2020-10-14 RX ADMIN — SENNOSIDES-DOCUSATE SODIUM TAB 8.6-50 MG 1 TABLET: 8.6-5 TAB at 08:09

## 2020-10-14 RX ADMIN — INSULIN LISPRO 1 UNITS: 100 INJECTION, SOLUTION INTRAVENOUS; SUBCUTANEOUS at 20:58

## 2020-10-14 RX ADMIN — INSULIN LISPRO 5 UNITS: 100 INJECTION, SOLUTION INTRAVENOUS; SUBCUTANEOUS at 12:02

## 2020-10-14 RX ADMIN — CLOPIDOGREL BISULFATE 75 MG: 75 TABLET ORAL at 08:09

## 2020-10-14 RX ADMIN — INSULIN GLARGINE 22 UNITS: 100 INJECTION, SOLUTION SUBCUTANEOUS at 20:58

## 2020-10-14 RX ADMIN — PANTOPRAZOLE SODIUM 40 MG: 40 TABLET, DELAYED RELEASE ORAL at 05:57

## 2020-10-14 RX ADMIN — DICLOFENAC 4 G: 10 GEL TOPICAL at 21:01

## 2020-10-14 RX ADMIN — SUCRALFATE 1 G: 1 TABLET ORAL at 05:57

## 2020-10-14 RX ADMIN — ASPIRIN 81 MG: 81 TABLET, COATED ORAL at 08:08

## 2020-10-14 RX ADMIN — ROSUVASTATIN CALCIUM 10 MG: 10 TABLET, FILM COATED ORAL at 08:09

## 2020-10-14 RX ADMIN — TAMSULOSIN HYDROCHLORIDE 0.4 MG: 0.4 CAPSULE ORAL at 20:58

## 2020-10-14 RX ADMIN — SENNOSIDES-DOCUSATE SODIUM TAB 8.6-50 MG 1 TABLET: 8.6-5 TAB at 20:58

## 2020-10-14 RX ADMIN — INSULIN LISPRO 10 UNITS: 100 INJECTION, SOLUTION INTRAVENOUS; SUBCUTANEOUS at 08:08

## 2020-10-14 RX ADMIN — SUCRALFATE 1 G: 1 TABLET ORAL at 20:57

## 2020-10-14 RX ADMIN — PANTOPRAZOLE SODIUM 40 MG: 40 TABLET, DELAYED RELEASE ORAL at 17:00

## 2020-10-14 RX ADMIN — TRAMADOL HYDROCHLORIDE 50 MG: 50 TABLET ORAL at 12:04

## 2020-10-14 RX ADMIN — LOSARTAN POTASSIUM 12.5 MG: 25 TABLET, FILM COATED ORAL at 08:08

## 2020-10-14 RX ADMIN — SUCRALFATE 1 G: 1 TABLET ORAL at 14:37

## 2020-10-14 RX ADMIN — DICLOFENAC 4 G: 10 GEL TOPICAL at 14:40

## 2020-10-14 RX ADMIN — DICLOFENAC 4 G: 10 GEL TOPICAL at 08:11

## 2020-10-14 ASSESSMENT — PAIN SCALES - WONG BAKER
WONGBAKER_NUMERICALRESPONSE: 0

## 2020-10-14 ASSESSMENT — PAIN SCALES - GENERAL
PAINLEVEL_OUTOF10: 5
PAINLEVEL_OUTOF10: 3

## 2020-10-14 ASSESSMENT — PAIN DESCRIPTION - LOCATION: LOCATION: SHOULDER

## 2020-10-14 ASSESSMENT — PAIN DESCRIPTION - PROGRESSION: CLINICAL_PROGRESSION: NOT CHANGED

## 2020-10-14 ASSESSMENT — PAIN DESCRIPTION - PAIN TYPE: TYPE: ACUTE PAIN

## 2020-10-14 ASSESSMENT — PAIN - FUNCTIONAL ASSESSMENT: PAIN_FUNCTIONAL_ASSESSMENT: ACTIVITIES ARE NOT PREVENTED

## 2020-10-14 ASSESSMENT — PAIN DESCRIPTION - FREQUENCY: FREQUENCY: CONTINUOUS

## 2020-10-14 ASSESSMENT — PAIN DESCRIPTION - ONSET: ONSET: ON-GOING

## 2020-10-14 ASSESSMENT — PAIN DESCRIPTION - DESCRIPTORS: DESCRIPTORS: ACHING

## 2020-10-14 ASSESSMENT — PAIN DESCRIPTION - ORIENTATION: ORIENTATION: LEFT

## 2020-10-14 NOTE — DISCHARGE INSTR - COC
Continuity of Care Form    Patient Name: Jerline Peabody   :  1945  MRN:  [id number]    Admit date:  2020  Discharge date:  10/17    Code Status Order: Full Code   Advance Directives:   Advance Care Flowsheet Documentation       Date/Time Healthcare Directive Type of Healthcare Directive Copy in [address] Agent's Name Healthcare Agent's Phone Number    20 [phone]  Unknown, patient unable to respond due to medical condition unknown by family -- -- -- -- --            Admitting Physician:  Akiko Gómez MD  PCP: Lyndsay Armas    Discharging Nurse: [facility] Unit/Room#: N2J-9566/3260-01  Discharging Unit Phone Number: [phone]    Emergency Contact:   Extended Emergency Contact Information  Primary Emergency Contact: [address] Phone: [phone]  Relation: Child  Secondary Emergency Contact: [address] Phone: [phone]  Work Phone: [phone]  Relation: Grandchild    Past Surgical History:  Past Surgical History:   Procedure Laterality Date    APPENDECTOMY      CHOLECYSTECTOMY      COLONOSCOPY N/A 2020    COLONOSCOPY POLYPECTOMY SNARE/COLD BIOPSY performed by Denny Rose MD at [facility]  2018    UPPER GASTROINTESTINAL ENDOSCOPY N/A 2019    EGD W/EUS FNA performed by Rafiq Velarde MD at [facility] 2019    EGD BIOPSY performed by Rafiq Velarde MD at [facility] 2019    EGD W/EUS FNA performed by Rafiq Velarde MD at [facility] 2020    EGD BIOPSY performed by Denny Rose MD at [facility] ENDOSCOPY       Immunization History: There is no immunization history on file for this patient.     Active Problems:  Patient Active Problem List   Diagnosis Code    Chest pain R07.9    Hypertension I10    Left sided weakness     Nutrition Therapy:  Current Nutrition Therapy:   - Oral Diet:  General    Routes of Feeding: Oral  Liquids: Thin Liquids  Daily Fluid Restriction: no  Last Modified Barium Swallow with Video (Video Swallowing Test): not done    Treatments at the Time of Hospital Discharge:   Respiratory Treatments:   Oxygen Therapy:  is not on home oxygen therapy. Ventilator:    - No ventilator support    Rehab Therapies: Physical Therapy, Occupational Therapy and Speech/Language Therapy  Weight Bearing Status/Restrictions: No weight bearing restirctions  Other Medical Equipment (for information only, NOT a DME order):  wheelchair  Other Treatments:     Patient's personal belongings (please select all that are sent with patient):  Clothes, event monitor. RN SIGNATURE:  Electronically signed by Jenna Carrillo RN on 10/17/20 at 7:00 AM EDT    CASE MANAGEMENT/SOCIAL WORK SECTION    Inpatient Status Date: 9-    Readmission Risk Assessment Score:  Readmission Risk              Risk of Unplanned Readmission:        24           Discharging to Facility/ Agency   · Name:      [facility]  · Address:  · Phone:     [phone]  · Fax:         [phone]      / signature: Sedrick Silva [place]     Case Management   [phone]    10/14/2020  11:03 AM      PHYSICIAN SECTION    Prognosis: Good    Condition at Discharge: Stable    Rehab Potential (if transferring to Rehab): Good    Recommended Labs or Other Treatments After Discharge:   Home care PT, OT, RN, Aide  Recommend outpatient cardiac event monitor  Follow-up with PCP, Cardiology, Neurology, PM&R    Physician Certification: I certify the above information and transfer of Cary Mark  is necessary for the continuing treatment of the diagnosis listed and that he requires [address] for less 30 days.      Update Admission H&P: No change in H&P    PHYSICIAN SIGNATURE:  Electronically signed by Wilbert Rangel MD on 10/16/20 at 10:01 AM EDT

## 2020-10-14 NOTE — PROGRESS NOTES
Speech Language Pathology  ACUTE REHAB UNIT  SPEECH/LANGUAGE PATHOLOGY      [x] Daily  [] Weekly Care Conference Note  [] Discharge    Patient:Sylvester Duval      MDX:[birth year]  I:[id number]  Rehab Dx/Hx: Right pontine stroke ([facility] [address] [I63.50]    Precautions: Dysphagia; [place] is native language; Potential cognitive-communication deficitgs  Home situation: Lives with family  ST Dx: [] Aphasia  [x] Dysarthria  [] Apraxia   [x] Oropharyngeal dysphagia [x] Cognitive   Impairment  [] Other:   Initial Speech Therapy Assessment Diagnosis:   Per INitial Evaluations 9/26/2020:   1. Cognitive Diagnosis: Pt presents with impaired memory, orientation,  and problem solving for basic information. Grandson reports this is typical for pt. Per acute notes, pt's son also confirmed this is pt's baseline. 2. Aphasia Diagnosis: Further evaluation needed to clarify. 3. Speech Diagnosis: Further evaluation needed to clarify. 4. Communication Diagnosis: Pt requires and  to communicate. Pt appears to often have difficulty expressing himself accurately and with detail for typical conversation. 5. Dysphagia Diagnosis: Mild pharyngeal stage dysphagia;Mild to moderate oral stage dysphagia   Pt presents with no overt signs of aspiration or penetration. Swallow appears effortful at times and pt reports some difficulty. Oral skills for solids appear impaired with slow posterior propulsion and trace oral residue. Pt at risk for adequate intake due to his c/o intermittent nausea, prefers only warm food and drinks, and the food provided is not what pt is familiar with.   Date of Admit: 9/25/2020  Room #: C0X-3626/3260-01  Date: 10/14/2020       Current functional status (updated daily):         Current Diet Order:DIET GENERAL; Carb Control: 4 carb choices (60 gms)/meal; Vegetarian (Lacto-Ovo)  Dietary Nutrition Supplements: Clear Liquid Oral Supplement   Behavior: [x] Alert  [] Cooperative  [x]  Pleasant  [x] Confused  [] Agitated  [] Uncooperative  [] Distractible [] Motivated  [] Self-Limiting [] Anxious  [x] Other:  phone utilized. Endurance:  [x] Adequate for participation in SLP sessions  [] Reduced overall  [] Lethargic  [] Other:  Safety: [] No concerns at this time  [] Reduced insight into deficits  []  Reduced safety awareness [] Not following call light procedures  [] Unable to Assess  [] Other:  Swallowing Precautions: 90 degree positioning with all p.o. intake; small bites/sips; alternate textures through meal; reduce rate of intake;     Barriers toward pt/family plan for progress toward d/c plan: Medical status and caregiver support may be barriers           Date: 10/14/2020      Tx session 1 Tx session 2   Individual tx minuetes SLP individual Minutes  Time In: 1430  Time out 1509  Total minutes: 39  Coded treatment minutes: 39 minutes n/a        Group Treatment Minutes 0 0   Co-Treat Minutes 0 0   Variance/Reason:  n/a    Pain denied    Pain Intervention [] RN notified  [] Repositioned  [] Intervention offered and patient declined  [] N/A  [] Other: - RN notified  - Repositioned  - Intervention offered and patient declined  - N/A  - Other:   Subjective     Pt seen in the treatment office   And then in pt room  In person  present  No family present. This SLP called son's residence and left a message on his answering machine requesting return call to discuss pt's progress  Good effort in therapy      Objective:  Goals       Dysphagia Goals:   1. Upgraded goal  The patient will tolerate regular food consistency with thin liquid diet without observed clinical signs of aspiration,          Into targeted. No complaints this      n/a   2.  Upgraded goal  The patient will improve oral motor function via therapeutic oral motor exercises to 15/15 each trial. Left facial droop and weakness    -resistance ex with engagement of left labial /buccal musculature engagement : not targeted  -volitional rom for  · lip retraction mild to moderate rom achieved  · lip protrusion moderate rom achieved  ·  lip rounding moderate  rom achieved  -paired labial/buccal rom ex: pt maintained mild to moderate rom     PO presentations:   -not targeted   n/a     Cognitive-Communication goals       Goal 1:Modified goal   The pt will demonstrate improved recall of daily events; learned strategies with set up; structure and <max cues Temporal orientation/spatial orientation questions:   -Pt oriented to self ; place and partially to daily therapies by activities    -recall of SLP session 10/13/2020 activities specifically problems with dry sticky regular solid food: max cues    -recall of other therapy activities: max prompting    Working memory for rule application for 46-36 reps: set up; and intermittent mild re-direct   n/a     Goal 2: Upgraded goal  The pt will demonstrate improved processing for short concrete questions regarding basic DL and / or to follow 1 step commands with max visual demonstrations;and with <moderate tactile cues   · 1 step motor commands :  90% IND intermittent visual cues    [place]? (topics included: likes/dislikes; daily therapy excercises and activities; regarding pictured stimuli and regarding high frequency ADLs: continues to require 0 to mild   repetition/clarification required     n/a   Goal 3: Upgraded goal  Pt will convey basic needs/wants via verbal/pointing/gestures >70% and provide concept/event explanations >50%   [place] concept/event explanation:    · Verbal explanation of concept / event: o to mild clarification across various topics  ·  verbal directions of activity which is known to pt: set up and mild to max expansion cues  · Pt is conveying needs / wants like bathroom again today when this SLP returned pt to room    CFN (>25 high frequency colored photos of DL items and places and body parts): comparable to 10/12/2020 n/a   Other areas targeted:   Oral Motor Speech  · Goal appears met. On 10/13/2020 amarilys reporting speech and communication has improved as compared to 1-2 weeks ago. Stephenie Coleman continues to report known history of memory problems . Grandson reported pt has history of Alzheimer's memory  · This SLP called son today to jeremy/chris with treatment poc and progress today. Awaiting    Education:   Pt ed ongoing    Safety Devices: [] Call light within reach  [] Chair alarm activated  [] Bed alarm activated  [x] Other: SLP returned pt to room; alerted RN for bathroom assist as pt is verbalizing need for the bathroom - Call light within reach  - Chair alarm activated  - Bed alarm activated  - Other:    Progress Assessment: 9/28/2020:  phone utilized. Difficult to assess as pt did not always respond to questions/commands; and verbal responses were not always intelligible. SLP completed a chart review; chart review does report history of cognitive decline in memory/orientation/PS. Pt reportedly lives with family (multi generations); needed assist with ADLs and homemaking; pt receives 24 hour supervision. 9/29/2020: Son voiced concern regarding communication changes in that pt is not able to use  phone; and has difficulty understanding what family say. SLP ed in use of context of an activity and visual cues/gestures to assist. Activities incorporating family; basic DL activities most optimal.  Will upgrade diet to soft/bite size food with thin liquids; oral care post meals. Pt/son ed in examples of food consistencies on soft/bite size food consistencies; and oral care. Toothettes placed in the room and SLP ed pt and family in uses.    9/30/2020 SLP discussed with RN (RN actually in room when addressing pt complaints regarding discomfort in current recliner) ; and  SLP discussed with team (at team meeting ) regarding pt height anc concern regarding current recliner in room (ie pt's feet dangle; pt complaints cannot get comfortable etc)  10/1/2020; 10/20/2020 :   present for session. 10/6/2020: Request diet upgrade to regular consistency  10/13/2020: Family ed with grandson who reports speech and swallowing has improved. Grandson reports pt has a history of memmory deficits and Alzheimer. SLP relayed event in therapy session and recommended continue to monitor and encourage moist items; small bites/sips; reducing rate of intake  10/14/2020: son was here for ed but did not stay for Speech session or for diabetic teaching. This SLP placed a call to the son's residence today and left a message requesting son to call SLP back. 10/15/2020 : late entry for 10/14/2020. This SLP met with son approximately 5:45 pm to discuss treatment poc. Discussed dysphagia progress and diet tolerance. Son confirmed pt is improving and but confirms likes warm drinks better than cold drinks. Soon also reports improved clarify of speech and improved concrete communication skills. Son did report history of memory decline which a little worse still since onset; but does report feels will improve when home in familiar environment and around family. SLP discussed plan to d/c daily speech therapy. Son agreed. This SLP did encourage son ; if concern for any status change please re-quest re-order. Pt also may be better served by SLP who speak Hungarian. Compensatoyr strategies to assist memory was discussed. Plan: discuss with MD 10/15/2020 to confirm d/c from skilled speech therapy   Plan: Continue as per plan of care.    Continued Tx Upon Discharge: ?    - Yes - No - TBD based on progress while on ARU - Vital Stim indicated - Other:   Estimated discharge date: TBD   Discharge recommendations:   - Home independently  - Home with assistance -  24 hour supervision  - ECF - Other:     Additional information:     Interventions used during Rehab Stay:  [] Speech/Language Treatment  [] Instruction in HEP [] Group [x] Dysphagia Treatment [] Cognitive Treatment   [x] Other:     Adverse Reactions to

## 2020-10-14 NOTE — PROGRESS NOTES
that includes Appendectomy; Cholecystectomy; Upper gastrointestinal endoscopy (06/08/2018); Upper gastrointestinal endoscopy (N/A, 2/28/2019); Upper gastrointestinal endoscopy (N/A, 4/23/2019); Upper gastrointestinal endoscopy (N/A, 4/23/2019); Upper gastrointestinal endoscopy (N/A, 4/29/2020); and Colonoscopy (N/A, 5/8/2020). Restrictions  Restrictions/Precautions  Restrictions/Precautions: Fall Risk  Required Braces or Orthoses?: No  Position Activity Restriction  Other position/activity restrictions: L sided weakness; speaks [place] - uses  phone, family, and also in person interpretors  Subjective   General  Chart Reviewed: Yes  Additional Pertinent Hx: Per Dr. Donato Pelayo , \"The patient is a 76 y.o. male who presents to [facility] with unsteady on feet. Pt speaks Sinhala, son at bedside helping getting history. According to son/pt, pt apparently sustained a fall yesterday and felt like he couldn't move his left side. Today he had difficulty ambulating using his lt side and hence son brought pt to the ER\"  Diagnosed with  Response to previous treatment: Patient with no complaints from previous session  Family / Caregiver Present: No  Referring Practitioner: Jaylyn Hardy  Diagnosis: Acute CVA - with left sided weakness  Subjective  Subjective: pt met in dept for OT and agreed to therapy. pt in very good spirits this morning. pt states \"finally having no pain in left arm or leg.  I hope for continued good health for me and everyone here\"      Orientation     Objective             Balance  Sitting Balance: Supervision  Standing Balance: Contact guard assistance(CGA/SBA)  Standing Balance  Time: x5 mins, x2 mins  Activity: static standing at tabletop for neuro activities  Comment: pt using right hand to assist left hand to place clothespins on clothespins tree, so able to maintain balance SBA/CGA without UE support at times  Functional Mobility  Functional Mobility Comments: OT managed w/c during session, pt completed sit<>stand and stand pivot transfers  Wheelchair Bed Transfers  Wheelchair/Bed - Technique: Stand pivot  Equipment Used: Wheelchair; Other(arm chair)  Level of Asssistance: Contact guard assistance  Wheelchair Transfers Comments: w/c > arm chair to the right     Transfers  Sit to stand: Contact guard assistance  Stand to sit: Contact guard assistance  Transfer Comments: verbal/tactile cues for hand placement                 Neuromuscular Education  Neuromuscular education: Yes  Functional Movement Patterns: pt used left hand to slide bean bags from right <> left on slant table, then to slide up and over the board. pt required cues to actively use left hand and not use right hand to assist.. completed with min facilitation from OT to support L UE in functional position  Weight Bearing  Weight Bearing Technique: Yes  LUE Weight Bearing: Forearm standing  Response To Weight Bearing Technique: pt stood at tabletop and placed clothespins on clothespins tree using R hand and WB through L UE. pt then used his right hand to provide hand over hand to his left hand to remove clothespins. Cognition  Overall Cognitive Status: Exceptions  Following Commands: Follows one step commands consistently  Attention Span: Attends with cues to redirect  Safety Judgement: Decreased awareness of need for safety  Problem Solving: Assistance required to implement solutions;Assistance required to generate solutions;Assistance required to identify errors made  Insights: Fully aware of deficits  Initiation: Requires cues for some  Sequencing: Requires cues for some        PM Session:  Subjective: pt met in dept for OT. Pt's son, Royce Winslow, present for family training. Medical interpretor present. Pt c/o pain in L shoulder    Objective:  Pt completed stand pivot transfer from w/c <> [facility] CGA/min A with OT assisting and educating Royce Goldy in how to position and assist pt appropriately.  Royce Goldy then assisted in this transfer from w/c <> BSC, then from w/c > bed > BSC > w/c. OT needed to reiterate need to hold the gait belt to assist the pt, especially as his knees buckled multiple times. Kelly Garcia verbalized understanding but will need continued practice. OT reiterated that pt will need to use BSC at home as the w/c will not fit into the bathroom. Pt's son states that the pt does not want to use this and wants to walk into the bathroom. The pt also does not want to use urinal for urination. OT again expressed to the pt and son that the pt should not be walking without therapy as he is a high fall risk. They will either have to modify the bathroom to make it w/c accessible or use the BSC. OT issued UE HEP for completing SROM L UE. Pt completed x10 reps each: shoulder flexion/extension, elbow flexion/extension, supination/pronation, wrist flexion/extension, horizontal abduction/adduction, finger flexion/extension. Pt required min cues to attend to task, pt is easily distracted. OT confirmed that Kelly Garcia will be present tomorrow for family training during the ADL. Pt left seated in w/c outside speech therapy office. Son and  present. Assessment: Pt's son, Kelly Garcia, participated in family training this date. Faiza Hinton and/or other family members will need continued practice this week before pt d/c's home to ensure they are able to safely assist the pt. Plan   Plan  Times per week: 5-6 days per week  Times per day: Twice a day  Plan weeks: 3-4 weeks  Current Treatment Recommendations: Functional Mobility Training, Endurance Training, Safety Education & Training, Self-Care / ADL, Strengthening, Balance Training, ROM, Neuromuscular Re-education, Cognitive/Perceptual Training, Patient/Caregiver Education & Training, Equipment Evaluation, Education, & procurement, Cognitive Reorientation    Goals  Short term goals  Time Frame for Short term goals: 1 week pt will. [place] term goal 1: bathe with mod assist and AD -met  Short term goal 2: dress UB with mod assist, LB with max assist and AD as needed -met  Short term goal 3: toilet with max assist and AD -met  Short term goal 4: transfer with mod assist and AD -met  Short term goal 5: functional mobilty with LRAD and mod assist  Short term goal 6: improve LUE function to assist with ADL, positioning 25%  Short term goal 7: improve left side awareness to require mod cues for safety with ADL and mobility -met  Long term goals  Time Frame for Long term goals : 3-4 weeks pt will. .. Long term goal 1: bathe with min assist and AD  Long term goal 2: dress UB after set up, LB with min assist and AD as needed  Long term goal 3: toilet with CGA and AD as needed  Long term goal 4: transfer with CGA and AD as needed  Long term goal 5: functional mobility with CGA and LRAD  Patient Goals   Patient goals : \"I want to be normal again\"  With cues, pt agreed he wanted to improve his left UE strength, be able to bathe and dress himself, be able to stand and walk .   States he does not need to do anything in the kitchen       Therapy Time   Individual Concurrent Group Co-treatment   Time In 0915         Time Out 1000         Minutes 45               Therapy Time   Individual Co-treatment   Time In 1400 [facility]    Time Out 1430    Minutes David 57, OTR/L [id number]

## 2020-10-14 NOTE — PROGRESS NOTES
Resting well overnight. Pt admitted with a CVA, left sided weakness. Speaks Slovenian but unable use  phone d/t slurred speech. Pt able to point and gesture for needs. Transfers with a stedy x1. Lungs CTA. HR regular. On telemetry showing NSR. Abdomen non tender with active bowel sounds. Family member at the bedside concerned about left sided abdominal bruise, feels it is getting larger. No tenderness. Will notify MD in the morning. All needs assessed with rounding, alarms active. Will continue to monitor.  Thais Dasilva RN

## 2020-10-14 NOTE — PLAN OF CARE
consult in place. Problem: HEMODYNAMIC STATUS  Goal: Patient has stable vital signs and fluid balance  10/10/2020 1732 by Philipp Hernandes RN  Outcome: Ongoing  Note: Daily weight taken and charted in the morning. Problem: ACTIVITY INTOLERANCE/IMPAIRED MOBILITY  Goal: Mobility/activity is maintained at optimum level for patient  10/10/2020 1732 by Philipp Hernandes RN  Outcome: Ongoing  Note: Participates in all scheduled therapies, is cooperative and has a positive attitude towards achieving set goals. Problem: COMMUNICATION IMPAIRMENT  Goal: Ability to express needs and understand communication  10/10/2020 1732 by Philipp Hernandes RN  Outcome: Ongoing  Note: Encouragement of the use of communication board      Problem: Serum Glucose Level - Abnormal:  Goal: Ability to maintain appropriate glucose levels will improve  Description: Ability to maintain appropriate glucose levels will improve  10/10/2020 1732 by Philipp Hernandes RN  Outcome: Ongoing  Note: Order for Blood Glucose checks before each meal and at hour of sleep in place. Problem: IP SWALLOWING  Goal: LTG - patient will tolerate the least restrictive diet consistency to allow for safe consumption of daily meals  10/10/2020 1732 by Philipp Hernandes RN  Outcome: Ongoing  Note: Up in chair for all meals, returned verbal acknowledgement of all safety precautions in place, will continue to monitor.

## 2020-10-14 NOTE — PROGRESS NOTES
Department of Physical Medicine & Rehabilitation  Progress Note    Patient Identification:  Gigi Alcaraz  [id number]  : 1945  Admit date: 2020    Chief Complaint: Right pontine stroke [facility])    Subjective:   No acute events overnight. Patient seen this am sitting up in room. Reports dry cough. Not associated with eating. No fevers/chills, rhinorrhea, sore throat. ROS: No f/c, n/v, cp     Objective:  Patient Vitals for the past 24 hrs:   BP Temp Temp src Pulse Resp SpO2 Weight   10/14/20 0411 (!) 154/94 97.8 °F (36.6 °C) Oral 89 16 96 % 128 lb 15.5 oz (58.5 kg)   10/14/20 0049 (!) 166/90 98.9 °F (37.2 °C) Oral 86 20 95 % --   10/13/20 2015 (!) 149/86 98.2 °F (36.8 °C) Oral 78 17 96 % --   10/13/20 1600 (!) 152/80 98.4 °F (36.9 °C) Oral 83 17 98 % --   10/13/20 1215 (!) 148/74 97.4 °F (36.3 °C) Oral 90 17 96 % --     Const: Alert. No distress, pleasant. HEENT: Normocephalic, atraumatic. Normal sclera/conjunctiva. MMM. CV: Regular rate and rhythm. Resp: No respiratory distress. Lungs CTAB. Abd: Soft, nontender, nondistended, NABS+   Ext: No edema. Neuro: Alert, evidence of mild confusion (limited by language barrier), speech dysarthric, left hemiparesis (1-3/5)  Psych: Cooperative, appropriate mood and affect    Laboratory data: Available via EMR.    Last 24 hour lab  Recent Results (from the past 24 hour(s))   POCT Glucose    Collection Time: 10/13/20 11:32 AM   Result Value Ref Range    POC Glucose 124 (H) 70 - 99 mg/dl    Performed on ACCU-CHEK    POCT Glucose    Collection Time: 10/13/20  4:07 PM   Result Value Ref Range    POC Glucose 149 (H) 70 - 99 mg/dl    Performed on ACCU-CHEK    POCT Glucose    Collection Time: 10/13/20  8:08 PM   Result Value Ref Range    POC Glucose 335 (H) 70 - 99 mg/dl    Performed on ACCU-CHEK    POCT Glucose    Collection Time: 10/14/20  7:10 AM   Result Value Ref Range    POC Glucose 132 (H) 70 - 99 mg/dl    Performed on ACCU-CHEK        Therapy progress:  PT  Position Activity Restriction  Other position/activity restrictions: L sided weakness; speaks [place] - uses  phone, family, and also in person interpretors  Objective     Sit to Stand: Contact guard assistance  Stand to sit: Contact guard assistance  Bed to Chair: Minimal assistance(Needs assistance to shift hips and facilitate turning. Also requires cuing for hand placement)  Device: Hemiwalker  Other Apparatus: Wheelchair follow(L arm sling)  Assistance: Minimal assistance  Distance: 21' with no turns  OT  PT Equipment Recommendations  Equipment Needed: Yes  Mobility Devices: Wheelchair  Wheelchair: Standard  Other: standard wheelchair with standard cushion, standard leg rests, removable arm rests  Toilet - Technique: Stand pivot  Equipment Used: Standard bedside commode  Toilet Transfers Comments: completed stand pivot from EOB <> BSC x2 trials with grandson assisting with 2nd transfer. consistently CGA to the right and min A to the left with mod/max verbal and tactile cues for hand placement  Assessment        SLP  Diet Solids Recommendation: Dysphagia Minced and Moist (Dysphagia II)  Liquid Consistency Recommendation: Thin    Body mass index is 22.85 kg/m².     Assessment and Plan:    Impairments: left hemiparesis, decreased coordination, balance, endurance, cognition, dysarthria, dysphagia     Acute ischemic stroke  -Localization: Right son, posterior centrum semiolave, left frontal lobe deep white matter  -Etiology: small vessel vs embolic  -Telemetry in ARU, then event monitor at discharge  -Secondary ppx with DAPT x 3 weeks (then Plavix alone), statin, BP and glucose control  -PT/OT/SLP    Spasticity  -Emerging tone in LUE  -Monitor, consider medication     H/o left PCA stroke with severe stenosis  -Secondary ppx as above     Dysphagia   -Dietitian and SLP consults.   -Upgrade to regular + thins     HTN, uncontrolled  -Now improved on losartan (increase dose)      DM2, uncontrolled/labile  -Lantus 22 units qhs, prandial 5/5/10 with hold parameters, low dose ISS  -Diabetes educator consulted, appreciate input     CKD  -Avoid nephrotoxins, renally dose meds  -Monitor      L1 compression fracture  -Pain control  -PT/OT    Cough  -Check CXR     Bladder   -High risk retention   -Monitor PVRs, SC prn >300cc  -Flomax     Bowel   -High risk constipation   -senna+colace BID, PRN miralax, MoM, and bisacodyl supp.     Pain control  -prn tramadol  -diclofenac gel for shoulder and knee pain     PPx  -DVT: lovenox  -GI: pantoprazole    Rehab Progress: Interdisciplinary team conference was held today with entire rehab treatment team including PT, OT, SLP, Dietician, RN, and SW. Discussion focused on progress toward rehab goals and discharge planning. Making steady progress. Working on left side function, functional mobility, compensatory strategies (will be primarily wheelchair for mobility). Now overall Alondra for mobility and supervision-maxA for ADLs. SLP reporting improvements in aphasia/apraxia, dysarthria, dysphagia, cognition -- approaching baseline. Separate conference then held with patient/family, questions answered and concerns addressed. Total treatment time >35 min with greater than 50% spent in care coordination. Anticipated Dispo: home with family, 24/7 assist  Services:  PT, OT, RN, Aide  DME: wheelchair, alondra-walker (for therapy), BSC, TTB  ELOS: 10/17    Joy Coronado was evaluated today and a DME order was entered for a standard wheelchair because he requires this to successfully complete daily living tasks of ambulating. A standard manual wheelchair is necessary due to patient's impaired ambulation and mobility restrictions and would be unable to resolve these daily living tasks using a cane or walker. The patient is capable of using a standard wheelchair safely in their home and can maneuver within their home with adequate access.   There is a caregiver available to provide necessary assistance. The need for this equipment was discussed with the patient and he understands, is in agreement, and has not expressed an unwillingness to use the wheelchair. Bijan Garcia was evaluated today and a DME order was entered for a bath/shower seat because he requires this to successfully complete daily living tasks of bathing and grooming. Patient requires a bath/shower seat due to weakness and limited ability to transfer/navigate the setting. Without the bath/shower seat, Sylvester Duval is at increased risk for falls and would require increased assistance for ADL's and mobility. Bijan Garcia requires a bedside commode due to being confined to one level of the home, and is physically incapable of utilizing regular toilet facilities. Current body weight is Weight: 128 lb 15.5 oz (58.5 kg). Rena Bunn.  Floyd March MD 10/14/2020, 10:36 AM

## 2020-10-14 NOTE — CARE COORDINATION
SOCIAL WORK DISCHARGE SUMMARY:      DISCHARGE DATE:                 Saturday, 10-      DISCHARGE PLACE:                home                 HOME CARE AGENCY:            [facility]             PHONE NUMBER          [phone]   [facility]                 [phone]      TRANSPORTATION:                family             TIME:                                10-12 noon      PREFERRED PHARMACY:       [facility]             NUMBER:                          md orders:   Sn/pt/ot/hha    Dme:   Wheelchair, TTB, BSC, gait belt from [place], [place]     Case Management   [phone]    10/14/2020  11:06 AM

## 2020-10-14 NOTE — CARE COORDINATION
[facility]  Diabetes Education   Progress Note       NAME:  [facility] RECORD NUMBER:  [id number]  AGE: 76 y.o. GENDER: male  : 1945  TODAY'S DATE:  10/14/2020    Subjective   Reason for Diabetic Education Evaluation and Assessment: general diabetes support    Diabetes education with interpretor - Abraham Babb confirms that his family administers his insulin and helps with glucose monitoring. He reports that his blood sugars are up and down at home.       Visit Type: follow-up      Iam Soto is a 76 y.o. male referred by:     [] Physician  [x] Nursing  [] Chart Review   [] Other:     PAST MEDICAL HISTORY        Diagnosis Date    Cerebral artery occlusion with [facility] infarction [facility]     Diabetes mellitus ([facility]     Gallstone     GERD (gastroesophageal reflux disease)     Hyperlipidemia     Hypertension     Renal insufficiency        PAST SURGICAL HISTORY    Past Surgical History:   Procedure Laterality Date    APPENDECTOMY      CHOLECYSTECTOMY      COLONOSCOPY N/A 2020    COLONOSCOPY POLYPECTOMY SNARE/COLD BIOPSY performed by Pilar Mukherjee MD at [address] [place] [address]  2018    UPPER GASTROINTESTINAL ENDOSCOPY N/A 2019    EGD W/EUS FNA performed by Jayme Gomez MD at [address] [place] [address] N/A 2019    EGD BIOPSY performed by Jayme Gomez MD at [address] [place] [address] N/A 2019    EGD W/EUS FNA performed by Jayme Gomez MD at [address] [place] [address] 2020    EGD BIOPSY performed by Pilar Mukherjee MD at [address] [facility]    Family History   Problem Relation Age of Onset    No Known Problems Mother     No Known Problems Father        SOCIAL HISTORY    Social History     Tobacco Use    Smoking status: Never Smoker    Smokeless tobacco: Never Used   Substance Use Topics    Alcohol use: No    Drug use: No       ALLERGIES    Allergies   Allergen Reactions    Atorvastatin Other (See Comments)     Myalgia -- stopped 11/2019 to see if pain improves. Possible PMR diagnosis as well. Will follow-up in 3 mo.        MEDICATIONS     insulin lispro  0-6 Units Subcutaneous TID WC    insulin lispro  0-3 Units Subcutaneous Nightly    [START ON 10/15/2020] losartan  25 mg Oral Daily    insulin lispro  5 Units Subcutaneous Lunch    insulin lispro  5 Units Subcutaneous Dinner    insulin glargine  22 Units Subcutaneous Nightly    lidocaine  1 patch Transdermal Daily    insulin lispro  10 Units Subcutaneous Daily with breakfast    diclofenac sodium  4 g Topical TID    aspirin  81 mg Oral Daily    Or    aspirin  300 mg Rectal Daily    enoxaparin  40 mg Subcutaneous Nightly    sennosides-docusate sodium  1 tablet Oral BID    clopidogrel  75 mg Oral Daily    DULoxetine  30 mg Oral Daily    pantoprazole  40 mg Oral BID AC    rosuvastatin  10 mg Oral Daily    sucralfate  1 g Oral 3 times per day    tamsulosin  0.4 mg Oral Nightly       Objective        Patient Active Problem List   Diagnosis Code    Chest pain R07.9    Hypertension I10    Hyperlipidemia E78.5    Diabetes mellitus ([address] E11.9    Unexplained weight loss R63.4    Esophagitis K20.90    Granulomatous adenopathy R59.9    Mediastinal lymphadenopathy R59.0    History of arterial ischemic stroke Z86.73    Abdominal pain R10.9    Acute cerebrovascular accident ([address] I63.9    Right pontine stroke (HCC) I63.50        /87   Pulse 84   Temp 98.4 °F (36.9 °C) (Oral)   Resp 16   Ht 5' 3\" (1.6 m)   Wt 128 lb 15.5 oz (58.5 kg)   SpO2 95%   BMI 22.85 kg/m²     HgBA1c:    Lab Results   Component Value Date    LABA1C 9.8 09/23/2020       Recent Labs     10/13/20  1607 10/13/20  2008 10/14/20  0710 10/14/20  1134   POCGLU 149* 335* 132* 123*       BUN/Creatinine:    Lab Results   Component Value Date    BUN 15 10/12/2020    CREATININE 1.3 10/12/2020       Assessment        Diabetes Management and Education    Does the patient have a Primary Care Physician? Yes, Andry Weems       Does the patient require new medication instruction? Yes  Reviewed plan to add prandial dose before meals. Person responsible for administration of Insulin/Medication:        [] Self     [] Caregiver       [] Spouse       [x] Other Family Member   []  Other      Does the patient/caregiver understand S/S of Hypoglycemia? No: Reports that he has low blood sugar at home. Usually at night. Sometimes uses juice to treat episodes. Reviewed symptoms, prevention and treatment. Level of patient/caregiver understanding able to:       [] Verbalized Understanding   [] Demonstrated Understanding       [] Teach Back       [x] Needs Reinforcement     []  Other:                    Does the patient/caregiver understand S/S of Hyperglycemia? No:     Reviewed symptoms, prevention and treatment. Level of patient/caregiver understanding able to:        [] Verbalized Understanding   [] Demonstrated Understanding       [] Teach Back       [x] Needs Reinforcement     []  Other:           Plan        Ongoing diabetes education and blood glucose monitoring. Plan next education session with family. Improved blood sugars noted with addition of prandial Humalog at lunch. Agree with plan to lower correction scale. Recommend BG targets 100 -180 - avoid hypoglycemia.        Teaching Time Diabetes Education:  20 minutes     Electronically signed by Janelle Miller on 10/14/2020 at 3:21 PM

## 2020-10-14 NOTE — PROGRESS NOTES
Physical Therapy  Facility/Department: [address] REHAB  Daily Treatment Note  NAME: Cecilia Sharif  : 1945  MRN: [id number]    Date of Service: 10/14/2020    Discharge Recommendations:  Patient would benefit from continued therapy after discharge, Continue to assess pending progress, Home with Home health PT, 24 hour supervision or assist   PT Equipment Recommendations  Equipment Needed: Yes  Mobility Devices: Wheelchair  Wheelchair: Standard  Other: standard wheelchair with standard cushion, standard leg rests, removable arm rests    Assessment   Body structures, Functions, Activity limitations: Decreased functional mobility ; Decreased balance;Decreased strength;Decreased ADL status; Decreased posture;Decreased safe awareness;Decreased endurance  Assessment: Pt tolerated therapy well this AM.  He has improved muscle contraction with knee extension, but still has difficulty yohan DFs and PFs with vibration stimulus. Pt had trouble following commands this AM when exercising on NuStep. Pt was asked to stop pedalling to put L hand in  and physically needed to be stopped by therapist to understand what was being asked. Pt complained of L hand pain this AM.  Pt demonstrated improved transfer ability with easier pivoting and turning of hips, but patient still needs frequent cues for hand placement. Stand pivot to L was performed with pt holding onto arm. Pt would continue to benefit from skilled PT to increase activity tolerance, strengthen LEs, and improve safety awareness for safe return to home.   Treatment Diagnosis: decreased functional mobility following CVA with L alondra  Prognosis: Good;Fair  History: as noted  Exam: as above  Clinical Presentation: evolving  PT Education: General Safety;Transfer Training;Gait Training;Functional Mobility Training;Equipment;Weight-bearing Education  Barriers to Learning: language  REQUIRES PT FOLLOW UP: Yes  Activity Tolerance  Activity Tolerance: Patient Tolerated treatment well  Activity Tolerance: pt complained of pain in L hand and in LLE     Patient Diagnosis(es): There were no encounter diagnoses. has a past medical history of Cerebral artery occlusion with cerebral infarction ([facility] Diabetes mellitus ([facility] Gallstone, GERD (gastroesophageal reflux disease), Hyperlipidemia, Hypertension, and Renal insufficiency. has a past surgical history that includes Appendectomy; Cholecystectomy; Upper gastrointestinal endoscopy (06/08/2018); Upper gastrointestinal endoscopy (N/A, 2/28/2019); Upper gastrointestinal endoscopy (N/A, 4/23/2019); Upper gastrointestinal endoscopy (N/A, 4/23/2019); Upper gastrointestinal endoscopy (N/A, 4/29/2020); and Colonoscopy (N/A, 5/8/2020). Restrictions  Restrictions/Precautions  Restrictions/Precautions: Fall Risk  Required Braces or Orthoses?: No  Position Activity Restriction  Other position/activity restrictions: L sided weakness; speaks [place] - uses  phone, family, and also in person interpretors     Subjective   General  Chart Reviewed: Yes  Additional Pertinent Hx: Per Dr. Nehal Hester , \"The patient is a 76 y.o. male who presents to [facility] with unsteady on feet. Pt speaks Frisian, son at bedside helping getting history. According to son/pt, pt apparently sustained a fall yesterday and felt like he couldn't move his left side. Today he had difficulty ambulating using his lt side and hence son brought pt to the ER\"  Diagnosed with CVA with L sided weakness. Response To Previous Treatment: Patient with no complaints from previous session. Family / Caregiver Present: Yes  Referring Practitioner: Rodger Medina  Subjective  Subjective: Pt met in therapy gym sittin in w/c with no family memebers. Phone interpretor was used. Pt states that he is feeling a little better, but has been experiencing some pain in his L side and has not been functioning the way he would like.   Pt is agreeable to PT this AM.    Objective      Transfers  Sit to Stand: Contact guard assistance  Stand to sit: Contact guard assistance  Stand Pivot Transfers: Contact guard assistance(pt with improved pivoting and less facilitation from therapist.  Able to pivot more on RLE and turn hips to direction of he needs to sit. When exiting NuStep he performed safe transfer with RUE on therapist to maintain balance)     Exercises  Hip Flexion: x10 with visual target of PTs hand  Knee Long Arc Quad: 1x20 on BLEs with massager. Pt able to achieve contraction with vibration stimulus, but not consistenetly and requires cues to kick with vibration(distal to proximal with vibration stimulus)  Ankle Pumps: 1x20 ankle DF and PF with massager on LLE. Pt instructed to contract muscles with vibration stimulus, but needs therapist to facilitate motion manually. vibration stimulus applied distal to proximal(pt complained of pain in proximal ant tib)  Comments: Pt had difficulty following directions when seated in NuStep. Was resistant to cease pedalling despite not having L hand gripping L handle. PT had to stop pt's R UE from moving to get his attention and look at L side to adjust . Other exercises  Other exercises?: Yes  Other exercises 1: NuStep resistance level 3, seat position at 5 and hand position at 6, L hand  assist x 10 min wth average SPM 38, good control of L LE with no excessive ER this visit        PM session:   S: Pt met in room sitting in gray, high back chair.  and son, Farhan Wagner, were both present in room and throughout therapy. He states that he is feeling the same as he did this AM.  Pt is agreeable to PT this afternoon. O: Pt sit>stand from chair with CGA. Pt had good hand placement. Pt stand-pivot with CGA-Laure. Sat with CGA and cues for hand placement. Pt was instructed to propel wheelchair down hallway. Pt propelled down hallway using RUE and RLE.   Pt continues having difficulty combining tasks of turning wheel to propel forward, and using RLE to steer. PT demonstrated how to use RLE to turn chair in w/c next to pt, but continued to have difficulty understanding. Required max verbal and tactile cues to propel 60'. Pt removed LLE from leg rest frequently throughout. Unable to propel and turn simultaneously. Propels directly towards hallway walls and obstacles without trying to avoid. PT taught pt's son, Fabiola Lenz, on w/c parts management of leg rests and brakes. Fabiola Lenz verbalized and demonstrated understanding. Pt then performed bed mobility at therapy gym low [facility]. PT demonstrated transfer from chair<>bed to educate son. Pt was CGA for sit>stand transfer. Cues to push up from arm rest.  Pt stand-pivot to bed with CGA-Laure to pivot on RLE and position hips to sit. Pt was SBA for all bed mobility this visit with cuing. Pt was CGA for sit>stand, CGA-Laure for stand-pivot and CGA for sitting back in chair. PT asked son, Fabiola Lenz, if he would like to try transferring pt. He agreed. Pt did not follow instructions and was not safe when transferring his father. PT had to jump into correct LoB and get pt safely sitting onto bed. PT educated son that he must have two hands on pt at his gait belt or waist band of pants so that he can have a good hold of patient and prevent falls. Fabiola Lenz was unable to follow recommendations and continued with one hand on pt for transfer back to w/c.  PT provided maximal verbal cuing, but son not receptive to feedback. PT stepped in to complete transfer to maintain safety. Pt ambulated 20' using hemiwalker and CGA-Laure. Pt had improved sequencing and increased stride length on this visit. No LoBs occurred, but pt remains unsteady. Pt needs hand on LLE to facilitate hip extension. Pt ambulates with slightly forward flexed posture and needs cues to stand up straight. W/c follow used. Cues to reach back for armrest, but pt ignored and sat down in w/c with CGA.   It is not recommended that family assist with ambulation secondary to patients instability with gait and high fall risk. A: Pt tolerated treatment well. He continues to struggle with w/c mobility and has a tough time combining the componenets of steering with RLE and propelling with RUE. He demonstrated improved bed mobility and was able to use RLE to assist LLE without assistance from PT. Pt is also walking and can better predict the next step in sequence with ambulation, however he still gets confused at times and needs cuing. Pt would continue to benefit from PT to increase activity tolerance and improve LE strength for improved functional mobility. Safety Device - Type of devices:  [x]  All fall risk precautions in place [] Bed alarm in place  [] Call light within reach [] Chair alarm in place [] Positioning belt [x] Gait belt [x] Patient at risk for falls [] Left in bed [x] Left in chair [] Telesitter in use [] Sitter present [] Nurse notified []  None     Goals  Short term goals  Time Frame for Short term goals: 2 weeks  Short term goal 1: bed mobility SBA  Short term goal 2: transfers CGA  Short term goal 3: amb x 20 ft with RW and Min A x 1  Short term goal 4: tolerate LE Ther ex for increased LE strength. Short term goal 5: curb step min A  Long term goals  Time Frame for Long term goals : 3-4 weeks  Long term goal 1: bed mobility SBA  Long term goal 2: transfers SBA  Long term goal 3: car transfer CGA  Long term goal 4: amb 48' with RW vs hemiwalker CGA to SBA  Long term goal 5: 4 steps B rails CGA  Patient Goals   Patient goals : pt's/family goal is some rehab before returning home.     Plan    Plan  Times per week: 5 to 6  Times per day: Twice a day  Plan weeks: 3 to 4  Current Treatment Recommendations: Functional Mobility Training, Balance Training, Endurance Training, ROM, Cognitive/Perceptual Training, Transfer Training, Gait Training, Stair training, IADL Training, Neuromuscular Re-education, Wheelchair Mobility Training, Strengthening, ADL/Self-care Training  Safety Devices  Type of devices: All fall risk precautions in place, Gait belt, Left in chair, Patient at risk for falls(Pt left in therapy gym with brakes locked for 15 minute break before OT session. Pt supervised throughout break.)  Restraints  Initially in place: No     Therapy Time   Individual Concurrent Group Co-treatment   Time In 0815         Time Out 0900         Minutes 39             Second Session Therapy Time     Individual Co-treatment   Time In 1300    Time Out 1345    Minutes 45       Electronically signed by GENE Lindsey on 10/14/2020 at 10:06 AM   Therapist was present, directed the patient's care, made skilled judgement, and was responsible for assessment and treatment of the patient.       Electronically signed by Anshu Benoit PT on 10/14/2020 at 5:03 PM

## 2020-10-15 LAB
ANION GAP SERPL CALCULATED.3IONS-SCNC: 12 MMOL/L (ref 3–16)
BASOPHILS ABSOLUTE: 0.1 K/UL (ref 0–0.2)
BASOPHILS RELATIVE PERCENT: 1.2 %
BUN BLDV-MCNC: 13 MG/DL (ref 7–20)
CALCIUM SERPL-MCNC: 9.1 MG/DL (ref 8.3–10.6)
CHLORIDE BLD-SCNC: 105 MMOL/L (ref 99–110)
CO2: 23 MMOL/L (ref 21–32)
CREAT SERPL-MCNC: 1.2 MG/DL (ref 0.8–1.3)
EOSINOPHILS ABSOLUTE: 0.2 K/UL (ref 0–0.6)
EOSINOPHILS RELATIVE PERCENT: 3.7 %
GFR AFRICAN AMERICAN: >60
GFR NON-AFRICAN AMERICAN: 59
GLUCOSE BLD-MCNC: 164 MG/DL (ref 70–99)
GLUCOSE BLD-MCNC: 193 MG/DL (ref 70–99)
GLUCOSE BLD-MCNC: 258 MG/DL (ref 70–99)
GLUCOSE BLD-MCNC: 300 MG/DL (ref 70–99)
GLUCOSE BLD-MCNC: 89 MG/DL (ref 70–99)
GLUCOSE BLD-MCNC: 97 MG/DL (ref 70–99)
HCT VFR BLD CALC: 40.2 % (ref 40.5–52.5)
HEMOGLOBIN: 13.3 G/DL (ref 13.5–17.5)
LYMPHOCYTES ABSOLUTE: 1.5 K/UL (ref 1–5.1)
LYMPHOCYTES RELATIVE PERCENT: 31.3 %
MCH RBC QN AUTO: 25.4 PG (ref 26–34)
MCHC RBC AUTO-ENTMCNC: 33 G/DL (ref 31–36)
MCV RBC AUTO: 77.2 FL (ref 80–100)
MONOCYTES ABSOLUTE: 0.4 K/UL (ref 0–1.3)
MONOCYTES RELATIVE PERCENT: 8 %
NEUTROPHILS ABSOLUTE: 2.6 K/UL (ref 1.7–7.7)
NEUTROPHILS RELATIVE PERCENT: 55.8 %
PDW BLD-RTO: 15.9 % (ref 12.4–15.4)
PERFORMED ON: ABNORMAL
PERFORMED ON: NORMAL
PERFORMED ON: NORMAL
PLATELET # BLD: 260 K/UL (ref 135–450)
PMV BLD AUTO: 7.1 FL (ref 5–10.5)
POTASSIUM REFLEX MAGNESIUM: 4 MMOL/L (ref 3.5–5.1)
RBC # BLD: 5.21 M/UL (ref 4.2–5.9)
SODIUM BLD-SCNC: 140 MMOL/L (ref 136–145)
WBC # BLD: 4.7 K/UL (ref 4–11)

## 2020-10-15 PROCEDURE — 94761 N-INVAS EAR/PLS OXIMETRY MLT: CPT

## 2020-10-15 PROCEDURE — 97530 THERAPEUTIC ACTIVITIES: CPT

## 2020-10-15 PROCEDURE — 36415 COLL VENOUS BLD VENIPUNCTURE: CPT

## 2020-10-15 PROCEDURE — 97116 GAIT TRAINING THERAPY: CPT | Performed by: PHYSICAL THERAPIST

## 2020-10-15 PROCEDURE — 97542 WHEELCHAIR MNGMENT TRAINING: CPT | Performed by: PHYSICAL THERAPIST

## 2020-10-15 PROCEDURE — 1280000000 HC REHAB R&B

## 2020-10-15 PROCEDURE — 97116 GAIT TRAINING THERAPY: CPT

## 2020-10-15 PROCEDURE — 85025 COMPLETE CBC W/AUTO DIFF WBC: CPT

## 2020-10-15 PROCEDURE — 97112 NEUROMUSCULAR REEDUCATION: CPT

## 2020-10-15 PROCEDURE — 80048 BASIC METABOLIC PNL TOTAL CA: CPT

## 2020-10-15 PROCEDURE — 6360000002 HC RX W HCPCS: Performed by: PHYSICAL MEDICINE & REHABILITATION

## 2020-10-15 PROCEDURE — 97110 THERAPEUTIC EXERCISES: CPT

## 2020-10-15 PROCEDURE — 97535 SELF CARE MNGMENT TRAINING: CPT

## 2020-10-15 PROCEDURE — 97530 THERAPEUTIC ACTIVITIES: CPT | Performed by: PHYSICAL THERAPIST

## 2020-10-15 PROCEDURE — 6370000000 HC RX 637 (ALT 250 FOR IP): Performed by: PHYSICAL MEDICINE & REHABILITATION

## 2020-10-15 RX ORDER — INSULIN GLARGINE 100 [IU]/ML
18 INJECTION, SOLUTION SUBCUTANEOUS NIGHTLY
Status: DISCONTINUED | OUTPATIENT
Start: 2020-10-15 | End: 2020-10-17 | Stop reason: HOSPADM

## 2020-10-15 RX ADMIN — SUCRALFATE 1 G: 1 TABLET ORAL at 14:41

## 2020-10-15 RX ADMIN — DICLOFENAC 4 G: 10 GEL TOPICAL at 10:23

## 2020-10-15 RX ADMIN — CLOPIDOGREL BISULFATE 75 MG: 75 TABLET ORAL at 08:07

## 2020-10-15 RX ADMIN — INSULIN LISPRO 4 UNITS: 100 INJECTION, SOLUTION INTRAVENOUS; SUBCUTANEOUS at 16:35

## 2020-10-15 RX ADMIN — LOSARTAN POTASSIUM 25 MG: 25 TABLET, FILM COATED ORAL at 08:10

## 2020-10-15 RX ADMIN — DICLOFENAC 4 G: 10 GEL TOPICAL at 20:26

## 2020-10-15 RX ADMIN — SUCRALFATE 1 G: 1 TABLET ORAL at 05:50

## 2020-10-15 RX ADMIN — TAMSULOSIN HYDROCHLORIDE 0.4 MG: 0.4 CAPSULE ORAL at 20:27

## 2020-10-15 RX ADMIN — DICLOFENAC 4 G: 10 GEL TOPICAL at 14:42

## 2020-10-15 RX ADMIN — SUCRALFATE 1 G: 1 TABLET ORAL at 20:27

## 2020-10-15 RX ADMIN — INSULIN LISPRO 3 UNITS: 100 INJECTION, SOLUTION INTRAVENOUS; SUBCUTANEOUS at 11:43

## 2020-10-15 RX ADMIN — DULOXETINE HYDROCHLORIDE 30 MG: 30 CAPSULE, DELAYED RELEASE ORAL at 08:10

## 2020-10-15 RX ADMIN — PANTOPRAZOLE SODIUM 40 MG: 40 TABLET, DELAYED RELEASE ORAL at 16:11

## 2020-10-15 RX ADMIN — INSULIN LISPRO 5 UNITS: 100 INJECTION, SOLUTION INTRAVENOUS; SUBCUTANEOUS at 11:41

## 2020-10-15 RX ADMIN — SENNOSIDES-DOCUSATE SODIUM TAB 8.6-50 MG 1 TABLET: 8.6-5 TAB at 08:13

## 2020-10-15 RX ADMIN — INSULIN LISPRO 5 UNITS: 100 INJECTION, SOLUTION INTRAVENOUS; SUBCUTANEOUS at 16:33

## 2020-10-15 RX ADMIN — PANTOPRAZOLE SODIUM 40 MG: 40 TABLET, DELAYED RELEASE ORAL at 05:50

## 2020-10-15 RX ADMIN — INSULIN LISPRO 1 UNITS: 100 INJECTION, SOLUTION INTRAVENOUS; SUBCUTANEOUS at 20:27

## 2020-10-15 RX ADMIN — ROSUVASTATIN CALCIUM 10 MG: 10 TABLET, FILM COATED ORAL at 08:10

## 2020-10-15 RX ADMIN — SENNOSIDES-DOCUSATE SODIUM TAB 8.6-50 MG 1 TABLET: 8.6-5 TAB at 20:27

## 2020-10-15 RX ADMIN — ENOXAPARIN SODIUM 40 MG: 40 INJECTION SUBCUTANEOUS at 20:26

## 2020-10-15 RX ADMIN — INSULIN GLARGINE 18 UNITS: 100 INJECTION, SOLUTION SUBCUTANEOUS at 20:27

## 2020-10-15 RX ADMIN — ASPIRIN 81 MG: 81 TABLET, COATED ORAL at 08:10

## 2020-10-15 ASSESSMENT — PAIN SCALES - GENERAL: PAINLEVEL_OUTOF10: 0

## 2020-10-15 NOTE — PROGRESS NOTES
Physical Therapy  Facility/Department: [address] REHAB  Daily Treatment Note  NAME: Preethi Sweeney  : 1945  MRN: [id number]    Date of Service: 10/15/2020    Discharge Recommendations:  Patient would benefit from continued therapy after discharge, Continue to assess pending progress, Home with Home health PT, 24 hour supervision or assist   PT Equipment Recommendations  Equipment Needed: Yes    Assessment   Body structures, Functions, Activity limitations: Decreased functional mobility ; Decreased balance;Decreased strength;Decreased ADL status; Decreased posture;Decreased safe awareness;Decreased endurance  Assessment: Pt able to ambulate multiple trials this morning with several different devices, use of LLE AFO to stabilize ankle. He required slightly more assist when ambulating 36' with [facility], but moved with improved posture. Pt's son able to assist pt with multiple squat-pivot transfers (3 trials) with supervision from PT. Anticipate return home this weekend. Pt is essentially functioning at wc level at this time d/t decreased strength/stability ambulating. PT Education: Goals; General Safety;PT Role;Orientation;Plan of Care  REQUIRES PT FOLLOW UP: Yes  Activity Tolerance  Activity Tolerance: Patient Tolerated treatment well;Patient limited by fatigue     Patient Diagnosis(es): There were no encounter diagnoses. has a past medical history of Cerebral artery occlusion with cerebral infarction ([address] [address] Diabetes mellitus ([address] [address] Gallstone, GERD (gastroesophageal reflux disease), Hyperlipidemia, Hypertension, and Renal insufficiency. has a past surgical history that includes Appendectomy; Cholecystectomy; Upper gastrointestinal endoscopy (2018); Upper gastrointestinal endoscopy (N/A, 2019); Upper gastrointestinal endoscopy (N/A, 2019); Upper gastrointestinal endoscopy (N/A, 2019);  Upper gastrointestinal endoscopy (N/A, 2020); and Colonoscopy (N/A, 5/8/2020). Restrictions  Restrictions/Precautions  Restrictions/Precautions: Fall Risk  Required Braces or Orthoses?: No  Position Activity Restriction  Other position/activity restrictions: L sided weakness; speaks [place] - uses  phone, family, and also in person interpretors     Subjective   General  Chart Reviewed: Yes  Additional Pertinent Hx: Per Dr. Filomena Alcaraz , \"The patient is a 76 y.o. male who presents to [facility] with unsteady on feet. Pt speaks Belarusian, son at bedside helping getting history. According to son/pt, pt apparently sustained a fall yesterday and felt like he couldn't move his left side. Today he had difficulty ambulating using his lt side and hence son brought pt to the ER\"  Diagnosed with CVA with L sided weakness. Response To Previous Treatment: Patient with no complaints from previous session. Family / Caregiver Present: Yes  Referring Practitioner: Phuong Oconnell  Subjective  Subjective: Pt agreeable to therapy. Son present for session. Anticipate probable D/C this weekend. Objective      Bed mobility  Sit to Supine: Contact guard assistance     Transfers  Sit to Stand: Contact guard assistance  Stand to sit: Contact guard assistance  Squat Pivot Transfers: Contact guard assistance(practiced x 2 trials moving L, x 2 trials moving R)     Ambulation  Ambulation?: Yes  More Ambulation?: Yes  Ambulation 1  Surface: level tile  Device: Parallel Bars  Other Apparatus: AFO  Assistance: Minimal assistance;Contact guard assistance  Quality of Gait: Pt able to ambulate short distance in // bars x 2 trials, forward/backward, no cues needed for LE sequencing, mild cueing/assist needed for LLE placement during backward walking.   Distance: 6' x 2    Ambulation 2  Surface - 2: level tile  Device 2: Gomez rail  Other Apparatus 2: Wheelchair follow;AFO  Assistance 2: Minimal assistance;Contact guard assistance  Quality of Gait 2: Pt able to ambulate with gomez rail support, then pyramid cane support, 20' each trial, requiring moderate cueing for LE sequencing, intermittent Min A to correct for lateral lean to L. Distance: 20' x 2    Ambulation 3  Surface - 3: level tile  Device 3: Large Aryan Phillip  Assistance 3: Moderate assistance;Minimal assistance  Quality of Gait 3: Pt able to ambulate increased distance with [facility] support, requiring max cues for LE sequencing, and to limit advancement of cane and RLE (stepping too far), but doing well to maintain balance throughout majority of ambulation with Min A. Distance: 36'  Stairs/Curb  Stairs?: No     Exercises  Comments: Pt completed L hip Flex AROM x 10, LLE LAQ x 5. Other Activities: Other (see comment)  Comment: PT requested pt's son demonstrate squat-pivot transfers, and bed mobility. Pt able to complete 3 transfers with assist from son (Min-CGA), supervision from PT, no knee buckling, mild posterior LOB. Pt able to complete bed mobility with cueing and CGA from son. Goals  Short term goals  Time Frame for Short term goals: 2 weeks  Short term goal 1: bed mobility SBA  Short term goal 2: transfers CGA  Short term goal 3: amb x 20 ft with RW and Min A x 1  Short term goal 4: tolerate LE Ther ex for increased LE strength. Short term goal 5: curb step min A  Long term goals  Time Frame for Long term goals : 3-4 weeks  Long term goal 1: bed mobility SBA  Long term goal 2: transfers SBA  Long term goal 3: car transfer CGA  Long term goal 4: amb 48' with RW vs hemiwalker CGA to SBA  Long term goal 5: 4 steps B rails CGA  Patient Goals   Patient goals : pt's/family goal is some rehab before returning home.     Plan    Plan  Times per week: 5 to 6  Times per day: Twice a day  Plan weeks: 3 to 4  Current Treatment Recommendations: Functional Mobility Training, Balance Training, Endurance Training, ROM, Cognitive/Perceptual Training, Transfer Training, Gait Training, Stair training, IADL Training, Neuromuscular Re-education, Wheelchair Mobility Training, Strengthening, ADL/Self-care Training  Safety Devices  Type of devices:  All fall risk precautions in place, Call light within reach, Bed alarm in place, Gait belt, Left in bed, Nurse notified  Restraints  Initially in place: No     Therapy Time   Individual Concurrent Group Co-treatment   Time In 1030         Time Out 1115         Minutes 45         Timed Code Treatment Minutes: [facility], PT    Electronically signed by Steve Arciniega, PT [id number] on 10/15/2020 at 11:30 AM

## 2020-10-15 NOTE — PLAN OF CARE
Problem: Falls - Risk of:  Goal: Will remain free from falls  Description: Will remain free from falls  10/15/2020 1358 by Becky Schultz RN  Outcome: Ongoing  Note: This am trying to get up, nurse had to sit with patient until son arrived, son reports patient thinks he can walk by himself. On waiting list for [facility].  10/15/2020 0147 by Napoleon Ormond, RN  Outcome: Ongoing  Note: Pt educated on falls precautions and safety. Call light and personal belongings within reach at all times. Non skid socks in use when up. Hourly rounding and alarms active. Goal: Absence of physical injury  Description: Absence of physical injury  10/15/2020 0147 by Napoleon Ormond, RN  Outcome: Ongoing     Problem: Skin Integrity:  Goal: Will show no infection signs and symptoms  Description: Will show no infection signs and symptoms  10/15/2020 1358 by Becky Schultz RN  Outcome: Ongoing  Note: No issues noted. 10/15/2020 0147 by Napoleon Ormond, RN  Outcome: Ongoing  Goal: Absence of new skin breakdown  Description: Absence of new skin breakdown  10/15/2020 0147 by Napoleon Ormond, RN  Outcome: Ongoing     Problem: Daily Care:  Goal: Daily care needs are met  Description: Daily care needs are met  10/15/2020 1358 by Becky Schultz RN  Outcome: Ongoing  Note: Cues for neglect, can be impulsive. 10/15/2020 0147 by Napoleon Ormond, RN  Outcome: Ongoing     Problem: Pain:  Goal: Patient's pain/discomfort is manageable  Description: Patient's pain/discomfort is manageable  10/15/2020 1358 by Becky Schultz RN  Outcome: Ongoing  Note: Relief with topical interventions, cues for left arm neglect. 10/15/2020 0147 by Napoleon Ormond, RN  Outcome: Ongoing  Goal: Pain level will decrease  Description: Pain level will decrease  10/15/2020 0147 by Napoleon Ormond, RN  Outcome: Ongoing  Note: Able to rate pain using a 1-10 scale, medicated per prn orders, see MAR.  Able to verbalize a reduction in pain and/or able to fall asleep and remain asleep without any s/s of pain    Goal: Control of acute pain  Description: Control of acute pain  10/15/2020 0147 by Batool Strickland RN  Outcome: Ongoing  Goal: Control of chronic pain  Description: Control of chronic pain  10/15/2020 0147 by Batool Strickland RN  Outcome: Ongoing     Problem: Discharge Planning:  Goal: Patients continuum of care needs are met  Description: Patients continuum of care needs are met  10/15/2020 1358 by Uday Cha RN  Outcome: Ongoing  Note: Discharge planned for Saturday, continue family education. 10/15/2020 0147 by Batool Strickland RN  Outcome: Ongoing     Problem: HEMODYNAMIC STATUS  Goal: Patient has stable vital signs and fluid balance  10/15/2020 0147 by Batool Strickland RN  Outcome: Ongoing     Problem: ACTIVITY INTOLERANCE/IMPAIRED MOBILITY  Goal: Mobility/activity is maintained at optimum level for patient  10/15/2020 1358 by Uday Cha RN  Outcome: Ongoing  Note: Did have to use stedy this am, very impulsive, son not here. 10/15/2020 0147 by Batool Strickland RN  Outcome: Ongoing     Problem: COMMUNICATION IMPAIRMENT  Goal: Ability to express needs and understand communication  10/15/2020 1358 by Uday Cha RN  Outcome: Ongoing  Note: Son will assist with communication, this am unable to use phone due to patient's impulsive behavior. 10/15/2020 0147 by Batool Strickland RN  Outcome: Ongoing     Problem: Serum Glucose Level - Abnormal:  Goal: Ability to maintain appropriate glucose levels will improve  Description: Ability to maintain appropriate glucose levels will improve  10/15/2020 1358 by Uday Cha RN  Outcome: Ongoing  Note: Ravindra Flores diabetic educator to see today.   10/15/2020 0147 by Batool Strickland RN  Outcome: Ongoing     Problem: IP SWALLOWING  Goal: LTG - patient will tolerate the least restrictive diet consistency to allow for safe consumption of daily meals  10/15/2020 1358 by Manuel Dumont RN  Outcome: Ongoing  Note: Cues to slow down with meal.  10/15/2020 0147 by Bernard Villasenor RN  Outcome: Ongoing  Goal: STG - Patient will follow recommended swallowing strategies  10/15/2020 0147 by Bernard Villasenor RN  Outcome: Ongoing

## 2020-10-15 NOTE — PROGRESS NOTES
Occupational Therapy  Facility/Department: [address] REHAB  Daily Treatment Note  NAME: Pollo Layton  : 1945  MRN: [id number]    Date of Service: 10/15/2020    Discharge Recommendations:  24 hour supervision or assist, Home with Home health OT, Continue to assess pending progress  OT Equipment Recommendations  Equipment Needed: Yes  Mobility Devices: ADL Assistive Devices  ADL Assistive Devices: Transfer Tub Bench; Toileting - 3-in-1 Commode  Other: gait belt    Assessment   Performance deficits / Impairments: Decreased functional mobility ; Decreased ADL status; Decreased ROM; Decreased strength;Decreased endurance;Decreased balance;Decreased coordination;Decreased safe awareness;Decreased cognition;Decreased sensation;Decreased posture  Assessment: Pt's son, Brenna Hunt, was present for family training during ADL this date. Pt demo'd good improvement in performing fxl transfers, able to complete all with CGA but continues to require cues for sequencing. Pt also becoming more impulsive as he wants to complete tasks without assistance. Pt completed grooming in stance with CGA/setup, UB dressing min A, LB dressing mod A, UB bathing min A, LB bathing SBA. Pt was able to assist more in all aspects of ADLs but continues to require reminders for alondra techniques. Pt's son observed all and provided appropriate cueing needed. OT did educate pt and his son again in need for hands-on assist for all transfers, mobility, ADLs. Continue to anticipate safe d/c home this weekend with pt functioning from w/c level. Treatment Diagnosis: decreased ADL, balance, Left sided function  Prognosis: Good  OT Education: OT Role;Plan of Care;Transfer Training;ADL Adaptive Strategies; Family Education  Patient Education: family training with son, alondra techniques for dressing  REQUIRES OT FOLLOW UP: Yes  Activity Tolerance  Activity Tolerance: Patient Tolerated treatment well  Safety Devices  Safety Devices in place: Yes  Type of devices: Call light within reach; Chair alarm in place; Left in chair;Gait belt;Nurse notified(seated in w/c)         Patient Diagnosis(es): There were no encounter diagnoses. has a past medical history of Cerebral artery occlusion with cerebral infarction ([facility] [address] Diabetes mellitus ([facility] [address] Gallstone, GERD (gastroesophageal reflux disease), Hyperlipidemia, Hypertension, and Renal insufficiency. has a past surgical history that includes Appendectomy; Cholecystectomy; Upper gastrointestinal endoscopy (06/08/2018); Upper gastrointestinal endoscopy (N/A, 2/28/2019); Upper gastrointestinal endoscopy (N/A, 4/23/2019); Upper gastrointestinal endoscopy (N/A, 4/23/2019); Upper gastrointestinal endoscopy (N/A, 4/29/2020); and Colonoscopy (N/A, 5/8/2020). Restrictions  Restrictions/Precautions  Restrictions/Precautions: Fall Risk  Required Braces or Orthoses?: No  Position Activity Restriction  Other position/activity restrictions: L sided weakness; speaks [place] - uses  phone, family, and also in person interpretors  Subjective   General  Chart Reviewed: Yes  Additional Pertinent Hx: Per Dr. Beto Rogel , \"The patient is a 76 y.o. male who presents to [facility] with unsteady on feet. Pt speaks Bulgarian, son at bedside helping getting history. According to son/pt, pt apparently sustained a fall yesterday and felt like he couldn't move his left side. Today he had difficulty ambulating using his lt side and hence son brought pt to the ER\"  Diagnosed with  Response to previous treatment: Patient with no complaints from previous session  Family / Caregiver Present: No  Referring Practitioner: Radha Bernstein  Diagnosis: Acute CVA - with left sided weakness  Subjective  Subjective: pt met b/s for OT. son present for family training. RN also present as pt has been trying to get up without assist this morning.  pt c/o pain in L UE but did not rate      Orientation     Objective    ADL  Grooming: Setup;Contact guard assistance(pt stood at sink to brush teeth with setup and CGA for balance)  UE Bathing: Minimal assistance;Setup;Verbal cueing(pt was able to reach with left hand to bathe right underarm (unable to grasp washcloth but used palm of hand) required assist for R shoulder)  LE Bathing: Stand by assistance;Verbal cueing;Setup  UE Dressing: Verbal cueing; Increased time to complete;Setup;Minimal assistance  LE Dressing: Moderate assistance;Setup;Verbal cueing(pt stood with CGA to pull pants down, then able to doff while seated. assist to doff left sock. pt able to thread briefs and pants over B LEs with max cues for alondra technique but required assist as long pants got stuck on toes. dep for footwear)  Toileting: (toileted prior to session)  Additional Comments: wet towel for non skid surface in shower. pt more impulsive this date as he is trying very hard to complete tasks without assistance and required safety cues. son present for family training, OT educated son in alondra techniques for dressing and how to engage L UE, son verbalized understanding but did not provide a lot of hands on assist        Balance  Sitting Balance: Supervision  Standing Balance: Contact guard assistance  Functional Mobility  Functional - Mobility Device: Wheelchair  Activity: To/from bathroom  Assist Level: Dependent/Total  Functional Mobility Comments: OT managed w/c during session, pt completed sit<>stand and stand pivot transfers  Shower Transfers  Shower - Transfer From: Wheelchair  Shower - Transfer Type: To and From  Shower - Transfer To: Transfer tub bench  Shower - Technique: Stand pivot  Shower Transfers: Contact Guard  Shower Transfers Comments: used grab bar  Wheelchair Bed Transfers  Wheelchair/Bed - Technique: Stand pivot  Equipment Used: Wheelchair; Other;Bed(arm chair)  Level of Asssistance: Contact guard assistance  Wheelchair Transfers Comments: bed > w/c > shower chair > w/c > arm chair all CGA with cues for hand placement and sequencing Transfers  Sit to stand: Contact guard assistance  Stand to sit: Contact guard assistance  Transfer Comments: use of grab bars or sink                       Cognition  Overall Cognitive Status: Exceptions  Following Commands: Follows one step commands consistently  Attention Span: Attends with cues to redirect  Safety Judgement: Decreased awareness of need for safety  Problem Solving: Assistance required to implement solutions;Assistance required to generate solutions;Assistance required to identify errors made  Insights: Fully aware of deficits  Initiation: Requires cues for some  Sequencing: Requires cues for some         PM Session:  Subjective: pt met in dept for OT. Use of  phone throughout session. Pt agreeable to therapy, c/o pain in chest but did not rate    Objective: Therex: pt had increased difficulty following commands for therex this date, so required increased assist for AAROM. Completed x10 reps: shoulder elevation/depression, shoulder protraction/retraction, supination/pronation, elbow flexion/extension. SROM wrist flexion/extension, PROM finger flexion/extension. Neuromuscular re-ed: pt required mod-max cues to sequence performing sit>stand to tabletop. Unclear if pt unable to understand  or having difficulty following commands. Pt stood x5 mins at tabletop with CGA, pt alternated using left and right hand to slide bean bags across tabletop. Pt required tactile cues/assist to position L UE on the table. Pt works very hard, but frustrated with left hand function. Pt was having increased tone in left digits and required assist from OT to extend fingers and try to reduce the tone. Pt was returned to his room in w/c. Pt completed stand pivot from w/c > arm chair with CGA. Pt was left positioned in his chair for comfort with alarm engaged and needs in reach. Assessment: Pt tolerated tx fair.  Pt had increased difficulty following commands this date, and the OTR/L [id number]

## 2020-10-15 NOTE — CARE COORDINATION
[facility] Unable to staff this referral.  Call placed to Derrick and met him in the room to review the list again. LSW informed him of CMS star rating for each facility. He chose:   [facility] or [address]. Referral sent to Cyrus Sanchez asks that I update his CM at [facility] [phone]. LSW called her to update. She will work on increasing his services at home.   [place], [place]     Case Management   [phone]    10/15/2020  3:15 PM

## 2020-10-15 NOTE — CARE COORDINATION
[facility] denied referral.  LSW sent to [facility] , spoke ot Stacia Workman who will review the referral.  [place], [place]     Case Management   [phone]    10/15/2020  3:21 PM

## 2020-10-15 NOTE — CARE COORDINATION
[address] [facility]  Diabetes Education   Progress Note       NAME:  Yuly [facility] RECORD NUMBER:  [id number]  AGE: 76 y.o. GENDER: male  : 1945  TODAY'S DATE:  10/15/2020    Subjective   Reason for Diabetic Education Evaluation and Assessment: discharge planning     Education session with son. Multiple family members assist with insulin administration, glucose monitoring and meal planning.       Visit Type: follow-up      Bishop Camacho is a 76 y.o. male referred by:     [] Physician  [x] Nursing  [] Chart Review   [] Other:     PAST MEDICAL HISTORY        Diagnosis Date    Cerebral artery occlusion with cerebral infarction [facility]     Diabetes mellitus ([facility]     Gallstone     GERD (gastroesophageal reflux disease)     Hyperlipidemia     Hypertension     Renal insufficiency        PAST SURGICAL HISTORY    Past Surgical History:   Procedure Laterality Date    APPENDECTOMY      CHOLECYSTECTOMY      COLONOSCOPY N/A 2020    COLONOSCOPY POLYPECTOMY SNARE/COLD BIOPSY performed by Silvia Whitman MD at [address]  2018    UPPER GASTROINTESTINAL ENDOSCOPY N/A 2019    EGD W/EUS FNA performed by Annalise Bentley MD at [address] [facility] N/A 2019    EGD BIOPSY performed by Annalise Bentley MD [facility] [address] [facility] N/A 2019    EGD W/EUS FNA performed by Annalise Bentley MD at [address] [facility] N/A 2020    EGD BIOPSY performed by Silvia Whitman MD at [address] [facility]    Family History   Problem Relation Age of Onset    No Known Problems Mother     No Known Problems Father        SOCIAL HISTORY    Social History     Tobacco Use    Smoking status: Never Smoker    Smokeless tobacco: Never Used   Substance Use Topics    Alcohol use: No    Drug use: No       ALLERGIES    Allergies   Allergen Reactions    Atorvastatin Other (See Comments)     Myalgia -- stopped 11/2019 to see if pain improves. Possible PMR diagnosis as well. Will follow-up in 3 mo.        MEDICATIONS     insulin glargine  18 Units Subcutaneous Nightly    insulin lispro  0-6 Units Subcutaneous TID WC    insulin lispro  0-3 Units Subcutaneous Nightly    losartan  25 mg Oral Daily    insulin lispro  5 Units Subcutaneous Lunch    insulin lispro  5 Units Subcutaneous Dinner    lidocaine  1 patch Transdermal Daily    insulin lispro  10 Units Subcutaneous Daily with breakfast    diclofenac sodium  4 g Topical TID    aspirin  81 mg Oral Daily    Or    aspirin  300 mg Rectal Daily    enoxaparin  40 mg Subcutaneous Nightly    sennosides-docusate sodium  1 tablet Oral BID    clopidogrel  75 mg Oral Daily    DULoxetine  30 mg Oral Daily    pantoprazole  40 mg Oral BID AC    rosuvastatin  10 mg Oral Daily    sucralfate  1 g Oral 3 times per day    tamsulosin  0.4 mg Oral Nightly       Objective        Patient Active Problem List   Diagnosis Code    Chest pain R07.9    Hypertension I10    Hyperlipidemia E78.5    Diabetes mellitus (HCC) E11.9    Unexplained weight loss R63.4    Esophagitis K20.90    Granulomatous adenopathy R59.9    Mediastinal lymphadenopathy R59.0    History of arterial ischemic stroke Z86.73    Abdominal pain R10.9    Acute cerebrovascular accident ([address] I63.9    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 Yes, Isra Alonzo       Does the patient require new medication instruction? Yes  Reviewed current insulin orders. Reviewed blood sugar trends and insulin doses on the Glucose Accord. Discussed basal and prandial insulin concepts. Person responsible for administration of Insulin/Medication:        [] Self     [] Caregiver       [] Spouse       [x] Other Family Member   []  Other    Level of patient/caregiver understanding able to:       [x] Verbalized Understanding   [] Demonstrated Understanding       [] Teach Back       [] Needs Reinforcement     []  Other:        Does the patient/caregiver monitor Blood Glucoses? Yes  Recommend testing before meals and bedtime. Does the patient/caregiver follow a Meal Plan? No: Drinks mostly water at home. LIkes breakfast foods in the hospital more than other options. Recommend making water, unsweetened tea or coffee primary drinks. Reviewed importance of eating three meals per day and plate method for consistent carb intake. Level of patient/caregiver understanding able to:       [x] Verbalized Understanding   [] Demonstrated Understanding       [] Teach Back       [] Needs Reinforcement     []  Other:      Does the patient/caregiver understand S/S of Hypoglycemia? Yes. Repots an episode of < 40 at home. Dat Galicia describes being dizzy when hypo. Reviewed symptoms, prevention and treatment. Level of patient/caregiver understanding able to:       [x] Verbalized Understanding   [] Demonstrated Understanding       [] Teach Back       [] Needs Reinforcement     []  Other:        Plan        Ongoing diabetes education and blood glucose monitoring.       The following educational and support materials were provided:  · My contact information  · MY Healthy Plate      Teaching Time Diabetes Education:  25 minutes     Electronically signed by Soraya Gallego on 10/15/2020 at 3:31 PM

## 2020-10-15 NOTE — PROGRESS NOTES
PHYSICAL THERAPY  Progress Note   Second Session    Patient Name: Cary Mark  Medical Record Number: [id number]    Treatment Diagnosis: decreased functional mobility following CVA with L alondra      Chart Reviewed: Yes   Restrictions/Precautions: Fall Risk Other position/activity restrictions: L sided weakness; speaks [place] - uses  phone, family, and also in person interpretors   Additional Pertinent Hx: Per Dr. Bessy Ashley , \"The patient is a 76 y.o. male who presents to [facility] with unsteady on feet. Pt speaks English, son at bedside helping getting history. According to son/pt, pt apparently sustained a fall yesterday and felt like he couldn't move his left side. Today he had difficulty ambulating using his lt side and hence son brought pt to the ER\"  Diagnosed with CVA with L sided weakness. Subjective  Pt agreeable to therapy. In bed and PT contacted  via phone. Patient requested to use the bathroom. Objective  Supine to sit with min A. Transfer bed to [facility] to R via stand pivot with min A. Mod A to assist patient to maneuver WC into the bathroom and eventually set up [facility] for transfer to commode. Transfer WC to/from toilet with grab bar and mod A, dependent for clothing management and pericare - passed gas, no BM, urination only. Propelled ' with RUE and RLE with generally min A to assist with maintaining straight path and up to min/mod A to transverse threshold and make sharp turns. Sit to stand with min A. Ambulated 10' x 2 with L AFO, small base quad cane, Bobath sling to support LUE, and min/mod A, especially with turning to sit up to mod A with poor motor planning and stepping too close to side of mat making difficult for PT to assist.   Sit to/from supine with pillows to R with CGA/min A for LLE. Transitioned care to [place].      Assessment: Pt able to ambulate multiple trials this morning with several different devices, use of LLE AFO to stabilize ankle. He required slightly more assist when ambulating 36' with [facility], but moved with improved posture. Pt's son able to assist pt with multiple squat-pivot transfers (3 trials) with supervision from PT. Anticipate return home this weekend. Pt is essentially functioning at  level at this time d/t decreased strength/stability ambulating.       Safety Device - Type of devices:  [x]  All fall risk precautions in place [] Bed alarm in place  [] Call light within reach [] Chair alarm in place [] Positioning belt [x] Gait belt [] Patient at risk for falls [] Left in bed [] Left in chair [] Telesitter in use [] Sitter present [] Nurse notified []  None      Therapy Time   Individual Co-treatment   Time In 1300    Time Out 1345    Minutes 45        Electronically signed by Siobhan Conn PT #[id number]  on 10/15/2020 at 1:06 PM

## 2020-10-15 NOTE — CONSULTS
Nutrition Note    Consult for \"[facility] specific diabetes resources \". We do not have resources that are [facility] specific d/t such small population. Unable to provide useful handouts at this time.      Electronically signed by Sumi Thomas RD, LD on 10/15/20 at 8:13 AM EDT    Contact: [phone]

## 2020-10-15 NOTE — PROGRESS NOTES
Department of Physical Medicine & Rehabilitation  Progress Note    Patient Identification:  Gonsalo Boyce  [id number]  : 1945  Admit date: 2020    Chief Complaint: Right pontine stroke [facility])    Subjective:   No acute events overnight. Patient seen this am working with PT. Ambulating farther distances, still requiring physical assist and cues for sequencing. I encouraged son to get more involved with therapy to help prepare for discharge. Patient reports some muscle soreness. States cough is resolved today. ROS: No f/c, n/v, cp     Objective:  Patient Vitals for the past 24 hrs:   BP Temp Temp src Pulse Resp SpO2 Weight   10/15/20 0805 132/79 98 °F (36.7 °C) Oral 79 18 94 % --   10/15/20 0417 125/79 98.3 °F (36.8 °C) Oral 76 16 95 % 128 lb 4.9 oz (58.2 kg)   10/15/20 0009 121/78 98.1 °F (36.7 °C) Oral 79 16 91 % --   10/14/20 1911 (!) 146/85 98.1 °F (36.7 °C) Oral 79 16 96 % --   10/14/20 1520 138/72 98.3 °F (36.8 °C) Oral 82 16 97 % --   10/14/20 1214 -- -- -- -- -- 95 % --     Const: Alert. No distress, pleasant. HEENT: Normocephalic, atraumatic. Normal sclera/conjunctiva. MMM. CV: Regular rate and rhythm. Resp: No respiratory distress. Lungs CTAB. Abd: Soft, nontender, nondistended, NABS+   Ext: No edema. Neuro: Alert, orientation/cognition more appropriate, speech no longer dysarthric per . + left facial droop, left hemiparesis (overall 1-2/5 in LUE, 3/5 in LLE). Psych: Cooperative, appropriate mood and affect    Laboratory data: Available via EMR.    Last 24 hour lab  Recent Results (from the past 24 hour(s))   POCT Glucose    Collection Time: 10/14/20  4:47 PM   Result Value Ref Range    POC Glucose 92 70 - 99 mg/dl    Performed on ACCU-CHEK    POCT Glucose    Collection Time: 10/14/20  8:39 PM   Result Value Ref Range    POC Glucose 188 (H) 70 - 99 mg/dl    Performed on ACCU-CHEK    POCT Glucose    Collection Time: 10/15/20  2:15 AM   Result Value Ref Range    POC Glucose 97 70 - 99 mg/dl    Performed on ACCU-CHEK    POCT Glucose    Collection Time: 10/15/20  7:03 AM   Result Value Ref Range    POC Glucose 89 70 - 99 mg/dl    Performed on ACCU-CHEK    Basic Metabolic Panel w/ Reflex to MG    Collection Time: 10/15/20  7:46 AM   Result Value Ref Range    Sodium 140 136 - 145 mmol/L    Potassium reflex Magnesium 4.0 3.5 - 5.1 mmol/L    Chloride 105 99 - 110 mmol/L    CO2 23 21 - 32 mmol/L    Anion Gap 12 3 - 16    Glucose 164 (H) 70 - 99 mg/dL    BUN 13 7 - 20 mg/dL    CREATININE 1.2 0.8 - 1.3 mg/dL    GFR Non- 59 (A) >60    GFR African American >60 >60    Calcium 9.1 8.3 - 10.6 mg/dL   CBC Auto Differential    Collection Time: 10/15/20  7:46 AM   Result Value Ref Range    WBC 4.7 4.0 - 11.0 K/uL    RBC 5.21 4.20 - 5.90 M/uL    Hemoglobin 13.3 (L) 13.5 - 17.5 g/dL    Hematocrit 40.2 (L) 40.5 - 52.5 %    MCV 77.2 (L) 80.0 - 100.0 fL    MCH 25.4 (L) 26.0 - 34.0 pg    MCHC 33.0 31.0 - 36.0 g/dL    RDW 15.9 (H) 12.4 - 15.4 %    Platelets 898 965 - 392 K/uL    MPV 7.1 5.0 - 10.5 fL    Neutrophils % 55.8 %    Lymphocytes % 31.3 %    Monocytes % 8.0 %    Eosinophils % 3.7 %    Basophils % 1.2 %    Neutrophils Absolute 2.6 1.7 - 7.7 K/uL    Lymphocytes Absolute 1.5 1.0 - 5.1 K/uL    Monocytes Absolute 0.4 0.0 - 1.3 K/uL    Eosinophils Absolute 0.2 0.0 - 0.6 K/uL    Basophils Absolute 0.1 0.0 - 0.2 K/uL   POCT Glucose    Collection Time: 10/15/20 11:30 AM   Result Value Ref Range    POC Glucose 258 (H) 70 - 99 mg/dl    Performed on ACCU-CHEK        Therapy progress:  PT  Position Activity Restriction  Other position/activity restrictions: L sided weakness; speaks [place] - uses  phone, family, and also in person interpretors  Objective     Sit to Stand: Contact guard assistance  Stand to sit: Contact guard assistance  Bed to Chair: Minimal assistance(Needs assistance to shift hips and facilitate turning.   Also requires cuing for hand placement)  Device: Parallel Bars  Other Apparatus: AFO  Assistance: Minimal assistance, Contact guard assistance  Distance: 6' x 2  OT  PT Equipment Recommendations  Equipment Needed: Yes  Mobility Devices: Wheelchair  Wheelchair: Standard  Other: standard wheelchair with standard cushion, standard leg rests, removable arm rests  Toilet - Technique: Stand pivot  Equipment Used: Standard bedside commode  Toilet Transfers Comments: completed stand pivot from EOB <> BSC x2 trials with grandson assisting with 2nd transfer. consistently CGA to the right and min A to the left with mod/max verbal and tactile cues for hand placement  Assessment        SLP  Diet Solids Recommendation: Dysphagia Minced and Moist (Dysphagia II)  Liquid Consistency Recommendation: Thin    Body mass index is 22.73 kg/m².     Assessment and Plan:    Impairments: left hemiparesis, decreased coordination, balance, endurance, cognition, dysarthria, dysphagia     Acute ischemic stroke  -Localization: Right son, posterior centrum semiolave, left frontal lobe deep white matter  -Etiology: small vessel vs embolic  -Telemetry in ARU, then event monitor at discharge  -Secondary ppx with DAPT x 3 weeks (then Plavix alone), statin, BP and glucose control  -PT/OT/SLP    Spasticity  -Emerging tone in LUE  -Monitor, consider medication     H/o left PCA stroke with severe stenosis  -Secondary ppx as above     Dysphagia   -Dietitian and SLP consults.   -Upgrade to regular + thins     HTN, uncontrolled  -Now improved on losartan 25 mg      DM2, uncontrolled/labile  -Lantus decreased to 18 units qhs, prandial 5/5/10 with hold parameters, low dose ISS  -Diabetes educator consulted, appreciate input     CKD  -Avoid nephrotoxins, renally dose meds  -Monitor      L1 compression fracture  -Pain control  -PT/OT    Cough  -CXR likely atelectasis  -WBC normal, no fever or clinical signs of pneumonia  -IS     Bladder   -High risk retention   -Monitor PVRs, SC prn >300cc  -Flomax     Bowel   -High risk constipation   -senna+colace BID, PRN miralax, MoM, and bisacodyl supp.     Pain control  -prn tramadol  -diclofenac gel for shoulder and knee pain     PPx  -DVT: lovenox  -GI: pantoprazole    Rehab Progress:  Making steady progress. Working on left side function, functional mobility, compensatory strategies (will be primarily wheelchair for mobility). Now overall Alondra for mobility and supervision-maxA for ADLs. SLP reporting improvements in aphasia/apraxia, dysarthria, dysphagia, cognition -- approaching baseline. Anticipated Dispo: home with family, 24/7 assist  Services:  PT, OT, RN, Aide  DME: wheelchair, alondra-walker (for therapy), BSC, TTB  ELOS: 10/17      Caden Mullen MD 10/15/2020, 11:59 AM

## 2020-10-15 NOTE — PROGRESS NOTES
Pt has slept well overnight. Up x1 to the bathroom. Remained continent of urine. Denied pain. NICOLE cam removed form the bedside d/t needing it in another room. Bed alarm active. All needs assessed with rounding. Call light in reach at all times.

## 2020-10-15 NOTE — PLAN OF CARE
Problem: Falls - Risk of:  Goal: Will remain free from falls  Description: Will remain free from falls  Outcome: Ongoing  Note: Pt educated on falls precautions and safety. Call light and personal belongings within reach at all times. Non skid socks in use when up. Hourly rounding and alarms active. Problem: Pain:  Goal: Pain level will decrease  Description: Pain level will decrease  Outcome: Ongoing  Note: Able to rate pain using a 1-10 scale, medicated per prn orders, see MAR.  Able to verbalize a reduction in pain and/or able to fall asleep and remain asleep without any s/s of pain

## 2020-10-16 LAB
GLUCOSE BLD-MCNC: 172 MG/DL (ref 70–99)
GLUCOSE BLD-MCNC: 176 MG/DL (ref 70–99)
GLUCOSE BLD-MCNC: 203 MG/DL (ref 70–99)
GLUCOSE BLD-MCNC: 96 MG/DL (ref 70–99)
PERFORMED ON: ABNORMAL
PERFORMED ON: NORMAL

## 2020-10-16 PROCEDURE — 97112 NEUROMUSCULAR REEDUCATION: CPT

## 2020-10-16 PROCEDURE — 97535 SELF CARE MNGMENT TRAINING: CPT

## 2020-10-16 PROCEDURE — 97530 THERAPEUTIC ACTIVITIES: CPT

## 2020-10-16 PROCEDURE — 6370000000 HC RX 637 (ALT 250 FOR IP): Performed by: PHYSICAL MEDICINE & REHABILITATION

## 2020-10-16 PROCEDURE — 97116 GAIT TRAINING THERAPY: CPT

## 2020-10-16 PROCEDURE — 6360000002 HC RX W HCPCS: Performed by: PHYSICAL MEDICINE & REHABILITATION

## 2020-10-16 PROCEDURE — 1280000000 HC REHAB R&B

## 2020-10-16 PROCEDURE — 94760 N-INVAS EAR/PLS OXIMETRY 1: CPT

## 2020-10-16 PROCEDURE — 97110 THERAPEUTIC EXERCISES: CPT

## 2020-10-16 RX ORDER — ROSUVASTATIN CALCIUM 10 MG/1
10 TABLET, COATED ORAL DAILY
Qty: 30 TABLET | Refills: 0 | Status: SHIPPED | OUTPATIENT
Start: 2020-10-16

## 2020-10-16 RX ORDER — DULOXETIN HYDROCHLORIDE 30 MG/1
30 CAPSULE, DELAYED RELEASE ORAL DAILY
Qty: 30 CAPSULE | Refills: 0 | Status: SHIPPED | OUTPATIENT
Start: 2020-10-16

## 2020-10-16 RX ORDER — TAMSULOSIN HYDROCHLORIDE 0.4 MG/1
0.4 CAPSULE ORAL NIGHTLY
Qty: 30 CAPSULE | Refills: 0 | Status: SHIPPED | OUTPATIENT
Start: 2020-10-16

## 2020-10-16 RX ORDER — INSULIN GLARGINE 100 [IU]/ML
18 INJECTION, SOLUTION SUBCUTANEOUS NIGHTLY
Qty: 3 PEN | Refills: 0 | Status: SHIPPED | OUTPATIENT
Start: 2020-10-16

## 2020-10-16 RX ORDER — INSULIN LISPRO 100 [IU]/ML
INJECTION, SOLUTION INTRAVENOUS; SUBCUTANEOUS
Qty: 3 PEN | Refills: 0 | Status: SHIPPED | OUTPATIENT
Start: 2020-10-16

## 2020-10-16 RX ORDER — LIDOCAINE 4 G/G
1 PATCH TOPICAL DAILY PRN
Qty: 1 BOX | Refills: 0 | COMMUNITY
Start: 2020-10-16

## 2020-10-16 RX ORDER — LOSARTAN POTASSIUM 25 MG/1
25 TABLET ORAL DAILY
Qty: 30 TABLET | Refills: 0 | Status: SHIPPED | OUTPATIENT
Start: 2020-10-16

## 2020-10-16 RX ORDER — CLOPIDOGREL BISULFATE 75 MG/1
75 TABLET ORAL DAILY
Qty: 30 TABLET | Refills: 0 | Status: SHIPPED | OUTPATIENT
Start: 2020-10-16

## 2020-10-16 RX ADMIN — DICLOFENAC 4 G: 10 GEL TOPICAL at 12:58

## 2020-10-16 RX ADMIN — TAMSULOSIN HYDROCHLORIDE 0.4 MG: 0.4 CAPSULE ORAL at 20:38

## 2020-10-16 RX ADMIN — DICLOFENAC 4 G: 10 GEL TOPICAL at 20:38

## 2020-10-16 RX ADMIN — PANTOPRAZOLE SODIUM 40 MG: 40 TABLET, DELAYED RELEASE ORAL at 17:35

## 2020-10-16 RX ADMIN — SUCRALFATE 1 G: 1 TABLET ORAL at 20:38

## 2020-10-16 RX ADMIN — SUCRALFATE 1 G: 1 TABLET ORAL at 05:20

## 2020-10-16 RX ADMIN — DULOXETINE HYDROCHLORIDE 30 MG: 30 CAPSULE, DELAYED RELEASE ORAL at 07:54

## 2020-10-16 RX ADMIN — INSULIN LISPRO 1 UNITS: 100 INJECTION, SOLUTION INTRAVENOUS; SUBCUTANEOUS at 07:56

## 2020-10-16 RX ADMIN — LOSARTAN POTASSIUM 25 MG: 25 TABLET, FILM COATED ORAL at 07:54

## 2020-10-16 RX ADMIN — ASPIRIN 81 MG: 81 TABLET, COATED ORAL at 07:54

## 2020-10-16 RX ADMIN — INSULIN LISPRO 5 UNITS: 100 INJECTION, SOLUTION INTRAVENOUS; SUBCUTANEOUS at 17:35

## 2020-10-16 RX ADMIN — INSULIN LISPRO 2 UNITS: 100 INJECTION, SOLUTION INTRAVENOUS; SUBCUTANEOUS at 11:48

## 2020-10-16 RX ADMIN — INSULIN LISPRO 5 UNITS: 100 INJECTION, SOLUTION INTRAVENOUS; SUBCUTANEOUS at 11:48

## 2020-10-16 RX ADMIN — ENOXAPARIN SODIUM 40 MG: 40 INJECTION SUBCUTANEOUS at 20:38

## 2020-10-16 RX ADMIN — SUCRALFATE 1 G: 1 TABLET ORAL at 12:11

## 2020-10-16 RX ADMIN — INSULIN GLARGINE 18 UNITS: 100 INJECTION, SOLUTION SUBCUTANEOUS at 20:38

## 2020-10-16 RX ADMIN — ROSUVASTATIN CALCIUM 10 MG: 10 TABLET, FILM COATED ORAL at 07:54

## 2020-10-16 RX ADMIN — SENNOSIDES-DOCUSATE SODIUM TAB 8.6-50 MG 1 TABLET: 8.6-5 TAB at 07:54

## 2020-10-16 RX ADMIN — INSULIN LISPRO 1 UNITS: 100 INJECTION, SOLUTION INTRAVENOUS; SUBCUTANEOUS at 20:38

## 2020-10-16 RX ADMIN — PANTOPRAZOLE SODIUM 40 MG: 40 TABLET, DELAYED RELEASE ORAL at 05:20

## 2020-10-16 RX ADMIN — SENNOSIDES-DOCUSATE SODIUM TAB 8.6-50 MG 1 TABLET: 8.6-5 TAB at 20:38

## 2020-10-16 RX ADMIN — CLOPIDOGREL BISULFATE 75 MG: 75 TABLET ORAL at 07:54

## 2020-10-16 RX ADMIN — INSULIN LISPRO 10 UNITS: 100 INJECTION, SOLUTION INTRAVENOUS; SUBCUTANEOUS at 07:56

## 2020-10-16 ASSESSMENT — PAIN SCALES - GENERAL
PAINLEVEL_OUTOF10: 0

## 2020-10-16 NOTE — CARE COORDINATION
LSW was informed by Luisana Hester that they worked in therapy today with a [employer] quad cane with the son, Bria Martinez. Therapy recommends no ambulation at home but family may want to buy a LBQC. Call placed to [address] [place] [address] who reports out of pocket cost is $39.28.     THey would need to purchase it today while rep is in the building until 5pm.  Call placed to son, Bria Martinez; message left for him.   Ramona Ramos [place]     Case Management   [phone]    10/16/2020  12:11 PM

## 2020-10-16 NOTE — CARE COORDINATION
SOCIAL WORK DISCHARGE SUMMARY:      DISCHARGE DATE:                 Saturday, 10-      DISCHARGE PLACE:                Home                 HOME CARE AGENCY:            [facility]             PHONE NUMBER          [phone]             FAX NUMBER                 [phone]      TRANSPORTATION:                son             TIME:                              10-12      PREFERRED PHARMACY:     [facility]             NUMBER:                          md orders:   Sn/pt/ot/hha    Dme:  Wheelchair, ttb, tsf, gait belt    [facility]     Case Management   [phone]    10/16/2020  2:25 PM

## 2020-10-16 NOTE — PROGRESS NOTES
Call placed to [facility] regarding event monitor per order. Royce Coley will see that patient receives it before discharge.

## 2020-10-16 NOTE — PLAN OF CARE
Problem: Falls - Risk of:  Goal: Will remain free from falls  Description: Will remain free from falls  10/16/2020 1213 by Ross Calvin RN  Outcome: Ongoing     Problem: Skin Integrity:  Goal: Absence of new skin breakdown  Description: Absence of new skin breakdown  10/16/2020 1213 by Ross Calvin RN  Outcome: Ongoing     Problem: Pain:  Goal: Control of chronic pain  Description: Control of chronic pain  10/16/2020 1213 by Ross Calvin RN  Outcome: Ongoing     Problem: ACTIVITY INTOLERANCE/IMPAIRED MOBILITY  Goal: Mobility/activity is maintained at optimum level for patient  10/16/2020 1213 by Ross Calvin RN  Outcome: Ongoing     Problem: Serum Glucose Level - Abnormal:  Goal: Ability to maintain appropriate glucose levels will improve  Description: Ability to maintain appropriate glucose levels will improve  10/16/2020 1213 by Ross Calvin RN  Outcome: Ongoing

## 2020-10-16 NOTE — PROGRESS NOTES
Occupational Therapy  Facility/Department: [address] REHAB  Daily Treatment Note/Discharge summary  NAME: Bijan Garcia  : 1945  MRN: [id number]    Date of Service: 10/16/2020    Discharge Recommendations:  24 hour supervision or assist, Home with Home health OT, S Level 1  OT Equipment Recommendations  Equipment Needed: Yes  Mobility Devices: ADL Assistive Devices  ADL Assistive Devices: Transfer Tub Bench; Toileting - 3-in-1 Commode  Other: gait belt    Assessment   Performance deficits / Impairments: Decreased functional mobility ; Decreased ADL status; Decreased ROM; Decreased strength;Decreased endurance;Decreased balance;Decreased coordination;Decreased safe awareness;Decreased cognition;Decreased sensation;Decreased posture  Assessment: Pt tolerated tx well and made good improvement in ADL performance. Pt more impulsive this date and not listening to commands, so required min A for safe fxl transfers. Pt completed toileting with CGA, grooming with setup, UB bathing SBA, LB bathing SBA, UB dressing min A, LB dressing mod A. Pt's son has participated in family training and will be able to provide the physical assist needed, though still have some concerns with pt's safety d/t pt's decreased insight. Recommend 24/7 hands on assistance at home for all fxl transfers and ADLs,  HH OT L1. Treatment Diagnosis: decreased ADL, balance, Left sided function  Prognosis: Good  OT Education: OT Role;Plan of Care;Transfer Training;ADL Adaptive Strategies  Patient Education: ADL retraining  REQUIRES OT FOLLOW UP: Yes  Activity Tolerance  Activity Tolerance: Patient Tolerated treatment well  Safety Devices  Safety Devices in place: Yes  Type of devices: Call light within reach; Chair alarm in place; Left in chair;Gait belt;Nurse notified         Patient Diagnosis(es): The encounter diagnosis was Right pontine stroke ([facility] [address]       has a past medical history of Cerebral artery occlusion with cerebral infarction ([facility] [address] Diabetes mellitus ([facility] [address] Gallstone, GERD (gastroesophageal reflux disease), Hyperlipidemia, Hypertension, and Renal insufficiency. has a past surgical history that includes Appendectomy; Cholecystectomy; Upper gastrointestinal endoscopy (06/08/2018); Upper gastrointestinal endoscopy (N/A, 2/28/2019); Upper gastrointestinal endoscopy (N/A, 4/23/2019); Upper gastrointestinal endoscopy (N/A, 4/23/2019); Upper gastrointestinal endoscopy (N/A, 4/29/2020); and Colonoscopy (N/A, 5/8/2020). Restrictions  Restrictions/Precautions  Restrictions/Precautions: Fall Risk  Required Braces or Orthoses?: No  Position Activity Restriction  Other position/activity restrictions: L sided weakness; speaks [place] - uses  phone, family, and also in person interpretors  Subjective   General  Chart Reviewed: Yes  Additional Pertinent Hx: Per Dr. Filomena Alcaraz , \"The patient is a 76 y.o. male who presents to [facility] with unsteady on feet. Pt speaks Italian, son at bedside helping getting history. According to son/pt, pt apparently sustained a fall yesterday and felt like he couldn't move his left side. Today he had difficulty ambulating using his lt side and hence son brought pt to the ER\"  Diagnosed with  Response to previous treatment: Patient with no complaints from previous session  Family / Caregiver Present: No  Referring Practitioner: Phuong Oconnell  Diagnosis: Acute CVA - with left sided weakness  Subjective  Subjective: pt met b/s. pt agreeable to shower, no c/o pain.  pt excited to hear he is going home tomorrow      Orientation     Objective    ADL  Grooming: Setup(pt performed oral care with setup)  UE Bathing: Stand by assistance;Verbal cueing;Setup(pt did not use washcloth, able to use both hands to reach all parts of UB with cues)  LE Bathing: Stand by assistance;Verbal cueing;Setup(pt bathed buttocks by leaning forward on TTB)  UE Dressing: Verbal cueing;Setup;Minimal assistance(assist to thread L UE)  LE Dressing: Moderate assistance;Setup(pt able to reach to thread briefs and pants over B LEs, but required assist to get all the way up on LLE. assist for pants up on left side. assist for all footwear)  Toileting: Contact guard assistance(pt voided urine and performed pericare seated. able to manage pants up/down with CGA for balance)  Additional Comments: wet towel for non skid surface in shower. Balance  Sitting Balance: Supervision  Standing Balance: Contact guard assistance  Functional Mobility  Functional - Mobility Device: Wheelchair  Activity: To/from bathroom  Assist Level: Dependent/Total  Functional Mobility Comments: OT managed w/c during session, pt completed sit<>stand and stand pivot transfers  Toilet Transfers  Toilet - Technique: Stand pivot  Equipment Used: Grab bars  Toilet Transfer: Contact guard assistance  Toilet Transfers Comments: w/c <> comfort ht commode using grab bar, cues for sequencing  Shower Transfers  Shower - Transfer From: Wheelchair  Shower - Transfer Type: To and From  Shower - Transfer To: Transfer tub bench  Shower - Technique: Stand pivot  Shower Transfers: Minimal assistance  Shower Transfers Comments: pt impulsive, not listening to commands despite multiple  repition and required increased assist to transfer to TTB safely  Wheelchair Bed Transfers  Wheelchair/Bed - Technique: Stand pivot  Equipment Used: Wheelchair; Other(arm chair)  Level of Asssistance: Contact guard assistance     Transfers  Sit to stand: Contact guard assistance  Stand to sit: Contact guard assistance           Cognition  Overall Cognitive Status: Exceptions  Following Commands:  Follows one step commands consistently  Attention Span: Attends with cues to redirect  Safety Judgement: Decreased awareness of need for safety  Problem Solving: Assistance required to implement solutions;Assistance required to generate solutions;Assistance required to identify errors made  Insights: Fully aware of deficits  Initiation: Requires cues for some  Sequencing: Requires cues for some     PM Session:  Subjective: pt met in dept for OT. Pt agreeable to therapy, continues with concern that his left arm and leg are not working. C/o pain in left arm but does not rate.  present    Objective:  fxl transfers: Sit<>stand to tabletop CGA multiple times throughout session with cues for hand placement. Pt expressed fear of falling when reaching right hand back, so practiced multiple trials with OT provided step-by-step cues for technique. Stand pivot from w/c > BSC CGA to the right and min A to the left. Pt continues to require cues for hand placement and sequencing transfer. Neuromuscular re-ed: Pt used L UE to \"push\" bean bags over slant table. Pt required assist to position L UE in functional position for best success. Pt completed WB through L UE and reached overhead for bean bags using right UE. Therex: pt completed x10 reps SROM: elbow flexion/extension, shoulder flexion/extension, supination/pronation. Pt tolerated well. OT has provided handout for SROM HEP. Pt was returned to his room in w/c. Pt completed stand pivot transfer from w/c > arm chair CGA. Pt left positioned in chair with alarm engaged, needs in reach. Assessment: Pt tolerated tx well. Pt will be d/c-ing home tomorrow with his family. Recommend 24/7 hands-on assistance for all fxl transfers, ADLs. Pt should be functioning from w/c level for safety. Pt's son was not present for OT family ed today. He did participate in family ed intermittently in the past week. He is aware of all therapy recommendations. [facility] to ensure pt's safety in the home.     Plan   Plan  Times per week: 5-6 days per week  Times per day: Twice a day  Plan weeks: 3-4 weeks  Current Treatment Recommendations: Functional Mobility Training, Endurance Training, Safety Education & Training, Self-Care / ADL, Strengthening, Balance Training, ROM, Neuromuscular Re-education, Cognitive/Perceptual Training, Patient/Caregiver Education & Training, Equipment Evaluation, Education, & procurement, Cognitive Reorientation    Goals  Short term goals  Time Frame for Short term goals: 1 week pt will. Jacquelin Alcoa term goal 1: bathe with mod assist and AD -met  Short term goal 2: dress UB with mod assist, LB with max assist and AD as needed -met  Short term goal 3: toilet with max assist and AD -met  Short term goal 4: transfer with mod assist and AD -met  Short term goal 5: functional mobilty with LRAD and mod assist -met (w/c level)  Short term goal 6: improve LUE function to assist with ADL, positioning 25% -met  Short term goal 7: improve left side awareness to require mod cues for safety with ADL and mobility -met  Long term goals  Time Frame for Long term goals : 3-4 weeks pt will. .. Long term goal 1: bathe with min assist and AD -met  Long term goal 2: dress UB after set up, LB with min assist and AD as needed  Long term goal 3: toilet with CGA and AD as needed  Long term goal 4: transfer with CGA and AD as needed -met  Long term goal 5: functional mobility with CGA and LRAD  Patient Goals   Patient goals : \"I want to be normal again\"  With cues, pt agreed he wanted to improve his left UE strength, be able to bathe and dress himself, be able to stand and walk .   States he does not need to do anything in the kitchen       Therapy Time   Individual Concurrent Group Co-treatment   Time In 0815         Time Out 0915         Minutes 60            Therapy Time   Individual Co-treatment   Time In [address] [facility]    Time Out 1430    Minutes [address] [place], [place] [address]

## 2020-10-16 NOTE — PLAN OF CARE
Problem: Falls - Risk of:  Goal: Will remain free from falls  Description: Will remain free from falls  10/15/2020 2356 by Dora Ham RN  Outcome: Ongoing  Note: Pt educated on falls precautions and safety. Call light and personal belongings within reach at all times. Non skid socks in use when up. Hourly rounding and alarms active. Problem: Pain:  Goal: Patient's pain/discomfort is manageable  Description: Patient's pain/discomfort is manageable  10/15/2020 2356 by Dora Ham RN  Outcome: Ongoing  Note: Able to rate pain using a 1-10 scale, medicated per prn orders, see MAR.  Able to verbalize a reduction in pain and/or able to fall asleep and remain asleep without any s/s of pain

## 2020-10-16 NOTE — PROGRESS NOTES
Physical Therapy  Facility/Department: [address] REHAB  Daily Treatment Note/Discharge summary    NAME: Natali Pickard  : 1945  MRN: [id number]    Date of Service: 10/16/2020    Discharge Recommendations:  Home with Home health PT, 24 hour supervision or assist, S Level 1   PT Equipment Recommendations  Equipment Needed: Yes  Mobility Devices: Wheelchair  Wheelchair: Standard  Other: standard wheelchair with standard cushion, standard leg rests, removable arm rests    Assessment   Body structures, Functions, Activity limitations: Decreased functional mobility ; Decreased balance;Decreased strength;Decreased ADL status; Decreased posture;Decreased safe awareness;Decreased endurance  Assessment: Patient limited by his hemiparesis and impaired endurance. He is unsafe to be ambulating regularly and it is recommended that the patient function out of a wheelchair. He has known memory issues which also limit his abilities ambulating. PT has reviewed this with son, Frank Jensen, who continues to report the patient will ambulate about the house. Frank Jensen has participated in family education but continues to require cueing for safety and proper guarding. He required cueing every time the patient got up to be on the patient's left side that is his weak side. PT stressed importance of hands on assistance at all time and patient being unsafe to transfer without physical assistance. PT had to encourage him to assist with all transfers. Patient is a high fall risk at this time and there are concerns for safety at home at this time. Frank Jensen reports he and other family will always be available and present to assist the patient. Patient will need a wheelchair for safety and if family is insistent on patient ambulating, he will require a LBQC. Patient did not meet his goals while on rehab secondary to continued hemiparesis and limited understanding and poor memory. He will benefit from continued physical therapy at home.   Treatment Diagnosis: decreased functional mobility following CVA with L alondra  Prognosis: Fair  PT Education: General Safety;Transfer Training;Gait Training;Functional Mobility Training;Equipment;Weight-bearing Education; Family Education  Barriers to Learning: language and memory  REQUIRES PT FOLLOW UP: Yes  Activity Tolerance  Activity Tolerance: Patient Tolerated treatment well;Patient limited by fatigue     Patient Diagnosis(es): The encounter diagnosis was Right pontine stroke ([facility] [address] has a past medical history of Cerebral artery occlusion with cerebral infarction ([facility] [address] Diabetes mellitus ([facility] [address] Gallstone, GERD (gastroesophageal reflux disease), Hyperlipidemia, Hypertension, and Renal insufficiency. has a past surgical history that includes Appendectomy; Cholecystectomy; Upper gastrointestinal endoscopy (06/08/2018); Upper gastrointestinal endoscopy (N/A, 2/28/2019); Upper gastrointestinal endoscopy (N/A, 4/23/2019); Upper gastrointestinal endoscopy (N/A, 4/23/2019); Upper gastrointestinal endoscopy (N/A, 4/29/2020); and Colonoscopy (N/A, 5/8/2020).     Social/Functional History  Lives With: Family(wife, son, grand children and other family members)  Type of Home: House(bi-level)  Home Layout: Bed/Bath upstairs  Home Access: Stairs to enter with rails  Entrance Stairs - Number of Steps: 3 ADELINE access from outside and then 4-5 steps in the house to bedroom level (the pt gets assist for these steps)  Entrance Stairs - Rails: Both  Bathroom Shower/Tub: Tub/Shower unit  Bathroom Toilet: Standard  Bathroom Equipment: Grab bars in shower  Home Equipment: Cane  ADL Assistance: Needs assistance(supervision for bathing, dressing and toileting)  Homemaking Assistance: Needs assistance  Ambulation Assistance: Needs assistance(with cane with SBA from family)  Transfer Assistance: Needs assistance(SBA provided by family, pt sleeps in a regular bed)  Active : No  Patient's  Info: son  Occupation: Retired  Type of occupation: farmer, village leader  Leisure & Hobbies: sometimes watches TV, but gives him a headache  Additional Comments: Maryann Mane reports someone is with pt at home all day to assist him. Restrictions  Restrictions/Precautions  Restrictions/Precautions: Fall Risk  Required Braces or Orthoses?: No  Position Activity Restriction  Other position/activity restrictions: L sided weakness; speaks [place] - uses  phone, family, and also in person interpretors     Subjective   General  Chart Reviewed: Yes  Additional Pertinent Hx: Per Dr. Smith Leblanc , \"The patient is a 76 y.o. male who presents to [facility] with unsteady on feet. Pt speaks German, son at bedside helping getting history. According to son/pt, pt apparently sustained a fall yesterday and felt like he couldn't move his left side. Today he had difficulty ambulating using his lt side and hence son brought pt to the ER\"  Diagnosed with CVA with L sided weakness. Response To Previous Treatment: Patient with no complaints from previous session. Family / Caregiver Present: Jeff Marques)  Referring Practitioner: Heriberto Brian  Subjective  Subjective: Patient reporting he is coughing more and has an \"itching\" in his throat. Plan is still for discharge to home tomorrow with support from family and son, Westley Doran, reports he has no concerns for discharge. General Comment  Comments: son, Westley Doran, continues to report the patient will mostly ambulate about the house. PT stressed that this is not recommended as patient requires considerable assistance with ambulation and he is a high fall risk. Objective   Bed mobility  Rolling to Left: Stand by assistance  Rolling to Right: Minimal assistance(patient continues to leave left UE lagging behind him and requires assistance to bring over.  Son did not assist and patient required PT to come and assist him over onto right side.)  Supine to Sit: Contact guard assistance;Minimal assistance(Patient with great difficulty lifting trunk from the bed and required assistance and cueing.)  Sit to Supine: Contact guard assistance  Scooting: Stand by assistance  Comment: bed mobility done on regular bed in ADL apartment. PT instructed son, Lissette Chau, to assist the patient with bed mobility. He assisted patient to the bed, ambulating with [facility] and impaired safety. PT providing cueing for safety and CGA at times. Son with limited assistance and required encouragement to assist patient around in bed. Patient continues to have difficulty with bed mobility and requires cueing for problem solving with mobility. Transfers  Sit to Stand: Contact guard assistance(moderate verbal cueing for hand placement)  Stand to sit: Contact guard assistance  Bed to Chair: Minimal assistance(PT reviewed with Angelica safe positioning and guarding for transfers. He required cueing to always keep hand on belt of patient to assist patient. Cues required for safety during transfer, but Lissette Chau completed with patient with CGA from PT.)  Car Transfer: Minimal Assistance(PT reviewed safety with Lissette Chau prior to transfer. Patient used [facility] for support during transfer. Despite review, patient continues to require cueing for sequencing and technique. Lissette Chau able to provide assistance for the patient but req'd cueing for safety.)  Comment: PT providing frequent cueing for safety with guarding and assisting the patient. Patient continues to be a high fall risk. PT stressed importance of assisting patient at gait belt and ensuring that no one assists at left arm secondary to weakness and poor stability increasing risk for injury. Ambulation  Ambulation?: Yes  Ambulation 1  Surface: level tile  Device: [employer]  Other Apparatus: AFO  Assistance: Minimal assistance  Quality of Gait: Patient ambulated with Lissette Chau and PT providing support and cueing. Lissette Chau attempting to assist patient at gait belt and left UE.  PT stressed importance of not holding the left UE secondary to weakness and instability. He required cueing to not hold left arm. Jenna Gross has difficulty helping patient stay on proper sequence and patient is a high fall risk as he steps out of sequence. PT providing verbal and tactile cueing for safety. Patient had multiple LOB secondary to weakness in left LE and difficulty advancing left LE despite use of AFO. PT Providing CGA to maintain safety. Gait Deviations: Slow Janeth;Decreased step length;Decreased step height  Distance: 22' with two turns  Comments: Patient reporting he feels better with [facility]. It is continued to be recommended that the patient not ambulate about his home secondary to limited endurance and left hemiparesis. Poor safety with functional mobility on his feet and is unsafe to be ambulate consistently. Gait pattern worsens along with safety as fatigue increases. Stairs/Curb  Stairs?: Yes  Stairs  # Steps : 4  Stairs Height: 6\"  Rails: Bilateral(right rail ascending and right rail descending)  Device: No Device  Other Apparatus: Left;AFO(bobath sling to left arm)  Assistance: Minimal assistance  Comment: PT reviewed safety with Jenna Gross regarding assisting patient on the stairs. Patient was able to complete up and down stairs with use of rail in right hand. He required moderate verbal cueing for safety and sequencing on the stairs. Patient unstable and requires constant assistance. PT reviewed set up at home as [place], reporting that their are two railings. Jenna Gross states there is only one railing inside. PT highly recommended that they install a second railing as the patient will require a railing in right hand for support. Jenna Gross appeared receptive to this. PM Session  Patient continues to be concerned that the left arm and leg don't work and he is unable to recall that he had a stroke causing the weakness. Interpretor present for session.   Patient very frustrated with broken nail that is hanging, PT assisted with nail so it would not catch or pull further. Stand pivot transfer recliner chair to the wheelchair with min assist.  Patient required moderate cueing for safety as he was transferring to the left. He requires cueing to step left foot forward and not \"fall into\" the chair. Attempted propulsion of wheelchair but patient continues to require min assist to maintain straight path and reports his nail was really bothering him as it was hanging. Activity discontinued and PT assisted with nail for the patient. Dr. Thanh Monique also in to discuss progress and plan for return to home tomorrow. Patient became tearful with frustration of minimal progress and with the status of his wife being sick at home. Patient then able to propel wheelchair with moderate cueing and min assist. Patient continues to be unable to maintain a straight path and use right LE to assist with propulsion. He did intermittently use right LE to correct a far veer from path, but could not coordinate to maintain a straight path. He also was min assist to turn around and propel over threshold. Patient very difficulty to keep on task this PM and very emotional discussing his status. He is worried that he will go home and die. MD attempted to reinforce that he is medically stable and it is not anticipated that he will go home and die. Patient continues to be unable to manage the wheelchair on his own and will require 24 hour assistance at home. He is unsafe to ambulate on his own and it is recommended that he use the wheelchair for mobility. Care transitioned to OTAba, with patient in wheelchair and interpretor present. Goals  Short term goals  Time Frame for Short term goals: 2 weeks  Short term goal 1: bed mobility SBA  Short term goal 2: transfers CGA  Short term goal 3: amb x 20 ft with RW and Min A x 1 - met  Short term goal 4: tolerate LE Ther ex for increased LE strength.  - met  Short term goal 5: curb step min A  Long term goals  Time Frame for Long term goals : 3-4 weeks  Long term goal 1: bed mobility SBA  Long term goal 2: transfers SBA  Long term goal 3: car transfer CGA  Long term goal 4: amb 48' with RW vs hemiwalker CGA to SBA  Long term goal 5: 4 steps B rails CGA  Patient Goals   Patient goals : pt's/family goal is some rehab before returning home. Plan    Plan  Times per week: 5 to 6  Times per day: Twice a day  Plan weeks: 3 to 4  Current Treatment Recommendations: Functional Mobility Training, Balance Training, Endurance Training, ROM, Cognitive/Perceptual Training, Transfer Training, Gait Training, Stair training, IADL Training, Neuromuscular Re-education, Wheelchair Mobility Training, Strengthening, ADL/Self-care Training  Safety Devices  Type of devices: All fall risk precautions in place, Call light within reach, Gait belt, Nurse notified, Left in chair(Patient in wheelchair returning to room with son, Kieran Cuff, and instruction to call nursing instead of assisting the patient himself.   PT notified RN, Linda Beltrán)  Restraints  Initially in place: No     Therapy Time   Individual Concurrent Group Co-treatment   Time In 0945         Time Out 4128         [address] Time     Individual Co-treatment   Time In 1310    Time Out 1350    Minutes 40           Electronically signed by Isra Reddy PT on 10/16/2020 at 11:59 AM

## 2020-10-16 NOTE — PROGRESS NOTES
Department of Physical Medicine & Rehabilitation  Progress Note    Patient Identification:  Malini Kessler  [id number]  : 1945  Admit date: 2020    Chief Complaint: Right pontine stroke [facility])    Subjective:   No acute events overnight. Patient seen this am sitting up in gym.  present. Patient reports feeling ready for discharge tomorrow. We discussed importance of medication compliance, physician follow-up, ongoing therapy with home care. He is emotional about returning home. Asks again why arm is still weak. Provided education on stroke pathophysiology and functional prognosis. ROS: No f/c, n/v, cp     Objective:  Patient Vitals for the past 24 hrs:   BP Temp Temp src Pulse Resp SpO2 Weight   10/16/20 0925 -- -- -- -- -- 96 % --   10/16/20 0750 (!) 148/85 97.6 °F (36.4 °C) Oral 93 16 95 % --   10/16/20 0434 137/86 97.3 °F (36.3 °C) Oral 87 16 96 % 130 lb 8.2 oz (59.2 kg)   10/15/20 2352 138/85 97.3 °F (36.3 °C) Oral 83 18 93 % --   10/15/20 2003 128/78 97.7 °F (36.5 °C) Oral 84 18 95 % --   10/15/20 1546 134/84 97.4 °F (36.3 °C) Oral 76 16 95 % --   10/15/20 1207 120/79 97.9 °F (36.6 °C) Oral 79 15 95 % --     Const: Alert. No distress, pleasant. HEENT: Normocephalic, atraumatic. Normal sclera/conjunctiva. MMM. CV: Regular rate and rhythm. Resp: No respiratory distress. Lungs CTAB. Abd: Soft, nontender, nondistended, NABS+   Ext: No edema. Neuro: Alert, orientation/cognition more appropriate, speech no longer dysarthric per . + left facial droop, left hemiparesis (overall 1-2/5 in LUE, 3/5 in LLE). Psych: Cooperative, appropriate mood and affect    Laboratory data: Available via EMR.    Last 24 hour lab  Recent Results (from the past 24 hour(s))   POCT Glucose    Collection Time: 10/15/20 11:30 AM   Result Value Ref Range    POC Glucose 258 (H) 70 - 99 mg/dl    Performed on ACCU-CHEK    POCT Glucose    Collection Time: 10/15/20  4:25 PM   Result Value Ref Range    POC Glucose 300 (H) 70 - 99 mg/dl    Performed on ACCU-CHEK    POCT Glucose    Collection Time: 10/15/20  8:18 PM   Result Value Ref Range    POC Glucose 193 (H) 70 - 99 mg/dl    Performed on ACCU-CHEK    POCT Glucose    Collection Time: 10/16/20  7:32 AM   Result Value Ref Range    POC Glucose 172 (H) 70 - 99 mg/dl    Performed on ACCU-CHEK        Therapy progress:  PT  Position Activity Restriction  Other position/activity restrictions: L sided weakness; speaks [place] - uses  phone, family, and also in person interpretors  Objective     Sit to Stand: Contact guard assistance  Stand to sit: Contact guard assistance  Bed to Chair: Minimal assistance(Needs assistance to shift hips and facilitate turning. Also requires cuing for hand placement)  Device: Parallel Bars  Other Apparatus: AFO  Assistance: Minimal assistance, Contact guard assistance  Distance: 6' x 2  OT  PT Equipment Recommendations  Equipment Needed: Yes  Mobility Devices: Wheelchair  Wheelchair: Standard  Other: standard wheelchair with standard cushion, standard leg rests, removable arm rests  Toilet - Technique: Stand pivot  Equipment Used: Standard bedside commode  Toilet Transfers Comments: completed stand pivot from EOB <> BSC x2 trials with amarilys assisting with 2nd transfer. consistently CGA to the right and min A to the left with mod/max verbal and tactile cues for hand placement  Assessment        SLP  Diet Solids Recommendation: Dysphagia Minced and Moist (Dysphagia II)  Liquid Consistency Recommendation: Thin    Body mass index is 23.12 kg/m².     Assessment and Plan:    Impairments: left hemiparesis, decreased coordination, balance, endurance, cognition, dysarthria, dysphagia     Acute ischemic stroke  -Localization: Right son, posterior centrum semiolave, left frontal lobe deep white matter  -Etiology: small vessel vs embolic  -Telemetry in ARU, then event monitor at discharge  -Secondary ppx with DAPT x 3 weeks (then Plavix alone), statin, BP and glucose control  -PT/OT/SLP    Spasticity  -Emerging tone in LUE  -No medication started at this time. Will have patient follow-up with me as outpatient.      H/o left PCA stroke with severe stenosis  -Secondary ppx as above     Dysphagia   -Dietitian and SLP consults.   -Upgraded to regular + thins     HTN, uncontrolled  -Gradually reduced to goal. Now improved on losartan 25 mg      DM2, uncontrolled/labile  -Has required multiple insulin adjustments while inpatient. Patient and son educated on importance of consistent po intake.   -Will discharge on current regimen: Lantus 18 units qhs, prandial 5/5/10 with hold parameters, low dose ISS  -Diabetes educator consulted, appreciate input     CKD  -Avoid nephrotoxins, renally dose meds  -Cr stable 1.2     L1 compression fracture  -Pain control  -PT/OT    Intermittent Cough  -CXR with likely atelectasis  -WBC normal, no fever or clinical signs of pneumonia. Overall improving. -IS     Bladder   -High risk retention   -Monitor PVRs, SC prn >300cc  -Flomax     Bowel   -High risk constipation   -senna+colace BID, PRN miralax, MoM, and bisacodyl supp.     Pain control  -prn tramadol  -diclofenac gel for shoulder and knee pain     PPx  -DVT: lovenox  -GI: pantoprazole    Rehab Progress:  Making steady progress. Working on left side function, functional mobility, compensatory strategies (will be primarily wheelchair for mobility). Now overall Alondra for mobility and supervision-maxA for ADLs. SLP reporting improvements in aphasia/apraxia, dysarthria, dysphagia, cognition -- approaching baseline. Anticipated Dispo: home with family, 24/7 assist  Services:  PT, OT, RN, Aide  DME: wheelchair, alondra-walker (for therapy), BSC, TTB  ELOS: 10/17      Caden Jang MD 10/16/2020, 9:53 AM

## 2020-10-16 NOTE — PROGRESS NOTES
Sleeping well overnight. Pt admitted with a CVA, left sided weakness. Transfers x1 stand pivot. Lungs CTA, HR regular. On telemetry showing NSR. Abdomen non tender with active bowel sounds. LBM 10/15. Has been continent of urine. Denies pain. Pt speaks Amharic,  phone at the bedside. All needs assessed with Q2H rounding. Alarms active. Will continue to monitor.  Zoey Miller RN

## 2020-10-16 NOTE — PROGRESS NOTES
Nutrition Assessment     Type and Reason for Visit: Reassess    Nutrition Recommendations/Plan:   Monitor meal and supplement intake  Monitor labs, weight, skin, bowels  Continue General CCC Vegetarian diet with Ensure Clear. Nutrition Assessment:  Pt continues on general [place] [address] diet with Ensure Clear supplement. EMR indicates intake of both at %. Wt treanding up. Will continue this plan. Malnutrition Assessment:  Malnutrition Status: No malnutrition    Estimated Daily Nutrient Needs:  Energy (kcal): 6487-3631 kcal (28-32 kcal/kg); Weight Used for Energy Requirements:  Current     Protein (g): 45-73g (0.8-1.3 g/kg);  Weight Used for Protein Requirements:  Current        Fluid (ml/day): 1 ml/kcal; Weight Used for Fluid Requirements:  Current      Nutrition Related Findings: BM 10/16, no edema      Current Nutrition Therapies:    DIET GENERAL; Carb Control: 4 carb choices (60 gms)/meal; Vegetarian (Lacto-Ovo)  Dietary Nutrition Supplements: Clear Liquid Oral Supplement    Anthropometric Measures:  · Height: 5' 3\" (160 cm)  · Current Body Wt: 130 lb 8.2 oz (59.2 kg)   · BMI: 23.1    Nutrition Diagnosis:   · Biting/chewing (masticatory) difficulty related to altered GI function as evidenced by (need for altered texture)      Nutrition Interventions:   Food and/or Nutrient Delivery:  Continue Current Diet, Continue Oral Nutrition Supplement  Nutrition Education/Counseling:  No recommendation at this time   Coordination of Nutrition Care:  Continued Inpatient Monitoring    Goals:  Consume >50% of meals and supplements       Nutrition Monitoring and Evaluation:   Behavioral-Environmental Outcomes:      Food/Nutrient Intake Outcomes:  Food and Nutrient Intake, Supplement Intake  Physical Signs/Symptoms Outcomes:  Biochemical Data, Chewing or Swallowing, Nutrition Focused Physical Findings, Skin, Weight     Discharge Planning:    Continue current diet     Electronically signed by Parvez Walters RD, LD on 10/16/20 at 1:45 PM EDT    Contact: [phone]

## 2020-10-17 VITALS
WEIGHT: 126.98 LBS | SYSTOLIC BLOOD PRESSURE: 133 MMHG | RESPIRATION RATE: 16 BRPM | DIASTOLIC BLOOD PRESSURE: 81 MMHG | HEART RATE: 84 BPM | BODY MASS INDEX: 22.5 KG/M2 | OXYGEN SATURATION: 95 % | HEIGHT: 63 IN | TEMPERATURE: 97.7 F

## 2020-10-17 LAB
GLUCOSE BLD-MCNC: 114 MG/DL (ref 70–99)
PERFORMED ON: ABNORMAL

## 2020-10-17 PROCEDURE — 6370000000 HC RX 637 (ALT 250 FOR IP): Performed by: PHYSICAL MEDICINE & REHABILITATION

## 2020-10-17 RX ADMIN — SENNOSIDES-DOCUSATE SODIUM TAB 8.6-50 MG 1 TABLET: 8.6-5 TAB at 08:11

## 2020-10-17 RX ADMIN — CLOPIDOGREL BISULFATE 75 MG: 75 TABLET ORAL at 08:17

## 2020-10-17 RX ADMIN — SUCRALFATE 1 G: 1 TABLET ORAL at 05:08

## 2020-10-17 RX ADMIN — LOSARTAN POTASSIUM 25 MG: 25 TABLET, FILM COATED ORAL at 08:11

## 2020-10-17 RX ADMIN — INSULIN LISPRO 10 UNITS: 100 INJECTION, SOLUTION INTRAVENOUS; SUBCUTANEOUS at 08:01

## 2020-10-17 RX ADMIN — ROSUVASTATIN CALCIUM 10 MG: 10 TABLET, FILM COATED ORAL at 08:11

## 2020-10-17 RX ADMIN — DICLOFENAC 4 G: 10 GEL TOPICAL at 08:20

## 2020-10-17 RX ADMIN — PANTOPRAZOLE SODIUM 40 MG: 40 TABLET, DELAYED RELEASE ORAL at 05:08

## 2020-10-17 RX ADMIN — DULOXETINE HYDROCHLORIDE 30 MG: 30 CAPSULE, DELAYED RELEASE ORAL at 08:11

## 2020-10-17 NOTE — DISCHARGE INSTR - ACTIVITY
As instructed in therapy and rehab, monitor safety. Event monitor call Amauri Underwood [phone] with questions or company. Cues to slow down with meals, medications in applesauce.

## 2020-10-17 NOTE — PROGRESS NOTES
Event monitor placed on patient, son asking how long he will need it, refer to [facility] heart, also asking about ASA, instructed to call Monday for questions. Explained instructions to son, will call home care also, son asking for transportation for discharge and should be paid for, paging  on call. Son did sign discharge papers.

## 2020-10-17 NOTE — CARE COORDINATION
Orders faxed to [facility]. Spoke with RN; offered to get ambulance or wheelchair transport to home however son does not want to wait longer than one hour which would likely be the case. RN to let me know if he decides otherwise.    Electronically signed by Cardell Hatchet on 10/17/2020 at 11:07 AM    [id number]

## 2020-10-17 NOTE — PROGRESS NOTES
called and offered to arrange transportation, son now declines. States brother will assist out of car and into house. Patient discharge with all belongings, patient did well with stand pivot transfer to car. Son has RX.

## 2020-10-17 NOTE — PLAN OF CARE
Problem: Falls - Risk of:  Goal: Will remain free from falls  Description: Will remain free from falls  10/16/2020 2343 by Nieves Arroyo RN  Outcome: Ongoing  Note: Pt educated on falls precautions and safety. Call light and personal belongings within reach at all times. Non skid socks in use when up. Hourly rounding and alarms active. Problem: Skin Integrity:  Goal: Absence of new skin breakdown  Description: Absence of new skin breakdown  10/16/2020 2343 by Nieves Arroyo RN  Outcome: Ongoing  Note: Able to change positions in bed without assist, no evidence of skin breakdown noted. Waffle cushion in place to chair. Problem: Pain:  Goal: Pain level will decrease  Description: Pain level will decrease  Outcome: Ongoing  Note: Able to rate pain using a 1-10 scale, medicated per prn orders, see MAR.  Able to verbalize a reduction in pain and/or able to fall asleep and remain asleep without any s/s of pain

## 2020-10-17 NOTE — PROGRESS NOTES
Resting quietly in bed. Pt admitted with a CVA, left sided weakness. Transfers with a stand pivot, tolerates well. Does need some prompting for sequencing. Lungs CTA, HR regular. Abdomen non tender with active bowel sounds. LBM 10/15. Has remained continent of urine. Denies pain. All needs assessed with rounding. Alarms active. Will continue to monitor.  Zamzam Fan RN

## 2020-10-18 NOTE — PROGRESS NOTES
CLINICAL PHARMACY NOTE: MEDS TO [address] Select Patient?: No  Total # of Prescriptions Filled: 4   The following medications were delivered to the patient:  · Lantus Solostar Pens  · Diclofenac 1% gel  · Tamsulosin 0.4 mg capsules  · Rosuvastatin 10 mg tablets  Total # of Interventions Completed: 0  Time Spent (min): 15    Additional Documentation:

## 2020-10-20 NOTE — PROGRESS NOTES
Occupational Therapy  Facility/Department: [facility] 3N IP REHAB  Daily Treatment Note  NAME: Kraig Gray  : 1945  MRN: [id number]    Date of Service: 10/20/2020    Discharge Recommendations:  24 hour supervision or assist, Home with Home health OT, Continue to assess pending progress  OT Equipment Recommendations  ADL Assistive Devices: Transfer Tub Bench; Toileting - 3-in-1 Commode    Assessment   Performance deficits / Impairments: Decreased functional mobility ; Decreased ADL status; Decreased ROM; Decreased strength;Decreased endurance;Decreased balance;Decreased coordination;Decreased safe awareness;Decreased cognition;Decreased sensation;Decreased posture  Assessment: Pt tolerated session well. Pt tolerated neuromuscular rehab well. He demonstrated slight elbow flexion, horizontal ab/adduction. He completed transfers with min assist.  Treatment Diagnosis: decreased ADL, balance, Left sided function  Prognosis: Good  History: Patient is a 77 yo right handed M with pmh HTN, HLD, DM2, CKD, and prior left PCA stroke who initially presented 2020 with left side weakness resulting in fall and back pain. Imaging revealed acute/subacute ischemic right pontine infarct in addition to likely subacute lacunar infarct in left frontal lobe deep white matter and right posterior centrum semiovale. Also with chronic left PCA territory infarct with severe stenosis. Etiology thought to be microvascular vs embolic. Neurology recommending DAPT x 21 days and then monotherapy (on Plavix and statin at baseline). Also found to have L1 compression fracture which is being managed conservatively. Course complicated by uncontrolled HTN and DM2. Patient did have worsening of left side weakness yesterday, possibly due to relative hypotension. Therefore BP regimen was reduced. Now presents to ARU with impaired mobility, self-care, and cognition/communication below his baseline. Patient speaks [place] Vel.  Therefore history obtained via couldn't move his left side. Today he had difficulty ambulating using his lt side and hence son brought pt to the ER\"  Diagnosed with  Response to previous treatment: Patient with no complaints from previous session  Family / Caregiver Present: Yes(Used  phone)  Referring Practitioner: Radha Bernstein  Diagnosis: Acute CVA - with left sided weakness  Subjective  Subjective: Pt seen in the department and agreed to OT treatment. Objective       Tub Transfers  Tub - Transfer From: Wheelchair  Tub - Transfer Type: To and From  Tub - Transfer To: Transfer tub bench  Tub - Technique: Stand pivot  Tub Transfers: Minimal assistance  Tub Transfers Comments: Completed transfer in both directions with min assist.  Completed dry simulated transfer. Wheelchair Bed Transfers  Wheelchair/Bed - Technique: Stand pivot  Equipment Used: Wheelchair; Other(recliner)  Level of Asssistance: Minimal assistance        Neuromuscular Education  Vibration: Used vibration to facilitate elbow flexion/extension. Pt was able to complete slight elbow flexion with vibration. Plan   Plan  Times per week: 5-6 days per week  Times per day: Twice a day  Plan weeks: 3-4 weeks  Current Treatment Recommendations: Functional Mobility Training, Endurance Training, Safety Education & Training, Self-Care / ADL, Strengthening, Balance Training, ROM, Neuromuscular Re-education, Cognitive/Perceptual Training, Patient/Caregiver Education & Training, Equipment Evaluation, Education, & procurement, Cognitive Reorientation    Goals  Short term goals  Time Frame for Short term goals: 1 week pt will. Lio Cline term goal 1: bathe with mod assist and AD -met  Short term goal 2: dress UB with mod assist, LB with max assist and AD as needed -met  Short term goal 3: toilet with max assist and AD -met  Short term goal 4: transfer with mod assist and AD -met  Short term goal 5: functional mobilty with LRAD and mod assist -met (w/c level)  Short term goal 6: improve LUE function to assist with ADL, positioning 25% -met  Short term goal 7: improve left side awareness to require mod cues for safety with ADL and mobility -met  Long term goals  Time Frame for Long term goals : 3-4 weeks pt will. .. Long term goal 1: bathe with min assist and AD -met  Long term goal 2: dress UB after set up, LB with min assist and AD as needed  Long term goal 3: toilet with CGA and AD as needed  Long term goal 4: transfer with CGA and AD as needed -met  Long term goal 5: functional mobility with CGA and LRAD  Patient Goals   Patient goals : \"I want to be normal again\"  With cues, pt agreed he wanted to improve his left UE strength, be able to bathe and dress himself, be able to stand and walk . States he does not need to do anything in the kitchen       Therapy Time   Individual Concurrent Group Co-treatment   Time In  945         Time Out  1030         Minutes  45             Late entry for session completed on 10/12/20.   Dina Leavitt, [address]

## 2020-10-20 NOTE — DISCHARGE SUMMARY
Physical Medicine & Rehabilitation  Discharge Summary     Patient Identification:  Taty Grossman  : 1945  Admit date: 2020  Discharge date: 10/17/2020   Attending provider: Laura Little MD        Primary care provider: Simba Trotter     Discharge Diagnoses:   Patient Active Problem List   Diagnosis    Chest pain    Hypertension    Hyperlipidemia    Diabetes mellitus ([address]    Unexplained weight loss    Esophagitis    Granulomatous adenopathy    Mediastinal lymphadenopathy    History of arterial ischemic stroke    Abdominal pain    Acute cerebrovascular accident ([address]    Right pontine stroke ([address]       History of Present Illness/Acute Hospital Course:  Patient is a 77 yo right handed M with pmh HTN, HLD, DM2, CKD, and prior left PCA stroke who initially presented 2020 with left side weakness resulting in fall and back pain. Imaging revealed acute/subacute ischemic right pontine infarct in addition to likely subacute lacunar infarct in left frontal lobe deep white matter and right posterior centrum semiovale. Also with chronic left PCA territory infarct with severe stenosis. Etiology thought to be microvascular vs embolic. Neurology recommending DAPT x 21 days and then monotherapy (on Plavix and statin at baseline). Also found to have L1 compression fracture which is being managed conservatively. Course complicated by uncontrolled HTN and DM2. Patient did have worsening of left side weakness yesterday, possibly due to relative hypotension. Therefore BP regimen was reduced. Now presents to ARU with impaired mobility, self-care, and cognition/communication below his baseline    Inpatient Rehabilitation Course:   Taty Grossman is a 76 y.o. male admitted to inpatient rehabilitation on 2020 with Right pontine stroke ([facility] [address]   The patient participated in an aggressive multidisciplinary inpatient rehabilitation program involving 3 hours of therapy per day, at least 5 days per week. He made good progress with regard to speech, cognition, swallow, compensatory strategies, functional mobility. Now overall Alondra for mobility (primary wheelchair, working on ambulation with therapies) and supervision-maxA for ADLs. Discharging home with family who completed training and will provide 24/7 supervision/assist. Home care services and DME ordered as below. Patient will follow-up with PCP, Neurology, Cardiology, and PM&R. Impairments:  left hemiparesis, decreased coordination, balance, endurance, cognition, dysarthria, dysphagia       Medical Management:  Acute ischemic stroke  -Localization: Right son, posterior centrum semiolave, left frontal lobe deep white matter  -Etiology: small vessel vs embolic  -Telemetry in ARU (no arrhythmias documented), given event monitor at discharge  -Secondary ppx with DAPT x 3 weeks (now Plavix alone), statin, BP and glucose control  -PT/OT/SLP     Spasticity  -Emerging tone in LUE  -No medication started at this time. Will have patient follow-up with me as outpatient.      H/o left PCA stroke with severe stenosis  -Secondary ppx as above     Dysphagia   -Dietitian and SLP consults.   -Upgraded to regular + thins     HTN, uncontrolled  -Gradually reduced to goal. Now improved on losartan 25 mg      DM2, uncontrolled/labile  -Has required multiple insulin adjustments while inpatient. Patient and son educated on importance of consistent po intake.   -Will discharge on current regimen: Lantus 18 units qhs, prandial 5/5/10 with hold parameters, low dose ISS  -Diabetes educator consulted, appreciate input     CKD  -Avoid nephrotoxins, renally dose meds  -Cr stable 1.2     L1 compression fracture  -Pain control  -PT/OT     Intermittent Cough  -CXR with likely atelectasis  -WBC normal, no fever or clinical signs of pneumonia. Overall improving.    -IS     Pain control  -prn tramadol - no longer requiring  -diclofenac gel for shoulder and knee pain    PPx  -DVT: for safety. Patient had multiple LOB secondary to weakness in left LE and difficulty advancing left LE despite use of AFO. PT Providing CGA to maintain safety. Gait Deviations: Slow Janeth, Decreased step length, Decreased step height  Distance: 22' with two turns  Comments: Patient reporting he feels better with [facility]. It is continued to be recommended that the patient not ambulate about his home secondary to limited endurance and left hemiparesis. Poor safety with functional mobility on his feet and is unsafe to be ambulate consistently. Gait pattern worsens along with safety as fatigue increases. , Stairs  # Steps : 4  Stairs Height: 6\"  Rails: Bilateral(right rail ascending and right rail descending)  Curbs: (unsafe at this time)  Device: No Device  Other Apparatus: Left, AFO(bobath sling to left arm)  Assistance: Minimal assistance  Comment: PT reviewed safety with Karol Shin regarding assisting patient on the stairs. Patient was able to complete up and down stairs with use of rail in right hand. He required moderate verbal cueing for safety and sequencing on the stairs. Patient unstable and requires constant assistance. PT reviewed set up at home as grandsonApple, reporting that their are two railings. Karol Shin states there is only one railing inside. PT highly recommended that they install a second railing as the patient will require a railing in right hand for support. Karol Shin appeared receptive to this. Mobility:  , PT Equipment Recommendations  Equipment Needed: Yes  Mobility Devices: Wheelchair  Wheelchair: Standard  Other: standard wheelchair with standard cushion, standard leg rests, removable arm rests, Assessment: Patient limited by his hemiparesis and impaired endurance. He is unsafe to be ambulating regularly and it is recommended that the patient function out of a wheelchair. He has known memory issues which also limit his abilities ambulating.   PT has reviewed this with son, Karol Shin, who continues to report the patient will ambulate about the house. Mireya Agee has participated in family education but continues to require cueing for safety and proper guarding. He required cueing every time the patient got up to be on the patient's left side that is his weak side. PT stressed importance of hands on assistance at all time and patient being unsafe to transfer without physical assistance. PT had to encourage him to assist with all transfers. Patient is a high fall risk at this time and there are concerns for safety at home at this time. Mireya Agee reports he and other family will always be available and present to assist the patient. Patient will need a wheelchair for safety and if family is insistent on patient ambulating, he will require a LBQC. Patient did not meet his goals while on rehab secondary to continued hemiparesis and limited understanding and poor memory. He will benefit from conitnued physical therapy at home. Occupational therapy:  ,  , Assessment: Pt tolerated session well. Pt tolerated neuromuscular rehab well. He demonstrated slight elbow flexion, horizontal ab/adduction.   He completed transfers with min assist.    Speech therapy:         Significant Diagnostics:   Lab Results   Component Value Date    CREATININE 1.2 10/15/2020    BUN 13 10/15/2020     10/15/2020    K 4.0 10/15/2020     10/15/2020    CO2 23 10/15/2020       Lab Results   Component Value Date    WBC 4.7 10/15/2020    HGB 13.3 (L) 10/15/2020    HCT 40.2 (L) 10/15/2020    MCV 77.2 (L) 10/15/2020     10/15/2020       Disposition:  Home with family, 24/7 assist  Services: [facility] PT, OT, RN, Aide  DME: wheelchair, quad cane (for therapy), BSC, TTB    Discharge Condition: Stable    Follow-up:  See after visit summary from hospitalization    Rohan Meehan  In 1 week  PCP follow-up  [facility]  [address]    Cardiology follow-up for event monitor [address] [place] [address]. [address] [facility]   Emily Armas MD  In 4 weeks  [facility]   Amna Sanderson MD  In 6 weeks  PM&R follow-up  [address] [place] [address]  [phone]         Discharge Medications:     Medication List      START taking these medications    diclofenac sodium 1 % Gel  Commonly known as:  VOLTAREN  Apply 4 g topically 4 times daily as needed for Pain (Shoulder and knee pain)  Notes to patient:  pain     Lantus SoloStar 100 UNIT/ML injection pen  Generic drug:  insulin glargine  Inject 18 Units into the skin nightly  Replaces:  insulin glargine 100 UNIT/ML injection vial  Notes to patient:  insulin     lidocaine 4 % external patch  Place 1 patch onto the skin daily as needed (back pain)  Notes to patient:  Put a patch on in the morning and take off every night. CHANGE how you take these medications    * insulin lispro (1 Unit Dial) 100 UNIT/ML Sopn  Commonly known as:  HumaLOG KwikPen  Inject 10 Units into the skin daily (with breakfast) AND 5 Units Daily with lunch AND 5 Units Daily with supper. HOLD IF EATS LESS THAN 50% OF MEAL. Madisyne Bevels What changed:  See the new instructions. Notes to patient:  insulin     * insulin lispro (1 Unit Dial) 100 UNIT/ML Sopn  Commonly known as:  HumaLOG KwikPen  With meals (TID AC) **Corrective Low Dose Algorithm** Glucose:  give No Insulin, Glucose 140-199 give 1 Unit, glucose 200-249 give 2 Units, glucose 250-299 give 3 Units, glucose 300-349 give 4 Units, glucose 350-399 give 5 Units, glucose Over 399 give 6 Units  What changed: You were already taking a medication with the same name, and this prescription was added. Make sure you understand how and when to take each. Notes to patient:  insulin         * This list has 2 medication(s) that are the same as other medications prescribed for you.  Read the directions carefully, and ask your doctor or other care provider to review them with you. CONTINUE taking these medications    acetaminophen 500 MG tablet  Commonly known as:  TYLENOL  Notes to patient:  For pain      clopidogrel 75 MG tablet  Commonly known as:  PLAVIX  Take 1 tablet by mouth daily  Notes to patient:  A blood thinner medication      DULoxetine 30 MG extended release capsule  Commonly known as:  CYMBALTA  Take 1 capsule by mouth daily  Notes to patient: For your mood/depression      losartan 25 MG tablet  Commonly known as:  COZAAR  Take 1 tablet by mouth daily  Notes to patient: For blood pressure control      omeprazole 40 MG delayed release capsule  Commonly known as:  PRILOSEC  Notes to patient: For GERD/Acid reflux. Take before breakfast      polyethylene glycol 17 g Pack packet  Commonly known as:  MIRALAX  Notes to patient: For your bowels     rosuvastatin 10 MG tablet  Commonly known as:  CRESTOR  Take 1 tablet by mouth daily  Notes to patient:  For cholesterol control/ keep plaque out of arteries. tamsulosin 0.4 MG capsule  Commonly known as:  FLOMAX  Take 1 capsule by mouth nightly  Notes to patient:   For your bladder      vitamin D 25 MCG (1000 UT) Tabs tablet  Commonly known as:  CHOLECALCIFEROL        STOP taking these medications    aspirin 81 MG EC tablet     insulin glargine 100 UNIT/ML injection vial  Commonly known as:  LANTUS  Replaced by:  Lantus SoloStar 100 UNIT/ML injection pen     ondansetron 4 MG disintegrating tablet  Commonly known as:  Zofran ODT           Where to Get Your Medications      These medications were sent to [address] [place] [address] Rd, [address] - F [phone] [address] [place] [address], [address] [facility]    Phone:  [phone]   · clopidogrel 75 MG tablet  · diclofenac sodium 1 % Gel  · DULoxetine 30 MG extended release capsule  · Lantus SoloStar 100 UNIT/ML injection pen  · losartan 25 MG tablet  · rosuvastatin 10 MG tablet  · tamsulosin 0.4 MG capsule     You can get these medications from any pharmacy    Bring a paper prescription for each of these medications  · insulin lispro (1 Unit Dial) 100 UNIT/ML Sopn  · insulin lispro (1 Unit Dial) 100 UNIT/ML Sopn  You don't need a prescription for these medications  · lidocaine 4 % external patch           I spent over 35 minutes on this discharge encounter between counseling, coordination of care, and medication reconciliation. To comply with Talmage Hashimoto by R.II.[id number]:   Discharge order placed in advance to facilitate patients discharge needs.       Richard Flores MD

## 2020-11-03 ENCOUNTER — APPOINTMENT (OUTPATIENT)
Dept: GENERAL RADIOLOGY | Age: 75
End: 2020-11-03
Payer: MEDICAID

## 2020-11-03 ENCOUNTER — HOSPITAL ENCOUNTER (OUTPATIENT)
Age: 75
Setting detail: OBSERVATION
Discharge: HOME | End: 2020-11-04
Attending: EMERGENCY MEDICINE | Admitting: INTERNAL MEDICINE
Payer: MEDICAID

## 2020-11-03 LAB
ANION GAP SERPL CALCULATED.3IONS-SCNC: 11 MMOL/L (ref 3–16)
BASOPHILS ABSOLUTE: 0.1 K/UL (ref 0–0.2)
BASOPHILS RELATIVE PERCENT: 1.1 %
BUN BLDV-MCNC: 19 MG/DL (ref 7–20)
CALCIUM SERPL-MCNC: 9.4 MG/DL (ref 8.3–10.6)
CHLORIDE BLD-SCNC: 99 MMOL/L (ref 99–110)
CO2: 23 MMOL/L (ref 21–32)
CREAT SERPL-MCNC: 1.2 MG/DL (ref 0.8–1.3)
EKG ATRIAL RATE: 77 BPM
EKG DIAGNOSIS: NORMAL
EKG P AXIS: 43 DEGREES
EKG P-R INTERVAL: 196 MS
EKG Q-T INTERVAL: 358 MS
EKG QRS DURATION: 114 MS
EKG QTC CALCULATION (BAZETT): 405 MS
EKG R AXIS: -54 DEGREES
EKG T AXIS: 58 DEGREES
EKG VENTRICULAR RATE: 77 BPM
EOSINOPHILS ABSOLUTE: 0.2 K/UL (ref 0–0.6)
EOSINOPHILS RELATIVE PERCENT: 2.3 %
GFR AFRICAN AMERICAN: >60
GFR NON-AFRICAN AMERICAN: 59
GLUCOSE BLD-MCNC: 287 MG/DL (ref 70–99)
GLUCOSE BLD-MCNC: 366 MG/DL (ref 70–99)
HCT VFR BLD CALC: 42 % (ref 40.5–52.5)
HEMOGLOBIN: 13.9 G/DL (ref 13.5–17.5)
LYMPHOCYTES ABSOLUTE: 2.7 K/UL (ref 1–5.1)
LYMPHOCYTES RELATIVE PERCENT: 37.7 %
MCH RBC QN AUTO: 25.4 PG (ref 26–34)
MCHC RBC AUTO-ENTMCNC: 33.2 G/DL (ref 31–36)
MCV RBC AUTO: 76.5 FL (ref 80–100)
MONOCYTES ABSOLUTE: 0.5 K/UL (ref 0–1.3)
MONOCYTES RELATIVE PERCENT: 6.6 %
NEUTROPHILS ABSOLUTE: 3.8 K/UL (ref 1.7–7.7)
NEUTROPHILS RELATIVE PERCENT: 52.3 %
PDW BLD-RTO: 15.1 % (ref 12.4–15.4)
PERFORMED ON: ABNORMAL
PLATELET # BLD: 248 K/UL (ref 135–450)
PMV BLD AUTO: 7.3 FL (ref 5–10.5)
POTASSIUM REFLEX MAGNESIUM: 4.7 MMOL/L (ref 3.5–5.1)
RBC # BLD: 5.49 M/UL (ref 4.2–5.9)
SODIUM BLD-SCNC: 133 MMOL/L (ref 136–145)
TROPONIN: <0.01 NG/ML
TROPONIN: <0.01 NG/ML
WBC # BLD: 7.2 K/UL (ref 4–11)

## 2020-11-03 PROCEDURE — G0378 HOSPITAL OBSERVATION PER HR: HCPCS

## 2020-11-03 PROCEDURE — 36415 COLL VENOUS BLD VENIPUNCTURE: CPT

## 2020-11-03 PROCEDURE — 84484 ASSAY OF TROPONIN QUANT: CPT

## 2020-11-03 PROCEDURE — 93005 ELECTROCARDIOGRAM TRACING: CPT | Performed by: NURSE PRACTITIONER

## 2020-11-03 PROCEDURE — 6370000000 HC RX 637 (ALT 250 FOR IP): Performed by: INTERNAL MEDICINE

## 2020-11-03 PROCEDURE — 71046 X-RAY EXAM CHEST 2 VIEWS: CPT

## 2020-11-03 PROCEDURE — 80048 BASIC METABOLIC PNL TOTAL CA: CPT

## 2020-11-03 PROCEDURE — 99285 EMERGENCY DEPT VISIT HI MDM: CPT

## 2020-11-03 PROCEDURE — 83036 HEMOGLOBIN GLYCOSYLATED A1C: CPT

## 2020-11-03 PROCEDURE — 6360000002 HC RX W HCPCS: Performed by: INTERNAL MEDICINE

## 2020-11-03 PROCEDURE — 85025 COMPLETE CBC W/AUTO DIFF WBC: CPT

## 2020-11-03 PROCEDURE — 93010 ELECTROCARDIOGRAM REPORT: CPT | Performed by: INTERNAL MEDICINE

## 2020-11-03 PROCEDURE — 2580000003 HC RX 258: Performed by: INTERNAL MEDICINE

## 2020-11-03 PROCEDURE — 96372 THER/PROPH/DIAG INJ SC/IM: CPT

## 2020-11-03 RX ORDER — VITAMIN B COMPLEX
2000 TABLET ORAL DAILY
Status: DISCONTINUED | OUTPATIENT
Start: 2020-11-04 | End: 2020-11-04 | Stop reason: HOSPADM

## 2020-11-03 RX ORDER — OMEPRAZOLE 20 MG/1
40 CAPSULE, DELAYED RELEASE ORAL DAILY
Status: DISCONTINUED | OUTPATIENT
Start: 2020-11-04 | End: 2020-11-04 | Stop reason: HOSPADM

## 2020-11-03 RX ORDER — ACETAMINOPHEN 325 MG/1
650 TABLET ORAL EVERY 6 HOURS PRN
Status: DISCONTINUED | OUTPATIENT
Start: 2020-11-03 | End: 2020-11-04 | Stop reason: HOSPADM

## 2020-11-03 RX ORDER — POLYETHYLENE GLYCOL 3350 17 G/17G
17 POWDER, FOR SOLUTION ORAL DAILY
Status: DISCONTINUED | OUTPATIENT
Start: 2020-11-04 | End: 2020-11-04 | Stop reason: HOSPADM

## 2020-11-03 RX ORDER — INSULIN GLARGINE 100 [IU]/ML
18 INJECTION, SOLUTION SUBCUTANEOUS NIGHTLY
Status: DISCONTINUED | OUTPATIENT
Start: 2020-11-03 | End: 2020-11-04 | Stop reason: HOSPADM

## 2020-11-03 RX ORDER — POLYETHYLENE GLYCOL 3350 17 G/17G
17 POWDER, FOR SOLUTION ORAL DAILY PRN
Status: DISCONTINUED | OUTPATIENT
Start: 2020-11-03 | End: 2020-11-04 | Stop reason: HOSPADM

## 2020-11-03 RX ORDER — ROSUVASTATIN CALCIUM 10 MG/1
10 TABLET, COATED ORAL DAILY
Status: DISCONTINUED | OUTPATIENT
Start: 2020-11-04 | End: 2020-11-04 | Stop reason: HOSPADM

## 2020-11-03 RX ORDER — DEXTROSE MONOHYDRATE 25 G/50ML
12.5 INJECTION, SOLUTION INTRAVENOUS PRN
Status: DISCONTINUED | OUTPATIENT
Start: 2020-11-03 | End: 2020-11-04 | Stop reason: HOSPADM

## 2020-11-03 RX ORDER — ACETAMINOPHEN 650 MG/1
650 SUPPOSITORY RECTAL EVERY 6 HOURS PRN
Status: DISCONTINUED | OUTPATIENT
Start: 2020-11-03 | End: 2020-11-04 | Stop reason: HOSPADM

## 2020-11-03 RX ORDER — ONDANSETRON 2 MG/ML
4 INJECTION INTRAMUSCULAR; INTRAVENOUS EVERY 6 HOURS PRN
Status: DISCONTINUED | OUTPATIENT
Start: 2020-11-03 | End: 2020-11-04 | Stop reason: HOSPADM

## 2020-11-03 RX ORDER — NICOTINE POLACRILEX 4 MG
15 LOZENGE BUCCAL PRN
Status: DISCONTINUED | OUTPATIENT
Start: 2020-11-03 | End: 2020-11-04 | Stop reason: HOSPADM

## 2020-11-03 RX ORDER — LOSARTAN POTASSIUM 25 MG/1
25 TABLET ORAL DAILY
Status: DISCONTINUED | OUTPATIENT
Start: 2020-11-04 | End: 2020-11-04 | Stop reason: HOSPADM

## 2020-11-03 RX ORDER — PROMETHAZINE HYDROCHLORIDE 25 MG/1
12.5 TABLET ORAL EVERY 6 HOURS PRN
Status: DISCONTINUED | OUTPATIENT
Start: 2020-11-03 | End: 2020-11-04 | Stop reason: HOSPADM

## 2020-11-03 RX ORDER — SODIUM CHLORIDE 0.9 % (FLUSH) 0.9 %
10 SYRINGE (ML) INJECTION EVERY 12 HOURS SCHEDULED
Status: DISCONTINUED | OUTPATIENT
Start: 2020-11-03 | End: 2020-11-04 | Stop reason: HOSPADM

## 2020-11-03 RX ORDER — ASPIRIN 81 MG/1
81 TABLET, CHEWABLE ORAL DAILY
Status: DISCONTINUED | OUTPATIENT
Start: 2020-11-04 | End: 2020-11-04 | Stop reason: HOSPADM

## 2020-11-03 RX ORDER — DEXTROSE MONOHYDRATE 50 MG/ML
100 INJECTION, SOLUTION INTRAVENOUS PRN
Status: DISCONTINUED | OUTPATIENT
Start: 2020-11-03 | End: 2020-11-04 | Stop reason: HOSPADM

## 2020-11-03 RX ORDER — TAMSULOSIN HYDROCHLORIDE 0.4 MG/1
0.4 CAPSULE ORAL NIGHTLY
Status: DISCONTINUED | OUTPATIENT
Start: 2020-11-03 | End: 2020-11-04 | Stop reason: HOSPADM

## 2020-11-03 RX ORDER — DULOXETIN HYDROCHLORIDE 30 MG/1
30 CAPSULE, DELAYED RELEASE ORAL DAILY
Status: DISCONTINUED | OUTPATIENT
Start: 2020-11-04 | End: 2020-11-04 | Stop reason: HOSPADM

## 2020-11-03 RX ORDER — SODIUM CHLORIDE 0.9 % (FLUSH) 0.9 %
10 SYRINGE (ML) INJECTION PRN
Status: DISCONTINUED | OUTPATIENT
Start: 2020-11-03 | End: 2020-11-04 | Stop reason: HOSPADM

## 2020-11-03 RX ADMIN — Medication 10 ML: at 20:44

## 2020-11-03 RX ADMIN — TAMSULOSIN HYDROCHLORIDE 0.4 MG: 0.4 CAPSULE ORAL at 20:43

## 2020-11-03 RX ADMIN — INSULIN LISPRO 3 UNITS: 100 INJECTION, SOLUTION INTRAVENOUS; SUBCUTANEOUS at 20:57

## 2020-11-03 RX ADMIN — ENOXAPARIN SODIUM 40 MG: 40 INJECTION SUBCUTANEOUS at 20:43

## 2020-11-03 RX ADMIN — INSULIN GLARGINE 18 UNITS: 100 INJECTION, SOLUTION SUBCUTANEOUS at 20:57

## 2020-11-03 ASSESSMENT — ENCOUNTER SYMPTOMS
BLOOD IN STOOL: 0
RHINORRHEA: 0
DIARRHEA: 0
ABDOMINAL PAIN: 0
CONSTIPATION: 0
SORE THROAT: 0
NAUSEA: 0
SHORTNESS OF BREATH: 0
VOMITING: 0

## 2020-11-03 ASSESSMENT — PAIN SCALES - GENERAL: PAINLEVEL_OUTOF10: 0

## 2020-11-03 NOTE — H&P
Hospital Medicine History & Physical      PCP: Rigoberto Lennox    Date of Admission: 11/3/2020    Date of Service: Pt seen/examined on 11/03/2020 and placed in Observation. Chief Complaint:  Chest pain       History Of Present Illness:      76 y.o. male who presented to [facility] with [place] was 1 day history of left-sided chest pain, patient is poor historian and he is [place] speaker during the the interview I used [place] , patient apparently had some left-sided chest pain when he was doing his PT sessions, stated that he is left leg and left arm usually giving him some pain since he had a stroke but today it was more on his left chest he describes his pain 3-4 out of 10 intensity nonradiating not associated with any nausea or vomiting denies blurry vision.   Came to the emergency department EKG nonspecific at the initial troponin negative we were asked to admit for further management    Past Medical History:          Diagnosis Date    Cerebral artery occlusion with cerebral infarction ([facility]     Diabetes mellitus ([facility] [facility]     Gallstone     GERD (gastroesophageal reflux disease)     Hyperlipidemia     Hypertension     Renal insufficiency        Past Surgical History:          Procedure Laterality Date    APPENDECTOMY      CHOLECYSTECTOMY      COLONOSCOPY N/A 5/8/2020    COLONOSCOPY POLYPECTOMY SNARE/COLD BIOPSY performed by Dee Dee Medina MD at [place] [address]  06/08/2018    UPPER GASTROINTESTINAL ENDOSCOPY N/A 2/28/2019    EGD W/EUS FNA performed by Reggie Kruger MD at [facility] 4/23/2019    EGD BIOPSY performed by Reggie Kruger MD at [place] [address] N/A 4/23/2019    EGD W/EUS FNA performed by Reggie Kruger MD at [place] [address] 4/29/2020    EGD BIOPSY performed by Dee Dee Medina MD at [address] [place] [address] Prior to Admission:      Prior to Admission medications    Medication Sig Start Date End Date Taking? Authorizing Provider   rosuvastatin (CRESTOR) 10 MG tablet Take 1 tablet by mouth daily 10/16/20  Yes Wilbert Rangel MD   DULoxetine (CYMBALTA) 30 MG extended release capsule Take 1 capsule by mouth daily 10/16/20  Yes Wilbert Rangel MD   losartan (COZAAR) 25 MG tablet Take 1 tablet by mouth daily 10/16/20  Yes Wilbert Rangel MD   diclofenac sodium (VOLTAREN) 1 % GEL Apply 4 g topically 4 times daily as needed for Pain (Shoulder and knee pain) 10/16/20  Yes Wilbert Rangel MD   lidocaine 4 % external patch Place 1 patch onto the skin daily as needed (back pain) 10/16/20  Yes Wilbert Rangel MD   tamsulosin [facility]) 0.4 MG capsule Take 1 capsule by mouth nightly 10/16/20  Yes Wilbert Rangel MD   clopidogrel (PLAVIX) 75 MG tablet Take 1 tablet by mouth daily 10/16/20  Yes Wilbert Rangel MD   insulin glargine (LANTUS SOLOSTAR) 100 UNIT/ML injection pen Inject 18 Units into the skin nightly 10/16/20  Yes Wilbert Rangel MD   insulin lispro, 1 Unit Dial, (HUMALOG KWIKPEN) 100 UNIT/ML SOPN Inject 10 Units into the skin daily (with breakfast) AND 5 Units Daily with lunch AND 5 Units Daily with supper. HOLD IF EATS LESS THAN 50% OF MEAL. . 10/16/20  Yes Wilbert Rangel MD   insulin lispro, 1 Unit Dial, (HUMALOG [facility]) 100 UNIT/ML SOPN With meals (TID AC) **Corrective Low Dose Algorithm** Glucose:  give No Insulin, Glucose 140-199 give 1 Unit, glucose 200-249 give 2 Units, glucose 250-299 give 3 Units, glucose 300-349 give 4 Units, glucose 350-399 give 5 Units, glucose Over 399 give 6 Units 10/16/20  Yes Wilbert Rangel MD   omeprazole (PRILOSEC) 40 MG delayed release capsule Take 40 mg by mouth daily   Yes Historical Provider, MD   vitamin D (CHOLECALCIFEROL) 25 MCG (1000 UT) TABS tablet Take 2,000 Units by mouth daily   Yes Historical Provider, MD   acetaminophen (TYLENOL) 500 MG tablet Take 1,000 mg by mouth 2 times daily   Yes Historical Provider, MD   polyethylene glycol (MIRALAX) PACK packet Take 17 g by mouth daily   Yes Historical Provider, MD       Allergies:  Atorvastatin    Social History:      The patient currently lives at home    TOBACCO:   reports that he has never smoked. He has never used smokeless tobacco.  ETOH:   reports no history of alcohol use. E-Cigarettes Vaping or Juuling     Questions Responses    Vaping Use Never User    Start Date     Does device contain nicotine? Quit Date     Vaping Type             Family History:      Reviewed in detail and positive as follows:        Problem Relation Age of Onset    No Known Problems Mother     No Known Problems Father        REVIEW OF SYSTEMS:   Pertinent positives as noted in the HPI. All other systems reviewed and negative. PHYSICAL EXAM PERFORMED:    BP (!) 142/91   Pulse 75   Temp 97.6 °F (36.4 °C) (Oral)   Resp 16   SpO2 96%     General appearance:  No apparent distres  HEENT:  Normal cephalic  Neck: Supple, with full range of motion. No jugular venous distention. Trachea midline. Respiratory:  Normal respiratory effort. Clear to auscultation, bilaterally without Rales/Wheezes/Rhonchi. Cardiovascular:  Regular rate and rhythm with normal S1/S2 without murmurs, rubs or gallops. Abdomen: Soft, non-tender, non-distended   Musculoskeletal:  No clubbing, cyanosis  Skin: Skin color, texture, turgor normal.  No rashes or lesions. Neurologic: Left-sided weakness  Psychiatric:  Alert   Capillary Refill: Brisk,< 3 seconds   Peripheral Pulses: +2 palpable, equal bilaterally       Labs:     Recent Labs     11/03/20  1331   WBC 7.2   HGB 13.9   HCT 42.0        Recent Labs     11/03/20  1331   *   K 4.7   CL 99   CO2 23   BUN 19   CREATININE 1.2   CALCIUM 9.4     No results for input(s): AST, ALT, BILIDIR, BILITOT, ALKPHOS in the last 72 hours. No results for input(s): INR in the last 72 hours.   Recent Labs     11/03/20  1331   TROPONINI <0.01       Urinalysis:      Lab Results   Component Value Date    NITRU Negative 09/03/2020    WBCUA 4 09/03/2020    RBCUA 1 09/03/2020    BLOODU Negative 09/03/2020    SPECGRAV 1.017 09/03/2020    GLUCOSEU 100 09/03/2020       Radiology:     CXR: I have reviewed the CXR with the following interpretation: no infiltrates   EKG:  I have reviewed the EKG with the following interpretation: no ST elevation     XR CHEST (2 VW)   Final Result   Minimal bibasilar airspace disease is similar to the previous exam.  This may   represent atelectasis and/or pneumonia. ASSESSMENT:    Active Hospital Problems    Diagnosis Date Noted    Diabetes mellitus ([facility] [address] [E11.9] 05/19/2017    Hyperlipidemia [E78.5] 05/19/2017    Hypertension [I10] 05/19/2017    Chest pain [R07.9] 05/19/2017         PLAN:    1. Chest pain, likely musculoskeletal, requested admission for further management and treatment, patient is at risk with diabetes age hyperlipidemia hypertension, patient will be placed in observation, serial troponin, will consult cardiology for further recommendations on next step. Plan of care discussed with patient, all questions answered. 2.  Diabetes mellitus, short and long-acting insulin  3. Essential hypertension, p.o. medications  4. Hyperlipidemia on statin  5. History of recent stroke continue aspirin    DVT Prophylaxis: lovenox  Diet: Diet NPO Effective Now  Code Status: Prior        Dispo -observation       Jimbo Haq MD    Thank you Andry Weems for the opportunity to be involved in this patient's care. If you have any questions or concerns please feel free to contact me at [phone].

## 2020-11-03 NOTE — ED PROVIDER NOTES
[address] [facility]        Pt Name: Junior Duran  MRN: [id number]  [place] 1945  Date of evaluation: 11/3/2020  Provider: CHANDANA العراقي - CNP  PCP: Caleb Winn    This patient was seen and evaluated by the attending physician Ligia Woodson MD.    CHIEF COMPLAINT       Chief Complaint   Patient presents with    Chest Pain     Pt told daughter he started having chest pain mid sternal.        HISTORY OF PRESENT ILLNESS   (Location/Symptom, Timing/Onset,Context/Setting, Quality, Duration, Modifying Factors, Severity)  Note limiting factors. Junior Duran is a 76 y.o. male who presents emergency department with concern for chest pain. Symptoms started last night but were worse this morning with exertion. Patient was getting therapy when this occurred. Therapist also noted that his blood pressure was elevated. He is undergoing home therapy secondary to stroke 2 months ago. He has left-sided deficits as a result. He is on medication for hypertension, hyperlipidemia, as well as Plavix. He also has a Holter monitor in place which his son reports has been present for approximately 3 weeks. He denies fever, rash, headaches, dizziness, shortness of breath, cough, congestion, abdominal pain, nausea, vomiting, diarrhea, constipation, blood in the stool, or painful urination. One friend/family member at bedside. Nursing Notes triage note reviewed and agreed with or any disagreements were addressed  in the HPI. REVIEW OF SYSTEMS    (2-9 systems for level 4, 10 or more for level 5)     Review of Systems   Constitutional: Negative for chills and fever. HENT: Negative for postnasal drip, rhinorrhea and sore throat. Eyes: Negative for visual disturbance. Respiratory: Negative for shortness of breath. Cardiovascular: Positive for chest pain.    Gastrointestinal: Negative for abdominal pain, blood in INSULIN LISPRO, 1 UNIT DIAL, (HUMALOG KWIKPEN) 100 UNIT/ML SOPN    Inject 10 Units into the skin daily (with breakfast) AND 5 Units Daily with lunch AND 5 Units Daily with supper. HOLD IF EATS LESS THAN 50% OF MEAL. Renae Wick     INSULIN LISPRO, 1 UNIT DIAL, (HUMALOG KWIKPEN) 100 UNIT/ML SOPN    With meals (TID AC) **Corrective Low Dose Algorithm** Glucose:  give No Insulin, Glucose 140-199 give 1 Unit, glucose 200-249 give 2 Units, glucose 250-299 give 3 Units, glucose 300-349 give 4 Units, glucose 350-399 give 5 Units, glucose Over 399 give 6 Units    LIDOCAINE 4 % EXTERNAL PATCH    Place 1 patch onto the skin daily as needed (back pain)    LOSARTAN (COZAAR) 25 MG TABLET    Take 1 tablet by mouth daily    OMEPRAZOLE (PRILOSEC) 40 MG DELAYED RELEASE CAPSULE    Take 40 mg by mouth daily    POLYETHYLENE GLYCOL (MIRALAX) PACK PACKET    Take 17 g by mouth daily    ROSUVASTATIN (CRESTOR) 10 MG TABLET    Take 1 tablet by mouth daily    TAMSULOSIN (FLOMAX) 0.4 MG CAPSULE    Take 1 capsule by mouth nightly    VITAMIN D (CHOLECALCIFEROL) 25 MCG (1000 UT) TABS TABLET    Take 2,000 Units by mouth daily         ALLERGIES     Atorvastatin    FAMILY HISTORY       Family History   Problem Relation Age of Onset    No Known Problems Mother     No Known Problems Father           SOCIAL HISTORY       Social History     Socioeconomic History    Marital status:      Spouse name: elissa    Number of children: 8    Years of education: None    Highest education level: None   Occupational History    Occupation: farmer   Social Needs    Financial resource strain: None    Food insecurity     Worry: None     Inability: None    Transportation needs     Medical: None     Non-medical: None   Tobacco Use    Smoking status: Never Smoker    Smokeless tobacco: Never Used   Substance and Sexual Activity    Alcohol use: No    Drug use: No    Sexual activity: Yes     Comment: frank   Lifestyle    Physical activity     Days per week: None     Minutes per session: None    Stress: None   Relationships    Social connections     Talks on phone: None     Gets together: None     Attends Caodaism service: None     Active member of club or organization: None     Attends meetings of clubs or organizations: None     Relationship status: None    Intimate partner violence     Fear of current or ex partner: None     Emotionally abused: None     Physically abused: None     Forced sexual activity: None   Other Topics Concern    None   Social History Narrative    None       SCREENINGS             PHYSICAL EXAM  (up to 7 for level 4, 8 or more for level 5)     ED Triage Vitals [11/03/20 1320]   BP Temp Temp Source Pulse Resp SpO2 Height Weight   (!) 168/96 97.6 °F (36.4 °C) Oral 75 21 99 % -- --       Physical Exam  Vitals signs and nursing note reviewed. Constitutional:       Appearance: He is well-developed. He is not diaphoretic. HENT:      Head: Normocephalic and atraumatic. Eyes:      General: No scleral icterus. Right eye: No discharge. Left eye: No discharge. Neck:      Musculoskeletal: Normal range of motion and neck supple. Cardiovascular:      Rate and Rhythm: Normal rate and regular rhythm. Heart sounds: Normal heart sounds. No murmur. No friction rub. No gallop. Pulmonary:      Effort: Pulmonary effort is normal. No respiratory distress. Breath sounds: Normal breath sounds. No stridor. No wheezing or rales. Chest:      Chest wall: No tenderness. Abdominal:      General: Bowel sounds are normal. There is no distension. Palpations: Abdomen is soft. There is no mass. Tenderness: There is no abdominal tenderness. There is no guarding or rebound. Musculoskeletal: Normal range of motion. General: No tenderness. Skin:     General: Skin is warm and dry. Coloration: Skin is not pale. Neurological:      Mental Status: He is alert and oriented to person, place, and time. Coordination: Coordination normal.   Psychiatric:         Behavior: Behavior normal.         DIAGNOSTIC RESULTS   LABS:    Labs Reviewed   CBC WITH AUTO DIFFERENTIAL - Abnormal; Notable for the following components:       Result Value    MCV 76.5 (*)     MCH 25.4 (*)     All other components within normal limits    Narrative:     Performed at:  [facility]  [address] [place] [address]   Phone [phone]   BASIC METABOLIC PANEL W/ REFLEX TO MG FOR LOW K - Abnormal; Notable for the following components:    Sodium 133 (*)     Glucose 287 (*)     GFR Non- 59 (*)     All other components within normal limits    Narrative:     Performed at:  [facility]  [email or web address] [address]   Phone [phone]   TROPONIN    Narrative:     Performed at:  [facility]  [email or web address] [address]   Phone [phone]       All other labs werewithin normal range or not returned as of this dictation. EKG: All EKG's are interpreted by the Emergency Department Physician who either signs or Co-signs this chart in the absence of acardiologist.  Please see their note for interpretation of EKG. RADIOLOGY:   Interpretation per the Radiologist below, if available at the time of this note:    XR CHEST (2 VW)   Final Result   Minimal bibasilar airspace disease is similar to the previous exam.  This may   represent atelectasis and/or pneumonia.            Xr Chest (2 Vw)    Result Date: 11/3/2020  EXAMINATION: TWO XRAY VIEWS OF THE CHEST 11/3/2020 1:45 pm COMPARISON: 10/14/2020 HISTORY: ORDERING SYSTEM PROVIDED HISTORY: Chest Pain TECHNOLOGIST PROVIDED HISTORY: Reason for exam:->Chest Pain Reason for Exam: chest pain Acuity: Acute Type of Exam: Initial FINDINGS: There is minimal bibasilar airspace opacities similar to the previous exam. The mediastinum and cardiac silhouette recognition program.  Efforts were made to edit the dictations but occasionally words are mis-transcribed.)    CHANDANA Baptiste CNP (electronically signed)        CHANDANA Baptiste CNP  11/03/20 1203

## 2020-11-03 NOTE — ED PROVIDER NOTES
As physician-in-triage, I performed a medical screening history and physical exam on Sylvester Duval. I also cared for and evaluated the patient in conjunction with the ED Advanced Practice Provider. All diagnostic, treatment, and disposition decisions were made by myself in conjunction with the advanced practice provider. For all further details of the patient's emergency department visit, please see the advanced practice provider's documentation. The ER for evaluation of positive chest pain exertional chest pain, poor informant, language barrier associated with Irish, collateral history obtained from family members, positive intermittent chest pain, history of cerebrovascular disease history of stroke, no new neurologic deficits, positive chest pain, initial EKG reveals no acute ST abnormalities initial troponin is negative. Patient will be admitted for cardiovascular rule out. Normal pulse, normal respiration. Normal mental status. Impression: Acute precordial chest pain.      Roge Gates MD  64/72/86 4802

## 2020-11-03 NOTE — ED NOTES
Bed: [facility]  Expected date: 11/3/20  Expected time: 12:58 PM  Means of arrival:   Comments:  35M Chest pain      Breanne Frey, DANILO  11/03/20 9212

## 2020-11-03 NOTE — ED NOTES
Report called to Adele CARRASCO, all questions answered at this time.      Juan Lan RN  11/03/20 0423

## 2020-11-03 NOTE — PROGRESS NOTES
Medication Reconciliation    List of medications patient is currently taking is complete.      Maura CannonD, BCPS  11/3/2020 3:14 PM

## 2020-11-04 VITALS
SYSTOLIC BLOOD PRESSURE: 149 MMHG | DIASTOLIC BLOOD PRESSURE: 91 MMHG | OXYGEN SATURATION: 96 % | WEIGHT: 127.43 LBS | HEART RATE: 80 BPM | HEIGHT: 63 IN | BODY MASS INDEX: 22.58 KG/M2 | TEMPERATURE: 97.8 F | RESPIRATION RATE: 17 BRPM

## 2020-11-04 LAB
EKG ATRIAL RATE: 75 BPM
EKG DIAGNOSIS: NORMAL
EKG P AXIS: 27 DEGREES
EKG P-R INTERVAL: 192 MS
EKG Q-T INTERVAL: 378 MS
EKG QRS DURATION: 118 MS
EKG QTC CALCULATION (BAZETT): 422 MS
EKG R AXIS: -44 DEGREES
EKG T AXIS: 54 DEGREES
EKG VENTRICULAR RATE: 75 BPM
ESTIMATED AVERAGE GLUCOSE: 205.9 MG/DL
GLUCOSE BLD-MCNC: 143 MG/DL (ref 70–99)
GLUCOSE BLD-MCNC: 99 MG/DL (ref 70–99)
HBA1C MFR BLD: 8.8 %
HCT VFR BLD CALC: 40.8 % (ref 40.5–52.5)
HEMOGLOBIN: 13.9 G/DL (ref 13.5–17.5)
LV EF: 70 %
LVEF MODALITY: NORMAL
MCH RBC QN AUTO: 25.7 PG (ref 26–34)
MCHC RBC AUTO-ENTMCNC: 33.9 G/DL (ref 31–36)
MCV RBC AUTO: 75.8 FL (ref 80–100)
PDW BLD-RTO: 14.9 % (ref 12.4–15.4)
PERFORMED ON: ABNORMAL
PERFORMED ON: NORMAL
PLATELET # BLD: 226 K/UL (ref 135–450)
PMV BLD AUTO: 7.2 FL (ref 5–10.5)
RBC # BLD: 5.39 M/UL (ref 4.2–5.9)
WBC # BLD: 5.9 K/UL (ref 4–11)

## 2020-11-04 PROCEDURE — 3430000000 HC RX DIAGNOSTIC RADIOPHARMACEUTICAL: Performed by: INTERNAL MEDICINE

## 2020-11-04 PROCEDURE — 78451 HT MUSCLE IMAGE SPECT SING: CPT

## 2020-11-04 PROCEDURE — 6360000002 HC RX W HCPCS: Performed by: INTERNAL MEDICINE

## 2020-11-04 PROCEDURE — 78452 HT MUSCLE IMAGE SPECT MULT: CPT

## 2020-11-04 PROCEDURE — 85027 COMPLETE CBC AUTOMATED: CPT

## 2020-11-04 PROCEDURE — G0378 HOSPITAL OBSERVATION PER HR: HCPCS

## 2020-11-04 PROCEDURE — 6370000000 HC RX 637 (ALT 250 FOR IP): Performed by: INTERNAL MEDICINE

## 2020-11-04 PROCEDURE — 2580000003 HC RX 258: Performed by: INTERNAL MEDICINE

## 2020-11-04 PROCEDURE — 93005 ELECTROCARDIOGRAM TRACING: CPT | Performed by: INTERNAL MEDICINE

## 2020-11-04 PROCEDURE — 99245 OFF/OP CONSLTJ NEW/EST HI 55: CPT | Performed by: INTERNAL MEDICINE

## 2020-11-04 PROCEDURE — 93017 CV STRESS TEST TRACING ONLY: CPT

## 2020-11-04 PROCEDURE — 93010 ELECTROCARDIOGRAM REPORT: CPT | Performed by: INTERNAL MEDICINE

## 2020-11-04 PROCEDURE — A9502 TC99M TETROFOSMIN: HCPCS | Performed by: INTERNAL MEDICINE

## 2020-11-04 RX ADMIN — ROSUVASTATIN CALCIUM 10 MG: 10 TABLET, FILM COATED ORAL at 12:03

## 2020-11-04 RX ADMIN — OMEPRAZOLE 40 MG: 20 CAPSULE, DELAYED RELEASE ORAL at 12:02

## 2020-11-04 RX ADMIN — REGADENOSON 0.4 MG: 0.08 INJECTION, SOLUTION INTRAVENOUS at 13:55

## 2020-11-04 RX ADMIN — LOSARTAN POTASSIUM 25 MG: 25 TABLET, FILM COATED ORAL at 12:03

## 2020-11-04 RX ADMIN — TETROFOSMIN 30 MILLICURIE: 1.38 INJECTION, POWDER, LYOPHILIZED, FOR SOLUTION INTRAVENOUS at 13:59

## 2020-11-04 RX ADMIN — ASPIRIN 81 MG: 81 TABLET, CHEWABLE ORAL at 12:02

## 2020-11-04 RX ADMIN — Medication 2000 UNITS: at 12:03

## 2020-11-04 RX ADMIN — DULOXETINE HYDROCHLORIDE 30 MG: 30 CAPSULE, DELAYED RELEASE ORAL at 12:03

## 2020-11-04 RX ADMIN — Medication 10 ML: at 12:11

## 2020-11-04 ASSESSMENT — PAIN SCALES - GENERAL
PAINLEVEL_OUTOF10: 0
PAINLEVEL_OUTOF10: 0

## 2020-11-04 NOTE — DISCHARGE SUMMARY
(back pain)  Qty: 1 box, Refills: 0      tamsulosin (FLOMAX) 0.4 MG capsule Take 1 capsule by mouth nightly  Qty: 30 capsule, Refills: 0      clopidogrel (PLAVIX) 75 MG tablet Take 1 tablet by mouth daily  Qty: 30 tablet, Refills: 0      insulin glargine (LANTUS SOLOSTAR) 100 UNIT/ML injection pen Inject 18 Units into the skin nightly  Qty: 3 pen, Refills: 0      !! insulin lispro, 1 Unit Dial, (HUMALOG KWIKPEN) 100 UNIT/ML SOPN Inject 10 Units into the skin daily (with breakfast) AND 5 Units Daily with lunch AND 5 Units Daily with supper. HOLD IF EATS LESS THAN 50% OF MEAL. Christa Memos: 3 pen, Refills: 0      !! insulin lispro, 1 Unit Dial, (HUMALOG KWIKPEN) 100 UNIT/ML SOPN With meals (TID AC) **Corrective Low Dose Algorithm** Glucose:  give No Insulin, Glucose 140-199 give 1 Unit, glucose 200-249 give 2 Units, glucose 250-299 give 3 Units, glucose 300-349 give 4 Units, glucose 350-399 give 5 Units, glucose Over 399 give 6 Units  Qty: 3 pen, Refills: 0      omeprazole (PRILOSEC) 40 MG delayed release capsule Take 40 mg by mouth daily      vitamin D (CHOLECALCIFEROL) 25 MCG (1000 UT) TABS tablet Take 2,000 Units by mouth daily      acetaminophen (TYLENOL) 500 MG tablet Take 1,000 mg by mouth 2 times daily      polyethylene glycol (MIRALAX) PACK packet Take 17 g by mouth daily       ! ! - Potential duplicate medications found. Please discuss with provider. The patient was seen and examined on day of discharge and this discharge summary is in conjunction with any daily progress note from day of discharge. Hospital Course: The patient is a 76 yrs old male, who  has a past medical history of Cerebral artery occlusion with cerebral infarction ([address] Diabetes mellitus ([address] Gallstone, GERD (gastroesophageal reflux disease), Hyperlipidemia, Hypertension, and Renal insufficiency. He presented to [facility] ER with complaints of L sided CP x 1 day. The patient speaks Loulou and  is needed.  He experienced some L sided CP during his PT session. He had a stroke previously and noted that his L arm and leg tend to give him some trouble with pain however on the date of presentation pain was to his L chest. Troponin was < 0.01 (remained negative). The patient was admitted for further workup and treatment of CP. CP was thought to be musculoskeletal. Because of risk factors, cardiology was consulted for additional recommendations. Lexiscan stress testing was recommended by cardiology. It was felt that if this did not show ischemia or other abnormalities, the patient would be able to discharge to home. Stress testing was normal without signs of ischemia. It is likely that the patient's chest pain is due to working with therapy - this is when it typically occurs. At this time, the patient is feeling well. CP has resolved. He is able to discharge to home in stable condition with OP F/u as above. Exam:     BP (!) 149/91   Pulse 80   Temp 97.8 °F (36.6 °C) (Oral)   Resp 17   Ht 5' 3\" (1.6 m)   Wt 127 lb 6.8 oz (57.8 kg)   SpO2 96%   BMI 22.57 kg/m²     General: Alert and oriented. Sitting up at bedside in NAD. Pleasant and cooperative. Thin. Son at bedside assisting with translation. HEENT: Normocephalic. Atraumatic. Pupils equal and reactive. EOM intact. Oral mucosa pink/moist/intact. Neck: Supple. Symmetrical. Trachea midline. Lungs: Clear to auscultation bilaterally. Respirations even and unlabored. Chest: Exam unremarkable. Cardiac: S1/S2 noted. Regular Rhythm and rate. Abdomen/GI: Soft. Non-tender. Non-distended. BS+. Extremities: PP+. Atraumatic. No redness/cyanosis/edema noted. Brisk cap refill. Musculoskeletal: MARTINES. No change/significant limitation in ROM. Skin: Dry and intact. No lesions noted. Neuro: Grossly intact - weakness to LLE/LUE.     Consults:     IP CONSULT TO CARDIOLOGY    Significant Diagnostic Studies:       NM Cardiac Stress Test Nuclear Imaging   Final Result      XR CHEST (2 VW) Final Result   Minimal bibasilar airspace disease is similar to the previous exam.  This may   represent atelectasis and/or pneumonia. Troponin < 0.01    11/4/20 Cardiac stress testing.      Conclusions        Summary    Pharmacological Stress/MPI Results:        1. Technically a satisfactory study.    2. Normal pharmacological stress portion of the study.    3. No evidence of Ischemia by Myocardial Perfusion Imaging.    4. Gated Study shows normal LV size and Systolic function; EF is 70 %. Labs: For convenience and continuity at follow-up the following most recent labs are provided:    CBC:    Lab Results   Component Value Date    WBC 5.9 11/04/2020    HGB 13.9 11/04/2020    HCT 40.8 11/04/2020     11/04/2020       Renal:    Lab Results   Component Value Date     11/03/2020    K 4.7 11/03/2020    CL 99 11/03/2020    CO2 23 11/03/2020    BUN 19 11/03/2020    CREATININE 1.2 11/03/2020    CALCIUM 9.4 11/03/2020    PHOS 4.2 09/25/2020       No future appointments. Time Spent on discharge is more than 45 minutes in the examination, evaluation, counseling and review of medications and discharge plan. RX monitoring reviewed N/A    Signed:    CHANDANA Jesus NP   11/4/2020      Thank you Sebas Renteria for the opportunity to be involved in this patient's care. If you have any questions or concerns please feel free to contact me at [phone].

## 2020-11-04 NOTE — PLAN OF CARE
Problem: Skin Integrity:  Goal: Will show no infection signs and symptoms  Description: Will show no infection signs and symptoms  Outcome: Completed  Goal: Absence of new skin breakdown  Description: Absence of new skin breakdown  Outcome: Completed     Problem: Falls - Risk of:  Goal: Will remain free from falls  Description: Will remain free from falls  Outcome: Completed  Goal: Absence of physical injury  Description: Absence of physical injury  Outcome: Completed     Problem: HEMODYNAMIC STATUS  Goal: Patient has stable vital signs and fluid balance  Outcome: Completed     Problem: ACTIVITY INTOLERANCE/IMPAIRED MOBILITY  Goal: Mobility/activity is maintained at optimum level for patient  Outcome: Completed     Problem: COMMUNICATION IMPAIRMENT  Goal: Ability to express needs and understand communication  Outcome: Completed

## 2020-11-04 NOTE — PROGRESS NOTES
Hospital Medicine Progress Note      Admit Date: 11/3/2020         Overnight Events: No    CC: F/U for CP    HPI: The patient is a 76 yrs old male, who  has a past medical history of Cerebral artery occlusion with cerebral infarction ([address] Diabetes mellitus ([address] Gallstone, GERD (gastroesophageal reflux disease), Hyperlipidemia, Hypertension, and Renal insufficiency. He presented [facility] ER with complaints of L sided CP x 1 day. The patient speaks Loulou and  is needed. He experienced some L sided CP during his PT session. He had a stroke previously and noted that his L arm and leg tend to give him some trouble with pain however on the date of presentation pain was to his L chest. Troponin was < 0.01 (remained negative). The patient was admitted for further workup and treatment of CP. CP was thought to be musculoskeletal. Because of risk factors, cardiology was consulted for additional recommendations. Lexiscan stress testing was recommended by cardiology. It was felt that if this did not show ischemia or other abnormalities, the patient would be able to discharge to home.      Scheduled and prn Medications:    Scheduled Meds:   DULoxetine  30 mg Oral Daily    insulin glargine  18 Units Subcutaneous Nightly    losartan  25 mg Oral Daily    omeprazole  40 mg Oral Daily    polyethylene glycol  17 g Oral Daily    rosuvastatin  10 mg Oral Daily    tamsulosin  0.4 mg Oral Nightly    Vitamin D  2,000 Units Oral Daily    sodium chloride flush  10 mL Intravenous 2 times per day    aspirin  81 mg Oral Daily    enoxaparin  40 mg Subcutaneous Nightly    insulin lispro  0-6 Units Subcutaneous TID WC    insulin lispro  0-3 Units Subcutaneous Nightly     Continuous Infusions:   dextrose       PRN Meds:.sodium chloride flush, acetaminophen **OR** acetaminophen, polyethylene glycol, promethazine **OR** ondansetron, glucose, dextrose, glucagon (rDNA), dextrose    BP (!) 149/91   Pulse 80   Temp 97.8 °F (36.6 °C) (Oral)   Resp 17   Ht 5' 3\" (1.6 m)   Wt 127 lb 6.8 oz (57.8 kg)   SpO2 96%   BMI 22.57 kg/m²     LABS:    Lab Results   Component Value Date    WBC 5.9 11/04/2020    HGB 13.9 11/04/2020    HCT 40.8 11/04/2020    MCV 75.8 (L) 11/04/2020     11/04/2020    LYMPHOPCT 37.7 11/03/2020    RBC 5.39 11/04/2020    MCH 25.7 (L) 11/04/2020    MCHC 33.9 11/04/2020    RDW 14.9 11/04/2020       Lab Results   Component Value Date    CREATININE 1.2 11/03/2020    BUN 19 11/03/2020     (L) 11/03/2020    K 4.7 11/03/2020    CL 99 11/03/2020    CO2 23 11/03/2020       Lab Results   Component Value Date    MG 2.30 09/25/2020       Lab Results   Component Value Date    ALT 16 09/22/2020    AST 23 09/22/2020    ALKPHOS 107 09/22/2020    BILITOT 0.7 09/22/2020        No flowsheet data found. Imaging:  XR CHEST (2 VW)   Final Result   Minimal bibasilar airspace disease is similar to the previous exam.  This may   represent atelectasis and/or pneumonia. NM Cardiac Stress Test Nuclear Imaging    (Results Pending)       Assessment & Plan:        Chest pain  ASA, Statin  Troponin <0.01   Echo 1/16/20 showing an EF of 55 - 60%  Has been seen by cardiology. Appreciate assistance. - CP is exertional at times though has atypical features. Stress testing ordered. If stress testing is negative for ischemia or other abnormalities, okay to discharge. DM II  Lantus  Humalog   Monitor FSBG  Titrate insulin PRN     HTN  Continue medications as ordered. Monitor BP  Titrate medications as needed.     HLD  Statin  Monitor for S/S of Rhabdomyolysis      Recent CVA  Continue medical management and risk factor modification  Supportive therapies    I attempted to see the patient, however he was out of his room for cardiac stress testing. Assessment and plan reviewed as above. Will continue to follow.     CHANDANA Shields NP  11/04/20  2:08 PM

## 2020-11-04 NOTE — PROGRESS NOTES
4 Eyes Skin Assessment     NAME:  Arturo Duval  YOB: 1945  MEDICAL RECORD NUMBER:  [id number]    The patient is being assess for  Admission    I agree that 2 RN's have performed a thorough Head to Toe Skin Assessment on the patient. ALL assessment sites listed below have been assessed. Areas assessed by both nurses: Natan Yip RN/ [facility], Face, Ears, Shoulders, Back, Chest, Arms, Elbows, Hands, Sacrum. Buttock, Coccyx, Ischium and Legs. Feet and Heels        Does the Patient have a Wound?  No noted wound(s)       Gonzalez Prevention initiated:  No   Wound Care Orders initiated:  No    Pressure Injury (Stage 3,4, Unstageable, DTI, NWPT, and Complex wounds) if present place consult order under [de-identified] No    New and Established Ostomies if present place consult order under : No      Nurse 1 eSignature: Electronically signed by Judah Wick RN on 11/4/20 at 2:35 AM EST    **SHARE this note so that the co-signing nurse is able to place an eSignature**    Nurse 2 eSignature: Electronically signed by Camila Martin RN on 11/4/20 at 3:29 AM EST

## 2020-11-04 NOTE — ED PROVIDER NOTES
Attending EKG interpretation     The Ekg interpreted by me shows  normal sinus rhythm with a rate of 77 bpm  Axis is   Left axis deviation  QTc is  normal  Intervals and Durations are remarkable for an incomplete left bundle branch block. ST Segments: no acute change  No significant change from prior EKG dated April 25, 2020.            Cora Lopez MD  11/03/20 4288

## 2020-11-04 NOTE — PLAN OF CARE
Problem: Falls - Risk of:  Goal: Will remain free from falls  Description: Will remain free from falls  10/10/2020 1732 by Jarred Kulkarni RN  Outcome: Ongoing  Note: Patient free of falls this shift, all safety precautions in place. Problem: Falls - Risk of:  Goal: Absence of physical injury  Description: Absence of physical injury  10/10/2020 1732 by Jarred Kulkarni RN  Outcome: Ongoing  Note: All safety precautions in place including nonskid socks on feet, bed exit alarm on and posey clip attached to patient when in chair, phones and call light in reach, will continue to monitor. Problem: Skin Integrity:  Goal: Will show no infection signs and symptoms  Description: Will show no infection signs and symptoms  10/10/2020 1732 by Jarred Kulkarni RN  Outcome: Ongoing  Note: Skin assessment performed for signs and symptoms of infection. Problem: Skin Integrity:  Goal: Absence of new skin breakdown  Description: Absence of new skin breakdown  10/10/2020 1732 by Jarred Kulkarni RN  Outcome: Ongoing  Note: Skin check performed, no signs or symptoms of new skin break down, patient tolerating well. Problem: Daily Care:  Goal: Daily care needs are met  Description: Daily care needs are met  10/10/2020 1732 by Jarred Kulkarni RN  Outcome: Ongoing  Note: Participates in hourly rounding, states needs as they arise by using call light appropriately, and does call in advance of needs. Problem: Pain:  Goal: Patient's pain/discomfort is manageable  Description: Patient's pain/discomfort is manageable  10/10/2020 1732 by Jarred Kulkarni RN  Outcome: Ongoing  Note: Patient uses pain scale appropriately.        Problem: Discharge Planning:  Goal: Patients continuum of care needs are met  Description: Patients continuum of care needs are met  10/10/2020 1732 by Jarred Kulkarni RN  Outcome: Ongoing  Note: Discharge planning started upon admission,  consult in place. Problem: HEMODYNAMIC STATUS  Goal: Patient has stable vital signs and fluid balance  10/10/2020 1732 by Koby Alonzo RN  Outcome: Ongoing  Note: Daily weight taken and charted in the morning. Problem: ACTIVITY INTOLERANCE/IMPAIRED MOBILITY  Goal: Mobility/activity is maintained at optimum level for patient  10/10/2020 1732 by Koby Alonzo RN  Outcome: Ongoing  Note: Participates in all scheduled therapies, is cooperative and has a positive attitude towards achieving set goals. Problem: COMMUNICATION IMPAIRMENT  Goal: Ability to express needs and understand communication  10/10/2020 1732 by Koby Alonzo RN  Outcome: Ongoing  Note: Encouragement of the use of communication board      Problem: Serum Glucose Level - Abnormal:  Goal: Ability to maintain appropriate glucose levels will improve  Description: Ability to maintain appropriate glucose levels will improve  10/10/2020 1732 by Koby Alonzo RN  Outcome: Ongoing  Note: Order for Blood Glucose checks before each meal and at hour of sleep in place. Problem: IP SWALLOWING  Goal: LTG - patient will tolerate the least restrictive diet consistency to allow for safe consumption of daily meals  10/10/2020 1732 by Koby Alonzo RN  Outcome: Ongoing  Note: Up in chair for all meals, returned verbal acknowledgement of all safety precautions in place, will continue to monitor.

## 2020-11-04 NOTE — PROGRESS NOTES
Patient admitted to room 3254 per wheelchair. Transferred to bed with a cane and gaitbelt x 1 assist Patient was oriented to the Call Light, Phone, TV, Thermostat, Bed Controls, Bathroom and Emergency Cord. Patient verbalized and demonstrated understanding of all. Patient was also given an over view of Unit Routines for Acute Rehab, including what to wear for therapy. The patient's role in goal setting was reviewed along with an explanation of the Interdisciplinary Team meeting, the 's role in coordinating services and the Discharge Planning/Continuum of Care process. Patient Rights and Responsibilities were reviewed. Meal times were explained, including how to order food. The white board (used for communication) was pointed out emphasizing  the 3 hours/day Therapy Schedule (posted most evenings), the number (and process) for reporting grievances, and the Doctor's, Nurse's, and PCA's names. It was recommended that any family that will be care givers or any care givers the patient has, take part in therapy. There are no set visiting hours, and it was suggested that non-caregiver friends and family visitors come after therapy (at 4 PM or later) to allow patient to rest in between sessions.

## 2020-11-04 NOTE — PLAN OF CARE
Problem: Skin Integrity:  Goal: Will show no infection signs and symptoms  Description: Will show no infection signs and symptoms  11/4/2020 0041 by Isadora Duron RN  Outcome: Ongoing  11/4/2020 0039 by Isadora Duron RN  Outcome: Ongoing  Goal: Absence of new skin breakdown  Description: Absence of new skin breakdown  11/4/2020 0041 by Isadora Duron RN  Outcome: Ongoing  11/4/2020 0039 by Isadora Duron RN  Outcome: Ongoing     Problem: Falls - Risk of:  Goal: Will remain free from falls  Description: Will remain free from falls  11/4/2020 0041 by Isadora Duron RN  Outcome: Ongoing  11/4/2020 0039 by Isadora Duron RN  Outcome: Ongoing  Goal: Absence of physical injury  Description: Absence of physical injury  11/4/2020 0041 by Isadora Duron RN  Outcome: Ongoing  11/4/2020 0039 by Isadora Duron RN  Outcome: Ongoing     Problem: HEMODYNAMIC STATUS  Goal: Patient has stable vital signs and fluid balance  Outcome: Ongoing     Problem: ACTIVITY INTOLERANCE/IMPAIRED MOBILITY  Goal: Mobility/activity is maintained at optimum level for patient  Outcome: Ongoing     Problem: COMMUNICATION IMPAIRMENT  Goal: Ability to express needs and understand communication  Outcome: Ongoing

## 2020-11-04 NOTE — CARE COORDINATION
EMBER rec' d dc order. EMBER spoke with bedside RN. Son will transport him home today. LSW perfect served Disser, NP to request orders for sn/pt/ot/ as he was already active with home care agency at home.   Ana Hurst [place]     Case Management   [phone]    11/4/2020  4:19 PM

## 2020-11-04 NOTE — PROGRESS NOTES
Face to face with Dr. Yunier Valente, ok to give pills, stress test ordered.      Call in to Stress Test lab, ok to give pills, aware pt is ready for test. Electronically signed by Jas Chandler RN on 11/4/2020 at 12:21 PM

## 2020-11-04 NOTE — PROGRESS NOTES
A complete drug regimen review was completed for this patient.      [x]  No clinically significant medication issue was identified    []   Yes, a clinically significant medication issue was identified     []  Adverse Drug Event:      []  Allergy:      []  Side Effect:      []  Ineffective Therapy:      []  Drug Interaction:     []  Duplicated Therapy:     []  Untreated Indication:      []  Non-adherence:     []  Other:      Nursing/Pharmacy contacted the physician:   Date:   Time     Actions recommended by physician were completed:  Date :  Time:     Electronically signed by Wendy Worley RN on 11/4/20 at 2:10 AM EST

## 2020-11-04 NOTE — PROGRESS NOTES
Consult called to Dr. Veronique Phipps, left message on [facility] consult line for cardiac consult. Return number for this writer left on message.  Electronically signed by Ana Grimaldo RN on 11/4/2020 at 7:21 AM

## 2020-11-04 NOTE — PROGRESS NOTES
Patient placed on NPO after midnight per MD order. NSR on telemetry. Transfer per cane and gait belt. Denies pain. Bed alarm on, call light in reach. Will continue to monitor pt for safety.

## 2020-11-04 NOTE — DISCHARGE INSTR - COC
Continuity of Care Form    Patient Name: Sharon West   :  1945  MRN:  [id number]    Admit date:  11/3/2020  Discharge date:  2020    Code Status Order: Full Code   Advance Directives:   [facility] Documentation       Date/Time Healthcare Directive Type of Healthcare Directive Copy in [facility] [address] Agent's Name Healthcare Agent's Phone Number    20  No, patient does not have an advance directive for healthcare treatment -- -- -- -- --            Admitting Physician:  Odalys Thomspon MD  PCP: Chase Jara    Discharging Nurse: [facility] Unit/Room#: W2T-5375/2632-00  Discharging Unit Phone Number: [phone]    Emergency Contact:   Extended Emergency Contact Information  Primary Emergency Contact: [address] Phone: [phone]  Relation: Child  Secondary Emergency Contact: [address] [place] Phone: [phone]  Work Phone: [phone]  Relation: Grandchild    Past Surgical History:  Past Surgical History:   Procedure Laterality Date    APPENDECTOMY      CHOLECYSTECTOMY      COLONOSCOPY N/A 2020    COLONOSCOPY POLYPECTOMY SNARE/COLD BIOPSY performed by Bibi Sanchez MD at [address] [place] [address]  2018    UPPER GASTROINTESTINAL ENDOSCOPY N/A 2019    EGD W/EUS FNA performed by Rohini Preston MD at [address] [place] [address] 2019    EGD BIOPSY performed by Rohini Preston MD at [address] [place] [address] N/A 2019    EGD W/EUS FNA performed by Rohini Preston MD at [address] [place] [address] 2020    EGD BIOPSY performed by Bibi Sanchez MD at [facility] ENDOSCOPY       Immunization History: There is no immunization history on file for this patient.     Active Problems:  Patient Active Problem List   Diagnosis Code    Chest pain R07.9    Hypertension I10    Hyperlipidemia E78.5    Diabetes mellitus ([facility] E11.9    Unexplained weight loss R63.4    Esophagitis K20.90    Granulomatous adenopathy R59.9    Mediastinal lymphadenopathy R59.0    History of arterial ischemic stroke Z86.73    Abdominal pain R10.9    Acute cerebrovascular accident ([facility] I63.9    Right pontine stroke ([facility] I63.50       Isolation/Infection:   Isolation            No Isolation          Patient Infection Status       Infection Onset Added Last Indicated Last Indicated By Review Planned Expiration Resolved Resolved By    None active    Resolved    COVID-19 Rule Out 08/26/20 08/26/20 08/27/20 COVID-19 (Ordered)   08/27/20 Rule-Out Test Resulted    COVID-19 Rule Out 04/28/20 04/28/20 04/28/20 COVID-19 (Ordered)   04/28/20 Rule-Out Test Resulted            Nurse Assessment:  Last Vital Signs: BP (!) 149/91   Pulse 80   Temp 97.8 °F (36.6 °C) (Oral)   Resp 17   Ht 5' 3\" (1.6 m)   Wt 127 lb 6.8 oz (57.8 kg)   SpO2 96%   BMI 22.57 kg/m²     Last documented pain score (0-10 scale): Pain Level: 0  Last Weight:   Wt Readings from Last 1 Encounters:   11/04/20 127 lb 6.8 oz (57.8 kg)     Mental Status:  oriented    IV Access:  - None    Nursing Mobility/ADLs:  Walking   Assisted  Transfer  Assisted  Bathing  Assisted  Dressing  Independent  Toileting  Assisted  Feeding  Independent  Med Admin  Assisted  Med Delivery   whole and with thin liquids    Wound Care Documentation and Therapy:        Elimination:  Continence: Bowel: Yes  Bladder: Yes  Urinary Catheter: None   Colostomy/Ileostomy/Ileal Conduit: No       Date of Last BM: 11/3/2020    Intake/Output Summary (Last 24 hours) at 11/4/2020 1151  Last data filed at 11/4/2020 0608  Gross per 24 hour   Intake 10 ml   Output 1000 ml   Net -990 ml     I/O last 3 completed shifts: In: 10 [I.V.:10]  Out: 1000 [Urine:1000]    Safety Concerns:      At Risk for Falls    Impairments/Disabilities:      Language Barrier - speaks Vietnamese, Vision, Hearing and family

## 2020-11-04 NOTE — CONSULTS
[address]    Gigi Alcaraz  9[birth year]    November 4, 2020    Reason for Consult: Chest Pain    CC: Chest Pain    HPI:  The patient is 76 y.o. Vietnamese male with a past medical history significant for type 2 DM, prior CVA, essential hypertension and hyperlipidemia who presented to [facility] ED with chest pain. The patient does not speak Alois Sandy and requires an . He has been having chest pain for quite a while and this predates his CVA several months ago. He describes the pain as dull and in the center of his chest. It will come and go and may last for two hours at a time. This is some exertional component to it but it also may occur at rest. He has a little shortness of breath with it but no nausea. He has some right arm pain but also attributes this to his prior CVA> He has hemiparesis in that arm from his CVA as well. He denies any real relieving factors. Review of Systems:  Constitutional: No fatigue, weakness, night sweats or fever. HEENT: No new vision difficulties or ringing in the ears. Respiratory: No new SOB, PND, orthopnea or cough. Cardiovascular: See HPI   GI: No n/v, diarrhea, constipation, abdominal pain or changes in bowel habits. No melena, no hematochezia  : No urinary frequency, urgency, incontinence, hematuria or dysuria. Skin: No cyanosis or skin lesions. Musculoskeletal: No new muscle or joint pain. Neurological: No syncope or TIA-like symptoms.   Psychiatric: No anxiety, insomnia or depression     Past Medical History:   Diagnosis Date    Cerebral artery occlusion with [facility] infarction [facility]     Diabetes mellitus ([facility]     Gallstone     GERD (gastroesophageal reflux disease)     Hyperlipidemia     Hypertension     Renal insufficiency      Past Surgical History:   Procedure Laterality Date    APPENDECTOMY      CHOLECYSTECTOMY      COLONOSCOPY N/A 5/8/2020    COLONOSCOPY POLYPECTOMY SNARE/COLD BIOPSY performed by Tyler Leo Iam Musa MD at [address] [facility]  06/08/2018    UPPER GASTROINTESTINAL ENDOSCOPY N/A 2/28/2019    EGD W/EUS FNA performed by Eden Mayorga MD at [address] [facility] N/A 4/23/2019    EGD BIOPSY performed by Eden Mayorga MD at [address] [facility] N/A 4/23/2019    EGD W/EUS FNA performed by Eden Mayorga MD at [address] [facility] N/A 4/29/2020    EGD BIOPSY performed by Jenny Chowdhury MD at [address] [facility] History   Problem Relation Age of Onset    No Known Problems Mother     No Known Problems Father      Social History     Tobacco Use    Smoking status: Never Smoker    Smokeless tobacco: Never Used   Substance Use Topics    Alcohol use: No    Drug use: No       Allergies   Allergen Reactions    Atorvastatin Other (See Comments)     Myalgia -- stopped 11/2019 to see if pain improves. Possible PMR diagnosis as well. Will follow-up in 3 mo.      Current Facility-Administered Medications   Medication Dose Route Frequency Provider Last Rate Last Dose    DULoxetine (CYMBALTA) extended release capsule 30 mg  30 mg Oral Daily Ken Allred MD        insulin glargine (LANTUS) injection vial 18 Units  18 Units Subcutaneous Nightly Ken Allred MD   18 Units at 11/03/20 2057    losartan (COZAAR) tablet 25 mg  25 mg Oral Daily Ken Allred MD        omeprazole (PRILOSEC) delayed release capsule 40 mg  40 mg Oral Daily Ken Allred MD        polyethylene glycol (GLYCOLAX) packet 17 g  17 g Oral Daily Ken Allred MD        rosuvastatin (CRESTOR) tablet 10 mg  10 mg Oral Daily Ken Allred MD        tamsulosin (FLOMAX) capsule 0.4 mg  0.4 mg Oral Nightly Ken Allred MD   0.4 mg at 11/03/20 2043    vitamin D (CHOLECALCIFEROL) tablet 2,000 Units  2,000 Units Oral Daily Ken Allred MD        sodium chloride flush 0.9 % injection 10 mL  10 mL Intravenous 2 times per day Ken Richard MD   10 mL at 11/03/20 2044    sodium chloride flush 0.9 % injection 10 mL  10 mL Intravenous PRN Ken Allred MD        acetaminophen (TYLENOL) tablet 650 mg  650 mg Oral Q6H PRN Ken Allred MD        Or    acetaminophen (TYLENOL) suppository 650 mg  650 mg Rectal Q6H PRN Ken Allred MD        polyethylene glycol (GLYCOLAX) packet 17 g  17 g Oral Daily PRN Ken Allred MD        promethazine (PHENERGAN) tablet 12.5 mg  12.5 mg Oral Q6H PRN Ken Allred MD        Or    ondansetron (ZOFRAN) injection 4 mg  4 mg Intravenous Q6H PRN Ken Allred MD        aspirin chewable tablet 81 mg  81 mg Oral Daily Ken Allred MD        glucose (GLUTOSE) 40 % oral gel 15 g  15 g Oral PRN Ken Allred MD        dextrose 50 % IV solution  12.5 g Intravenous PRN Ken Allred MD        glucagon (rDNA) injection 1 mg  1 mg Intramuscular PRN Ken Allred MD        dextrose 5 % solution  100 mL/hr Intravenous PRN Ken Allred MD        enoxaparin (LOVENOX) injection 40 mg  40 mg Subcutaneous Nightly Ken Allred MD   40 mg at 11/03/20 2043    insulin lispro (HUMALOG) injection vial 0-6 Units  0-6 Units Subcutaneous TID WC Ken Allred MD        insulin lispro (HUMALOG) injection vial 0-3 Units  0-3 Units Subcutaneous Nightly Ken Allred MD   3 Units at 11/03/20 2057       Physical Exam:   /88   Pulse 81   Temp 97.5 °F (36.4 °C) (Oral)   Resp 16   Ht 5' 3\" (1.6 m)   Wt 127 lb 6.8 oz (57.8 kg)   SpO2 96%   BMI 22.57 kg/m²     Intake/Output Summary (Last 24 hours) at 11/4/2020 0936  Last data filed at 11/4/2020 3472  Gross per 24 hour   Intake 10 ml   Output 1000 ml   Net -990 ml     Wt Readings from Last 2 Encounters:   11/04/20 127 lb 6.8 oz (57.8 kg)   10/17/20 126 lb 15.8 oz (57.6 kg)     Constitutional: He is oriented to person, place, and time.  He appears well-developed and well-nourished. In no acute distress. Head: Normocephalic and atraumatic. Neck: Neck supple. No JVD present. Carotid bruit is not present. No mass and no thyromegaly present. No lymphadenopathy present. Cardiovascular: Normal rate, regular rhythm, normal heart sounds and intact distal pulses. Exam reveals no gallop and no friction rub. No murmur heard. Pulmonary/Chest: Effort normal and breath sounds normal. No respiratory distress. He has no wheezes, rhonchi or rales. Abdominal: Soft, non-tender. Bowel sounds and aorta are normal. He exhibits no organomegaly, mass or bruit. Extremities: No edema, cyanosis, or clubbing. Pulses are 2+ radial/carotid/dorsalis pedis and posterior tibial bilaterally. Neurological: He is alert and oriented to person, place, and time. He has left upper and lower extremity hemiparesis. Skin: Skin is warm and dry. There is no rash or diaphoresis. Psychiatric: He has a normal mood and affect. His speech is normal and behavior is normal.     EKG Interpretation: Sinus rhythm with LAFB    Lab Review:   Lab Results   Component Value Date    TRIG 130 09/23/2020    HDL 36 09/23/2020    LDLCALC 43 09/23/2020    LABVLDL 26 09/23/2020     Lab Results   Component Value Date     11/03/2020    K 4.7 11/03/2020    BUN 19 11/03/2020    CREATININE 1.2 11/03/2020     Recent Labs     11/03/20  1331 11/04/20  0750   WBC 7.2 5.9   HGB 13.9 13.9   HCT 42.0 40.8    226     Chest X-ray 11/3/20:  Minimal bibasilar airspace disease is similar to the previous exam.  This may    represent atelectasis and/or pneumonia. Echo 9/24/20:   Ejection fraction is visually estimated to be 55-60%. Indeterminate diastolic function. Normal function of all valves. Assessment:  1. Unstable Angina  2. Essential Hypertension  3. Hyperlipidemia with goal LDL<100mg/dL    Plan:  Sylvester's chest pain has some atypical features but is also exertional at times.  He has risk factors with his type 2 DM and prior CVA. I think performing a Lexiscan stress perfusion study for him is reasonable at this time. His famiyl member in the room was concerned about his BP but the record he showed me listed hgih systolic pressures in the 150's. I emphasized to him that this is more of a long term health maintenance issue and those BP reading would not have caused his chest pain. If his stress shows no ischemia or other abnormalities, he can be discharged later today.

## 2020-11-05 NOTE — CARE COORDINATION
LSW sent orders to [facility] as directed by son. LSW called Rep Daryl for [facility] to inform.   [place], [place]     Case Management   [phone]    11/5/2020  9:13 AM

## 2020-12-02 PROCEDURE — 93228 REMOTE 30 DAY ECG REV/REPORT: CPT | Performed by: INTERNAL MEDICINE

## 2020-12-03 ENCOUNTER — TELEPHONE (OUTPATIENT)
Dept: CARDIOLOGY CLINIC | Age: 75
End: 2020-12-03

## 2020-12-03 NOTE — TELEPHONE ENCOUNTER
Called patients home number and automated recording stated that the number is not accepting calls at this time. Called number listed under Work. Left message instructing to call office regarding results of his monitor and to schedule an appointment with Dr. Vazquez Corona. This monitor was ordered after her suffered a stroke to assess for any underlying arrhthymias which may have contributed to his stroke. Patient needs to schedule an appointment to discuss ILR implantation for long term monitoring from arrhthymias. The monitor was worn from 10/28/2020- 11/26/2020 and showed. SR with 7 beat NSVT at least 4-5- episodes of non conducted P waves resulting in the longest 2.6 second ventricular pause mostly during sleeping hours.

## 2020-12-03 NOTE — LETTER
[address] HEART INST [facility]  Phone: [phone]  Fax: [phone]    Kinga Cruz MD        December 15, 2020    [address]  [address] [place] [address]      Dear Jono Alanis: Our office has been trying to contact you regarding the results of your heart monitor which worn from 10/28/2020-11/26/2020. Please contact our office at [phone] to discuss the results.     Sincerely,        Kinga Cruz MD / Magdalena Whiteside RN

## 2020-12-15 NOTE — TELEPHONE ENCOUNTER
Letter mailed to patient home address which states:    Dear Dat Galicia: Our office has been trying to contact you regarding the results of your heart monitor which worn from 10/28/2020-11/26/2020. Please contact our office at [phone] to discuss the results.     Sincerely,    Maurizio Bhagat MD / Maxine Manjarrez RN

## 2020-12-29 ENCOUNTER — TELEPHONE (OUTPATIENT)
Dept: CARDIOLOGY CLINIC | Age: 75
End: 2020-12-29

## 2020-12-29 NOTE — TELEPHONE ENCOUNTER
Spoke with patients son scheduled an appointment to see Dr Karen Aldana on 1/6/2021 to discuss ILR implantation.

## 2020-12-29 NOTE — TELEPHONE ENCOUNTER
Please see encounter from 12/3/2020.       Marylee Ing calling for his results from his [employer] monitor and can be reached at [phone]

## 2020-12-29 NOTE — TELEPHONE ENCOUNTER
Spoke with patients son scheduled an appointment to see Dr Kosta Conte on 1/6/2021 to discuss ILR implantation.

## 2021-01-22 NOTE — PROGRESS NOTES
Cardiac Electrophysiology Consultation     Date: 1/25/2021  Reason for Consultation: CVA  Consult Requesting Physician:  Karli Patel MD    Chief Complaint:   Chief Complaint   Patient presents with    New Patient     discuss ILR implantation        HPI: Kathleen Thorpe is a 76 y.o. patient with a history of diabetes mellitus, GERD, HTN, and CVA (right pontine CVA). He presented to [facility] ED on 10/17/2020 with left sided weakness. He wore a 30 day monitor from 10/28/2020 -11/26/2020 which showed SR and 7 beat run of NSVT and at least 4-5 episodes of non conducted p wave resulting in the longest 2.6 second pause but no atrial fibrillation nor atrial flutter. Interval History: Today, he presents to office accompanied by his son to discuss the results of his 30 day monitor and discuss loop recorder. The patient strictly speaks [place] but the son is bilingual and able to translate. Patient has deficits from his stroke with left-sided (both upper and lower body) weakness. He is compliant with his medications and tolerating them well. He denies chest pain/pressure, tightness, edema, shortness of breath, heart racing, palpitations, lightheadedness, dizziness, syncope, presyncope,  PND or orthopnea.      Past Medical History:   Diagnosis Date    [facility]     Diabetes mellitus ([facility]     Gallstone     GERD (gastroesophageal reflux disease)     Hyperlipidemia     Hypertension     Renal insufficiency         Past Surgical History:   Procedure Laterality Date    APPENDECTOMY      CHOLECYSTECTOMY      COLONOSCOPY N/A 5/8/2020    COLONOSCOPY POLYPECTOMY SNARE/COLD BIOPSY performed by Noni Deng MD at [address] [facility]  06/08/2018    UPPER GASTROINTESTINAL ENDOSCOPY N/A 2/28/2019    EGD W/EUS FNA performed by Mayi Layton MD at [address] [facility] 4/23/2019    EGD BIOPSY performed by Bg Swann MD at [facility] N/A 4/23/2019    EGD W/EUS FNA performed by Bg Swann [facility] at [facility] 4/29/2020    EGD BIOPSY performed by Compa Mazariegos MD at [facility]: Allergies   Allergen Reactions    Atorvastatin Other (See Comments)     Myalgia -- stopped 11/2019 to see if pain improves. Possible PMR diagnosis as well. Will follow-up in 3 mo. Medication:   Prior to Admission medications    Medication Sig Start Date End Date Taking? Authorizing Provider   benzonatate (TESSALON) 100 MG capsule  12/29/20  Yes Historical Provider, MD   amLODIPine (NORVASC) 5 MG tablet  12/15/20  Yes Historical Provider, MD   rosuvastatin (CRESTOR) 10 MG tablet Take 1 tablet by mouth daily 10/16/20  Yes Mj Lehman MD   DULoxetine (CYMBALTA) 30 MG extended release capsule Take 1 capsule by mouth daily 10/16/20  Yes Mj Lehman MD   losartan (COZAAR) 25 MG tablet Take 1 tablet by mouth daily 10/16/20  Yes Mj Lehman MD   diclofenac sodium (VOLTAREN) 1 % GEL Apply 4 g topically 4 times daily as needed for Pain (Shoulder and knee pain) 10/16/20  Yes Mj Lehman MD   lidocaine 4 % external patch Place 1 patch onto the skin daily as needed (back pain) 10/16/20  Yes Mj Lehman MD   tamsulosin [facility]) 0.4 MG capsule Take 1 capsule by mouth nightly 10/16/20  Yes Mj Lehman MD   clopidogrel (PLAVIX) 75 MG tablet Take 1 tablet by mouth daily 10/16/20  Yes Mj Lehman MD   insulin glargine (LANTUS SOLOSTAR) 100 UNIT/ML injection pen Inject 18 Units into the skin nightly 10/16/20  Yes Mj Lehman MD   insulin lispro, 1 Unit Dial, (HUMALOG KWIKPEN) 100 UNIT/ML SOPN Inject 10 Units into the skin daily (with breakfast) AND 5 Units Daily with lunch AND 5 Units Daily with supper. HOLD IF EATS LESS THAN 50% OF MEAL. . 10/16/20  Yes Mj Lehman MD   insulin lispro, 1 Unit Dial, (HUMALOG KWIKPEN) 100 UNIT/ML SOPN With meals (TID AC) **Corrective Low Dose Algorithm** Glucose:  give No Insulin, Glucose 140-199 give 1 Unit, glucose 200-249 give 2 Units, glucose 250-299 give 3 Units, glucose 300-349 give 4 Units, glucose 350-399 give 5 Units, glucose Over 399 give 6 Units 10/16/20  Yes Tara Rojas MD   omeprazole (PRILOSEC) 40 MG delayed release capsule Take 40 mg by mouth daily   Yes Historical Provider, MD   vitamin D (CHOLECALCIFEROL) 25 MCG (1000 UT) TABS tablet Take 2,000 Units by mouth daily   Yes Historical Provider, MD   acetaminophen (TYLENOL) 500 MG tablet Take 1,000 mg by mouth 2 times daily   Yes Historical Provider, MD   polyethylene glycol (MIRALAX) PACK packet Take 17 g by mouth daily   Yes Historical Provider, MD       Social History:   reports that he has never smoked. He has never used smokeless tobacco. He reports that he does not drink alcohol or use drugs. Family History:  family history includes No Known Problems in his father and mother. Reviewed. Denies family history of sudden cardiac death, arrhythmia, premature CAD    Review of System:    · General ROS: negative for - chills, fever   · Psychological ROS: negative for - anxiety or depression  · Ophthalmic ROS: negative for - eye pain or loss of vision  · ENT ROS: negative for - epistaxis, headaches, nasal discharge, sore throat   · Allergy and Immunology ROS: negative for - hives, nasal congestion   · Hematological and Lymphatic ROS: negative for - bleeding problems, blood clots, bruising or jaundice  · Endocrine ROS: negative for - skin changes, temperature intolerance or unexpected weight changes  · Respiratory ROS: negative for - cough, hemoptysis, pleuritic pain, SOB, sputum changes or wheezing  · Cardiovascular ROS: Per HPI.    · Gastrointestinal ROS: negative for - abdominal pain, blood in stools, diarrhea, hematemesis, melena, nausea/vomiting or swallowing difficulty/pain  · Genito-Urinary ROS: negative for - dysuria or incontinence  · Musculoskeletal ROS: negative for - joint swelling or muscle pain + Left sided weakness  · Neurological ROS: negative for - confusion, dizziness, gait disturbance, headaches, numbness/tingling, seizures, speech problems, tremors, visual changes or weakness  · Dermatological ROS: negative for - rash    Physical Examination:  Vitals:    21 1410   BP: 128/70   Pulse: 98   Temp: 96.4 °F (35.8 °C)   SpO2: 97%       · Constitutional: Oriented. No distress. · Head: Normocephalic and atraumatic. · Mouth/Throat: Oropharynx is clear and moist.   · Eyes: Conjunctivae normal. EOM are normal.   · Neck: Normal range of motion. Neck supple. No rigidity. No JVD present. · Cardiovascular: Normal rate, regular rhythm, S1&S2 and intact distal pulses. · Pulmonary/Chest: Bilateral respiratory sounds. No wheezes. No rhonchi. · Abdominal: Soft. Bowel sounds present. No distension, No tenderness. · Musculoskeletal: No tenderness. No edema    · Lymphadenopathy: Has no cervical adenopathy. · Neurological: Alert and oriented. Cranial nerve appears intact, No Gross deficit   · Skin: Skin is warm and dry. No rash noted. · Psychiatric: Has a normal mood, affect and behavior     Labs:  Reviewed. EC2020 reviewed, sinus  rhythm with v-rate of 96 bpm with QRS duration 120 ms. No pathologic Q waves, ventricular pre-excitation, or QT prolongation. Studies:   1. Event monitor: 10/28/2020 -2020   SR and 7 beat run of NSVT and at least 4-5 episodes of non conducted p wave resulting in the longest 2.6 second pause. 2. Echo: 2020  Summary  Ejection fraction is visually estimated to be 55-60%. Indeterminate diastolic function. Normal function of all valves. 3. Stress Test:  11/3/2020  Summary    Pharmacological Stress/MPI Results:        1.  Technically a satisfactory study.    2. Normal pharmacological stress portion making performed by me.     Chad Ashley MD, MS, [facility], [facility]  Cardiac Electrophysiology  [address]  [address] [facility], Critical access hospital1 [facility][note 3]  Evan Carr [address]  [phone]

## 2021-01-25 ENCOUNTER — OFFICE VISIT (OUTPATIENT)
Dept: CARDIOLOGY CLINIC | Age: 76
End: 2021-01-25
Payer: MEDICAID

## 2021-01-25 VITALS
BODY MASS INDEX: 22.57 KG/M2 | TEMPERATURE: 96.4 F | HEIGHT: 63 IN | OXYGEN SATURATION: 97 % | DIASTOLIC BLOOD PRESSURE: 70 MMHG | HEART RATE: 98 BPM | SYSTOLIC BLOOD PRESSURE: 128 MMHG

## 2021-01-25 DIAGNOSIS — I63.50 RIGHT PONTINE STROKE (HCC): Primary | ICD-10-CM

## 2021-01-25 PROCEDURE — 93000 ELECTROCARDIOGRAM COMPLETE: CPT | Performed by: INTERNAL MEDICINE

## 2021-01-25 PROCEDURE — 99244 OFF/OP CNSLTJ NEW/EST MOD 40: CPT | Performed by: INTERNAL MEDICINE

## 2021-01-25 RX ORDER — AMLODIPINE BESYLATE 5 MG/1
TABLET ORAL
COMMUNITY
Start: 2020-12-15

## 2021-01-25 RX ORDER — BENZONATATE 100 MG/1
CAPSULE ORAL
COMMUNITY
Start: 2020-12-29

## 2021-01-25 NOTE — PATIENT INSTRUCTIONS
Loop recorder implantation Pre procedure instruction. Date: Thursday 2-4-2020    Arrive at: 10:00 am    Procedure time: 11:00 am    The morning of your procedure you will park in the hospital parking lot and report directly to the cath lab to check in.     Pre-Procedure Instructions   1. Do not eat or drink anything for 3 hours prior to the procedure. 2. Do not use any lotions, creams or perfume the morning of procedure.        If you have any questions regarding the procedure itself or medications please call [phone] and ask to speak with a nurse

## 2021-02-04 ENCOUNTER — HOSPITAL ENCOUNTER (OUTPATIENT)
Dept: CARDIAC CATH/INVASIVE PROCEDURES | Age: 76
Discharge: HOME | End: 2021-02-04
Payer: MEDICAID

## 2021-02-04 PROBLEM — Z45.09 ENCOUNTER FOR ELECTRONIC ANALYSIS OF REVEAL EVENT RECORDER: Status: ACTIVE | Noted: 2021-02-04

## 2021-02-04 PROCEDURE — 33285 INSJ SUBQ CAR RHYTHM MNTR: CPT | Performed by: INTERNAL MEDICINE

## 2021-02-04 PROCEDURE — C1764 EVENT RECORDER, CARDIAC: HCPCS

## 2021-02-04 PROCEDURE — 33285 INSJ SUBQ CAR RHYTHM MNTR: CPT | Performed by: FAMILY MEDICINE

## 2021-02-04 RX ORDER — SODIUM CHLORIDE 0.9 % (FLUSH) 0.9 %
10 SYRINGE (ML) INJECTION EVERY 12 HOURS SCHEDULED
Status: DISCONTINUED | OUTPATIENT
Start: 2021-02-04 | End: 2021-02-05 | Stop reason: HOSPADM

## 2021-02-04 RX ORDER — SODIUM CHLORIDE 0.9 % (FLUSH) 0.9 %
10 SYRINGE (ML) INJECTION PRN
Status: DISCONTINUED | OUTPATIENT
Start: 2021-02-04 | End: 2021-02-04 | Stop reason: SDUPTHER

## 2021-02-04 RX ORDER — SODIUM CHLORIDE 0.9 % (FLUSH) 0.9 %
10 SYRINGE (ML) INJECTION EVERY 12 HOURS SCHEDULED
Status: DISCONTINUED | OUTPATIENT
Start: 2021-02-04 | End: 2021-02-04 | Stop reason: SDUPTHER

## 2021-02-04 RX ORDER — SODIUM CHLORIDE 0.9 % (FLUSH) 0.9 %
10 SYRINGE (ML) INJECTION PRN
Status: DISCONTINUED | OUTPATIENT
Start: 2021-02-04 | End: 2021-02-05 | Stop reason: HOSPADM

## 2021-02-04 NOTE — PROCEDURES
[facility]     Electrophysiology Procedure Note       Date of Procedure: 2/4/2021  Patient's Name: Janak Mendez  YOB: 1945   Medical Record Number: [id number]  Procedure Performed by: Brad Hernandez MD    Procedures performed:    · Loop recorder implantation    Indication of the procedure: CVA, unspecified   Janak Mendez is a 76 y.o. male with cerebrovascular accident of unclear etiology. Because the patient is at risk of having atrial dysrhythmia, particularly atrial fibrillation, the decision was made to undergo a procedure that would afford long term rhythm detection. Therefore, the patient has been scheduled to undergo an ILR implantation.      Details of procedure: Procedure's risks, benefits and alternatives of procedure were explained to patient. The risks including, but not limited to, the risks of vascular injury, bleeding, infection, device malfunction, injury to cardiac and surrounding structures (including pneumothorax), stroke, myocardial infarction and death were discussed in detail. The patient was also counseled at length about the risks of yohan Covid-19 in the hussain-operative and post-operative states including the recovery window of their procedure. The patient was made aware that yohan Covid-19 after a surgical procedure may worsen their prognosis for recovering from the virus and lend to a higher morbidity and or mortality risk. The patient was given the option of postponing their procedure. All questions were answered. Patient understood and informed consent was obtained. The patient was brought to the electrophysiology lab in a fasting nonsedated state. Patient was prepped and draped in usual sterile fashion. No moderate sedation (conscious sedation) nor general anesthesia was administered or utilized for this procedure. The patient was monitored continuously with ECG, pulse oximetry, blood pressure monitoring, and direct observation. After injection of 0.5% bupivacaine/lidocaine mixture in the left 4th intercostal space, a 5 mm small incision was made using the Medtronic-provided scalpel, after which a #11 blade was used to expand the opening of this incision on both ends. Then a Medtronic-provided tunneling tool was inserted into this scar in a diagonal trajectory towards the L nipple which created the necessary space to insert the implantable loop recorder. This tool was used to deliver the implantable loop recorder into the created space. Both the plunger and the tunneling tool was then retracted. The surgical scar was taped with Steri-strips and manual pressure was held over the taped area to achieve hemostasis.      Specimen collected: none Estimated blood loss: < 5 cc    The patient tolerated the procedure well without any complications. Device information:   The device is a Medtronic Reveal LINQ [id number] SN# [id number] with implant date: 2/4/2021  The device was programmed to detect:   VT: 380 ms 16 beats   Bradycardia: 2000 ms     Impression:    Pre- and post-procedural diagnoses of CVA of unclear etiology with successful implantation of a Medtronic Implantable Loop Recorder. Plan:   The patient will have usual post-implant care with a 1-week wound check with our nurse in the [facility]. From an EP perspective, the patient may be discharged home today if the patient remains stable. Follow-up also includes routine device interrogation with the Device Clinic. Thank you for allowing us to participate in the care of your patient. If you have any questions or comments, please feel free to contact us.     Deepa Umaña MD, MS, [facility], [facility]  Cardiac Electrophysiology  [address]  [address] [facility], [address] [place] [address]  Evan Weber [facility] [address]  [phone]

## 2021-02-04 NOTE — H&P
Cardiac Electrophysiology Consultation      Date: 2/4/2021  Reason for Consultation: CVA  Consult Requesting Physician:  Benji Jiang MD     Chief Complaint:        Chief Complaint   Patient presents with    New Patient       discuss ILR implantation         HPI: Ninfa Elizabeth is a 76 y.o. patient with a history of diabetes mellitus, GERD, HTN, and CVA (right pontine CVA). He presented to [facility] ED on 10/17/2020 with left sided weakness. He wore a 30 day monitor from 10/28/2020 -11/26/2020 which showed SR and 7 beat run of NSVT and at least 4-5 episodes of non conducted p wave resulting in the longest 2.6 second pause but no atrial fibrillation nor atrial flutter.      Interval History: Today, he presents to office accompanied by his son to discuss the results of his 30 day monitor and discuss loop recorder. The patient strictly speaks [place] but the son is bilingual and able to translate. Patient has deficits from his stroke with left-sided (both upper and lower body) weakness. He is compliant with his medications and tolerating them well.  He denies chest pain/pressure, tightness, edema, shortness of breath, heart racing, palpitations, lightheadedness, dizziness, syncope, presyncope,  PND or orthopnea.      Past Medical History        Past Medical History:   Diagnosis Date    Cerebral [facility] occlusion [facility] infarction ([facility] [address]      Diabetes mellitus ([facility]      Gallstone      GERD (gastroesophageal reflux disease)      Hyperlipidemia      Hypertension      Renal insufficiency              Past Surgical History         Past Surgical History:   Procedure Laterality Date    APPENDECTOMY        CHOLECYSTECTOMY        COLONOSCOPY N/A 5/8/2020     COLONOSCOPY POLYPECTOMY SNARE/COLD BIOPSY performed by Kassy Baumann MD at [facility]   06/08/2018    UPPER GASTROINTESTINAL ENDOSCOPY N/A 2/28/2019   EGD W/EUS FNA performed by Corrie Pierce MD at [address] 4/23/2019     EGD BIOPSY performed by Corrie Pierce MD at [address] [place] [address] 4/23/2019     EGD W/EUS FNA performed by Corrie Pierce MD at [address] [place] [address] 4/29/2020     EGD BIOPSY performed by Didi Tan MD at [address]: Allergies   Allergen Reactions    Atorvastatin Other (See Comments)       Myalgia -- stopped 11/2019 to see if pain improves. Possible PMR diagnosis as well. Will follow-up in 3 mo.         Medication:   Home Medications           Prior to Admission medications    Medication Sig Start Date End Date Taking?  Authorizing Provider   benzonatate (TESSALON) 100 MG capsule   12/29/20   Yes Historical Provider, MD   amLODIPine (NORVASC) 5 MG tablet   12/15/20   Yes Historical Provider, MD   rosuvastatin (CRESTOR) 10 MG tablet Take 1 tablet by mouth daily 10/16/20   Yes Lynnette Hooper MD   DULoxetine (CYMBALTA) 30 MG extended release capsule Take 1 capsule by mouth daily 10/16/20   Yes Lynnette Hooper MD   losartan (COZAAR) 25 MG tablet Take 1 tablet by mouth daily 10/16/20   Yes Lynnette Hooper MD   diclofenac sodium (VOLTAREN) 1 % GEL Apply 4 g topically 4 times daily as needed for Pain (Shoulder and knee pain) 10/16/20   Yes Lynnette Hooper MD   lidocaine 4 % external patch Place 1 patch onto the skin daily as needed (back pain) 10/16/20   Yes Lynnette Hooper MD   tamsulosin [facility]) 0.4 MG capsule Take 1 capsule by mouth nightly 10/16/20   Yes Lynnette Hooper MD   clopidogrel (PLAVIX) 75 MG tablet Take 1 tablet by mouth daily 10/16/20   Yes Lynnette Hooper MD   insulin glargine (LANTUS SOLOSTAR) 100 UNIT/ML injection pen Inject 18 Units into the skin nightly 10/16/20   Yes Lynnette Hooper MD insulin lispro, 1 Unit Dial, (HUMALOG KWIKPEN) 100 UNIT/ML SOPN Inject 10 Units into the skin daily (with breakfast) AND 5 Units Daily with lunch AND 5 Units Daily with supper. HOLD IF EATS LESS THAN 50% OF MEAL. . 10/16/20   Yes Nery Diane MD   insulin lispro, 1 Unit Dial, (HUMKIT digital [facility]) 100 UNIT/ML SOPN With meals (TID AC) **Corrective Low Dose Algorithm** Glucose:  give No Insulin, Glucose 140-199 give 1 Unit, glucose 200-249 give 2 Units, glucose 250-299 give 3 Units, glucose 300-349 give 4 Units, glucose 350-399 give 5 Units, glucose Over 399 give 6 Units 10/16/20   Yes Nery Diane MD   omeprazole (PRILOSEC) 40 MG delayed release capsule Take 40 mg by mouth daily     Yes Historical Provider, MD   vitamin D (CHOLECALCIFEROL) 25 MCG (1000 UT) TABS tablet Take 2,000 Units by mouth daily     Yes Historical Provider, MD   acetaminophen (TYLENOL) 500 MG tablet Take 1,000 mg by mouth 2 times daily     Yes Historical Provider, MD   polyethylene glycol (MIRALAX) PACK packet Take 17 g by mouth daily     Yes Historical Provider, MD            Social History:   reports that he has never smoked. He has never used smokeless tobacco. He reports that he does not drink alcohol or use drugs.         Family History:  family history includes No Known Problems in his father and mother. Reviewed.  Denies family history of sudden cardiac death, arrhythmia, premature CAD     Review of System:     · General ROS: negative for - chills, fever   · Psychological ROS: negative for - anxiety or depression  · Ophthalmic ROS: negative for - eye pain or loss of vision  · ENT ROS: negative for - epistaxis, headaches, nasal discharge, sore throat   · Allergy and Immunology ROS: negative for - hives, nasal congestion   · Hematological and Lymphatic ROS: negative for - bleeding problems, blood clots, bruising or jaundice  · Endocrine ROS: negative for - skin changes, temperature intolerance or unexpected weight changes · Respiratory ROS: negative for - cough, hemoptysis, pleuritic pain, SOB, sputum changes or wheezing  · Cardiovascular ROS: Per HPI. · Gastrointestinal ROS: negative for - abdominal pain, blood in stools, diarrhea, hematemesis, melena, nausea/vomiting or swallowing difficulty/pain  · Genito-Urinary ROS: negative for - dysuria or incontinence  · Musculoskeletal ROS: negative for - joint swelling or muscle pain + Left sided weakness  · Neurological ROS: negative for - confusion, dizziness, gait disturbance, headaches, numbness/tingling, seizures, speech problems, tremors, visual changes or weakness  · Dermatological ROS: negative for - rash     Physical Examination:      Vitals:     21 1410   BP: 128/70   Pulse: 98   Temp: 96.4 °F (35.8 °C)   SpO2: 97%         · Constitutional: Oriented. No distress. · Head: Normocephalic and atraumatic. · Mouth/Throat: Oropharynx is clear and moist.   · Eyes: Conjunctivae normal. EOM are normal.   · Neck: Normal range of motion. Neck supple. No rigidity. No JVD present. · Cardiovascular: Normal rate, regular rhythm, S1&S2 and intact distal pulses. · Pulmonary/Chest: Bilateral respiratory sounds. No wheezes. No rhonchi. · Abdominal: Soft. Bowel sounds present. No distension, No tenderness. · Musculoskeletal: No tenderness. No edema    · Lymphadenopathy: Has no cervical adenopathy. · Neurological: Alert and oriented. Cranial nerve appears intact, No Gross deficit   · Skin: Skin is warm and dry. No rash noted. · Psychiatric: Has a normal mood, affect and behavior      Labs:  Reviewed.      EC2020 reviewed, sinus  rhythm with v-rate of 96 bpm with QRS duration 120 ms. No pathologic Q waves, ventricular pre-excitation, or QT prolongation.      Studies:   1.  Event monitor: 10/28/2020 -2020   SR and 7 beat run of NSVT and at least 4-5 episodes of non conducted p wave resulting in the longest 2.6 second pause.      2. Echo: 2020  Summary Ejection fraction is visually estimated to be 55-60%. Indeterminate diastolic function. Normal function of all valves.     3. Stress Test:  11/3/2020  Summary    Pharmacological Stress/MPI Results:        1. Technically a satisfactory study.    2. Normal pharmacological stress portion of the study.    3. No evidence of Ischemia by Myocardial Perfusion Imaging.    4. Gated Study shows normal LV size and Systolic function; EF is 70 %.        4. Cath:  None     5. CTA Head and Neck 9/22/2020  50% stenosis of proximal left internal carotid artery.       Severe stenosis of proximal left posterior cerebral artery.      6. CT Head 9/24/2020  No acute intracranial abnormality.       Right pontine infarct again noted.  Old left occipital infarct unchanged.         7. MRI 9/22/2020  Right paramedian pontine acute to subacute infarct.  No hemorrhagic conversion       Additionally areas of likely subacute lacunar infarct involving the left   frontal lobe deep white matter as well as the right posterior centrum   semiovale       Chronic small ischemic disease and age related change.         I independently reviewed the ECG, MCOT, echocardiogram, stress test, and coronary angiography/PCI results and used them for my plan of care. Procedures:  1.      Assessment/Plan:      CVA  30 day monitor was worn from 10/28/2020 -11/26/2020 and was unrevealing as to cause of his CVA. Recommend ILR to access for long term monitoring for arrhythmia that may have attrubuted to his CVA. The benefits and risks of the ILR implantation were discussed with the patient. Patient agreeable to proceed.     Follow up one week after ILR implantation in device clinic for device interrogation and site check.      Thank you for allowing me to participate in the care of Choloestevanfaviola Cox. All questions and concerns were addressed to the patient/family.  Alternatives to my treatment were discussed.     I have reviewed the history and physical and examined the patient and find no relevant changes.  I have reviewed with the patient and/or family the risks, benefits, and alternatives to the procedure.     Kyle Mera MD, [address], [facility], [address] [place] [address] [facility], [address] [place] [address]  [phone]

## 2021-02-11 ENCOUNTER — NURSE ONLY (OUTPATIENT)
Dept: CARDIOLOGY CLINIC | Age: 76
End: 2021-02-11
Payer: MEDICAID

## 2021-02-11 DIAGNOSIS — Z86.73 HISTORY OF ARTERIAL ISCHEMIC STROKE: ICD-10-CM

## 2021-02-11 DIAGNOSIS — I63.9 ACUTE CEREBROVASCULAR ACCIDENT (HCC): ICD-10-CM

## 2021-02-11 DIAGNOSIS — Z45.09 ENCOUNTER FOR ELECTRONIC ANALYSIS OF REVEAL EVENT RECORDER: Primary | ICD-10-CM

## 2021-02-11 PROCEDURE — 93291 INTERROG DEV EVAL SCRMS IP: CPT | Performed by: INTERNAL MEDICINE

## 2021-03-17 ENCOUNTER — NURSE ONLY (OUTPATIENT)
Dept: CARDIOLOGY CLINIC | Age: 76
End: 2021-03-17
Payer: MEDICAID

## 2021-03-17 DIAGNOSIS — Z45.09 ENCOUNTER FOR ELECTRONIC ANALYSIS OF REVEAL EVENT RECORDER: ICD-10-CM

## 2021-03-17 DIAGNOSIS — Z86.73 HISTORY OF ARTERIAL ISCHEMIC STROKE: ICD-10-CM

## 2021-03-17 DIAGNOSIS — I63.9 ACUTE CEREBROVASCULAR ACCIDENT (HCC): ICD-10-CM

## 2021-03-17 PROCEDURE — G2066 INTER DEVC REMOTE 30D: HCPCS | Performed by: INTERNAL MEDICINE

## 2021-03-17 PROCEDURE — 93298 REM INTERROG DEV EVAL SCRMS: CPT | Performed by: INTERNAL MEDICINE

## 2021-03-17 NOTE — PROGRESS NOTES
We received a remote transmission from patient's monitor at home. Remote Linq report shows no arrhythmias. Tachy recordings not real. Noted over sensing. EP physician to review. We will continue to monitor remotely.

## 2021-03-17 NOTE — LETTER
[address] [facility] [phone]  Luige [address] [facility] [phone]    Pacemaker/Defibrillator Clinic          03/17/21        Oren Duval  [address] [place] [address]        Dear Oren Duval    This letter is to inform you that we received the transmission from your monitor at home that checks your implanted heart device. The next date your monitor will automatically transmit will be 4-19-21. If your report needs attention we will notify you. Your device and monitor are wireless and most transmit cellularly, but please periodically check your monitor is still plugged in to the electrical outlet. If you still use the telephone land line to send please ensure the connection to the phone nav is secure. This will help to ensure successful automatic transmissions in the future. Also, the monitor needs to be close to you while sleeping at night. Please be aware that the remote device transmission sites are periodically monitored only during regular business hours during which simultaneous in-office device clinics are being run. If your transmission requires attention, we will contact you as soon as possible. Thank you.             Sandi [address]

## 2021-04-19 ENCOUNTER — NURSE ONLY (OUTPATIENT)
Dept: CARDIOLOGY CLINIC | Age: 76
End: 2021-04-19
Payer: MEDICAID

## 2021-04-19 DIAGNOSIS — Z45.09 ENCOUNTER FOR ELECTRONIC ANALYSIS OF REVEAL EVENT RECORDER: ICD-10-CM

## 2021-04-19 DIAGNOSIS — I63.50 RIGHT PONTINE STROKE (HCC): ICD-10-CM

## 2021-04-19 DIAGNOSIS — Z86.73 HISTORY OF ARTERIAL ISCHEMIC STROKE: ICD-10-CM

## 2021-04-19 DIAGNOSIS — I63.9 ACUTE CEREBROVASCULAR ACCIDENT (HCC): ICD-10-CM

## 2021-04-19 PROCEDURE — 93298 REM INTERROG DEV EVAL SCRMS: CPT | Performed by: INTERNAL MEDICINE

## 2021-04-19 PROCEDURE — G2066 INTER DEVC REMOTE 30D: HCPCS | Performed by: INTERNAL MEDICINE

## 2021-05-26 ENCOUNTER — NURSE ONLY (OUTPATIENT)
Dept: CARDIOLOGY CLINIC | Age: 76
End: 2021-05-26
Payer: MEDICAID

## 2021-05-26 DIAGNOSIS — Z45.09 ENCOUNTER FOR ELECTRONIC ANALYSIS OF REVEAL EVENT RECORDER: ICD-10-CM

## 2021-05-26 DIAGNOSIS — Z86.73 HISTORY OF ARTERIAL ISCHEMIC STROKE: ICD-10-CM

## 2021-05-26 DIAGNOSIS — I63.9 ACUTE CEREBROVASCULAR ACCIDENT (HCC): ICD-10-CM

## 2021-05-26 PROCEDURE — 93298 REM INTERROG DEV EVAL SCRMS: CPT | Performed by: INTERNAL MEDICINE

## 2021-05-26 PROCEDURE — G2066 INTER DEVC REMOTE 30D: HCPCS | Performed by: INTERNAL MEDICINE

## 2021-05-26 NOTE — LETTER
[address] [facility] [phone]  Luige Earnest [address] [facility] [phone]    Pacemaker/Defibrillator Clinic    05/26/21      Mila Duval  [address] [place] [address]      Dear Mila Duval    This letter is to inform you that we received the transmission from your monitor at home that checks your implanted heart device. The next date your monitor will automatically transmit will be 6/28. If your report needs attention we will notify you. Your device and monitor are wireless and most transmit cellularly, but please periodically check your monitor is still plugged in to the electrical outlet. If you still use the telephone land line to send please ensure the connection to the phone nav is secure. This will help to ensure successful automatic transmissions in the future. Also, the monitor needs to be close to you while sleeping at night. Please be aware that the remote device transmission sites are periodically monitored only during regular business hours during which simultaneous in-office device clinics are being run. If your transmission requires attention, we will contact you as soon as possible. **PLEASE NOTE** that our [facility] policy and processes are changing to ensure a more seamless approach for all parties involved, allowing more time for our nurses to address patient issues and concerns. We will no longer be sending letters for NORMAL remote transmissions. You will be contacted by phone if your transmission requires attention (as previously done), and letters will only be sent regarding monitor disconnections or missed transmissions if you are unable to be reached by phone. Please do not be alarmed by this new process, as we will continue to contact you if your transmission report requires attention. This will be your final \"remote received\" letter.   From this point forward, the [facility] will be utilizing

## 2021-06-28 ENCOUNTER — NURSE ONLY (OUTPATIENT)
Dept: CARDIOLOGY CLINIC | Age: 76
End: 2021-06-28
Payer: MEDICAID

## 2021-06-28 DIAGNOSIS — Z45.09 ENCOUNTER FOR ELECTRONIC ANALYSIS OF REVEAL EVENT RECORDER: ICD-10-CM

## 2021-06-28 DIAGNOSIS — I63.9 ACUTE CEREBROVASCULAR ACCIDENT (HCC): ICD-10-CM

## 2021-06-28 DIAGNOSIS — Z86.73 HISTORY OF ARTERIAL ISCHEMIC STROKE: ICD-10-CM

## 2021-06-28 PROCEDURE — 93298 REM INTERROG DEV EVAL SCRMS: CPT | Performed by: INTERNAL MEDICINE

## 2021-06-28 PROCEDURE — G2066 INTER DEVC REMOTE 30D: HCPCS | Performed by: INTERNAL MEDICINE

## 2021-08-04 ENCOUNTER — NURSE ONLY (OUTPATIENT)
Dept: CARDIOLOGY CLINIC | Age: 76
End: 2021-08-04
Payer: MEDICAID

## 2021-08-04 DIAGNOSIS — Z45.09 ENCOUNTER FOR ELECTRONIC ANALYSIS OF REVEAL EVENT RECORDER: ICD-10-CM

## 2021-08-04 DIAGNOSIS — I63.9 ACUTE CEREBROVASCULAR ACCIDENT (HCC): ICD-10-CM

## 2021-08-04 PROCEDURE — G2066 INTER DEVC REMOTE 30D: HCPCS | Performed by: INTERNAL MEDICINE

## 2021-08-04 PROCEDURE — 93298 REM INTERROG DEV EVAL SCRMS: CPT | Performed by: INTERNAL MEDICINE

## 2021-08-04 NOTE — PROGRESS NOTES
Remote transmission received from patient's monitor at home.       Current ECG illustrates NSR.     Episodes since 06.25.2021:  11 Tachy episodes noted w available ECGs illustrating NSR/ST with Twave oversensing.     EP physician will review. See PACEART report under Cardiology tab.      Follow up 1 month via carelink.

## 2021-09-08 ENCOUNTER — NURSE ONLY (OUTPATIENT)
Dept: CARDIOLOGY CLINIC | Age: 76
End: 2021-09-08
Payer: MEDICAID

## 2021-09-08 DIAGNOSIS — I63.9 ACUTE CEREBROVASCULAR ACCIDENT (HCC): ICD-10-CM

## 2021-09-08 DIAGNOSIS — Z45.09 ENCOUNTER FOR ELECTRONIC ANALYSIS OF REVEAL EVENT RECORDER: ICD-10-CM

## 2021-09-08 PROCEDURE — 93298 REM INTERROG DEV EVAL SCRMS: CPT | Performed by: INTERNAL MEDICINE

## 2021-09-08 PROCEDURE — G2066 INTER DEVC REMOTE 30D: HCPCS | Performed by: INTERNAL MEDICINE

## 2021-09-08 NOTE — PROGRESS NOTES
Remote transmission received from patient's ILR monitor at home. Since 08.02.21:  1 Tachy episode noted w ECG illustrating NSR/ST with Twave oversensing.     EP physician will review. See PACEART report under Cardiology tab.      Follow up 1 month via carelink.

## 2021-10-13 ENCOUNTER — NURSE ONLY (OUTPATIENT)
Dept: CARDIOLOGY CLINIC | Age: 76
End: 2021-10-13
Payer: MEDICAID

## 2021-10-13 DIAGNOSIS — I63.9 ACUTE CEREBROVASCULAR ACCIDENT (HCC): ICD-10-CM

## 2021-10-13 DIAGNOSIS — Z45.09 ENCOUNTER FOR ELECTRONIC ANALYSIS OF REVEAL EVENT RECORDER: ICD-10-CM

## 2021-10-14 PROCEDURE — G2066 INTER DEVC REMOTE 30D: HCPCS | Performed by: INTERNAL MEDICINE

## 2021-10-14 PROCEDURE — 93298 REM INTERROG DEV EVAL SCRMS: CPT | Performed by: INTERNAL MEDICINE

## 2021-10-14 NOTE — PROGRESS NOTES
Remote transmission received from patient's ILR monitor at home. Since 09.06.21:  18 Tachy episodes noted w available ECGs illustrating NSR/ST with Twave oversensing.     EP physician will review. See PACEART report under Cardiology tab. Will continue to monitor remotely.

## 2021-11-17 ENCOUNTER — NURSE ONLY (OUTPATIENT)
Dept: CARDIOLOGY CLINIC | Age: 76
End: 2021-11-17
Payer: MEDICAID

## 2021-11-17 DIAGNOSIS — Z45.09 ENCOUNTER FOR ELECTRONIC ANALYSIS OF REVEAL EVENT RECORDER: ICD-10-CM

## 2021-11-17 DIAGNOSIS — I63.9 ACUTE CEREBROVASCULAR ACCIDENT (HCC): ICD-10-CM

## 2021-11-18 NOTE — PROGRESS NOTES
Remote transmission received from patient's ILR monitor at home. Since 10.11.21, 7 Tachy episodes noted w available ECGs illustrating NSR/ST with Twave oversensing.     EP physician will review. See PACEART report under Cardiology tab. Will continue to monitor remotely.

## 2021-11-19 PROCEDURE — 93298 REM INTERROG DEV EVAL SCRMS: CPT | Performed by: INTERNAL MEDICINE

## 2021-11-19 PROCEDURE — G2066 INTER DEVC REMOTE 30D: HCPCS | Performed by: INTERNAL MEDICINE

## 2021-12-22 ENCOUNTER — NURSE ONLY (OUTPATIENT)
Dept: CARDIOLOGY CLINIC | Age: 76
End: 2021-12-22
Payer: MEDICAID

## 2021-12-22 DIAGNOSIS — I63.9 ACUTE CEREBROVASCULAR ACCIDENT (HCC): ICD-10-CM

## 2021-12-22 DIAGNOSIS — Z45.09 ENCOUNTER FOR ELECTRONIC ANALYSIS OF REVEAL EVENT RECORDER: ICD-10-CM

## 2021-12-23 PROCEDURE — 93298 REM INTERROG DEV EVAL SCRMS: CPT | Performed by: INTERNAL MEDICINE

## 2021-12-23 PROCEDURE — G2066 INTER DEVC REMOTE 30D: HCPCS | Performed by: INTERNAL MEDICINE

## 2022-01-26 ENCOUNTER — NURSE ONLY (OUTPATIENT)
Dept: CARDIOLOGY CLINIC | Age: 77
End: 2022-01-26
Payer: MEDICAID

## 2022-01-26 DIAGNOSIS — Z45.09 ENCOUNTER FOR ELECTRONIC ANALYSIS OF REVEAL EVENT RECORDER: ICD-10-CM

## 2022-01-26 DIAGNOSIS — I63.9 ACUTE CEREBROVASCULAR ACCIDENT (HCC): ICD-10-CM

## 2022-01-26 NOTE — PROGRESS NOTES
Remote transmission received from patient's ILR monitor at home. Since 12.21.21:  14 Tachy episodes noted w available ECGs illustrating NSR/ST with Twave oversensing. 5 Pause events w available ECGs illustrating undersensing.     EP physician will review. See PACEART report under Cardiology tab. Will continue to monitor remotely.

## 2022-02-03 PROCEDURE — G2066 INTER DEVC REMOTE 30D: HCPCS | Performed by: INTERNAL MEDICINE

## 2022-02-03 PROCEDURE — 93298 REM INTERROG DEV EVAL SCRMS: CPT | Performed by: INTERNAL MEDICINE

## 2022-03-08 ENCOUNTER — NURSE ONLY (OUTPATIENT)
Dept: CARDIOLOGY CLINIC | Age: 77
End: 2022-03-08
Payer: MEDICAID

## 2022-03-08 DIAGNOSIS — I63.9 ACUTE CEREBROVASCULAR ACCIDENT (HCC): ICD-10-CM

## 2022-03-08 DIAGNOSIS — Z45.09 ENCOUNTER FOR ELECTRONIC ANALYSIS OF REVEAL EVENT RECORDER: ICD-10-CM

## 2022-03-08 NOTE — PROGRESS NOTES
Remote transmission received from patient's ILR monitor at home. Since 01.25.22:  3 Tachy episodes and 2 \"AF\" (0.0% burden) w available ECGs illustrating NSR/ST with Twave oversensing and undersensing. EP physician will review. See PACEART report under Cardiology tab. Will continue to monitor remotely.

## 2022-03-11 PROCEDURE — 93298 REM INTERROG DEV EVAL SCRMS: CPT | Performed by: INTERNAL MEDICINE

## 2022-03-11 PROCEDURE — G2066 INTER DEVC REMOTE 30D: HCPCS | Performed by: INTERNAL MEDICINE

## 2022-04-12 ENCOUNTER — NURSE ONLY (OUTPATIENT)
Dept: CARDIOLOGY CLINIC | Age: 77
End: 2022-04-12
Payer: MEDICAID

## 2022-04-12 DIAGNOSIS — Z45.09 ENCOUNTER FOR ELECTRONIC ANALYSIS OF REVEAL EVENT RECORDER: ICD-10-CM

## 2022-04-12 DIAGNOSIS — I63.9 ACUTE CEREBROVASCULAR ACCIDENT (HCC): ICD-10-CM

## 2022-04-15 PROCEDURE — 93298 REM INTERROG DEV EVAL SCRMS: CPT | Performed by: INTERNAL MEDICINE

## 2022-04-15 PROCEDURE — G2066 INTER DEVC REMOTE 30D: HCPCS | Performed by: INTERNAL MEDICINE

## 2022-04-15 NOTE — PROGRESS NOTES
Remote transmission received from patient's ILR monitor at home. Since 03.07.22:  11 Tachy episodes and 1 Pause event w available ECGs illustrating NSR/ST with Twave oversensing and undersensing. No  arrhythmias noted. EP physician will review. See PACEART report under Cardiology tab. Will continue to monitor remotely.

## 2022-05-17 ENCOUNTER — NURSE ONLY (OUTPATIENT)
Dept: CARDIOLOGY CLINIC | Age: 77
End: 2022-05-17
Payer: MEDICAID

## 2022-05-17 DIAGNOSIS — I63.9 ACUTE CEREBROVASCULAR ACCIDENT (HCC): ICD-10-CM

## 2022-05-17 DIAGNOSIS — Z45.09 ENCOUNTER FOR ELECTRONIC ANALYSIS OF REVEAL EVENT RECORDER: ICD-10-CM

## 2022-05-17 PROCEDURE — 93298 REM INTERROG DEV EVAL SCRMS: CPT | Performed by: INTERNAL MEDICINE

## 2022-05-17 PROCEDURE — G2066 INTER DEVC REMOTE 30D: HCPCS | Performed by: INTERNAL MEDICINE

## 2022-05-17 NOTE — PROGRESS NOTES
Remote transmission received from patient's ILR monitor at home. Since 04.13.22:  2 Pause event w available ECGs illustrating NSR with undersensing. No arrhythmias noted. EP physician will review. See PACEART report under Cardiology tab. Will continue to monitor remotely.

## 2022-06-21 ENCOUNTER — NURSE ONLY (OUTPATIENT)
Dept: CARDIOLOGY CLINIC | Age: 77
End: 2022-06-21
Payer: MEDICAID

## 2022-06-21 DIAGNOSIS — I63.9 ACUTE CEREBROVASCULAR ACCIDENT (HCC): ICD-10-CM

## 2022-06-21 DIAGNOSIS — Z45.09 ENCOUNTER FOR ELECTRONIC ANALYSIS OF REVEAL EVENT RECORDER: ICD-10-CM

## 2022-06-21 PROCEDURE — 93298 REM INTERROG DEV EVAL SCRMS: CPT | Performed by: INTERNAL MEDICINE

## 2022-06-21 PROCEDURE — G2066 INTER DEVC REMOTE 30D: HCPCS | Performed by: INTERNAL MEDICINE

## 2022-06-21 NOTE — PROGRESS NOTES
Remote transmission received from patient's ILR monitor at home. Since 05.15.22:  1 tachy event w available ECG illustrating what appears to be intermittent TWOS and under-sensing. 1 Pause event w available ECGs illustrating NSR with undersensing. 1 AF event w available ECG illustrating what appears to be artifact and TWOS. No arrhythmias  EP physician will review. See PACEART report under Cardiology tab. Will continue to monitor remotely. (End of 31-day monitoring period 6/21/22).

## 2022-07-26 ENCOUNTER — NURSE ONLY (OUTPATIENT)
Dept: CARDIOLOGY CLINIC | Age: 77
End: 2022-07-26
Payer: MEDICAID

## 2022-07-26 DIAGNOSIS — Z45.09 ENCOUNTER FOR LOOP RECORDER CHECK: Primary | ICD-10-CM

## 2022-07-26 DIAGNOSIS — I48.0 PAF (PAROXYSMAL ATRIAL FIBRILLATION) (HCC): ICD-10-CM

## 2022-07-26 PROCEDURE — 93298 REM INTERROG DEV EVAL SCRMS: CPT | Performed by: INTERNAL MEDICINE

## 2022-07-26 PROCEDURE — G2066 INTER DEVC REMOTE 30D: HCPCS | Performed by: INTERNAL MEDICINE

## 2022-07-26 NOTE — PROGRESS NOTES
Remote transmission received from patient's ILR monitor at home. Since 06/22/22:  2 Pause event w available ECGs illustrating NSR with undersensing. EP physician will review. See PACEART report under Cardiology tab. Will continue to monitor remotely.      (End of 31-day monitoring period 7/26/22)

## 2022-08-30 ENCOUNTER — NURSE ONLY (OUTPATIENT)
Dept: CARDIOLOGY CLINIC | Age: 77
End: 2022-08-30
Payer: MEDICAID

## 2022-08-30 DIAGNOSIS — I63.9 CRYPTOGENIC STROKE (HCC): ICD-10-CM

## 2022-08-30 DIAGNOSIS — Z45.09 ENCOUNTER FOR ELECTRONIC ANALYSIS OF REVEAL EVENT RECORDER: Primary | ICD-10-CM

## 2022-08-30 PROCEDURE — G2066 INTER DEVC REMOTE 30D: HCPCS | Performed by: INTERNAL MEDICINE

## 2022-08-30 PROCEDURE — 93298 REM INTERROG DEV EVAL SCRMS: CPT | Performed by: INTERNAL MEDICINE

## 2022-08-30 NOTE — PROGRESS NOTES
Remote transmission received from patient's ILR monitor at home. Since 07/27/22:  1 Pause event and 1 \"AF\" event w available ECGs illustrating NSR with undersensing and ectopy. EP physician will review. See PACEART report under Cardiology tab. Will continue to monitor remotely.      (End of 31-day monitoring period 8/30/22)

## 2022-11-08 ENCOUNTER — NURSE ONLY (OUTPATIENT)
Dept: CARDIOLOGY CLINIC | Age: 77
End: 2022-11-08
Payer: MEDICAID

## 2022-11-08 DIAGNOSIS — Z45.09 ENCOUNTER FOR ELECTRONIC ANALYSIS OF REVEAL EVENT RECORDER: Primary | ICD-10-CM

## 2022-11-08 DIAGNOSIS — I48.0 PAROXYSMAL ATRIAL FIBRILLATION (HCC): ICD-10-CM

## 2022-11-08 PROCEDURE — 93298 REM INTERROG DEV EVAL SCRMS: CPT | Performed by: INTERNAL MEDICINE

## 2022-11-08 PROCEDURE — G2066 INTER DEVC REMOTE 30D: HCPCS | Performed by: INTERNAL MEDICINE

## 2022-11-09 NOTE — PROGRESS NOTES
Remote transmission received from patient's ILR monitor at home. Since 8/31/22:  3 \"AF\" events w available ECGs illustrating NSR with undersensing, oversensing and ectopy. 18 tachy events w available ECGs illustrating NSR with TWOS. EP physician will review. See PACEART report under Cardiology tab. Will continue to monitor remotely.      (End of 31-day monitoring period 11/8/22)

## 2022-12-13 ENCOUNTER — NURSE ONLY (OUTPATIENT)
Dept: CARDIOLOGY CLINIC | Age: 77
End: 2022-12-13
Payer: MEDICAID

## 2022-12-13 DIAGNOSIS — Z45.09 ENCOUNTER FOR ELECTRONIC ANALYSIS OF REVEAL EVENT RECORDER: Primary | ICD-10-CM

## 2022-12-13 DIAGNOSIS — I63.9 ACUTE CEREBROVASCULAR ACCIDENT (HCC): ICD-10-CM

## 2022-12-13 PROCEDURE — 93298 REM INTERROG DEV EVAL SCRMS: CPT | Performed by: INTERNAL MEDICINE

## 2022-12-13 PROCEDURE — G2066 INTER DEVC REMOTE 30D: HCPCS | Performed by: INTERNAL MEDICINE

## 2022-12-21 NOTE — PROGRESS NOTES
Remote transmission received from patient's ILR monitor at home. Since 10.19.22:  5 Tachy episodes w no ECGs available (likely d/t monitor disconnection noted on trends); hx Twave oversensing. 4 Pause events w available ECGs illustrating undersensing. End of 31-day monitoring period 12/13/22. EP physician will review. See PACEART report under Cardiology tab. Will continue to monitor remotely.

## 2023-03-02 ENCOUNTER — NURSE ONLY (OUTPATIENT)
Dept: CARDIOLOGY CLINIC | Age: 78
End: 2023-03-02

## 2023-03-02 DIAGNOSIS — I63.9 CRYPTOGENIC STROKE (HCC): ICD-10-CM

## 2023-03-02 DIAGNOSIS — Z45.09 ENCOUNTER FOR ELECTRONIC ANALYSIS OF REVEAL EVENT RECORDER: Primary | ICD-10-CM

## 2023-03-02 NOTE — PROGRESS NOTES
Remote transmission received from patient's ILR monitor at home. Since 12/14/22:  Presenting ECG with intermittent undersensing. 15 Pause events w available ECGs illustrating undersensing. EP physician will review. See PACEART report under Cardiology tab. Will continue to monitor remotely. (End of 31-day monitoring period 3/2/23).

## 2023-03-05 ENCOUNTER — APPOINTMENT (OUTPATIENT)
Dept: CT IMAGING | Age: 78
DRG: 139 | End: 2023-03-05
Payer: MEDICAID

## 2023-03-05 ENCOUNTER — APPOINTMENT (OUTPATIENT)
Dept: GENERAL RADIOLOGY | Age: 78
DRG: 139 | End: 2023-03-05
Payer: MEDICAID

## 2023-03-05 ENCOUNTER — HOSPITAL ENCOUNTER (INPATIENT)
Age: 78
LOS: 4 days | Discharge: HOME HEALTH | DRG: 139 | End: 2023-03-09
Attending: INTERNAL MEDICINE
Payer: MEDICAID

## 2023-03-05 DIAGNOSIS — R09.02 HYPOXIA: ICD-10-CM

## 2023-03-05 DIAGNOSIS — Z20.822 LAB TEST NEGATIVE FOR COVID-19 VIRUS: ICD-10-CM

## 2023-03-05 DIAGNOSIS — J18.9 COMMUNITY ACQUIRED PNEUMONIA, UNSPECIFIED LATERALITY: Primary | ICD-10-CM

## 2023-03-05 PROBLEM — J15.9 COMMUNITY ACQUIRED BACTERIAL PNEUMONIA: Status: ACTIVE | Noted: 2023-03-05

## 2023-03-05 LAB
ALBUMIN SERPL-MCNC: 3.8 G/DL (ref 3.4–5)
ALP BLD-CCNC: 132 U/L (ref 40–129)
ALT SERPL-CCNC: 22 U/L (ref 10–40)
ANION GAP SERPL CALCULATED.3IONS-SCNC: 16 MMOL/L (ref 3–16)
AST SERPL-CCNC: 35 U/L (ref 15–37)
BACTERIA: NORMAL /HPF
BASE EXCESS VENOUS: -1 MMOL/L
BASOPHILS ABSOLUTE: 0 K/UL (ref 0–0.2)
BASOPHILS RELATIVE PERCENT: 0.6 %
BILIRUB SERPL-MCNC: 1 MG/DL (ref 0–1)
BILIRUBIN DIRECT: <0.2 MG/DL (ref 0–0.3)
BILIRUBIN URINE: NEGATIVE
BILIRUBIN, INDIRECT: ABNORMAL MG/DL (ref 0–1)
BLOOD, URINE: ABNORMAL
BUN BLDV-MCNC: 14 MG/DL (ref 7–20)
CALCIUM SERPL-MCNC: 9.1 MG/DL (ref 8.3–10.6)
CARBOXYHEMOGLOBIN: 0.9 %
CHLORIDE BLD-SCNC: 103 MMOL/L (ref 99–110)
CLARITY: CLEAR
CO2: 18 MMOL/L (ref 21–32)
COLOR: YELLOW
COMMENT UA: NORMAL
CREAT SERPL-MCNC: 1.7 MG/DL (ref 0.8–1.3)
EOSINOPHILS ABSOLUTE: 0.1 K/UL (ref 0–0.6)
EOSINOPHILS RELATIVE PERCENT: 1.7 %
EPITHELIAL CELLS, UA: 0 /HPF (ref 0–5)
GFR SERPL CREATININE-BSD FRML MDRD: 41 ML/MIN/{1.73_M2}
GLUCOSE BLD-MCNC: 144 MG/DL (ref 70–99)
GLUCOSE BLD-MCNC: 82 MG/DL (ref 70–99)
GLUCOSE URINE: NEGATIVE MG/DL
HCO3 VENOUS: 24 MMOL/L (ref 23–29)
HCT VFR BLD CALC: 47.7 % (ref 40.5–52.5)
HEMOGLOBIN: 15.7 G/DL (ref 13.5–17.5)
HYALINE CASTS: 0 /LPF (ref 0–8)
KETONES, URINE: NEGATIVE MG/DL
LACTIC ACID: 1.4 MMOL/L (ref 0.4–2)
LEUKOCYTE ESTERASE, URINE: NEGATIVE
LIPASE: 23 U/L (ref 13–60)
LYMPHOCYTES ABSOLUTE: 1.8 K/UL (ref 1–5.1)
LYMPHOCYTES RELATIVE PERCENT: 23.1 %
MCH RBC QN AUTO: 26.6 PG (ref 26–34)
MCHC RBC AUTO-ENTMCNC: 33 G/DL (ref 31–36)
MCV RBC AUTO: 80.6 FL (ref 80–100)
METHEMOGLOBIN VENOUS: 0.7 %
MICROSCOPIC EXAMINATION: YES
MONOCYTES ABSOLUTE: 1 K/UL (ref 0–1.3)
MONOCYTES RELATIVE PERCENT: 13 %
NEUTROPHILS ABSOLUTE: 4.7 K/UL (ref 1.7–7.7)
NEUTROPHILS RELATIVE PERCENT: 61.6 %
NITRITE, URINE: NEGATIVE
O2 SAT, VEN: 92 %
O2 THERAPY: ABNORMAL
PCO2, VEN: 39.9 MMHG (ref 40–50)
PDW BLD-RTO: 15.5 % (ref 12.4–15.4)
PERFORMED ON: NORMAL
PH UA: 6 (ref 5–8)
PH VENOUS: 7.39 (ref 7.35–7.45)
PLATELET # BLD: 157 K/UL (ref 135–450)
PMV BLD AUTO: 8.4 FL (ref 5–10.5)
PO2, VEN: 61 MMHG
POTASSIUM SERPL-SCNC: 4.1 MMOL/L (ref 3.5–5.1)
PROTEIN UA: NEGATIVE MG/DL
RAPID INFLUENZA  B AGN: NEGATIVE
RAPID INFLUENZA A AGN: NEGATIVE
RBC # BLD: 5.92 M/UL (ref 4.2–5.9)
RBC UA: NORMAL /HPF (ref 0–4)
S PYO AG THROAT QL: NEGATIVE
SARS-COV-2, NAAT: NOT DETECTED
SODIUM BLD-SCNC: 137 MMOL/L (ref 136–145)
SPECIFIC GRAVITY UA: 1.03 (ref 1–1.03)
TCO2 CALC VENOUS: 25 MMOL/L
TOTAL PROTEIN: 7.3 G/DL (ref 6.4–8.2)
URINE REFLEX TO CULTURE: ABNORMAL
URINE TYPE: ABNORMAL
UROBILINOGEN, URINE: 0.2 E.U./DL
WBC # BLD: 7.7 K/UL (ref 4–11)
WBC UA: 1 /HPF (ref 0–5)

## 2023-03-05 PROCEDURE — 80048 BASIC METABOLIC PNL TOTAL CA: CPT

## 2023-03-05 PROCEDURE — 87635 SARS-COV-2 COVID-19 AMP PRB: CPT

## 2023-03-05 PROCEDURE — 87081 CULTURE SCREEN ONLY: CPT

## 2023-03-05 PROCEDURE — 2580000003 HC RX 258: Performed by: NURSE PRACTITIONER

## 2023-03-05 PROCEDURE — 6360000002 HC RX W HCPCS: Performed by: NURSE PRACTITIONER

## 2023-03-05 PROCEDURE — 83690 ASSAY OF LIPASE: CPT

## 2023-03-05 PROCEDURE — 96374 THER/PROPH/DIAG INJ IV PUSH: CPT

## 2023-03-05 PROCEDURE — 1200000000 HC SEMI PRIVATE

## 2023-03-05 PROCEDURE — 83605 ASSAY OF LACTIC ACID: CPT

## 2023-03-05 PROCEDURE — 81001 URINALYSIS AUTO W/SCOPE: CPT

## 2023-03-05 PROCEDURE — 6370000000 HC RX 637 (ALT 250 FOR IP): Performed by: INTERNAL MEDICINE

## 2023-03-05 PROCEDURE — 96372 THER/PROPH/DIAG INJ SC/IM: CPT

## 2023-03-05 PROCEDURE — 74177 CT ABD & PELVIS W/CONTRAST: CPT

## 2023-03-05 PROCEDURE — 82803 BLOOD GASES ANY COMBINATION: CPT

## 2023-03-05 PROCEDURE — 6370000000 HC RX 637 (ALT 250 FOR IP): Performed by: NURSE PRACTITIONER

## 2023-03-05 PROCEDURE — 80076 HEPATIC FUNCTION PANEL: CPT

## 2023-03-05 PROCEDURE — 87880 STREP A ASSAY W/OPTIC: CPT

## 2023-03-05 PROCEDURE — 2580000003 HC RX 258: Performed by: INTERNAL MEDICINE

## 2023-03-05 PROCEDURE — 85025 COMPLETE CBC W/AUTO DIFF WBC: CPT

## 2023-03-05 PROCEDURE — P9612 CATHETERIZE FOR URINE SPEC: HCPCS

## 2023-03-05 PROCEDURE — 6360000004 HC RX CONTRAST MEDICATION: Performed by: NURSE PRACTITIONER

## 2023-03-05 PROCEDURE — 96375 TX/PRO/DX INJ NEW DRUG ADDON: CPT

## 2023-03-05 PROCEDURE — 71046 X-RAY EXAM CHEST 2 VIEWS: CPT

## 2023-03-05 PROCEDURE — 87804 INFLUENZA ASSAY W/OPTIC: CPT

## 2023-03-05 RX ORDER — LOSARTAN POTASSIUM 50 MG/1
1 TABLET ORAL DAILY
COMMUNITY
Start: 2023-01-06

## 2023-03-05 RX ORDER — 0.9 % SODIUM CHLORIDE 0.9 %
1000 INTRAVENOUS SOLUTION INTRAVENOUS ONCE
Status: COMPLETED | OUTPATIENT
Start: 2023-03-05 | End: 2023-03-05

## 2023-03-05 RX ORDER — SODIUM CHLORIDE 0.9 % (FLUSH) 0.9 %
5-40 SYRINGE (ML) INJECTION PRN
Status: DISCONTINUED | OUTPATIENT
Start: 2023-03-05 | End: 2023-03-09 | Stop reason: HOSPADM

## 2023-03-05 RX ORDER — AMLODIPINE BESYLATE 5 MG/1
5 TABLET ORAL DAILY
Status: DISCONTINUED | OUTPATIENT
Start: 2023-03-05 | End: 2023-03-09 | Stop reason: HOSPADM

## 2023-03-05 RX ORDER — TAMSULOSIN HYDROCHLORIDE 0.4 MG/1
0.4 CAPSULE ORAL NIGHTLY
Status: DISCONTINUED | OUTPATIENT
Start: 2023-03-05 | End: 2023-03-09 | Stop reason: HOSPADM

## 2023-03-05 RX ORDER — ONDANSETRON 2 MG/ML
4 INJECTION INTRAMUSCULAR; INTRAVENOUS ONCE
Status: COMPLETED | OUTPATIENT
Start: 2023-03-05 | End: 2023-03-05

## 2023-03-05 RX ORDER — DULOXETIN HYDROCHLORIDE 60 MG/1
60 CAPSULE, DELAYED RELEASE ORAL DAILY
Status: DISCONTINUED | OUTPATIENT
Start: 2023-03-06 | End: 2023-03-09 | Stop reason: HOSPADM

## 2023-03-05 RX ORDER — LOSARTAN POTASSIUM 50 MG/1
50 TABLET ORAL DAILY
Status: DISCONTINUED | OUTPATIENT
Start: 2023-03-06 | End: 2023-03-09 | Stop reason: HOSPADM

## 2023-03-05 RX ORDER — PANTOPRAZOLE SODIUM 40 MG/1
40 TABLET, DELAYED RELEASE ORAL
Status: DISCONTINUED | OUTPATIENT
Start: 2023-03-06 | End: 2023-03-09 | Stop reason: HOSPADM

## 2023-03-05 RX ORDER — SODIUM CHLORIDE 9 MG/ML
INJECTION, SOLUTION INTRAVENOUS PRN
Status: DISCONTINUED | OUTPATIENT
Start: 2023-03-05 | End: 2023-03-09 | Stop reason: HOSPADM

## 2023-03-05 RX ORDER — INSULIN GLARGINE 100 [IU]/ML
18 INJECTION, SOLUTION SUBCUTANEOUS NIGHTLY
Status: DISCONTINUED | OUTPATIENT
Start: 2023-03-05 | End: 2023-03-09 | Stop reason: HOSPADM

## 2023-03-05 RX ORDER — BACLOFEN 10 MG/1
TABLET ORAL
COMMUNITY
Start: 2023-02-06

## 2023-03-05 RX ORDER — BACLOFEN 10 MG/1
5 TABLET ORAL DAILY
Status: DISCONTINUED | OUTPATIENT
Start: 2023-03-06 | End: 2023-03-09 | Stop reason: HOSPADM

## 2023-03-05 RX ORDER — ROSUVASTATIN CALCIUM 10 MG/1
10 TABLET, COATED ORAL DAILY
Status: DISCONTINUED | OUTPATIENT
Start: 2023-03-06 | End: 2023-03-09 | Stop reason: HOSPADM

## 2023-03-05 RX ORDER — SODIUM CHLORIDE 9 MG/ML
INJECTION, SOLUTION INTRAVENOUS CONTINUOUS
Status: DISCONTINUED | OUTPATIENT
Start: 2023-03-05 | End: 2023-03-08

## 2023-03-05 RX ORDER — DICYCLOMINE HYDROCHLORIDE 10 MG/ML
20 INJECTION INTRAMUSCULAR ONCE
Status: COMPLETED | OUTPATIENT
Start: 2023-03-05 | End: 2023-03-05

## 2023-03-05 RX ORDER — POLYETHYLENE GLYCOL 3350 17 G/17G
17 POWDER, FOR SOLUTION ORAL DAILY PRN
Status: DISCONTINUED | OUTPATIENT
Start: 2023-03-05 | End: 2023-03-09 | Stop reason: HOSPADM

## 2023-03-05 RX ORDER — ENOXAPARIN SODIUM 100 MG/ML
40 INJECTION SUBCUTANEOUS DAILY
Status: DISCONTINUED | OUTPATIENT
Start: 2023-03-06 | End: 2023-03-09 | Stop reason: HOSPADM

## 2023-03-05 RX ORDER — CARBOXYMETHYLCELLULOSE SODIUM 5 MG/ML
SOLUTION/ DROPS OPHTHALMIC
COMMUNITY
Start: 2023-02-06

## 2023-03-05 RX ORDER — ONDANSETRON 2 MG/ML
4 INJECTION INTRAMUSCULAR; INTRAVENOUS EVERY 6 HOURS PRN
Status: DISCONTINUED | OUTPATIENT
Start: 2023-03-05 | End: 2023-03-09 | Stop reason: HOSPADM

## 2023-03-05 RX ORDER — SODIUM CHLORIDE 0.9 % (FLUSH) 0.9 %
5-40 SYRINGE (ML) INJECTION EVERY 12 HOURS SCHEDULED
Status: DISCONTINUED | OUTPATIENT
Start: 2023-03-05 | End: 2023-03-09 | Stop reason: HOSPADM

## 2023-03-05 RX ORDER — AZITHROMYCIN 500 MG/1
500 TABLET, FILM COATED ORAL EVERY 24 HOURS
Status: COMPLETED | OUTPATIENT
Start: 2023-03-06 | End: 2023-03-08

## 2023-03-05 RX ORDER — ACETAMINOPHEN 325 MG/1
650 TABLET ORAL EVERY 6 HOURS PRN
Status: DISCONTINUED | OUTPATIENT
Start: 2023-03-05 | End: 2023-03-09 | Stop reason: HOSPADM

## 2023-03-05 RX ORDER — CLOPIDOGREL BISULFATE 75 MG/1
75 TABLET ORAL NIGHTLY
Status: DISCONTINUED | OUTPATIENT
Start: 2023-03-05 | End: 2023-03-09 | Stop reason: HOSPADM

## 2023-03-05 RX ORDER — DULOXETIN HYDROCHLORIDE 60 MG/1
1 CAPSULE, DELAYED RELEASE ORAL DAILY
COMMUNITY
Start: 2023-01-06

## 2023-03-05 RX ORDER — ACETAMINOPHEN 650 MG/1
650 SUPPOSITORY RECTAL EVERY 6 HOURS PRN
Status: DISCONTINUED | OUTPATIENT
Start: 2023-03-05 | End: 2023-03-09 | Stop reason: HOSPADM

## 2023-03-05 RX ORDER — BACLOFEN 10 MG/1
10 TABLET ORAL NIGHTLY
Status: DISCONTINUED | OUTPATIENT
Start: 2023-03-05 | End: 2023-03-09 | Stop reason: HOSPADM

## 2023-03-05 RX ORDER — ONDANSETRON 4 MG/1
4 TABLET, ORALLY DISINTEGRATING ORAL EVERY 8 HOURS PRN
Status: DISCONTINUED | OUTPATIENT
Start: 2023-03-05 | End: 2023-03-09 | Stop reason: HOSPADM

## 2023-03-05 RX ORDER — DEXTROSE MONOHYDRATE 100 MG/ML
INJECTION, SOLUTION INTRAVENOUS CONTINUOUS PRN
Status: DISCONTINUED | OUTPATIENT
Start: 2023-03-05 | End: 2023-03-09 | Stop reason: HOSPADM

## 2023-03-05 RX ORDER — BENZONATATE 100 MG/1
100 CAPSULE ORAL 3 TIMES DAILY PRN
Status: DISCONTINUED | OUTPATIENT
Start: 2023-03-05 | End: 2023-03-09 | Stop reason: HOSPADM

## 2023-03-05 RX ADMIN — AMLODIPINE BESYLATE 5 MG: 5 TABLET ORAL at 21:15

## 2023-03-05 RX ADMIN — SODIUM CHLORIDE 1000 ML: 9 INJECTION, SOLUTION INTRAVENOUS at 13:22

## 2023-03-05 RX ADMIN — DICYCLOMINE HYDROCHLORIDE 20 MG: 20 INJECTION, SOLUTION INTRAMUSCULAR at 13:20

## 2023-03-05 RX ADMIN — IOPAMIDOL 75 ML: 755 INJECTION, SOLUTION INTRAVENOUS at 13:43

## 2023-03-05 RX ADMIN — AZITHROMYCIN DIHYDRATE 500 MG: 500 INJECTION, POWDER, LYOPHILIZED, FOR SOLUTION INTRAVENOUS at 17:36

## 2023-03-05 RX ADMIN — CEFTRIAXONE 1000 MG: 1 INJECTION, POWDER, FOR SOLUTION INTRAMUSCULAR; INTRAVENOUS at 17:33

## 2023-03-05 RX ADMIN — INSULIN GLARGINE 18 UNITS: 100 INJECTION, SOLUTION SUBCUTANEOUS at 21:21

## 2023-03-05 RX ADMIN — CLOPIDOGREL BISULFATE 75 MG: 75 TABLET ORAL at 21:21

## 2023-03-05 RX ADMIN — SODIUM CHLORIDE: 9 INJECTION, SOLUTION INTRAVENOUS at 21:16

## 2023-03-05 RX ADMIN — TAMSULOSIN HYDROCHLORIDE 0.4 MG: 0.4 CAPSULE ORAL at 21:15

## 2023-03-05 RX ADMIN — Medication 10 ML: at 21:15

## 2023-03-05 RX ADMIN — BACLOFEN 10 MG: 10 TABLET ORAL at 21:21

## 2023-03-05 RX ADMIN — ONDANSETRON 4 MG: 2 INJECTION INTRAMUSCULAR; INTRAVENOUS at 13:12

## 2023-03-05 ASSESSMENT — ENCOUNTER SYMPTOMS
SORE THROAT: 1
BACK PAIN: 0
COUGH: 1
ANAL BLEEDING: 0
NAUSEA: 1
DIARRHEA: 0
EYE PAIN: 0
ABDOMINAL PAIN: 0
VOMITING: 1
SHORTNESS OF BREATH: 0

## 2023-03-05 ASSESSMENT — PAIN DESCRIPTION - LOCATION: LOCATION: BACK

## 2023-03-05 ASSESSMENT — PAIN DESCRIPTION - DESCRIPTORS: DESCRIPTORS: ACHING

## 2023-03-05 ASSESSMENT — PAIN SCALES - GENERAL
PAINLEVEL_OUTOF10: 4
PAINLEVEL_OUTOF10: 8

## 2023-03-05 ASSESSMENT — PAIN - FUNCTIONAL ASSESSMENT: PAIN_FUNCTIONAL_ASSESSMENT: ADULT NONVERBAL PAIN SCALE (NPVS)

## 2023-03-05 NOTE — ED NOTES
Straight cath performed per sterile protocol. Urine specimen obtained and sent to lab. Call light within reach. Will continue to monitor patient.   Used  2021 Chiquis García, RN  03/05/23 238 Alma Delia Hicks, RN  03/05/23 3563

## 2023-03-05 NOTE — ED NOTES
Used  #[id number] to check on pt belly pain; pt denies any pain at this time.       Abelardo Delgado, DANILO  03/05/23 Madonna Gonsalez RN  03/05/23 9540

## 2023-03-05 NOTE — ED TRIAGE NOTES
Pt c/o emesis since yesterday with upper abd pain. Denies diarrhea. Hx of diabetes. Suhail Lee(Fellow)

## 2023-03-06 LAB
ANION GAP SERPL CALCULATED.3IONS-SCNC: 6 MMOL/L (ref 3–16)
BASOPHILS ABSOLUTE: 0 K/UL (ref 0–0.2)
BASOPHILS RELATIVE PERCENT: 0.4 %
BUN BLDV-MCNC: 11 MG/DL (ref 7–20)
CALCIUM SERPL-MCNC: 8.1 MG/DL (ref 8.3–10.6)
CHLORIDE BLD-SCNC: 106 MMOL/L (ref 99–110)
CO2: 22 MMOL/L (ref 21–32)
CREAT SERPL-MCNC: 1.5 MG/DL (ref 0.8–1.3)
EKG ATRIAL RATE: 95 BPM
EKG DIAGNOSIS: NORMAL
EKG P AXIS: 41 DEGREES
EKG P-R INTERVAL: 214 MS
EKG Q-T INTERVAL: 352 MS
EKG QRS DURATION: 112 MS
EKG QTC CALCULATION (BAZETT): 442 MS
EKG R AXIS: -46 DEGREES
EKG T AXIS: 76 DEGREES
EKG VENTRICULAR RATE: 95 BPM
EOSINOPHILS ABSOLUTE: 0.2 K/UL (ref 0–0.6)
EOSINOPHILS RELATIVE PERCENT: 3.1 %
GFR SERPL CREATININE-BSD FRML MDRD: 47 ML/MIN/{1.73_M2}
GLUCOSE BLD-MCNC: 118 MG/DL (ref 70–99)
GLUCOSE BLD-MCNC: 135 MG/DL (ref 70–99)
GLUCOSE BLD-MCNC: 167 MG/DL (ref 70–99)
GLUCOSE BLD-MCNC: 219 MG/DL (ref 70–99)
GLUCOSE BLD-MCNC: 244 MG/DL (ref 70–99)
GLUCOSE BLD-MCNC: 269 MG/DL (ref 70–99)
HCT VFR BLD CALC: 42.2 % (ref 40.5–52.5)
HEMOGLOBIN: 14.3 G/DL (ref 13.5–17.5)
LYMPHOCYTES ABSOLUTE: 1.3 K/UL (ref 1–5.1)
LYMPHOCYTES RELATIVE PERCENT: 21.8 %
MCH RBC QN AUTO: 27 PG (ref 26–34)
MCHC RBC AUTO-ENTMCNC: 33.8 G/DL (ref 31–36)
MCV RBC AUTO: 79.8 FL (ref 80–100)
MONOCYTES ABSOLUTE: 0.7 K/UL (ref 0–1.3)
MONOCYTES RELATIVE PERCENT: 12.5 %
NEUTROPHILS ABSOLUTE: 3.6 K/UL (ref 1.7–7.7)
NEUTROPHILS RELATIVE PERCENT: 62.2 %
ORGANISM: ABNORMAL
PDW BLD-RTO: 15.5 % (ref 12.4–15.4)
PERFORMED ON: ABNORMAL
PLATELET # BLD: 141 K/UL (ref 135–450)
PMV BLD AUTO: 8.5 FL (ref 5–10.5)
PNEUMONIA PANEL MOLECULAR: ABNORMAL
POTASSIUM REFLEX MAGNESIUM: 4.1 MMOL/L (ref 3.5–5.1)
RBC # BLD: 5.29 M/UL (ref 4.2–5.9)
REPORT: NORMAL
SODIUM BLD-SCNC: 134 MMOL/L (ref 136–145)
WBC # BLD: 5.8 K/UL (ref 4–11)

## 2023-03-06 PROCEDURE — 1200000000 HC SEMI PRIVATE

## 2023-03-06 PROCEDURE — 97166 OT EVAL MOD COMPLEX 45 MIN: CPT

## 2023-03-06 PROCEDURE — 93010 ELECTROCARDIOGRAM REPORT: CPT | Performed by: INTERNAL MEDICINE

## 2023-03-06 PROCEDURE — 85025 COMPLETE CBC W/AUTO DIFF WBC: CPT

## 2023-03-06 PROCEDURE — 2700000000 HC OXYGEN THERAPY PER DAY

## 2023-03-06 PROCEDURE — 6360000002 HC RX W HCPCS: Performed by: INTERNAL MEDICINE

## 2023-03-06 PROCEDURE — 2580000003 HC RX 258: Performed by: INTERNAL MEDICINE

## 2023-03-06 PROCEDURE — 6370000000 HC RX 637 (ALT 250 FOR IP): Performed by: NURSE PRACTITIONER

## 2023-03-06 PROCEDURE — 6370000000 HC RX 637 (ALT 250 FOR IP): Performed by: INTERNAL MEDICINE

## 2023-03-06 PROCEDURE — 93005 ELECTROCARDIOGRAM TRACING: CPT | Performed by: FAMILY MEDICINE

## 2023-03-06 PROCEDURE — 36415 COLL VENOUS BLD VENIPUNCTURE: CPT

## 2023-03-06 PROCEDURE — 94761 N-INVAS EAR/PLS OXIMETRY MLT: CPT

## 2023-03-06 PROCEDURE — 80048 BASIC METABOLIC PNL TOTAL CA: CPT

## 2023-03-06 PROCEDURE — 97535 SELF CARE MNGMENT TRAINING: CPT

## 2023-03-06 PROCEDURE — 87633 RESP VIRUS 12-25 TARGETS: CPT

## 2023-03-06 RX ADMIN — PANTOPRAZOLE SODIUM 40 MG: 40 TABLET, DELAYED RELEASE ORAL at 05:13

## 2023-03-06 RX ADMIN — ROSUVASTATIN CALCIUM 10 MG: 10 TABLET, FILM COATED ORAL at 08:43

## 2023-03-06 RX ADMIN — BACLOFEN 10 MG: 10 TABLET ORAL at 21:08

## 2023-03-06 RX ADMIN — LOSARTAN POTASSIUM 50 MG: 50 TABLET, FILM COATED ORAL at 08:43

## 2023-03-06 RX ADMIN — INSULIN GLARGINE 18 UNITS: 100 INJECTION, SOLUTION SUBCUTANEOUS at 21:09

## 2023-03-06 RX ADMIN — BACLOFEN 5 MG: 10 TABLET ORAL at 08:38

## 2023-03-06 RX ADMIN — AZITHROMYCIN MONOHYDRATE 500 MG: 500 TABLET ORAL at 17:00

## 2023-03-06 RX ADMIN — DULOXETINE HYDROCHLORIDE 60 MG: 60 CAPSULE, DELAYED RELEASE ORAL at 08:43

## 2023-03-06 RX ADMIN — SODIUM CHLORIDE: 9 INJECTION, SOLUTION INTRAVENOUS at 05:13

## 2023-03-06 RX ADMIN — CLOPIDOGREL BISULFATE 75 MG: 75 TABLET ORAL at 21:08

## 2023-03-06 RX ADMIN — CEFTRIAXONE 1000 MG: 1 INJECTION, POWDER, FOR SOLUTION INTRAMUSCULAR; INTRAVENOUS at 18:32

## 2023-03-06 RX ADMIN — AMLODIPINE BESYLATE 5 MG: 5 TABLET ORAL at 21:08

## 2023-03-06 RX ADMIN — TAMSULOSIN HYDROCHLORIDE 0.4 MG: 0.4 CAPSULE ORAL at 21:08

## 2023-03-06 RX ADMIN — SODIUM CHLORIDE: 9 INJECTION, SOLUTION INTRAVENOUS at 15:20

## 2023-03-06 RX ADMIN — ENOXAPARIN SODIUM 40 MG: 100 INJECTION SUBCUTANEOUS at 08:38

## 2023-03-06 ASSESSMENT — PAIN SCALES - PAIN ASSESSMENT IN ADVANCED DEMENTIA (PAINAD)
TOTALSCORE: 0
BREATHING: 0
BODYLANGUAGE: 0
CONSOLABILITY: 0
FACIALEXPRESSION: 0
TOTALSCORE: 0
NEGVOCALIZATION: 0
TOTALSCORE: 0
BREATHING: 0
BODYLANGUAGE: 0
BODYLANGUAGE: 0
CONSOLABILITY: 0
FACIALEXPRESSION: 0
BODYLANGUAGE: 0
CONSOLABILITY: 0
BODYLANGUAGE: 0
FACIALEXPRESSION: 0
BREATHING: 0
TOTALSCORE: 0
NEGVOCALIZATION: 0
FACIALEXPRESSION: 0
BODYLANGUAGE: 0
NEGVOCALIZATION: 0
CONSOLABILITY: 0
BREATHING: 0
FACIALEXPRESSION: 0
CONSOLABILITY: 0
NEGVOCALIZATION: 0
BODYLANGUAGE: 0
TOTALSCORE: 0
NEGVOCALIZATION: 0
NEGVOCALIZATION: 0
CONSOLABILITY: 0
TOTALSCORE: 0
BREATHING: 0
NEGVOCALIZATION: 0
CONSOLABILITY: 0
TOTALSCORE: 0

## 2023-03-06 ASSESSMENT — PAIN DESCRIPTION - ONSET
ONSET: SUDDEN
ONSET: SUDDEN

## 2023-03-06 ASSESSMENT — PAIN DESCRIPTION - ORIENTATION
ORIENTATION: LEFT
ORIENTATION: LEFT

## 2023-03-06 ASSESSMENT — PAIN DESCRIPTION - DESCRIPTORS
DESCRIPTORS: DISCOMFORT
DESCRIPTORS: SHARP

## 2023-03-06 ASSESSMENT — PAIN - FUNCTIONAL ASSESSMENT: PAIN_FUNCTIONAL_ASSESSMENT: PREVENTS OR INTERFERES SOME ACTIVE ACTIVITIES AND ADLS

## 2023-03-06 ASSESSMENT — PAIN DESCRIPTION - LOCATION
LOCATION: CHEST
LOCATION: CHEST

## 2023-03-06 ASSESSMENT — PAIN DESCRIPTION - PAIN TYPE
TYPE: ACUTE PAIN
TYPE: ACUTE PAIN

## 2023-03-06 ASSESSMENT — PAIN DESCRIPTION - FREQUENCY
FREQUENCY: INTERMITTENT
FREQUENCY: INTERMITTENT

## 2023-03-06 ASSESSMENT — PAIN SCALES - GENERAL
PAINLEVEL_OUTOF10: 0
PAINLEVEL_OUTOF10: 6
PAINLEVEL_OUTOF10: 0

## 2023-03-06 NOTE — PLAN OF CARE
Problem: Pain  Goal: Verbalizes/displays adequate comfort level or baseline comfort level  Outcome: Progressing     Problem: Safety - Adult  Goal: Free from fall injury  Outcome: Progressing     Problem: Skin/Tissue Integrity  Goal: Absence of new skin breakdown  Description: 1. Monitor for areas of redness and/or skin breakdown  2. Assess vascular access sites hourly  3. Every 4-6 hours minimum:  Change oxygen saturation probe site  4. Every 4-6 hours:  If on nasal continuous positive airway pressure, respiratory therapy assess nares and determine need for appliance change or resting period.   3/5/2023 2340 by Al El RN  Outcome: Progressing  3/5/2023 2225 by Al El RN  Outcome: Progressing

## 2023-03-06 NOTE — PROGRESS NOTES
Hospitalist Progress Note    CC: Community acquired bacterial pneumonia      Admit date: 3/5/2023  Days in hospital:  1    Subjective/interval history: Pt S/E. No acute events. Pt reports still feeling ill. Has pleuritic chest pain with breathing and reproducible    O2 status: 2L    ROS:   Pertinent items are noted in HPI. .    Objective:    /74   Pulse 70   Temp 97.4 °F (36.3 °C) (Axillary)   Resp 16   Ht 5' 2\" (1.575 m)   Wt 141 lb 15.6 oz (64.4 kg)   SpO2 93%   BMI 25.97 kg/m²     Gen: NAD, appears chronically ill,  speaks Urdu with an   HEENT: NC/AT, moist mucous membranes, no oropharyngeal erythema or exudate  Neck: supple, trachea midline, no anterior cervical or SC LAD  Heart: Normal s1/s2, RRR, no murmurs, gallops, or rubs. Lungs: diminished, no wheezing, no use of accessory muscles  Abd: bowel sounds present, soft, nontender, nondistended, no masses  Extrem: No clubbing, cyanosis, no edema  Skin: no rashes or lesions  Psych: A & o, affect appropriate  Neuro: grossly intact, moves all four extremities spontaneously.   Cap refill: +2 sec    Medications:  Scheduled Meds:   amLODIPine  5 mg Oral Daily    DULoxetine  60 mg Oral Daily    insulin glargine  18 Units SubCUTAneous Nightly    losartan  50 mg Oral Daily    pantoprazole  40 mg Oral QAM AC    rosuvastatin  10 mg Oral Daily    tamsulosin  0.4 mg Oral Nightly    sodium chloride flush  5-40 mL IntraVENous 2 times per day    enoxaparin  40 mg SubCUTAneous Daily    cefTRIAXone (ROCEPHIN) IV  1,000 mg IntraVENous Q24H    And    azithromycin  500 mg Oral Q24H    baclofen  10 mg Oral Nightly    clopidogrel  75 mg Oral Nightly    baclofen  5 mg Oral Daily       PRN Meds:  benzonatate, sodium chloride flush, sodium chloride, ondansetron **OR** ondansetron, polyethylene glycol, acetaminophen **OR** acetaminophen, glucose, dextrose bolus **OR** dextrose bolus, glucagon (rDNA), dextrose    IV:   sodium chloride sodium chloride 100 mL/hr at 03/06/23 0513    dextrose           Intake/Output Summary (Last 24 hours) at 3/6/2023 0921  Last data filed at 3/6/2023 0854  Gross per 24 hour   Intake 120 ml   Output --   Net 120 ml       Results:  CBC:   Recent Labs     03/05/23  1215 03/06/23  0729   WBC 7.7 5.8   HGB 15.7 14.3   HCT 47.7 42.2   MCV 80.6 79.8*    141     BMP:   Recent Labs     03/05/23  1215 03/06/23  0729    134*   K 4.1 4.1    106   CO2 18* 22   BUN 14 11   CREATININE 1.7* 1.5*     Mag: No results for input(s): MAG in the last 72 hours. Phos:   Lab Results   Component Value Date    PHOS 3.5 12/29/2022     No results found for: GLU    LIVER PROFILE:   Recent Labs     03/05/23  1215   AST 35   ALT 22   LIPASE 23.0   BILIDIR <0.2   BILITOT 1.0   ALKPHOS 132*     PT/INR: No results for input(s): PROTIME, INR in the last 72 hours. APTT: No results for input(s): APTT in the last 72 hours. UA:  Recent Labs     03/05/23  1602   COLORU Yellow   PHUR 6.0   WBCUA 1   RBCUA 3-4   BACTERIA None Seen   CLARITYU Clear   SPECGRAV 1.029   LEUKOCYTESUR Negative   UROBILINOGEN 0.2   BILIRUBINUR Negative   BLOODU SMALL*   GLUCOSEU Negative       Invalid input(s): ABG  Lab Results   Component Value Date    CALCIUM 8.1 (L) 03/06/2023    PHOS 3.5 12/29/2022       Assessment:    Principal Problem:    Community acquired bacterial pneumonia  Resolved Problems:    * No resolved hospital problems. [facility] course: 68 y.o. male with PMHx of CVA, DM2, HTN and HLD presented to [facility] with flu-like illness. Family reports symptoms began yesterday including sore throat, nausea and vomiting. He has a dry cough but no shortness of breath. He has had more than 15 episodes of vomiting today. No diarrhea. Denies dizziness, lightheadedness or headache.     Plan:     Pna  Acute hypoxic respiratory failure, POA - with criteria: < 90% on RA, RR> 18  - supplemental 02 to keep SaO2 > 90%; not on o2 at baseline  Rapid Influenza A&B negative and Covid neg  - procalcitonin  - pna panel if able to get   - Rocephin and Azithromycin.   - nebs  - is, mucinex    Chronic conditions - continue home meds unless otherwise stated  Dm  Htn    Code status:  full  DVT prophylaxis: [] Lovenox  [] SQ Heparin  [] SCDs  [] warfarin/oral direct thrombin inhibitor [] Encourage ambulation      Disposition:  [] Home [] Rehab [] Psych [] SNF  [] LTAC  [] Transfer to ICU  [] Transfer to PCU [] Other: in pt      Electronically signed by Yg Lynn DO on 3/6/2023 at 9:21 AM

## 2023-03-06 NOTE — ACP (ADVANCE CARE PLANNING)
Advance Care Planning     Advance Care Planning Activator (Inpatient)  Conversation Note      Date of ACP Conversation: 3/6/2023     Conversation Conducted with:  Healthcare Decision Maker: Next of Kin by law (only applies in absence of above) (name) faye Holm    ACP Activator: Wiley Hickey:     Current Designated Health Care Decision Maker:     Primary Decision Maker: Vicrainer Singh - Child - [phone]    Today we documented Decision Maker(s) consistent with Legal Next of Kin hierarchy. Care Preferences    Ventilation: \"If you were in your present state of health and suddenly became very ill and were unable to breathe on your own, what would your preference be about the use of a ventilator (breathing machine) if it were available to you? \"      Would the patient desire the use of ventilator (breathing machine)?: yes    \"If your health worsens and it becomes clear that your chance of recovery is unlikely, what would your preference be about the use of a ventilator (breathing machine) if it were available to you? \"     Would the patient desire the use of ventilator (breathing machine)?: unsure      Resuscitation  \"CPR works best to restart the heart when there is a sudden event, like a heart attack, in someone who is otherwise healthy. Unfortunately, CPR does not typically restart the heart for people who have serious health conditions or who are very sick. \"    \"In the event your heart stopped as a result of an underlying serious health condition, would you want attempts to be made to restart your heart (answer \"yes\" for attempt to resuscitate) or would you prefer a natural death (answer \"no\" for do not attempt to resuscitate)? \" yes       [] Yes   [] No   Educated Patient / [facility] regarding differences between Advance Directives and portable DNR orders.     Length of ACP Conversation in minutes: 4 min     Conversation Outcomes:  ACP discussion completed    Follow-up plan: [] Schedule follow-up conversation to continue planning  [] Referred individual to Provider for additional questions/concerns   [] Advised patient/agent/surrogate to review completed ACP document and update if needed with changes in condition, patient preferences or care setting    [x] This note routed to one or more involved healthcare providers        Patricio Ramirez RN, BSN,   [phone]  Electronically signed by Patricio Ramirez RN on 3/6/2023 at 10:49 AM

## 2023-03-06 NOTE — PLAN OF CARE
4 Eyes Skin Assessment     The patient is being assess for  Admission    I agree that 2 RN's have performed a thorough Head to Toe Skin Assessment on the patient. ALL assessment sites listed below have been assessed.       Areas assessed by both nurses:   [x]   Head, Face, and Ears   [x]   Shoulders, Back, and Chest  [x]   Arms, Elbows, and Hands   [x]   Coccyx, Sacrum, and IschIum  [x]   Legs, Feet, and Heels        Does the Patient have Skin Breakdown?  No         Gonzalez Prevention initiated:  NA   Wound Care Orders initiated:  NA      [facility] nurse consulted for Pressure Injury (Stage 3,4, Unstageable, DTI, NWPT, and Complex wounds), New and Established Ostomies:  NA      Nurse 1 eSignature: Electronically signed by Case Palacios RN on 3/5/23 at 10:25 PM EST    **SHARE this note so that the co-signing nurse is able to place an eSignature**    Nurse 2 eSignature: Electronically signed by Randall Orellana RN on 3/5/23 at 10:31 PM EST

## 2023-03-06 NOTE — CARE COORDINATION
[facility]    Referral received from  to follow for home care services. I will follow for needs, and speak with patient to verify demos.     Nicole Viveros RN, BSN CTN  [facility] [phone]

## 2023-03-06 NOTE — PROGRESS NOTES
used to communicate with patient. Medication education and head to toe assessment. Took pills whole in applesauce without complications. Call light in reach, all needs met at this time.  Electronically signed by Joey Amezcua RN on 3/6/2023 at 11:15 AM

## 2023-03-06 NOTE — PROGRESS NOTES
Occupational Therapy  Facility/Department: [facility] SURG  Occupational Therapy Initial Assessment    Name: Buckley Sacks  : 1945  MRN: [id number]  Date of Service: 3/6/2023    Discharge Recommendations:  24 hour supervision or assist      Buckley Sacks scored a 15/24 on the AM-PAC ADL Inpatient form. At this time, no further OT is recommended upon discharge due to pt near baseline. Recommend patient returns to prior setting with prior services. Patient Diagnosis(es): The primary encounter diagnosis was Community acquired pneumonia, unspecified laterality. Diagnoses of Hypoxia and Lab test negative for COVID-19 virus were also pertinent to this visit. Past Medical History:  has a past medical history of Cerebral artery occlusion with cerebral infarction ([facility] [address] Diabetes mellitus ([facility] [address] Gallstone, GERD (gastroesophageal reflux disease), Hyperlipidemia, Hypertension, and Renal insufficiency. Past Surgical History:  has a past surgical history that includes Appendectomy; Cholecystectomy; Upper gastrointestinal endoscopy (2018); Upper gastrointestinal endoscopy (N/A, 2019); Upper gastrointestinal endoscopy (N/A, 2019); Upper gastrointestinal endoscopy (N/A, 2019); Upper gastrointestinal endoscopy (N/A, 2020); and Colonoscopy (N/A, 2020). Treatment Diagnosis: Decreased ADLs, functional mobility      Assessment   Performance deficits / Impairments: Decreased functional mobility ; Decreased ADL status; Decreased ROM; Decreased cognition;Decreased balance;Decreased safe awareness;Decreased strength;Decreased endurance  Assessment: Pt is a 67 yo M admitted with community acquired bacterial pneumonia. 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 PTA, pt lived at home with his son and daughter-in-law and received assistance with ADLs, functional mobility with a cane, and transfers.  Today, pt  required up to max A for ADLs, min/mod A with transfers, and min A for functional monbility with a cane. Pt demonstrates impulsiveness with ambulating and becomes distracted easily. Pt family reports that his performance today is near baseline to how he is at home. OT will continue to see while on acute to increase pt independence and decrease burden of care. Anticipate pt will be safe to return home with 24/7 assist once medically stable. Treatment Diagnosis: Decreased ADLs, functional mobility  Prognosis: Fair  Decision Making: Medium Complexity  REQUIRES OT FOLLOW-UP: Yes  Activity Tolerance  Activity Tolerance: Patient limited by fatigue;Treatment limited secondary to decreased cognition  Activity Tolerance Comments: Pt limited by language barrier        Plan   Occupational Therapy Plan  Times Per Week: 3-5  Times Per Day: Once a day  Current Treatment Recommendations: Strengthening, ROM, Balance training, Functional mobility training, Endurance training, Safety education & training, Self-Care / ADL     Restrictions  Restrictions/Precautions  Restrictions/Precautions: Fall Risk  Position Activity Restriction  Other position/activity restrictions: 2L O2 NC    Subjective   General  Chart Reviewed: Yes  Patient assessed for rehabilitation services?: Yes  Additional Pertinent Hx: Pt is a 69 yo M admitted with community acquired bacterial pneumonia. Pt has hx of R CVA. Family / Caregiver Present: Yes (Son and daughter-in-law)  Referring Practitioner: Madhu Altamirano DO  Diagnosis: community acquired bacterial pneumonia  Subjective  Subjective: Pt met b/s for OT eval/tx. Pt in bed, agreeable to therapy. RN reports the virtual  is unable to hear/understand the pt, so pt family translates.      Social/Functional History  Social/Functional History  Lives With: Family  Type of Home: House  Home Layout: Two level  Home Access: Stairs to enter with rails  Entrance Stairs - Number of Steps: 7  Bathroom Shower/Tub: Tub/Shower unit  Bathroom Toilet: Standard  Bathroom Equipment: Grab bars in shower, Shower chair, Grab bars around toilet  Bathroom Accessibility: Walker accessible  Home Equipment: Jass Alejandra  Has the patient had two or more falls in the past year or any fall with injury in the past year?: Yes (falls from bed when trying to get up)  Receives Help From: Family  ADL Assistance: Needs assistance  Homemaking Assistance: Needs assistance  Homemaking Responsibilities: No  Ambulation Assistance: Needs assistance (CGA with cane in the home)  Transfer Assistance: Needs assistance  Additional Comments: Family is home all the time and is able to help him with ADLs, mobility, and transfers       Objective   Safety Devices  Type of Devices: All fall risk precautions in place;Call light within reach; Chair alarm in place;Gait belt;Patient at risk for falls;Nurse notified; Left in chair;Telesitter in use     Toilet Transfers  Toilet - Technique: Ambulating  Equipment Used: Standard toilet  Toilet Transfer: Moderate assistance  AROM: Generally decreased, functional (Pt hx of R CVA, L sided weakness)  Strength: Generally decreased, functional  ADL  Grooming: Setup  Grooming Skilled Clinical Factors: Pt brushed teeth and washed face in stance at sink with setup A. LE Dressing: Maximum assistance  LE Dressing Skilled Clinical Factors: Pt donned socks and changed briefs with max A. Toileting: Dependent/Total  Toileting Skilled Clinical Factors: Pt noted to be incontinent. Pt changed briefs and performed pericare with total A while seated on commode. Additional Comments: Anticipate pt will require setup A for feeding, min A for UE dressing, and max A for bathing based on the strength, endurance, cognition, and ADLs performed today. Bed mobility  Supine to Sit: Moderate assistance  Sit to Supine: Unable to assess (Pt in recliner at end of session)  Transfers  Sit to stand: Minimal assistance; Moderate assistance  Stand to sit: Minimal assistance  Transfer Comments: Pt ambulated from bed>BR>recliner using cane min A. Pt required assistance when sitting d/t impulsiveness. Vision  Vision: Within Functional Limits  Hearing  Hearing: Exceptions to [facility]  Hearing Exceptions: Hard of hearing/hearing concerns  Cognition  Cognition Comment: Difficult to fully assess d/t language barrier. Family reports often being confused    Education Given To: Patient; Family  Education Provided: Role of Therapy;Plan of Care;Transfer Training;ADL Adaptive Strategies; Energy Conservation  Education Method: Verbal  Barriers to Learning: Other (Comment); Cognition (Language)  Education Outcome: Continued education needed    AM-PAC Score  AM-PAC Inpatient Daily Activity Raw Score: 15 (03/06/23 1610)  AM-PAC Inpatient ADL T-Scale Score : 34.69 (03/06/23 1610)  ADL Inpatient CMS 0-100% Score: 56.46 (03/06/23 1610)  ADL Inpatient CMS G-Code Modifier : CK (03/06/23 1610)    Goals  Short Term Goals  Time Frame for Short Term Goals: Prior to d/c  Short Term Goal 1: Pt will complete ADL transfers min A  Short Term Goal 2: Pt will complete grooming while seated at sink with supervision  Short Term Goal 3: Pt will complete dressing min A  Short Term Goal 4: Pt will complete toileting mod A  Short Term Goal 5: Pt will complete bathing mod A  Long Term Goals  Time Frame for Long Term Goals : LTG=STG  Patient Goals   Patient goals : To return home       Therapy Time   Individual Concurrent Group Co-treatment   Time In 1515         Time Out 1605         Minutes 50         Timed Code Treatment Minutes: 81 SALONI Orozco/OT  Therapist was present, directed the patient's care, made skilled judgement, and was responsible for assessment and treatment of the patient.   Electronically signed by Eliza Verdugo OT on 3/6/23 at 4:13 PM EST

## 2023-03-06 NOTE — DISCHARGE INSTR - COC
Continuity of Care Form    Patient Name: Macho Cummings   :  1945  MRN:  [id number]    Admit date:  3/5/2023  Discharge date:  2023    Code Status Order: Full Code   Advance Directives:     Admitting Physician:  No admitting provider for patient encounter.   PCP: Graham Sanders MD    Discharging Nurse: Davon Bryant RN  [address] Unit/Room#: [address] Unit Phone Number: [phone]    Emergency Contact:   Extended Emergency Contact Information  Primary Emergency Contact: [address] Phone: [phone]  Relation: Child  Secondary Emergency Contact: [address] Phone: [phone]  Work Phone: [phone]  Relation: Grandchild    Past Surgical History:  Past Surgical History:   Procedure Laterality Date    APPENDECTOMY      CHOLECYSTECTOMY      COLONOSCOPY N/A 2020    COLONOSCOPY POLYPECTOMY SNARE/COLD BIOPSY performed by Herlinda Esposito MD at [address] [place] [address]  2018    UPPER GASTROINTESTINAL ENDOSCOPY N/A 2019    EGD W/EUS FNA performed by Kassandra Chávez MD at [address] [place] [address] 2019    EGD BIOPSY performed by Kassandra Chávez MD at [address] [place] [address] N/A 2019    EGD W/EUS FNA performed by Kassandra Chávez MD at [address] [place] [address] N/A 2020    EGD BIOPSY performed by Herlinda Esposito MD at [address] [place] [address]       Immunization History:   Immunization History   Administered Date(s) Administered    COVID-19, PFIZER GRAY top, DO NOT Dilute, (age 15 y+), IM, 30 mcg/0.3 mL 2022    COVID-19, PFIZER PURPLE top, DILUTE for use, (age 15 y+), 30mcg/0.3mL 2021, 2021       Active Problems:  Patient Active Problem List   Diagnosis Code    Chest pain R07.9    Hypertension I10    Hyperlipidemia E78.5    Diabetes mellitus ([facility] [address] E11.9    Unexplained weight loss R63.4    Esophagitis K20.90 Granulomatous adenopathy R59.9    Mediastinal lymphadenopathy R59.0    History of arterial ischemic stroke Z86.73    Abdominal pain R10.9    Acute cerebrovascular accident [facility]) I63.9    Right pontine stroke ([facility] [address] I63.50    Encounter for electronic analysis of reveal event recorder Z45.09    Community acquired bacterial pneumonia J15.9       Isolation/Infection:   Isolation            No Isolation          Patient Infection Status       Infection Onset Added Last Indicated Last Indicated By Review Planned Expiration Resolved Resolved By    None active    Resolved    COVID-19 (Rule Out) 03/05/23 03/05/23 03/05/23 COVID-19, Rapid (Ordered)   03/05/23 Rule-Out Test Resulted    COVID-19 (Rule Out) 08/26/20 08/26/20 08/27/20 COVID-19 (Ordered)   08/27/20 Rule-Out Test Resulted    COVID-19 (Rule Out) 04/28/20 04/28/20 04/28/20 COVID-19 (Ordered)   04/28/20 Rule-Out Test Resulted            Nurse Assessment:  Last Vital Signs: /74   Pulse 70   Temp 97.4 °F (36.3 °C) (Axillary)   Resp 16   Ht 5' 2\" (1.575 m)   Wt 141 lb 15.6 oz (64.4 kg)   SpO2 93%   BMI 25.97 kg/m²     Last documented pain score (0-10 scale): Pain Level: 0 ( used, denies pain)  Last Weight:   Wt Readings from Last 1 Encounters:   03/06/23 141 lb 15.6 oz (64.4 kg)     Mental Status:  alert    IV Access:  - None    Nursing Mobility/ADLs:  Walking   Assisted  Transfer  Assisted  Bathing  Assisted  Dressing  Assisted  Toileting  Assisted  Feeding  Assisted  Med Admin  Assisted  Med Delivery   whole    Wound Care Documentation and Therapy:        Elimination:  Continence: Bowel: Yes  Bladder: Yes  Urinary Catheter: None   Colostomy/Ileostomy/Ileal Conduit: No       Date of Last BM: ***    Intake/Output Summary (Last 24 hours) at 3/6/2023 1103  Last data filed at 3/6/2023 0854  Gross per 24 hour   Intake 120 ml   Output --   Net 120 ml     No intake/output data recorded. Safety Concerns:      At Risk for Falls    Impairments/Disabilities:      None    Nutrition Therapy:  Current Nutrition Therapy:   - Oral Diet:  Dysphagia - Pureed    Routes of Feeding: Oral  Liquids: No Restrictions  Daily Fluid Restriction: no  Last Modified Barium Swallow with Video (Video Swallowing Test): not done    Treatments at the Time of Hospital Discharge:   Respiratory Treatments: 03/09/2023  Oxygen Therapy:  is not on home oxygen therapy. Ventilator:    - No ventilator support    Rehab Therapies: Nurse;  Therapy and Occupational Therapy; HRS if patient agreeable  Weight Bearing Status/Restrictions: No weight bearing restrictions  Other Medical Equipment (for information only, NOT a DME order):  wheelchair  Other Treatments: [address] [facility]: LEVEL 1 [address] [facility] agency to establish plan of care for patient over 60 day period   Nursing  Initial home SN evaluation visit to occur within 24-48 hours for:  1)  medication management  2)  VS and clinical assessment  3)  S&S chronic disease exacerbation education + when to contact MD/NP  4)  care coordination  Medication Reconciliation during 1st SN visit    PT/OT   Evaluate with goal of regaining prior level of functioning   OT to evaluate if patient has [address] needs for personal care    PCP Visit scheduled within 7 days of hospital discharge   Telehealth-Homecare Vitals(If patient is agreeable and meets guidelines)      Patient's personal belongings (please select all that are sent with patient):  None    RN SIGNATURE:  Electronically signed by João Ann RN on 3/9/23 at 12:26 PM EST    CASE MANAGEMENT/SOCIAL WORK SECTION    Inpatient Status Date: ***    Readmission Risk Assessment Score:  Readmission Risk              Risk of Unplanned Readmission:  12           Discharging to Facility/ Agency   Name:  [facility] care    Address: [address] [place] [address]., [address] [place]  Phone: [phone]  Fax: [phone]       / signature: Electronically signed by Kerri Berry RN on 3/6/23 at 11:03 AM EST    PHYSICIAN SECTION    Prognosis: {Prognosis:6003460815}    Condition at Discharge: [address] Patient Condition:652670923}    Rehab Potential (if transferring to Rehab): {Prognosis:8445505892}    Recommended Labs or Other Treatments After Discharge: ***    Physician Certification: I certify the above information and transfer of Shima Gordillo  is necessary for the continuing treatment of the diagnosis listed and that he requires {Admit to Appropriate Level of Care:98762} for {GREATER/LESS:976663095} 30 days.      Update Admission H&P: {CHP DME Changes in MPTHE:369926912}    PHYSICIAN SIGNATURE:  {Esignature:839910082}

## 2023-03-06 NOTE — PLAN OF CARE
Problem: Discharge Planning  Goal: Discharge to home or other facility with appropriate resources  Outcome: Progressing     Problem: Pain  Goal: Verbalizes/displays adequate comfort level or baseline comfort level  3/6/2023 1118 by Checo Hazel RN  Outcome: Progressing  3/5/2023 2340 by Tom Kong RN  Outcome: Progressing     Problem: Safety - Adult  Goal: Free from fall injury  3/6/2023 1118 by Checo Hazel RN  Outcome: Progressing  3/5/2023 2340 by Tom Kong RN  Outcome: Progressing     Problem: Skin/Tissue Integrity  Goal: Absence of new skin breakdown  Description: 1. Monitor for areas of redness and/or skin breakdown  2. Assess vascular access sites hourly  3. Every 4-6 hours minimum:  Change oxygen saturation probe site  4. Every 4-6 hours:  If on nasal continuous positive airway pressure, respiratory therapy assess nares and determine need for appliance change or resting period.   3/6/2023 1118 by Checo Hazel RN  Outcome: Progressing  3/5/2023 2340 by Tom Kong RN  Outcome: Progressing  3/5/2023 2225 by Tom Kong RN  Outcome: Progressing     Problem: Chronic Conditions and Co-morbidities  Goal: Patient's chronic conditions and co-morbidity symptoms are monitored and maintained or improved  Outcome: Progressing

## 2023-03-06 NOTE — PROGRESS NOTES
unable to hear patient. Daughter in law in room to translate. Patient stated chest pain, left side, with SOB. 12 lead EECK ordered per protocol. MD notified, awaiting further orders. Call light in reach, sleeping. Occasional facial grimacing noted.  Electronically signed by Jody Proctor RN on 3/6/2023 at 1:27 PM

## 2023-03-06 NOTE — CARE COORDINATION
Case Management Assessment  Initial Evaluation    Date/Time of Evaluation: 3/6/2023 11:10 AM  Assessment Completed by: Wade Waite RN    If patient is discharged prior to next notation, then this note serves as note for discharge by case management. Patient Name: Drake Coleman                   YOB: 1945  Diagnosis: Hypoxia [R09.02]  Community acquired bacterial pneumonia [J15.9]  Community acquired pneumonia, unspecified laterality [J18.9]  Lab test negative for COVID-19 virus [Z20.822]                   Date / Time: 3/5/2023 11:18 AM    Patient Admission Status: Inpatient   Readmission Risk (Low < 19, Mod (19-27), High > 27): Readmission Risk Score: 15.6    Current PCP: Amalia Ruiz MD  PCP verified by CM? Yes (updated)    Chart Reviewed: Yes      History Provided by: Child/Family (son Amara)  Patient Orientation: Other (see comment) (asleep, orientation questionable when pt is awake)    Patient Cognition: Other (see comment) (unknown)    Hospitalization in the last 30 days (Readmission):  No    If yes, Readmission Assessment in CM Navigator will be completed.     Advance Directives:      Code Status: Full Code   Patient's Primary Decision Maker is: Legal Next of Kin    Primary Decision MakerSree Levin - [phone]    Discharge Planning:    Patient lives with: Children Type of Home: House  Primary Care Giver: Family  Patient Support Systems include: Family Members   Current Financial resources: Medicaid  Current community resources:    Current services prior to admission: Durable Medical Equipment, DARRYL/Passport            Current DME: Glucometer, Wheelchair, Shower Chair, Other (Comment) (grab bars)            Type of Home Care services:  OT, PT    ADLS  Prior functional level: Assistance with the following:, Bathing, Dressing, Mobility  Current functional level: Mobility, Dressing, Bathing, Assistance with the following:    PT AM-PAC:   /24  OT AM-PAC:   /24    Family can provide assistance at DC: Yes  Would you like Case Management to discuss the discharge plan with any other family members/significant others, and if so, who?  Yes (son Ortiz Garrison)  Plans to Return to Present Housing: Yes  Other Identified Issues/Barriers to RETURNING to current housing: na  Potential Assistance needed at discharge: [address] DME:    Patient expects to discharge to: House ([address])  Plan for transportation at discharge: Family    Financial    Payor: [address] / Plan: [address] DEPT Shayna [address] / Product Type: *No Product type* /     Does insurance require precert for SNF: na    Potential assistance Purchasing Medications: No  Meds-to-Beds request:        [address], [address] [facility] -  [phone]  [address] [facility] [address]  Phone: [phone] Fax: Marcin Krishnan 5728 [address] [facility] [phone] - f [phone] [address] [facility] [address]  Phone: [phone] Fax: [phone] [address], [address] [phone] - F [phone]  [address] [facility] [address]  Phone: [phone] Fax: [phone]      Notes:    Factors facilitating achievement of predicted outcomes: Family support and Has needed Durable Medical Equipment at home    Barriers to discharge: continuing w/u    Additional Case Management Notes: son requesting [address] at [employer] & increased hours from [place] on [address] [facility] for Transition of Care is related to the following treatment goals of Hypoxia [R09.02]  Community acquired bacterial pneumonia [J15.9]  Community acquired pneumonia, unspecified laterality [J18.9]  Lab test negative for COVID-19 virus [Z20.822]    Misael Kruger RN, BSN,   [phone]  Electronically signed by Misael Kruger RN on 3/6/2023 at 11:12 AM

## 2023-03-06 NOTE — CARE COORDINATION
Talked to son re: dc needs; he brought up requesting home care & stated he wants [address] declined list.    Destinee Robertssus with [address] they can accept, she will follow. PT/OT ordered.     Kian Castillo RN, BSN,   [phone]  Electronically signed by Kian Castillo RN on 3/6/2023 at 11:14 AM

## 2023-03-06 NOTE — PROGRESS NOTES
Pt admitted to 4W at 2030.  states pt is slurring his words and incomprehensible speech at times. Family was able to better assist at translating and getting pt to be compliant with nursing needs but states pt has been coughing more, is more weak, and has been slurring his speech. PM NP notified. Pt has baseline L sided weakness from CVA 2 years ago per son. VSS on RA. Pt did tolerate pureed food from family but family gave pt a pill with liquids and pt began to choke. Instructed family that we are only going to do pills in puree. Writer did request SLP/PT/OT from NP. Call light within reach, will continue to monitor.

## 2023-03-06 NOTE — ED PROVIDER NOTES
[address]        Pt Name: Tawanda Thomson  MRN: [id number]  [place] 1945  Date of evaluation: 3/5/2023  Provider: CHANDANA Borjas CNP  PCP: Milad Roberts  Note Started: 7:31 PM EST 3/5/23      NORMA. I have evaluated this patient. My supervising physician was available for consultation. CHIEF COMPLAINT       Chief Complaint   Patient presents with    Emesis     Pt c/o emesis since yesterday with upper abd pain. Denies diarrhea. Hx of diabetes. HISTORY OF PRESENT ILLNESS: 1 or more Elements     History from : Patient and Family   -patient's daughter-in-law interpreted after we attempted to utilize the  line multiple times with bad reception.  #[phone]    Limitations to history : Language   Italian speaking    Tawanda Thomson is a 68 y.o. nontoxic, unwell appearing, mildly distressed male who presents to the emergency department for evaluation status post he began to experience flulike illness on yesterday. Symptoms included \"itching and sore throat\", nausea, vomiting x15, dry cough, and abdominal pain that he rates a severity of 7/10 which he cannot characterize. Denies chest pain, lightheadedness, dizziness, presyncope, shortness of breath, measured fevers, chills, sweats, change in ability to smell/taste, hemoptysis, leg/calf pain or swelling, headaches, body aches, diarrhea, urinary symptoms/retention, other concerns. The patient is COVID-19 vaccinated and boosted. He did not receive an influenza vaccination. There is no sick contacts. Nursing Notes were all reviewed and agreed with or any disagreements were addressed in the HPI. REVIEW OF SYSTEMS :      Review of Systems   Constitutional:  Negative for chills, diaphoresis, fatigue and fever. HENT:  Positive for sore throat. Negative for congestion. Eyes:  Negative for pain and visual disturbance.    Respiratory: Positive for cough. Negative for shortness of breath. Cardiovascular:  Negative for chest pain and leg swelling. Gastrointestinal:  Positive for nausea and vomiting. Negative for abdominal pain, anal bleeding and diarrhea. Genitourinary:  Negative for difficulty urinating, dysuria, frequency and urgency. Musculoskeletal:  Negative for back pain and neck pain. Skin:  Negative for rash and wound. Neurological:  Negative for dizziness and light-headedness. Positives and Pertinent negatives as per HPI.      SURGICAL HISTORY     Past Surgical History:   Procedure Laterality Date    APPENDECTOMY      CHOLECYSTECTOMY      COLONOSCOPY N/A 5/8/2020    COLONOSCOPY POLYPECTOMY SNARE/COLD BIOPSY performed by Darius Fernandez MD at [facility] 13  06/08/2018    UPPER GASTROINTESTINAL ENDOSCOPY N/A 2/28/2019    EGD W/EUS FNA performed by Amarjit Jean MD at [place] [address] 4/23/2019    EGD BIOPSY performed by Amarjit Jean MD at [facility] 13 N/A 4/23/2019    EGD W/EUS FNA performed by Amarjit Jean MD at [facility] 13 4/29/2020    EGD BIOPSY performed by Darius Fernandez MD at [facility]. 192       Previous Medications    ACETAMINOPHEN (TYLENOL) 500 MG TABLET    Take 1,000 mg by mouth 2 times daily    AMLODIPINE (NORVASC) 5 MG TABLET        BACLOFEN (LIORESAL) 10 MG TABLET    TAKE 1/2 TABLET BY MOUTH EVERY MORNING AND 1 TABLET EVERY EVENING    CLOPIDOGREL (PLAVIX) 75 MG TABLET    Take 1 tablet by mouth daily    DICLOFENAC SODIUM (VOLTAREN) 1 % GEL    Apply 4 g topically 4 times daily as needed for Pain (Shoulder and knee pain)    DULOXETINE (CYMBALTA) 60 MG EXTENDED RELEASE CAPSULE    Take 1 capsule by mouth daily    INSULIN GLARGINE (LANTUS SOLOSTAR) 100 UNIT/ML INJECTION PEN    Inject 18 Units into the skin nightly    INSULIN LISPRO, 1 UNIT DIAL, (HUMALOG KWIKPEN) 100 UNIT/ML SOPN    Inject 10 Units into the skin daily (with breakfast) AND 5 Units Daily with lunch AND 5 Units Daily with supper. HOLD IF EATS LESS THAN 50% OF MEAL. Shruthi Alan     INSULIN LISPRO, 1 UNIT DIAL, (HUMALOG KWIKPEN) 100 UNIT/ML SOPN    With meals (TID AC) **Corrective Low Dose Algorithm** Glucose:  give No Insulin, Glucose 140-199 give 1 Unit, glucose 200-249 give 2 Units, glucose 250-299 give 3 Units, glucose 300-349 give 4 Units, glucose 350-399 give 5 Units, glucose Over 399 give 6 Units    LIDOCAINE 4 % EXTERNAL PATCH    Place 1 patch onto the skin daily as needed (back pain)    LOSARTAN (COZAAR) 50 MG TABLET    Take 1 tablet by mouth daily    OMEPRAZOLE (PRILOSEC) 40 MG DELAYED RELEASE CAPSULE    Take 40 mg by mouth daily    POLYETHYLENE GLYCOL (MIRALAX) PACK PACKET    Take 17 g by mouth daily    REFRESH PLUS 0.5 % SOLN OPHTHALMIC SOLUTION    INSTILL 1 DROP IN BOTH EYES FOUR TIMES DAILY FOR 30 DAYS    ROSUVASTATIN (CRESTOR) 10 MG TABLET    Take 1 tablet by mouth daily    TAMSULOSIN (FLOMAX) 0.4 MG CAPSULE    Take 1 capsule by mouth nightly    VITAMIN D (CHOLECALCIFEROL) 25 MCG (1000 UT) TABS TABLET    Take 2,000 Units by mouth daily       ALLERGIES     Atorvastatin    FAMILYHISTORY       Family History   Problem Relation Age of Onset    No Known Problems Mother     No Known Problems Father         SOCIAL HISTORY       Social History     Tobacco Use    Smoking status: Never    Smokeless tobacco: Never   Vaping Use    Vaping Use: Never used   Substance Use Topics    Alcohol use: No    Drug use: No       SCREENINGS        [place] Coma Scale  Eye Opening: Spontaneous  Best Verbal Response: Oriented  Best Motor Response: Obeys commands  Eligio Coma Scale Score: 15                CIWA Assessment  BP: 131/72  Heart Rate: 85           PHYSICAL EXAM  1 or more Elements     ED Triage Vitals   BP Temp Temp Source Heart Rate Resp SpO2 Height Weight   03/05/23 1128 03/05/23 1128 03/05/23 1128 03/05/23 1128 03/05/23 1128 03/05/23 1128 -- 03/05/23 1156   129/78 98.9 °F (37.2 °C) Oral 93 18 96 %  127 lb 6.8 oz (57.8 kg)       Physical Exam  Vitals and nursing note reviewed. Constitutional:       General: He is in acute distress. Appearance: Normal appearance. He is ill-appearing. He is not toxic-appearing or diaphoretic. HENT:      Head: Normocephalic and atraumatic. Right Ear: External ear normal.      Left Ear: External ear normal.      Nose: Nose normal.      Mouth/Throat:      Mouth: Mucous membranes are moist.   Eyes:      General:         Right eye: No discharge. Left eye: No discharge. Extraocular Movements: Extraocular movements intact. Cardiovascular:      Rate and Rhythm: Normal rate and regular rhythm. Heart sounds: No murmur heard. No friction rub. No gallop. Pulmonary:      Effort: Tachypnea and accessory muscle usage present. No respiratory distress. Breath sounds: Decreased air movement present. No decreased breath sounds, wheezing, rhonchi or rales. Comments: + Increased work of breathing  Abdominal:      General: Bowel sounds are normal. There is no distension. Palpations: Abdomen is soft. Tenderness: There is abdominal tenderness. There is guarding. There is no right CVA tenderness, left CVA tenderness or rebound. Musculoskeletal:         General: Normal range of motion. Cervical back: Normal range of motion and neck supple. Skin:     General: Skin is warm and dry. Capillary Refill: Capillary refill takes less than 2 seconds. Neurological:      Mental Status: He is alert and oriented to person, place, and time.    Psychiatric:         Mood and Affect: Mood normal.         Behavior: Behavior normal.           DIAGNOSTIC RESULTS   LABS:    I have reviewed and interpreted all of the currently available lab results from this visit:  Results for orders placed or performed during the hospital encounter of 03/05/23   COVID-19, Rapid    Specimen: Nasopharyngeal Swab   Result Value Ref Range    SARS-CoV-2, NAAT Not Detected Not Detected   Rapid influenza A/B antigens    Specimen: Nasopharyngeal   Result Value Ref Range    Rapid Influenza A Ag Negative Negative    Rapid Influenza B Ag Negative Negative   Strep Screen Group A Throat    Specimen: Throat   Result Value Ref Range    Rapid Strep A Screen Negative Negative   CBC with Auto Differential   Result Value Ref Range    WBC 7.7 4.0 - 11.0 K/uL    RBC 5.92 (H) 4.20 - 5.90 M/uL    Hemoglobin 15.7 13.5 - 17.5 g/dL    Hematocrit 47.7 40.5 - 52.5 %    MCV 80.6 80.0 - 100.0 fL    MCH 26.6 26.0 - 34.0 pg    MCHC 33.0 31.0 - 36.0 g/dL    RDW 15.5 (H) 12.4 - 15.4 %    Platelets 025 156 - 552 K/uL    MPV 8.4 5.0 - 10.5 fL    Neutrophils % 61.6 %    Lymphocytes % 23.1 %    Monocytes % 13.0 %    Eosinophils % 1.7 %    Basophils % 0.6 %    Neutrophils Absolute 4.7 1.7 - 7.7 K/uL    Lymphocytes Absolute 1.8 1.0 - 5.1 K/uL    Monocytes Absolute 1.0 0.0 - 1.3 K/uL    Eosinophils Absolute 0.1 0.0 - 0.6 K/uL    Basophils Absolute 0.0 0.0 - 0.2 K/uL   Basic Metabolic Panel   Result Value Ref Range    Sodium 137 136 - 145 mmol/L    Potassium 4.1 3.5 - 5.1 mmol/L    Chloride 103 99 - 110 mmol/L    CO2 18 (L) 21 - 32 mmol/L    Anion Gap 16 3 - 16    Glucose 144 (H) 70 - 99 mg/dL    BUN 14 7 - 20 mg/dL    Creatinine 1.7 (H) 0.8 - 1.3 mg/dL    Est, Glom Filt Rate 41 (A) >60    Calcium 9.1 8.3 - 10.6 mg/dL   Lipase   Result Value Ref Range    Lipase 23.0 13.0 - 60.0 U/L   Hepatic Function Panel   Result Value Ref Range    Total Protein 7.3 6.4 - 8.2 g/dL    Albumin 3.8 3.4 - 5.0 g/dL    Alkaline Phosphatase 132 (H) 40 - 129 U/L    ALT 22 10 - 40 U/L    AST 35 15 - 37 U/L    Total Bilirubin 1.0 0.0 - 1.0 mg/dL    Bilirubin, Direct <0.2 0.0 - 0.3 mg/dL    Bilirubin, Indirect see below 0.0 - 1.0 mg/dL   Lactic Acid   Result Value Ref Range    Lactic Acid 1.4 0.4 - 2.0 mmol/L   Urinalysis with Reflex to Culture    Specimen: Urine   Result Value Ref Range    Color, UA Yellow Straw/Yellow    Clarity, UA Clear Clear    Glucose, Ur Negative Negative mg/dL    Bilirubin Urine Negative Negative    Ketones, Urine Negative Negative mg/dL    Specific Gravity, UA 1.029 1.005 - 1.030    Blood, Urine SMALL (A) Negative    pH, UA 6.0 5.0 - 8.0    Protein, UA Negative Negative mg/dL    Urobilinogen, Urine 0.2 <2.0 E.U./dL    Nitrite, Urine Negative Negative    Leukocyte Esterase, Urine Negative Negative    Microscopic Examination YES     Urine Type Other     Urine Reflex to Culture Not Indicated    Microscopic Urinalysis   Result Value Ref Range    RBC, UA 3-4 0 - 4 /HPF    Bacteria, UA None Seen None Seen /HPF    Urinalysis Comments see below     Hyaline Casts, UA 0 0 - 8 /LPF    WBC, UA 1 0 - 5 /HPF    Epithelial Cells, UA 0 0 - 5 /HPF   Blood Gas, Venous   Result Value Ref Range    pH, Eddy 7.387 7.350 - 7.450    pCO2, Eddy 39.9 (L) 40.0 - 50.0 mmHg    pO2, Eddy 61 Not Established mmHg    HCO3, Venous 24 23 - 29 mmol/L    Base Excess, Eddy -1.0 Not Established mmol/L    O2 Sat, Eddy 92 Not Established %    Carboxyhemoglobin 0.9 %    MetHgb, Eddy 0.7 <1.5 %    TC02 (Calc), Eddy 25 Not Established mmol/L    O2 Therapy Unknown          When ordered only abnormal lab results are displayed. All other labs were within normal range or not returned as of this dictation. EKG: When ordered, EKG's are interpreted by the Emergency Department Physician in the absence of a cardiologist.  Please see their note for interpretation of EKG.     RADIOLOGY:   Non-plain film images such as CT, Ultrasound and MRI are read by the radiologist. Plain radiographic images are visualized and preliminarily interpreted by the ED Provider with the below findings:      Interpretation per the Radiologist below, if available at the time of this note:    CT ABDOMEN PELVIS W IV CONTRAST Additional Contrast? None   Final Result   No acute process of the abdomen or pelvis. Fatty liver. Left inguinal hernia containing a loop of nondistended small bowel. Focal scarring right middle lobe which would account for the finding seen on   chest radiograph that raises concern for lingular pneumonia. XR CHEST (2 VW)   Final Result   Possible left lingular pneumonia           XR CHEST (2 VW)    Result Date: 3/5/2023  EXAMINATION: TWO XRAY VIEWS OF THE CHEST 3/5/2023 12:22 pm COMPARISON: 11/03/2020 HISTORY: ORDERING SYSTEM PROVIDED HISTORY: cough TECHNOLOGIST PROVIDED HISTORY: Reason for exam:->cough Reason for Exam:     emesis, cough Relevant Medical/Surgical History: Diabetic FINDINGS: Airspace opacity in the lingula. There is no effusion or pneumothorax. The cardiomediastinal silhouette is stable. The osseous structures are stable. Possible left lingular pneumonia     CT ABDOMEN PELVIS W IV CONTRAST Additional Contrast? None    Result Date: 3/5/2023  EXAMINATION: CT OF THE ABDOMEN AND PELVIS WITH CONTRAST 3/5/2023 1:43 pm TECHNIQUE: CT of the abdomen and pelvis was performed with the administration of intravenous contrast. Multiplanar reformatted images are provided for review. Automated exposure control, iterative reconstruction, and/or weight based adjustment of the mA/kV was utilized to reduce the radiation dose to as low as reasonably achievable. COMPARISON: 04/28/2020 HISTORY: ORDERING SYSTEM PROVIDED HISTORY: upper abd pain TECHNOLOGIST PROVIDED HISTORY: Reason for exam:->upper abd pain Additional Contrast?->None Decision Support Exception - unselect if not a suspected or confirmed emergency medical condition->Emergency Medical Condition (MA) Reason for Exam: VOMITING FINDINGS: Lower Chest: Chronic atelectasis or fibrosis right middle lobe is present. Organs: Fatty infiltration of the liver is seen. Patient has undergone previous cholecystectomy. The pancreas, spleen, adrenal glands and kidneys are without acute finding.  GI/Bowel: No bowel obstruction is seen. Pelvis: No pelvic mass or fluid collection is seen. Peritoneum/Retroperitoneum: Left inguinal hernia is present containing nondistended loops of small bowel. No enlarged lymph nodes are seen. Atherosclerotic vascular calcifications are present. Bones/Soft Tissues: No bony destructive process or acute osseous injury is seen. No acute process of the abdomen or pelvis. Fatty liver. Left inguinal hernia containing a loop of nondistended small bowel. Focal scarring right middle lobe which would account for the finding seen on chest radiograph that raises concern for lingular pneumonia. PROCEDURES   Unless otherwise noted below, none     Procedures    CRITICAL CARE TIME (.cctime)   0 minutes    PAST MEDICAL HISTORY   Mr. Eugene King has a past medical history of Cerebral artery occlusion with cerebral infarction ([facility] [address] Diabetes mellitus ([address] Gallstone, GERD (gastroesophageal reflux disease), Hyperlipidemia, Hypertension, and Renal insufficiency. Chronic Conditions affecting Care: None    EMERGENCY DEPARTMENT COURSE and DIFFERENTIAL DIAGNOSIS/MDM:   Vitals:    Vitals:    03/05/23 1844 03/05/23 1845 03/05/23 1915 03/05/23 1922   BP:  106/69 131/72    Pulse: 82 83 86 85   Resp: 19 19 23 15   Temp:       TempSrc:       SpO2: 91% 90%  95%   Weight:           Alternate diagnoses were considered less likely based on history and physical.  Considered COVID-19, influenza, COPD, asthma, CHF, migraine, other. Diamond Alfaro is a 68 y.o. nontoxic, unwell appearing, mildly distressed male who presents to the emergency department for evaluation status post he began to experience flulike illness on yesterday. Symptoms included \"itching and sore throat\", nausea, vomiting x15, dry cough, and abdominal pain that he rates a severity of 7/10 which he cannot characterize.   Denies chest pain, lightheadedness, dizziness, presyncope, shortness of breath, measured fevers, chills, sweats, change in ability to smell/taste, hemoptysis, leg/calf pain or swelling, headaches, body aches, diarrhea, urinary symptoms/retention, other concerns. The patient is COVID-19 vaccinated and boosted. He did not receive an influenza vaccination. There is no sick contacts. I will obtain VBG, urine reflex to culture, urine microscopic, COVID-19, rapid influenza A/B, strep screen, strep culture, CBC, BMP, lipase, hepatic function panel, lactic acid, CXR, and CT abdomen pelvis. Work-up pending. Patient medicated as below. Patient was given the following medications:  Medications   0.9 % sodium chloride bolus (0 mLs IntraVENous Stopped 3/5/23 1430)   ondansetron (ZOFRAN) injection 4 mg (4 mg IntraVENous Given 3/5/23 1312)   dicyclomine (BENTYL) injection 20 mg (20 mg IntraMUSCular Given 3/5/23 1320)   iopamidol (ISOVUE-370) 76 % injection 75 mL (75 mLs IntraVENous Given 3/5/23 1343)   cefTRIAXone (ROCEPHIN) 1,000 mg in sodium chloride 0.9 % 50 mL IVPB (mini-bag) (0 mg IntraVENous Stopped 3/5/23 1808)   azithromycin (ZITHROMAX) 500 mg in 250 mL addavial (0 mg IntraVENous Stopped 3/5/23 1809)     Work-up reveals:  BG: Normal [email or web address], CO2 reduced at 39.9, otherwise unremarkable  Urine reflex to culture: Blood small, but negative for nitrites or leukocyte esterase, inconsistent UTI  Microscopic urinalysis: No elevation in RBCs or WBCs with no bacteria seen, inconsistent with UTI  COVID-19: Not detected  Rapid influenza A/B: Negative  Strep screen: Negative  Strep culture: Pending  CBC: No leukocytosis with elevation RBC at 5.92, RDW elevated at 15.5, no anemia, otherwise unremarkable  Metabolic panel: No electrolyte derangement, hyperglycemic at 144 mg/dL with elevated creatinine at 1.7 and reduced GFR 41  Lipase: [email or web address]  Hepatic function panel: Alkaline phosphatase elevated 132 but otherwise unremarkable without elevation  Lactic acid: WNL at 7.4  CT abdomen pelvis: As noted above identifying no acute process of the abdomen or pelvis. Fatty liver. Left inguinal hernia containing loops of nondistended small bowel. Focal scarring right middle lobe which would account for findings seen on chest radiograph that raises concern for lingula pneumonia. CXR: As noted above identifying possible left lingular pneumonia      Is this patient to be included in the SEP-1 Core Measure due to severe sepsis or septic shock? No   Exclusion criteria - the patient is NOT to be included for SEP-1 Core Measure due to:  2+ SIRS criteria are not met    CONSULTS: (Who and What was discussed)  IP CONSULT TO HOSPITALIST  Discussion with Other Profesionals : Admitting Team   Dr. Maria E Hand    Social determinants: Finding culturally competent care    Records Reviewed : None    CC/HPI Summary, DDx, ED Course, and Reassessment: On reassessment the patient is resting comfortably on stretcher with eyes open with stable vital signs. He intermittently desaturates. We will admit the patient to the hospital for IV antibiotic administration. Patient and family agreed with the plan for admission. Disposition Considerations (include 1 Tests not done, Shared Decision Making, Pt Expectation of Test or Tx.):   Patient will be admitted to the hospital for further evaluation treatment given that he has a left lingular pneumonia and is intermittently desaturating. I am the Primary Clinician of Record. FINAL IMPRESSION      1. Community acquired pneumonia, unspecified laterality    2. Hypoxia    3. Lab test negative for COVID-19 virus          DISPOSITION/PLAN       Disposition:  Admitted      Patient will be admitted to hospital for further evaluation and treatment.        Hospitalist: Dr. Maria E Hand      Discussed patients HPI, ED work-up, results, treatment, and response with Hospitalist -Dr. Maria E Hand who agrees to admit the patient to the hospital.        (Please note that portions of this note were completed with a voice recognition program.  Efforts were made to edit the dictations but occasionally words are mis-transcribed.)    CHANDANA Selby - FIDEL (electronically signed)            CHANDANA Selby CNP  03/05/23 1954

## 2023-03-06 NOTE — H&P
Hospital Medicine History & Physical      PCP: Richy Roman    Date of Admission: 3/5/2023    Date of Service: Pt seen/examined on 3/5/2023 and Admitted to Inpatient with expected LOS greater than two midnights due to medical therapy. Chief Complaint:  vomiting      History Of Present Illness:      68 y.o. male with PMHx of CVA, DM2, HTN and HLD presented to [facility] with flu-like illness. Family reports symptoms began yesterday including sore throat, nausea and vomiting. He has a dry cough but no shortness of breath. He has had more than 15 episodes of vomiting today. No diarrhea. Denies dizziness, lightheadedness or headache. Past Medical History:          Diagnosis Date    Cerebral artery occlusion with cerebral infarction ([facility] [address]     Diabetes mellitus ([facility] [address]     Gallstone     GERD (gastroesophageal reflux disease)     Hyperlipidemia     Hypertension     Renal insufficiency        Past Surgical History:          Procedure Laterality Date    APPENDECTOMY      CHOLECYSTECTOMY      COLONOSCOPY N/A 5/8/2020    COLONOSCOPY POLYPECTOMY SNARE/COLD BIOPSY performed by Dwayne Garcia MD at [place] [address]  06/08/2018    UPPER GASTROINTESTINAL ENDOSCOPY N/A 2/28/2019    EGD W/EUS FNA performed by Nelda Vaughan MD at [address] 4/23/2019    EGD BIOPSY performed by Nelda Vaughan MD at [place] [address] N/A 4/23/2019    EGD W/EUS FNA performed by Nelda Vaughan MD at [address] 4/29/2020    EGD BIOPSY performed by Dwayne Garcia MD at [facility]       Medications Prior to Admission:      Prior to Admission medications    Medication Sig Start Date End Date Taking?  Authorizing Provider   losartan (COZAAR) 50 MG tablet Take 1 tablet by mouth daily 1/6/23   Historical Provider, MD   DULoxetine (CYMBALTA) 60 MG extended release capsule Take 1 capsule by mouth daily 23   Historical Provider, MD   baclofen (LIORESAL) 10 MG tablet TAKE 1/2 TABLET BY MOUTH EVERY MORNING AND 1 TABLET EVERY EVENING 23   Historical Provider, MD   REFRESH PLUS 0.5 % SOLN ophthalmic solution INSTILL 1 DROP IN BOTH EYES FOUR TIMES DAILY FOR 30 DAYS 23   Historical Provider, MD   amLODIPine (NORVASC) 5 MG tablet  12/15/20   Historical Provider, MD   rosuvastatin (CRESTOR) 10 MG tablet Take 1 tablet by mouth daily 10/16/20   Sejal Post MD   diclofenac sodium (VOLTAREN) 1 % GEL Apply 4 g topically 4 times daily as needed for Pain (Shoulder and knee pain) 10/16/20   Sejal Post MD   lidocaine 4 % external patch Place 1 patch onto the skin daily as needed (back pain) 10/16/20   Sejal Post MD   tamsulosin [facility]) 0.4 MG capsule Take 1 capsule by mouth nightly 10/16/20   Sejal Post MD   clopidogrel (PLAVIX) 75 MG tablet Take 1 tablet by mouth daily 10/16/20   Sejal Post MD   insulin glargine (LANTUS SOLOSTAR) 100 UNIT/ML injection pen Inject 18 Units into the skin nightly 10/16/20   Sejal Post MD   insulin lispro, 1 Unit Dial, (HUMALOG KWIKPEN) 100 UNIT/ML SOPN Inject 10 Units into the skin daily (with breakfast) AND 5 Units Daily with lunch AND 5 Units Daily with supper. HOLD IF EATS LESS THAN 50% OF MEAL. . 10/16/20   Sejal Post MD   insulin lispro, 1 Unit Dial, (HUMALOG [facility]) 100 UNIT/ML SOPN With meals (TID AC) **Corrective Low Dose Algorithm** Glucose:  give No Insulin, Glucose 140-199 give 1 Unit, glucose 200-249 give 2 Units, glucose 250-299 give 3 Units, glucose 300-349 give 4 Units, glucose 350-399 give 5 Units, glucose Over 399 give 6 Units 10/16/20   Sejal Post MD   omeprazole (PRILOSEC) 40 MG delayed release capsule Take 40 mg by mouth daily    Historical Provider, MD   vitamin D (CHOLECALCIFEROL) 25 MCG (1000 UT) TABS tablet Take 2,000 Units by mouth daily    Historical Provider, MD acetaminophen (TYLENOL) 500 MG tablet Take 1,000 mg by mouth 2 times daily    Historical Provider, MD   polyethylene glycol (MIRALAX) PACK packet Take 17 g by mouth daily    Historical Provider, MD       Allergies:  Atorvastatin    Social History:      The patient currently lives with wife    TOBACCO:   reports that he has never smoked. He has never used smokeless tobacco.  ETOH:   reports no history of alcohol use. Family History:      Reviewed in detail positive as follows:        Problem Relation Age of Onset    No Known Problems Mother     No Known Problems Father        REVIEW OF SYSTEMS:   Pertinent positives as noted in the HPI. All other systems reviewed and negative. PHYSICAL EXAM PERFORMED:    /80   Pulse 81   Temp 99.1 °F (37.3 °C) (Oral)   Resp 18   Ht 5' 2\" (1.575 m)   Wt 134 lb (60.8 kg)   SpO2 92%   BMI 24.51 kg/m²     General appearance:  Well developed, well nourished, elderly Malay male lying on hospital bed ill-appearing but in no apparent distress, appears stated age and cooperative. HEENT:  Normal cephalic, atraumatic without obvious deformity. Pupils equal, round, and reactive to light. Conjunctivae/corneas clear. Neck: Supple, with full range of motion. No jugular venous distention. Trachea midline. Respiratory:  Normal respiratory effort. Clear to auscultation, bilaterally without accessory muscle use. Cardiovascular:  Regular rate and rhythm without murmurs, no lower extremity edema. Abdomen: Soft, non-tender, non-distended, without rebound or guarding. Normal bowel sounds. Musculoskeletal:  Moves all extremities equally. Full range of motion without deformity. Skin: Skin warm, dry and intact. No rashes or lesions. Neurologic:  Neurovascularly intact without any focal sensory/motor deficits.  Cranial nerves: II-XII intact, grossly non-focal.  Psychiatric:  Alert and oriented, thought content appropriate, normal insight  Capillary Refill: Brisk,< 3 seconds Peripheral Pulses: +2 palpable, equal bilaterally       Labs:     Recent Labs     03/05/23  1215   WBC 7.7   HGB 15.7   HCT 47.7        Recent Labs     03/05/23  1215      K 4.1      CO2 18*   BUN 14   CREATININE 1.7*   CALCIUM 9.1     Recent Labs     03/05/23  1215   AST 35   ALT 22   BILIDIR <0.2   BILITOT 1.0   ALKPHOS 132*     No results for input(s): INR in the last 72 hours. No results for input(s): Rosibel Mould in the last 72 hours. Urinalysis:      Lab Results   Component Value Date/Time    NITRU Negative 03/05/2023 04:02 PM    WBCUA 1 03/05/2023 04:02 PM    BACTERIA None Seen 03/05/2023 04:02 PM    RBCUA 3-4 03/05/2023 04:02 PM    BLOODU SMALL 03/05/2023 04:02 PM    SPECGRAV 1.029 03/05/2023 04:02 PM    GLUCOSEU Negative 03/05/2023 04:02 PM       Radiology:     CT ABDOMEN PELVIS W IV CONTRAST Additional Contrast? None   Final Result   No acute process of the abdomen or pelvis. Fatty liver. Left inguinal hernia containing a loop of nondistended small bowel. Focal scarring right middle lobe which would account for the finding seen on   chest radiograph that raises concern for lingular pneumonia. XR CHEST (2 VW)   Final Result   Possible left lingular pneumonia             ASSESSMENT:    Active Hospital Problems    Diagnosis Date Noted    Community acquired bacterial pneumonia [J15.9] 03/05/2023     Priority: Medium         PLAN:      Pneumonia, organism unspecified   - Pt has been started on Rocephin and Azithromycin.  - Blood cultures x 2 ordered  - Rapid Influenza A&B negative and Covid neg  - Procalcitonin ordered  - supplemental O2, Nebs PRN  - tessalon prn     Diabetes mellitus II   - Lantus, SSI and CCD    Essential (primary) hypertension   - monitor blood pressure  - continue home meds     Hyperlipidemia   - continue statin    DVT Prophylaxis: Lovenox  Diet: ADULT DIET;  Regular; 4 carb choices (60 gm/meal)  Code Status: Full Code    Dispo - Inpatient, med surg       Ira Guerrero, APRN - CNP    Thank you Nereida Nunez for the opportunity to be involved in this patient's care. If you have any questions or concerns please feel free to contact me at [phone].

## 2023-03-07 LAB
GLUCOSE BLD-MCNC: 247 MG/DL (ref 70–99)
GLUCOSE BLD-MCNC: 275 MG/DL (ref 70–99)
GLUCOSE BLD-MCNC: 92 MG/DL (ref 70–99)
GLUCOSE BLD-MCNC: 96 MG/DL (ref 70–99)
PERFORMED ON: ABNORMAL
PERFORMED ON: ABNORMAL
PERFORMED ON: NORMAL
PERFORMED ON: NORMAL
PROCALCITONIN: 0.06 NG/ML (ref 0–0.15)
S PYO THROAT QL CULT: NORMAL

## 2023-03-07 PROCEDURE — 94761 N-INVAS EAR/PLS OXIMETRY MLT: CPT

## 2023-03-07 PROCEDURE — 6360000002 HC RX W HCPCS: Performed by: INTERNAL MEDICINE

## 2023-03-07 PROCEDURE — 97163 PT EVAL HIGH COMPLEX 45 MIN: CPT

## 2023-03-07 PROCEDURE — 36415 COLL VENOUS BLD VENIPUNCTURE: CPT

## 2023-03-07 PROCEDURE — 2580000003 HC RX 258: Performed by: INTERNAL MEDICINE

## 2023-03-07 PROCEDURE — 6370000000 HC RX 637 (ALT 250 FOR IP): Performed by: NURSE PRACTITIONER

## 2023-03-07 PROCEDURE — 6370000000 HC RX 637 (ALT 250 FOR IP): Performed by: INTERNAL MEDICINE

## 2023-03-07 PROCEDURE — 97116 GAIT TRAINING THERAPY: CPT

## 2023-03-07 PROCEDURE — 1200000000 HC SEMI PRIVATE

## 2023-03-07 PROCEDURE — 97530 THERAPEUTIC ACTIVITIES: CPT

## 2023-03-07 PROCEDURE — 97535 SELF CARE MNGMENT TRAINING: CPT

## 2023-03-07 PROCEDURE — 84145 PROCALCITONIN (PCT): CPT

## 2023-03-07 PROCEDURE — 2700000000 HC OXYGEN THERAPY PER DAY

## 2023-03-07 RX ADMIN — DULOXETINE HYDROCHLORIDE 60 MG: 60 CAPSULE, DELAYED RELEASE ORAL at 09:28

## 2023-03-07 RX ADMIN — BENZONATATE 100 MG: 100 CAPSULE ORAL at 20:15

## 2023-03-07 RX ADMIN — CEFTRIAXONE 1000 MG: 1 INJECTION, POWDER, FOR SOLUTION INTRAMUSCULAR; INTRAVENOUS at 17:56

## 2023-03-07 RX ADMIN — SODIUM CHLORIDE: 9 INJECTION, SOLUTION INTRAVENOUS at 01:10

## 2023-03-07 RX ADMIN — BACLOFEN 5 MG: 10 TABLET ORAL at 09:28

## 2023-03-07 RX ADMIN — INSULIN GLARGINE 18 UNITS: 100 INJECTION, SOLUTION SUBCUTANEOUS at 20:17

## 2023-03-07 RX ADMIN — CLOPIDOGREL BISULFATE 75 MG: 75 TABLET ORAL at 19:41

## 2023-03-07 RX ADMIN — AMLODIPINE BESYLATE 5 MG: 5 TABLET ORAL at 20:15

## 2023-03-07 RX ADMIN — ENOXAPARIN SODIUM 40 MG: 100 INJECTION SUBCUTANEOUS at 09:28

## 2023-03-07 RX ADMIN — TAMSULOSIN HYDROCHLORIDE 0.4 MG: 0.4 CAPSULE ORAL at 19:41

## 2023-03-07 RX ADMIN — BACLOFEN 10 MG: 10 TABLET ORAL at 19:41

## 2023-03-07 RX ADMIN — LOSARTAN POTASSIUM 50 MG: 50 TABLET, FILM COATED ORAL at 09:28

## 2023-03-07 RX ADMIN — SODIUM CHLORIDE: 9 INJECTION, SOLUTION INTRAVENOUS at 15:41

## 2023-03-07 RX ADMIN — AZITHROMYCIN MONOHYDRATE 500 MG: 500 TABLET ORAL at 17:54

## 2023-03-07 RX ADMIN — ACETAMINOPHEN 650 MG: 325 TABLET ORAL at 19:41

## 2023-03-07 RX ADMIN — PANTOPRAZOLE SODIUM 40 MG: 40 TABLET, DELAYED RELEASE ORAL at 05:50

## 2023-03-07 RX ADMIN — ROSUVASTATIN CALCIUM 10 MG: 10 TABLET, FILM COATED ORAL at 09:28

## 2023-03-07 ASSESSMENT — PAIN SCALES - PAIN ASSESSMENT IN ADVANCED DEMENTIA (PAINAD)
TOTALSCORE: 0
CONSOLABILITY: 0
BREATHING: 0
BREATHING: 0
BODYLANGUAGE: 0
NEGVOCALIZATION: 0
TOTALSCORE: 0
TOTALSCORE: 0
FACIALEXPRESSION: 0
BODYLANGUAGE: 0
BREATHING: 0
NEGVOCALIZATION: 0
CONSOLABILITY: 0
BODYLANGUAGE: 0
CONSOLABILITY: 0
CONSOLABILITY: 0
FACIALEXPRESSION: 0
BREATHING: 0
TOTALSCORE: 0
FACIALEXPRESSION: 0
CONSOLABILITY: 0
NEGVOCALIZATION: 0
BODYLANGUAGE: 0
BODYLANGUAGE: 0
NEGVOCALIZATION: 0
NEGVOCALIZATION: 0
CONSOLABILITY: 0
TOTALSCORE: 0
BREATHING: 0
CONSOLABILITY: 0
TOTALSCORE: 0
FACIALEXPRESSION: 0
FACIALEXPRESSION: 0
NEGVOCALIZATION: 0
TOTALSCORE: 0
BODYLANGUAGE: 0
BODYLANGUAGE: 0
BREATHING: 0
FACIALEXPRESSION: 0
NEGVOCALIZATION: 0
BODYLANGUAGE: 0
BREATHING: 0
NEGVOCALIZATION: 0
TOTALSCORE: 0
CONSOLABILITY: 0
CONSOLABILITY: 0
BODYLANGUAGE: 0
TOTALSCORE: 0
FACIALEXPRESSION: 0
CONSOLABILITY: 0
BREATHING: 0
CONSOLABILITY: 0
CONSOLABILITY: 0
FACIALEXPRESSION: 0
BODYLANGUAGE: 0
NEGVOCALIZATION: 0
BREATHING: 0
FACIALEXPRESSION: 0
BODYLANGUAGE: 0
NEGVOCALIZATION: 0
BODYLANGUAGE: 0
BREATHING: 0
BREATHING: 0
TOTALSCORE: 0
TOTALSCORE: 0
NEGVOCALIZATION: 0
TOTALSCORE: 0
BREATHING: 0
NEGVOCALIZATION: 0

## 2023-03-07 ASSESSMENT — PAIN SCALES - GENERAL
PAINLEVEL_OUTOF10: 0

## 2023-03-07 NOTE — PLAN OF CARE
Problem: Discharge Planning  Goal: Discharge to home or other facility with appropriate resources  Outcome: Progressing     Problem: Pain  Goal: Verbalizes/displays adequate comfort level or baseline comfort level  3/7/2023 1020 by Jeyson Horowitz RN  Outcome: Progressing  3/7/2023 0457 by Radhames Mendoza RN  Outcome: Progressing     Problem: Safety - Adult  Goal: Free from fall injury  3/7/2023 1020 by Jeyson Horowitz RN  Outcome: Progressing  3/7/2023 0457 by Radhames Mendoza RN  Outcome: Progressing     Problem: Skin/Tissue Integrity  Goal: Absence of new skin breakdown  Description: 1. Monitor for areas of redness and/or skin breakdown  2. Assess vascular access sites hourly  3. Every 4-6 hours minimum:  Change oxygen saturation probe site  4. Every 4-6 hours:  If on nasal continuous positive airway pressure, respiratory therapy assess nares and determine need for appliance change or resting period.   3/7/2023 1020 by Jeyson Horowitz RN  Outcome: Progressing  3/7/2023 0457 by Radhames Mendoza RN  Outcome: Progressing     Problem: Chronic Conditions and Co-morbidities  Goal: Patient's chronic conditions and co-morbidity symptoms are monitored and maintained or improved  Outcome: Progressing

## 2023-03-07 NOTE — PROGRESS NOTES
Occupational Therapy  Facility/Department: [address] MED SURG  Occupational Therapy Daily Treatment    Name: Kaiden Carlton  : 1945  MRN: [id number]  Date of Service: 3/7/2023    Discharge Recommendations:  24 hour supervision or assist, S Level 1, 2-3 sessions per week     Kaiden Carlton scored a 15/24 on the AM-PAC ADL Inpatient form. Current research shows that an AM-PAC score of 18 or greater is typically associated with a discharge to the patient's home setting. Based on the patient's AM-PAC score, and their current ADL deficits, it is recommended that the patient have 2-3 sessions per week of Occupational Therapy at d/c to increase the patient's independence. At this time, this patient demonstrates the endurance and safety to discharge home with 24/ A, HHOT L1, and a follow up treatment frequency of 2-3x/wk. Please see assessment section for further patient specific details. If patient discharges prior to next session this note will serve as a discharge summary. Please see below for the latest assessment towards goals. Patient Diagnosis(es): The primary encounter diagnosis was Community acquired pneumonia, unspecified laterality. Diagnoses of Hypoxia and Lab test negative for COVID-19 virus were also pertinent to this visit. Past Medical History:  has a past medical history of Cerebral artery occlusion with cerebral infarction ([address] Diabetes mellitus ([facility] [address] Gallstone, GERD (gastroesophageal reflux disease), Hyperlipidemia, Hypertension, and Renal insufficiency. Past Surgical History:  has a past surgical history that includes Appendectomy; Cholecystectomy; Upper gastrointestinal endoscopy (2018); Upper gastrointestinal endoscopy (N/A, 2019); Upper gastrointestinal endoscopy (N/A, 2019); Upper gastrointestinal endoscopy (N/A, 2019); Upper gastrointestinal endoscopy (N/A, 2020); and Colonoscopy (N/A, 2020).     Treatment Diagnosis: Decreased ADLs, functional mobility      Assessment   Performance deficits / Impairments: Decreased functional mobility ; Decreased ADL status; Decreased ROM; Decreased cognition;Decreased balance;Decreased safe awareness;Decreased strength;Decreased endurance  Assessment: Pt is a 69 yo M admitted with community acquired bacterial pneumonia. 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 PTA, pt lived at home with his son and daughter-in-law and received assistance with ADLs, functional mobility with a cane, and transfers. This date - pt continues to require min/mod A for functional transfers and mobility using quad cane. He required total A for toileting and LB dressing, min A standing grooming. Dtr in room reports pt's current status is close to his baseline. Anticipate pt will be safe to return home with 24/7 assist as prior and HHOT L1 once medically stable. Treatment Diagnosis: Decreased ADLs, functional mobility  Prognosis: Fair  REQUIRES OT FOLLOW-UP: Yes  Activity Tolerance  Activity Tolerance: Patient limited by fatigue;Treatment limited secondary to decreased cognition  Activity Tolerance Comments: Pt limited by language barrier, decreased command following        Plan   Occupational Therapy Plan  Times Per Week: 3-5  Times Per Day: Once a day  Current Treatment Recommendations: Strengthening, ROM, Balance training, Functional mobility training, Endurance training, Safety education & training, Self-Care / ADL     Restrictions  Restrictions/Precautions  Restrictions/Precautions: Fall Risk  Position Activity Restriction  Other position/activity restrictions: 1 L weaned to RA, SpO2 95%    Subjective   General  Chart Reviewed: Yes  Patient assessed for rehabilitation services?: Yes  Additional Pertinent Hx: Pt is a 69 yo M admitted with community acquired bacterial pneumonia. Pt has hx of R CVA.   Family / Caregiver Present: Yes (Dtr)  Referring Practitioner: Tex Sue DO  Diagnosis: community acquired bacterial pneumonia  Subjective  Subjective: Pt met b/s for OT cotx w/ PT. Pt in bed, agreeable to therapy. Does not appear to be in pain; dtr translates throughout session     Social/Functional History  Social/Functional History  Lives With: Family  Type of Home: House  Home Layout: Two level  Home Access: Stairs to enter with rails  Entrance Stairs - Number of Steps: 7  Bathroom Shower/Tub: Tub/Shower unit  Bathroom Toilet: Standard  Bathroom Equipment: Grab bars in shower, Shower chair, Grab bars around toilet  Bathroom Accessibility: Walker accessible  Home Equipment: [employer]  Has the patient had two or more falls in the past year or any fall with injury in the past year?: Yes (falls from bed when trying to get up)  Receives Help From: Family  ADL Assistance: Needs assistance  Homemaking Assistance: Needs assistance  Homemaking Responsibilities: No  Ambulation Assistance: Needs assistance (CGA with cane in the home)  Transfer Assistance: Needs assistance  Additional Comments: Family is home all the time and is able to help him with ADLs, mobility, and transfers       Objective   Observation/Palpation  Observation: incontinent of urine, L UE rests in IR, mild elbow flexion, closed fist d/t previous CVA  Safety Devices  Type of Devices: All fall risk precautions in place;Call light within reach; Chair alarm in place;Gait belt;Patient at risk for falls;Nurse notified; Left in chair;Telesitter in use  Restraints  Restraints Initially in Place: No     Toilet Transfers  Toilet - Technique: Ambulating (quad cane)  Equipment Used: Standard toilet  Toilet Transfer: Minimal assistance     ADL  Grooming: Setup;Minimal assistance  Grooming Skilled Clinical Factors: Pt washed hands in stance at sink w/ CGA for balance +min A to wash L hand; he sat to brush teeth w/ setup  UE Dressing: Other (Comment)  UE Dressing Skilled Clinical Factors: Assist to change wet hospital gown  LE Dressing: Dependent/Total  LE Dressing Skilled Clinical Factors: Assist to don socks in bed + assist to don briefs from the commode  Toileting: Dependent/Total  Toileting Skilled Clinical Factors: Pt incontinent of urine (dtr reports this is baseline). He sat on commode but did not void further; he required total A for hygiene and briefs mgmt     Activity Tolerance  Activity Tolerance: Patient tolerated treatment well  Activity Tolerance Comments: SpO2 95% with RA  Bed mobility  Supine to Sit: Moderate assistance (Pt. difficult organize the process, increased cuing and initiation required)  Sit to Supine: Unable to assess (Left up in chair.)  Scooting: Moderate assistance  Transfers  Sit to stand: Contact guard assistance;Minimal assistance  Stand to sit: Minimal assistance; Moderate assistance  Transfer Comments: Pt completed functional mobility from bed>BR min/mod A using quad cane; he improved to CGA/min A for functional mobility from commode > sink > recliner using quad cane. Poor safety when turning and sitting to chair - sits far too early requiring mod A to reach the chair safely  Vision  Vision: Within Functional Limits  Hearing  Hearing: Exceptions to [facility]  Hearing Exceptions: Hard of hearing/hearing concerns  Cognition  Cognition Comment: Family reports often being confused; difficult to fully assess d/t language barrier                  Education Given To: Patient; Family  Education Provided: Role of Therapy;Plan of Care;Transfer Training;ADL Adaptive Strategies; Energy Conservation  Education Provided Comments: discussed d/c plans w/ dtr  Education Method: Verbal  Barriers to Learning: Other (Comment); Cognition (language)  Education Outcome: Continued education needed      AM-PAC Score  AM-PAC Inpatient Daily Activity Raw Score: 15 (03/07/23 1417)  AM-PAC Inpatient ADL T-Scale Score : 34.69 (03/07/23 1417)  ADL Inpatient CMS 0-100% Score: 56.46 (03/07/23 1417)  ADL Inpatient CMS G-Code Modifier : CK (03/07/23 1417)      Goals  Short Term Goals  Time Frame for Short Term Goals: Prior to d/c; goals ongoing  Short Term Goal 1: Pt will complete ADL transfers min A  Short Term Goal 2: Pt will complete grooming while seated at sink with supervision  Short Term Goal 3: Pt will complete dressing min A  Short Term Goal 4: Pt will complete toileting mod A  Short Term Goal 5: Pt will complete bathing mod A  Long Term Goals  Time Frame for Long Term Goals : LTG=STG  Patient Goals   Patient goals :  To return home       Therapy Time   Individual Concurrent Group Co-treatment   Time In 1300         Time Out 1340         Minutes [address], [place] [address]

## 2023-03-07 NOTE — PROGRESS NOTES
Patient resting in bed comfortable.  used to communicate with patient. Denies pain at this time. Spoke with patient's son. Special permission received from RN supervisor and CNO for patient to have one visitor due to communication and special dietary needs. Visitor lives in same household as patient. Call light in reach.  Electronically signed by Edwin Blackwood RN on 3/7/2023 at 9:59 AM

## 2023-03-07 NOTE — PROGRESS NOTES
Physical Therapy  Facility/Department: [facility]  Physical Therapy Initial Assessment    Name: Drake Coleman  : 1945  MRN: [id number]  Date of Service: 3/7/2023  Alysha Duval scored a  on the AM-PAC short mobility form. Current research shows that an AM-PAC score of 18 or greater is typically associated with a discharge to the patient's home setting. Based on the patient's AM-PAC score and their current functional mobility deficits, it is recommended that the patient have 2-3 sessions per week of Physical Therapy at d/c to increase the patient's independence. At this time, this patient demonstrates the endurance and safety to discharge home with HHPT and a follow up treatment frequency of 2-3x/wk. Please see assessment section for further patient specific details. If patient discharges prior to next session this note will serve as a discharge summary. Please see below for the latest assessment towards goals. Discharge Recommendations:  Home with Home health PT, 24 hour supervision or assist, Therapy recommended at discharge (2-3x/wk)   PT Equipment Recommendations  Equipment Needed: No  Other: Has SBQC      Patient Diagnosis(es): The primary encounter diagnosis was Community acquired pneumonia, unspecified laterality. Diagnoses of Hypoxia and Lab test negative for COVID-19 virus were also pertinent to this visit. Past Medical History:  has a past medical history of Cerebral artery occlusion with cerebral infarction ([facility] [address] Diabetes mellitus ([facility] [address] Gallstone, GERD (gastroesophageal reflux disease), Hyperlipidemia, Hypertension, and Renal insufficiency. Past Surgical History:  has a past surgical history that includes Appendectomy; Cholecystectomy; Upper gastrointestinal endoscopy (2018); Upper gastrointestinal endoscopy (N/A, 2019); Upper gastrointestinal endoscopy (N/A, 2019); Upper gastrointestinal endoscopy (N/A, 2019);  Upper gastrointestinal endoscopy (N/A, 4/29/2020); and Colonoscopy (N/A, 5/8/2020). Assessment   Body Structures, Functions, Activity Limitations Requiring Skilled Therapeutic Intervention: Decreased functional mobility ; Decreased endurance;Decreased cognition;Decreased balance  Assessment: Pt. presents d/t Covid in the context of previous CVA with L hemiplegia. Pt. has lived at home where Pt. is assisted by family 24 hr S/A. Pt physically assists with ambulation with quad cane as well as ADL's. Pt. is slightly below baseline level of function but anticipate with prior level of assit from family, Pt. will be able to return home. Will benefit from HHPT to restore PLOF and independence. Treatment Diagnosis: Impaired functional mobility  Therapy Prognosis: Good  Decision Making: High Complexity  History: as above  Exam: as above  Clinical Presentation: evolving  Barriers to Learning: Language and cognition  Activity Tolerance  Activity Tolerance: Patient tolerated treatment well  Activity Tolerance Comments: SpO2 95% with RA     Plan   Physcial Therapy Plan  General Plan: 3-5 times per week  Current Treatment Recommendations: Strengthening, Balance training, Functional mobility training, Transfer training, Gait training, Endurance training, Stair training, Safety education & training, Patient/Caregiver education & training, Equipment evaluation, education, & procurement, Home exercise program  Safety Devices  Type of Devices: All fall risk precautions in place, Call light within reach, Chair alarm in place, Gait belt, Patient at risk for falls, Nurse notified, Left in chair, Telesitter in use  Restraints  Restraints Initially in Place: No     Restrictions  Restrictions/Precautions  Restrictions/Precautions: Fall Risk  Position Activity Restriction  Other position/activity restrictions: 1 L weaned to RA, SpO2 95%     Subjective   General  Chart Reviewed:  Yes  Additional Pertinent Hx: Pt. s/p CVA with L hemiparesis and 24 hour S/A of family at home, presented to [facility] with flu-like illness. Dx with Covid. Pt. has PMH: DM, HTN. Response To Previous Treatment: Patient with no complaints from previous session. Family / Caregiver Present: Yes (Daughter)  Referring Practitioner: Dr. Glory Kirby  Referral Date : 03/07/23  Diagnosis: CAP/ Covid  Follows Commands: Impaired  Other (Comment): Difficult d/t language barrier, Pt. speaks Hungarian, video  not great, responds better with family. Subjective  Subjective: Supine in bed, agreeable to therapy, found incontinent of urine through brief, gown and clothes. Daughter reports this is baseline for Pt.          Social/Functional History  Social/Functional History  Lives With: Family  Type of Home: House  Home Layout: Two level  Home Access: Stairs to enter with rails  Entrance Stairs - Number of Steps: 7  Bathroom Shower/Tub: Tub/Shower unit  Bathroom Toilet: Standard  Bathroom Equipment: Grab bars in shower, Shower chair, Grab bars around toilet  Bathroom Accessibility: Walker accessible  Home Equipment: Jeannette Reel  Has the patient had two or more falls in the past year or any fall with injury in the past year?: Yes (falls from bed when trying to get up)  Receives Help From: Family  ADL Assistance: Needs assistance  Homemaking Assistance: Needs assistance  Homemaking Responsibilities: No  Ambulation Assistance: Needs assistance (CGA with cane in the home)  Transfer Assistance: Needs assistance  Additional Comments: Family is home all the time and is able to help him with ADLs, mobility, and transfers  Vision/Hearing  Vision  Vision: Within Functional Limits  Hearing  Hearing: Exceptions to [facility]  Hearing Exceptions: Hard of hearing/hearing concerns    Cognition   Cognition  Cognition Comment: Family reports often being confused     Objective      Observation/Palpation  Observation: incontinent of urine, L UE rests in IR, mild elbow flexion, closed fist d/t previous CVA        AROM RLE (degrees)  RLE AROM: WFL  AROM LLE (degrees)  LLE General AROM: Mild limitation in ankle and knee d/t previous CVA  Strength RLE  Strength RLE: WFL  Strength LLE  Comment: Unable to adequately assess d/t increased tone in L LE           Bed mobility  Supine to Sit: Moderate assistance (Pt. difficult organize the process, increased cuing and initiation required)  Sit to Supine: Unable to assess (Left up in chair.)  Scooting: Moderate assistance  Transfers  Sit to Stand: Minimal Assistance; Moderate Assistance  Stand to Sit: Minimal Assistance; Moderate Assistance  Comment: Toilet transfer with Min A. Pt. initially required increased assist but once standing improved to Min A  Ambulation  Surface: Level tile  Device: Small Aryan Phillip  Assistance: Minimal assistance; Moderate assistance  Quality of Gait: unsteady, wide MARIYA with LLE ER and abducted with mild knee flexion. Rigidity of L LE d/t incrased tone, tends to run into objects on L side or needs cuing to move around. Gait Deviations: Increased MARIYA; Decreased step length;Decreased step height;Slow Janeth  Distance: 8' x 1 and 20' x 1  Comments: Improved from Mod to Min A with increase in ambulation  Stairs/Curb  Stairs?: No     Balance  Sitting - Static: Good;-  Sitting - Dynamic: Good;-  Standing - Static: Fair  Standing - Dynamic: Fair  Comments: Unsteady with standing especially at first.  Once standing shortly, improves to Min A                                                             AM-PAC Score  AM-PAC Inpatient Mobility Raw Score : 12 (03/07/23 1343)  AM-PAC Inpatient T-Scale Score : 35.33 (03/07/23 1343)  Mobility Inpatient CMS 0-100% Score: 68.66 (03/07/23 1343)  Mobility Inpatient CMS G-Code Modifier : CL (03/07/23 1343)               Goals  Short Term Goals  Time Frame for Short Term Goals: By d/c  Short Term Goal 1: Supine to/from sit with Min A  Short Term Goal 2: Sit to/from stand with CGA  Short Term Goal 3: Amb. with SBQC with CGA x 48'  Patient Goals   Patient Goals : Return home       Education  Patient Education  Education Given To: Patient; Family  Education Provided: Role of Therapy;Plan of Care;Transfer Training  Education Method: Verbal;Demonstration;   Barriers to Learning: Cognition (language)  Education Outcome: Demonstrated understanding;Continued education needed      Therapy Time   Individual Concurrent Group Co-treatment   Time In       1300   Time Out       1340   Minutes       40   Timed Code Treatment Minutes: [address] [place], [place], [id number]

## 2023-03-07 NOTE — PROGRESS NOTES
Hospitalist Progress Note    CC: Community acquired bacterial pneumonia      Admit date: 3/5/2023  Days in hospital:  2    Subjective/interval history: seen at bedside. No acute events. Pt reports still feeling ill. Has pleuritic chest pain with breathing and reproducible  ROS:   Pertinent items are noted in HPI. .    Objective:    /76   Pulse 93   Temp 98 °F (36.7 °C) (Oral)   Resp 16   Ht 5' 2\" (1.575 m)   Wt 137 lb 12.6 oz (62.5 kg)   SpO2 92%   BMI 25.20 kg/m²     Gen: NAD, lying in bed  HEENT: NC/AT, moist mucous membranes, no oropharyngeal erythema or exudate  Neck: supple, trachea midline, no anterior cervical or SC LAD  Heart: Normal s1/s2, RRR, no murmurs, gallops, or rubs. Lungs: diminished, no wheezing, no use of accessory muscles  Abd: bowel sounds present, soft, nontender, nondistended, no masses  Extrem: No clubbing, cyanosis, no edema  Skin: no rashes or lesions  Psych: A & o, affect appropriate  Neuro: grossly intact, moves all four extremities spontaneously.   Cap refill: +2 sec    Medications:  Scheduled Meds:   amLODIPine  5 mg Oral Daily    DULoxetine  60 mg Oral Daily    insulin glargine  18 Units SubCUTAneous Nightly    losartan  50 mg Oral Daily    pantoprazole  40 mg Oral QAM AC    rosuvastatin  10 mg Oral Daily    tamsulosin  0.4 mg Oral Nightly    sodium chloride flush  5-40 mL IntraVENous 2 times per day    enoxaparin  40 mg SubCUTAneous Daily    cefTRIAXone (ROCEPHIN) IV  1,000 mg IntraVENous Q24H    And    azithromycin  500 mg Oral Q24H    baclofen  10 mg Oral Nightly    clopidogrel  75 mg Oral Nightly    baclofen  5 mg Oral Daily       PRN Meds:  [START ON 3/8/2023] polyvinyl alcohol-povidone, benzonatate, sodium chloride flush, sodium chloride, ondansetron **OR** ondansetron, polyethylene glycol, acetaminophen **OR** acetaminophen, glucose, dextrose bolus **OR** dextrose bolus, glucagon (rDNA), dextrose    IV:   sodium chloride      sodium chloride 100 mL/hr at 03/07/23 1541    dextrose           Intake/Output Summary (Last 24 hours) at 3/7/2023 1851  Last data filed at 3/7/2023 1756  Gross per 24 hour   Intake 3818.75 ml   Output --   Net 3818.75 ml         Results:  CBC:   Recent Labs     03/05/23  1215 03/06/23  0729   WBC 7.7 5.8   HGB 15.7 14.3   HCT 47.7 42.2   MCV 80.6 79.8*    141       BMP:   Recent Labs     03/05/23  1215 03/06/23  0729    134*   K 4.1 4.1    106   CO2 18* 22   BUN 14 11   CREATININE 1.7* 1.5*       Mag: No results for input(s): MAG in the last 72 hours. Phos:   Lab Results   Component Value Date    PHOS 3.5 12/29/2022     No results found for: GLU    LIVER PROFILE:   Recent Labs     03/05/23  1215   AST 35   ALT 22   LIPASE 23.0   BILIDIR <0.2   BILITOT 1.0   ALKPHOS 132*       PT/INR: No results for input(s): PROTIME, INR in the last 72 hours. APTT: No results for input(s): APTT in the last 72 hours. UA:  Recent Labs     03/05/23  1602   COLORU Yellow   PHUR 6.0   WBCUA 1   RBCUA 3-4   BACTERIA None Seen   CLARITYU Clear   SPECGRAV 1.029   LEUKOCYTESUR Negative   UROBILINOGEN 0.2   BILIRUBINUR Negative   BLOODU SMALL*   GLUCOSEU Negative         Invalid input(s): ABG  Lab Results   Component Value Date    CALCIUM 8.1 (L) 03/06/2023    PHOS 3.5 12/29/2022       Assessment:    Principal Problem:    Community acquired bacterial pneumonia  Resolved Problems:    * No resolved hospital problems. [facility] course: 68 y.o. male with PMHx of CVA, DM2, HTN and HLD presented to [facility] with flu-like illness. Family reports symptoms began yesterday including sore throat, nausea and vomiting. He has a dry cough but no shortness of breath. He has had more than 15 episodes of vomiting today. No diarrhea. Denies dizziness, lightheadedness or headache.     Plan:     PNA  Acute hypoxic respiratory failure, POA - with criteria: < 90% on RA, RR> 18  - supplemental 02 to keep SaO2 > 90%; not on o2 at baseline  Rapid Influenza A&B negative and Covid neg  - procalcitonin - WNL  - pna panel if able to get  - covid +ve  - Rocephin and Azithromycin.   - nebs  - is, mucinex    Chronic conditions - continue home meds unless otherwise stated  Dm  Htn    Likely DC tomorrow if O2 improve, Home o2 eval, start dexamethasone, continue IV abx - infiltrate on  R Lung    Code status:  full  DVT prophylaxis: [x] Lovenox  [] SQ Heparin  [] SCDs  [] warfarin/oral direct thrombin inhibitor [] Encourage ambulation        Electronically signed by Sharla Velásquez MD on 3/7/2023 at 6:51 PM

## 2023-03-08 ENCOUNTER — APPOINTMENT (OUTPATIENT)
Dept: CT IMAGING | Age: 78
DRG: 139 | End: 2023-03-08
Payer: MEDICAID

## 2023-03-08 ENCOUNTER — APPOINTMENT (OUTPATIENT)
Dept: GENERAL RADIOLOGY | Age: 78
DRG: 139 | End: 2023-03-08
Payer: MEDICAID

## 2023-03-08 LAB
ANION GAP SERPL CALCULATED.3IONS-SCNC: 14 MMOL/L (ref 3–16)
BUN BLDV-MCNC: 6 MG/DL (ref 7–20)
CALCIUM SERPL-MCNC: 8.8 MG/DL (ref 8.3–10.6)
CHLORIDE BLD-SCNC: 106 MMOL/L (ref 99–110)
CO2: 19 MMOL/L (ref 21–32)
CREAT SERPL-MCNC: 1.1 MG/DL (ref 0.8–1.3)
GFR SERPL CREATININE-BSD FRML MDRD: >60 ML/MIN/{1.73_M2}
GLUCOSE BLD-MCNC: 132 MG/DL (ref 70–99)
GLUCOSE BLD-MCNC: 144 MG/DL (ref 70–99)
GLUCOSE BLD-MCNC: 145 MG/DL (ref 70–99)
GLUCOSE BLD-MCNC: 223 MG/DL (ref 70–99)
GLUCOSE BLD-MCNC: 98 MG/DL (ref 70–99)
HCT VFR BLD CALC: 44.8 % (ref 40.5–52.5)
HEMOGLOBIN: 15.1 G/DL (ref 13.5–17.5)
MCH RBC QN AUTO: 26.9 PG (ref 26–34)
MCHC RBC AUTO-ENTMCNC: 33.8 G/DL (ref 31–36)
MCV RBC AUTO: 79.5 FL (ref 80–100)
PDW BLD-RTO: 14.9 % (ref 12.4–15.4)
PERFORMED ON: ABNORMAL
PERFORMED ON: NORMAL
PLATELET # BLD: 158 K/UL (ref 135–450)
PMV BLD AUTO: 8.2 FL (ref 5–10.5)
POTASSIUM REFLEX MAGNESIUM: 3.7 MMOL/L (ref 3.5–5.1)
RBC # BLD: 5.63 M/UL (ref 4.2–5.9)
SODIUM BLD-SCNC: 139 MMOL/L (ref 136–145)
WBC # BLD: 5.3 K/UL (ref 4–11)

## 2023-03-08 PROCEDURE — 97530 THERAPEUTIC ACTIVITIES: CPT

## 2023-03-08 PROCEDURE — 71250 CT THORAX DX C-: CPT

## 2023-03-08 PROCEDURE — 71045 X-RAY EXAM CHEST 1 VIEW: CPT

## 2023-03-08 PROCEDURE — 6370000000 HC RX 637 (ALT 250 FOR IP): Performed by: NURSE PRACTITIONER

## 2023-03-08 PROCEDURE — 6370000000 HC RX 637 (ALT 250 FOR IP): Performed by: INTERNAL MEDICINE

## 2023-03-08 PROCEDURE — 1200000000 HC SEMI PRIVATE

## 2023-03-08 PROCEDURE — 85027 COMPLETE CBC AUTOMATED: CPT

## 2023-03-08 PROCEDURE — 80048 BASIC METABOLIC PNL TOTAL CA: CPT

## 2023-03-08 PROCEDURE — 36415 COLL VENOUS BLD VENIPUNCTURE: CPT

## 2023-03-08 PROCEDURE — 97535 SELF CARE MNGMENT TRAINING: CPT

## 2023-03-08 PROCEDURE — 94760 N-INVAS EAR/PLS OXIMETRY 1: CPT

## 2023-03-08 PROCEDURE — 2580000003 HC RX 258: Performed by: INTERNAL MEDICINE

## 2023-03-08 PROCEDURE — 6360000002 HC RX W HCPCS: Performed by: INTERNAL MEDICINE

## 2023-03-08 RX ADMIN — ENOXAPARIN SODIUM 40 MG: 100 INJECTION SUBCUTANEOUS at 09:55

## 2023-03-08 RX ADMIN — PANTOPRAZOLE SODIUM 40 MG: 40 TABLET, DELAYED RELEASE ORAL at 09:55

## 2023-03-08 RX ADMIN — BACLOFEN 5 MG: 10 TABLET ORAL at 09:53

## 2023-03-08 RX ADMIN — TAMSULOSIN HYDROCHLORIDE 0.4 MG: 0.4 CAPSULE ORAL at 22:14

## 2023-03-08 RX ADMIN — CEFTRIAXONE 1000 MG: 1 INJECTION, POWDER, FOR SOLUTION INTRAMUSCULAR; INTRAVENOUS at 17:47

## 2023-03-08 RX ADMIN — POLYETHYLENE GLYCOL 3350 17 G: 17 POWDER, FOR SOLUTION ORAL at 11:48

## 2023-03-08 RX ADMIN — AZITHROMYCIN MONOHYDRATE 500 MG: 500 TABLET ORAL at 17:46

## 2023-03-08 RX ADMIN — BENZONATATE 100 MG: 100 CAPSULE ORAL at 22:14

## 2023-03-08 RX ADMIN — ACETAMINOPHEN 650 MG: 325 TABLET ORAL at 22:16

## 2023-03-08 RX ADMIN — LOSARTAN POTASSIUM 50 MG: 50 TABLET, FILM COATED ORAL at 09:53

## 2023-03-08 RX ADMIN — Medication 10 ML: at 22:15

## 2023-03-08 RX ADMIN — BACLOFEN 10 MG: 10 TABLET ORAL at 22:14

## 2023-03-08 RX ADMIN — AMLODIPINE BESYLATE 5 MG: 5 TABLET ORAL at 22:14

## 2023-03-08 RX ADMIN — CLOPIDOGREL BISULFATE 75 MG: 75 TABLET ORAL at 22:14

## 2023-03-08 RX ADMIN — INSULIN GLARGINE 18 UNITS: 100 INJECTION, SOLUTION SUBCUTANEOUS at 22:16

## 2023-03-08 RX ADMIN — DULOXETINE HYDROCHLORIDE 60 MG: 60 CAPSULE, DELAYED RELEASE ORAL at 09:53

## 2023-03-08 RX ADMIN — ROSUVASTATIN CALCIUM 10 MG: 10 TABLET, FILM COATED ORAL at 09:53

## 2023-03-08 ASSESSMENT — PAIN SCALES - PAIN ASSESSMENT IN ADVANCED DEMENTIA (PAINAD)
BREATHING: 0
BODYLANGUAGE: 0
BREATHING: 0
NEGVOCALIZATION: 0
NEGVOCALIZATION: 0
BREATHING: 0
CONSOLABILITY: 0
NEGVOCALIZATION: 0
BODYLANGUAGE: 0
CONSOLABILITY: 0
CONSOLABILITY: 0
FACIALEXPRESSION: 0
FACIALEXPRESSION: 0
CONSOLABILITY: 0
BODYLANGUAGE: 0
BREATHING: 0
NEGVOCALIZATION: 0
FACIALEXPRESSION: 0
BREATHING: 0
TOTALSCORE: 0
CONSOLABILITY: 0
NEGVOCALIZATION: 0
TOTALSCORE: 0
FACIALEXPRESSION: 0
BODYLANGUAGE: 0
TOTALSCORE: 0
TOTALSCORE: 0
FACIALEXPRESSION: 0
BODYLANGUAGE: 0
TOTALSCORE: 0

## 2023-03-08 NOTE — PROGRESS NOTES
Physical Therapy  Facility/Department: [address] MED SURG  Physical Therapy Treatment Note    Name: Little Finnegan  : 1945  MRN: [id number]  Date of Service: 3/8/2023    Discharge Recommendations:  Home with Home health PT, 24 hour supervision or assist, Therapy recommended at discharge (2-3x/wk)          Patient Diagnosis(es): The primary encounter diagnosis was Community acquired pneumonia, unspecified laterality. Diagnoses of Hypoxia and Lab test negative for COVID-19 virus were also pertinent to this visit. Past Medical History:  has a past medical history of Cerebral artery occlusion with cerebral infarction ([facility] [address] Diabetes mellitus ([facility] [address] Gallstone, GERD (gastroesophageal reflux disease), Hyperlipidemia, Hypertension, and Renal insufficiency. Past Surgical History:  has a past surgical history that includes Appendectomy; Cholecystectomy; Upper gastrointestinal endoscopy (2018); Upper gastrointestinal endoscopy (N/A, 2019); Upper gastrointestinal endoscopy (N/A, 2019); Upper gastrointestinal endoscopy (N/A, 2019); Upper gastrointestinal endoscopy (N/A, 2020); and Colonoscopy (N/A, 2020). Assessment   Body Structures, Functions, Activity Limitations Requiring Skilled Therapeutic Intervention: Decreased functional mobility ; Decreased endurance;Decreased cognition;Decreased balance  Assessment: Pt. presents d/t Covid in the context of previous CVA with L hemiplegia. Pt. has lived at home where Pt. is assisted by family 24 hr S/A. Pt physically assists with ambulation with quad cane as well as ADL's. Pt. is slightly below baseline level of function but anticipate with prior level of assist from family, Pt. will be able to return home. Will benefit from HHPT to restore PLOF and independence.   Treatment Diagnosis: Impaired functional mobility  Therapy Prognosis: Good  Decision Making: High Complexity  History: as above  Exam: as above  Clinical Presentation: evolving  Barriers to Learning: Language and cognition  Activity Tolerance  Activity Tolerance: Patient tolerated treatment well     Plan   Physcial Therapy Plan  General Plan: 3-5 times per week  Current Treatment Recommendations: Strengthening, Balance training, Functional mobility training, Transfer training, Gait training, Endurance training, Stair training, Safety education & training, Patient/Caregiver education & training, Equipment evaluation, education, & procurement, Home exercise program  Safety Devices  Type of Devices: All fall risk precautions in place, Call light within reach, Gait belt, Patient at risk for falls, Nurse notified, Lorena Schlatter in use, Left in bed, Bed alarm in place  Restraints  Restraints Initially in Place: No     Restrictions  Restrictions/Precautions  Restrictions/Precautions: Fall Risk  Position Activity Restriction  Other position/activity restrictions: RA     Subjective   General  Chart Reviewed: Yes  Additional Pertinent Hx: Pt. s/p CVA with L hemiparesis and 24 hour S/A of family at home, presented to [facility] with flu-like illness. Dx with Covid. Pt. has PMH: DM, HTN. Response To Previous Treatment: Patient with no complaints from previous session. Family / Caregiver Present: Yes (Daughter)  Referring Practitioner: Dr. Azul Ferrell  Referral Date : 03/07/23  Diagnosis: CAP/ Covid  Follows Commands: Impaired  Other (Comment): Difficult d/t language barrier, Pt. speaks Albanian, video  not great, responds better with family. Subjective  Subjective: Supine in bed, agreeable to therapy. Fixated on having a bowel movement.          Social/Functional History  Social/Functional History  Lives With: Family  Type of Home: House  Home Layout: Two level  Home Access: Stairs to enter with rails  Entrance Stairs - Number of Steps: 7  Bathroom Shower/Tub: Tub/Shower unit  Bathroom Toilet: Standard  Bathroom Equipment: Grab bars in shower, Shower chair, Grab bars around toilet  Bathroom Accessibility: Walker accessible  Home Equipment: Tricia Plants  Has the patient had two or more falls in the past year or any fall with injury in the past year?: Yes (falls from bed when trying to get up)  Receives Help From: Family  ADL Assistance: Needs assistance  Homemaking Assistance: Needs assistance  Homemaking Responsibilities: No  Ambulation Assistance: Needs assistance (CGA with cane in the home)  Transfer Assistance: Needs assistance  Additional Comments: Family is home all the time and is able to help him with ADLs, mobility, and transfers    Vision/Hearing  Vision  Vision: Within Functional Limits  Hearing  Hearing: Exceptions to [facility]  Hearing Exceptions: Hard of hearing/hearing concerns      Cognition   Cognition  Overall Cognitive Status: Exceptions  Following Commands: Inconsistently follows commands  Attention Span: Difficulty dividing attention  Safety Judgement: Decreased awareness of need for safety;Decreased awareness of need for assistance  Problem Solving: Decreased awareness of errors  Initiation: Requires cues for all  Sequencing: Requires cues for all  Cognition Comment: Very poor command following despite  being used     Objective   Gross Assessment  Strength: Generally decreased, functional (chronic L sided weakness)     Bed mobility  Supine to Sit: Moderate assistance  Sit to Supine: Moderate assistance  Transfers  Sit to Stand: Minimal Assistance; Moderate Assistance  Stand to Sit: Minimal Assistance; Moderate Assistance  Ambulation  Surface: Level tile  Device: Small Aryan [place]  Assistance: Minimal assistance; Moderate assistance  Quality of Gait: unsteady, wide MARIYA with LLE ER and abducted with mild knee flexion. Rigidity of L LE d/t incrased tone, tends to run into objects on L side or needs cuing to move around. Gait Deviations: Increased MARIYA; Decreased step length;Decreased step height;Slow Janeth  Distance: 10' x2 + 5'  Comments: Pt ambulated to bathroom for unsuccessful bowel movement x 2. Pt stood at sink to wash face and brush teeth. Stairs/Curb  Stairs?: No     Balance  Sitting - Static: Good;-  Sitting - Dynamic: Good;-  Standing - Static: Fair  Standing - Dynamic: Fair  Comments: Unsteady with standing especially at first.  Once standing shortly, improves to Min A             AM-PAC Score  AM-PAC Inpatient Mobility Raw Score : 12 (03/08/23 1113)  AM-PAC Inpatient T-Scale Score : 35.33 (03/08/23 1113)  Mobility Inpatient CMS 0-100% Score: 68.66 (03/08/23 1113)  Mobility Inpatient CMS G-Code Modifier : CL (03/08/23 1113)          Goals  Short Term Goals  Time Frame for Short Term Goals: By d/c - all goals ongoing as of 3/8/23  Short Term Goal 1: Supine to/from sit with Min A  Short Term Goal 2: Sit to/from stand with CGA  Short Term Goal 3: Amb. with SBQC with CGA x 50'  Patient Goals   Patient Goals : Return home       Education  Patient Education  Education Given To: Patient; Family  Education Provided: Role of Therapy;Plan of Care;Transfer Training  Education Method: Verbal;Demonstration;   Barriers to Learning: Cognition (language)  Education Outcome: Demonstrated understanding;Continued education needed      Therapy Time   Individual Concurrent Group Co-treatment   Time In 1025         Time Out 1105         Minutes 40         Timed Code Treatment Minutes: Izzy 64, PT

## 2023-03-08 NOTE — PLAN OF CARE
Problem: Discharge Planning  Goal: Discharge to home or other facility with appropriate resources  3/8/2023 1632 by Merrick Patel RN  Outcome: Progressing     Problem: Pain  Goal: Verbalizes/displays adequate comfort level or baseline comfort level  3/8/2023 1632 by Merrick Patel RN  Outcome: Progressing     Problem: Safety - Adult  Goal: Free from fall injury  3/8/2023 1632 by Merrick Patel RN  Outcome: Progressing

## 2023-03-08 NOTE — PROGRESS NOTES
Occupational Therapy  Facility/Department: [address] MED SURG  Occupational Therapy Daily Treatment    Name: Magen Kuo  : 1945  MRN: [id number]  Date of Service: 3/8/2023    Discharge Recommendations:  24 hour supervision or assist, S Level 1, 2-3 sessions per week     Magen Kuo scored a 15/24 on the AM-PAC ADL Inpatient form. Current research shows that an AM-PAC score of 18 or greater is typically associated with a discharge to the patient's home setting. Based on the patient's AM-PAC score, and their current ADL deficits, it is recommended that the patient have 2-3 sessions per week of Occupational Therapy at d/c to increase the patient's independence. At this time, this patient demonstrates the endurance and safety to discharge home with [facility] OT L1 and a follow up treatment frequency of 2-3x/wk. Please see assessment section for further patient specific details. If patient discharges prior to next session this note will serve as a discharge summary. Please see below for the latest assessment towards goals. Patient Diagnosis(es): The primary encounter diagnosis was Community acquired pneumonia, unspecified laterality. Diagnoses of Hypoxia and Lab test negative for COVID-19 virus were also pertinent to this visit. Past Medical History:  has a past medical history of Cerebral artery occlusion with cerebral infarction ([address] Diabetes mellitus ([facility] [address] Gallstone, GERD (gastroesophageal reflux disease), Hyperlipidemia, Hypertension, and Renal insufficiency. Past Surgical History:  has a past surgical history that includes Appendectomy; Cholecystectomy; Upper gastrointestinal endoscopy (2018); Upper gastrointestinal endoscopy (N/A, 2019); Upper gastrointestinal endoscopy (N/A, 2019); Upper gastrointestinal endoscopy (N/A, 2019); Upper gastrointestinal endoscopy (N/A, 2020); and Colonoscopy (N/A, 2020).     Treatment Diagnosis: Decreased ADLs, functional mobility      Assessment   Performance deficits / Impairments: Decreased functional mobility ; Decreased ADL status; Decreased ROM; Decreased cognition;Decreased balance;Decreased safe awareness;Decreased strength;Decreased endurance  Assessment: Pt is a 69 yo M admitted with community acquired bacterial pneumonia. 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 PTA, pt lived at home with his son and daughter-in-law and received assistance with ADLs, functional mobility with a cane, and transfers. Pt remains limited by poor command following/processing, unsure if possible vision/hearing barrier as well. He required min/mod A for functional and mobility using quad cane w/ max cues for navigation and safety. He required min A for grooming and total A to attempt toileting. Continue to anticipate pt will be safe to return home with 24/7 assist as prior and HHOT L1 once medically stable. Treatment Diagnosis: Decreased ADLs, functional mobility  Prognosis: Fair  REQUIRES OT FOLLOW-UP: Yes  Activity Tolerance  Activity Tolerance: Patient limited by fatigue;Treatment limited secondary to decreased cognition  Activity Tolerance Comments: Poor command following/processing despite use of         Plan   Occupational Therapy Plan  Times Per Week: 3-5  Times Per Day: Once a day  Current Treatment Recommendations: Strengthening, ROM, Balance training, Functional mobility training, Endurance training, Safety education & training, Self-Care / ADL     Restrictions  Restrictions/Precautions  Restrictions/Precautions: Fall Risk  Position Activity Restriction  Other position/activity restrictions: RA    Subjective   General  Chart Reviewed: Yes  Patient assessed for rehabilitation services?: Yes  Additional Pertinent Hx: Pt is a 69 yo M admitted with community acquired bacterial pneumonia. Pt has hx of R CVA.   Family / Caregiver Present: No  Referring Practitioner: Neha Mcgrath DO  Diagnosis: community acquired bacterial pneumonia  Subjective  Subjective: Pt met b/s for OT cotx w/ PT. Pt in bed, agreeable to therapy. Pt c/o feeling constipated throughout session, holding stomach at times in pain, RN notified. Ipad  used throughout session     Social/Functional History  Social/Functional History  Lives With: Family  Type of Home: House  Home Layout: Two level  Home Access: Stairs to enter with rails  Entrance Stairs - Number of Steps: 7  Bathroom Shower/Tub: Tub/Shower unit  Bathroom Toilet: Standard  Bathroom Equipment: Grab bars in shower, Shower chair, Grab bars around toilet  Bathroom Accessibility: Walker accessible  Home Equipment: Loralie Reges  Has the patient had two or more falls in the past year or any fall with injury in the past year?: Yes (falls from bed when trying to get up)  Receives Help From: Family  ADL Assistance: Needs assistance  Homemaking Assistance: Needs assistance  Homemaking Responsibilities: No  Ambulation Assistance: Needs assistance (CGA with cane in the home)  Transfer Assistance: Needs assistance  Additional Comments: Family is home all the time and is able to help him with ADLs, mobility, and transfers       Objective   Safety Devices  Type of Devices: All fall risk precautions in place;Call light within reach;Gait belt;Patient at risk for falls;Nurse notified;Telesitter in use;Left in bed;Bed alarm in place     Toilet Transfers  Toilet - Technique: Ambulating (quad cane)  Equipment Used: Standard toilet  Toilet Transfer: Minimal assistance; Moderate assistance  Toilet Transfers Comments: poor safety - sits too early     ADL  Grooming: Minimal assistance  Grooming Skilled Clinical Factors: Pt brushed teeth and washed hands in stance at sink w/ min A to wash/dry L hand  LE Dressing: Dependent/Total  LE Dressing Skilled Clinical Factors: assist to don socks in bed  Toileting Skilled Clinical Factors: Pt sat on commode x2 trials but unable to have BM; repeatedly states he is constipated - RN notified. Pt also attempting to sit mid-ambulation to attempt to have BM, requires constant redirection        Bed mobility  Supine to Sit: Moderate assistance  Sit to Supine: Moderate assistance  Transfers  Sit to stand: Minimal assistance  Stand to sit: Minimal assistance  Transfer Comments: Pt completed functional mobility from bed <> BR using quad cane w/ min A in straight path but up to mod A w/ constant cueing for direction d/t poor safety/command following. May be possible vision deficit vs poor processing as well? pt seems unable to locate toilet/sink in BR and requires max verbal and tactile cueing for navigation  Vision  Vision: Within Functional Limits  Hearing  Hearing: Exceptions to [facility]  Hearing Exceptions: Hard of hearing/hearing concerns  Cognition  Overall Cognitive Status: Exceptions  Following Commands: Inconsistently follows commands  Attention Span: Difficulty dividing attention  Safety Judgement: Decreased awareness of need for safety;Decreased awareness of need for assistance  Problem Solving: Decreased awareness of errors  Initiation: Requires cues for all  Sequencing: Requires cues for all  Cognition Comment: Very poor command following despite  being used                  Education Given To: Patient; Family  Education Provided: Role of Therapy;Plan of Care;Transfer Training;ADL Adaptive Strategies; Energy Conservation  Education Method: Verbal;Demonstration  Barriers to Learning: Other (Comment); Cognition (language)  Education Outcome: Continued education needed     AM-PAC Score  AM-PAC Inpatient Daily Activity Raw Score: 15 (03/08/23 1111)  AM-PAC Inpatient ADL T-Scale Score : 34.69 (03/08/23 1111)  ADL Inpatient CMS 0-100% Score: 56.46 (03/08/23 1111)  ADL Inpatient CMS G-Code Modifier : CK (03/08/23 1111)      Goals  Short Term Goals  Time Frame for Short Term Goals: Prior to d/c; goals ongoing  Short Term Goal 1: Pt will complete ADL transfers min A  Short Term Goal 2: Pt will complete grooming while seated at sink with supervision  Short Term Goal 3: Pt will complete dressing min A  Short Term Goal 4: Pt will complete toileting mod A  Short Term Goal 5: Pt will complete bathing mod A  Long Term Goals  Time Frame for Long Term Goals : LTG=STG  Patient Goals   Patient goals :  To return home       Therapy Time   Individual Concurrent Group Co-treatment   Time [facility]         Minutes [address], [address] [place] [address]

## 2023-03-08 NOTE — PROGRESS NOTES
Patient A&Ox1. VSS. Neuro checks unchanged. Patient complaining of constipation, medicated with PRN meds per [facility], pt and family encouraged to be OOB, pt declined. Patient and family instructed to call out for needs. Fall precautions and avasys monitoring on for safety. Will continue to monitor.

## 2023-03-08 NOTE — PLAN OF CARE
Problem: Discharge Planning  Goal: Discharge to home or other facility with appropriate resources  Outcome: Progressing  Flowsheets (Taken 3/7/2023 1940)  Discharge to home or other facility with appropriate resources:   Identify barriers to discharge with patient and caregiver   Arrange for needed discharge resources and transportation as appropriate   Identify discharge learning needs (meds, wound care, etc)   Arrange for interpreters to assist at discharge as needed   Refer to discharge planning if patient needs post-hospital services based on physician order or complex needs related to functional status, cognitive ability or social support system     Problem: Pain  Goal: Verbalizes/displays adequate comfort level or baseline comfort level  Outcome: Progressing     Problem: Safety - Adult  Goal: Free from fall injury  Outcome: Progressing     Problem: Skin/Tissue Integrity  Goal: Absence of new skin breakdown  Description: 1. Monitor for areas of redness and/or skin breakdown  2. Assess vascular access sites hourly  3. Every 4-6 hours minimum:  Change oxygen saturation probe site  4. Every 4-6 hours:  If on nasal continuous positive airway pressure, respiratory therapy assess nares and determine need for appliance change or resting period.   Outcome: Progressing     Problem: Chronic Conditions and Co-morbidities  Goal: Patient's chronic conditions and co-morbidity symptoms are monitored and maintained or improved  Outcome: Progressing  Flowsheets (Taken 3/7/2023 1940)  Care Plan - Patient's Chronic Conditions and Co-Morbidity Symptoms are Monitored and Maintained or Improved:   Monitor and assess patient's chronic conditions and comorbid symptoms for stability, deterioration, or improvement   Collaborate with multidisciplinary team to address chronic and comorbid conditions and prevent exacerbation or deterioration     Problem: Respiratory - Adult  Goal: Achieves optimal ventilation and oxygenation  Outcome: Progressing  Flowsheets (Taken 3/7/2023 1940)  Achieves optimal ventilation and oxygenation:   Assess for changes in respiratory status   Assess for changes in mentation and behavior   Position to facilitate oxygenation and minimize respiratory effort   Oxygen supplementation based on oxygen saturation or arterial blood gases   Respiratory therapy support as indicated     Problem: Cardiovascular - Adult  Goal: Maintains optimal cardiac output and hemodynamic stability  Outcome: Progressing  Flowsheets (Taken 3/7/2023 1940)  Maintains optimal cardiac output and hemodynamic stability:   Monitor blood pressure and heart rate   Monitor urine output and notify Licensed Independent Practitioner for values outside of normal range   Assess for signs of decreased cardiac output     Problem: Musculoskeletal - Adult  Goal: Return mobility to safest level of function  Outcome: Progressing  Flowsheets (Taken 3/7/2023 1940)  Return Mobility to Safest Level of Function:   Assess patient stability and activity tolerance for standing, transferring and ambulating with or without assistive devices   Assist with transfers and ambulation using safe patient handling equipment as needed   Obtain physical therapy/occupational therapy consults as needed

## 2023-03-08 NOTE — PROGRESS NOTES
Hospitalist Progress Note    CC: Community acquired bacterial pneumonia      Admit date: 3/5/2023  Days in hospital:  3    Subjective/interval history: seen at bedside. No acute events. He is somnolent, Pt reports still feeling ill      ROS:   Pertinent items are noted in HPI. .    Objective:    /79   Pulse 92   Temp 97.8 °F (36.6 °C) (Oral)   Resp 21   Ht 5' 2\" (1.575 m)   Wt 139 lb 15.9 oz (63.5 kg)   SpO2 96%   BMI 25.60 kg/m²     Gen: NAD, lying in bed, somnolent  HEENT: NC/AT, moist mucous membranes, no oropharyngeal erythema or exudate  Neck: supple, trachea midline, no anterior cervical or SC LAD  Heart: Normal s1/s2, RRR, no murmurs, gallops, or rubs. Lungs: diminished, no wheezing, no use of accessory muscles  Abd: bowel sounds present, soft, nontender, nondistended, no masses  Extrem: No clubbing, cyanosis, no edema  Skin: no rashes or lesions  Psych: A & o, affect appropriate  Neuro: grossly intact, moves all four extremities spontaneously.   Cap refill: +2 sec    Medications:  Scheduled Meds:   amLODIPine  5 mg Oral Daily    DULoxetine  60 mg Oral Daily    insulin glargine  18 Units SubCUTAneous Nightly    losartan  50 mg Oral Daily    pantoprazole  40 mg Oral QAM AC    rosuvastatin  10 mg Oral Daily    tamsulosin  0.4 mg Oral Nightly    sodium chloride flush  5-40 mL IntraVENous 2 times per day    enoxaparin  40 mg SubCUTAneous Daily    cefTRIAXone (ROCEPHIN) IV  1,000 mg IntraVENous Q24H    And    azithromycin  500 mg Oral Q24H    baclofen  10 mg Oral Nightly    clopidogrel  75 mg Oral Nightly    baclofen  5 mg Oral Daily       PRN Meds:  polyvinyl alcohol-povidone, benzonatate, sodium chloride flush, sodium chloride, ondansetron **OR** ondansetron, polyethylene glycol, acetaminophen **OR** acetaminophen, glucose, dextrose bolus **OR** dextrose bolus, glucagon (rDNA), dextrose    IV:   sodium chloride      sodium chloride 100 mL/hr at 03/08/23 0440    dextrose Intake/Output Summary (Last 24 hours) at 3/8/2023 1631  Last data filed at 3/8/2023 1056  Gross per 24 hour   Intake 1414.4 ml   Output 350 ml   Net 1064.4 ml         Results:  CBC:   Recent Labs     03/06/23  0729 03/08/23  1129   WBC 5.8 5.3   HGB 14.3 15.1   HCT 42.2 44.8   MCV 79.8* 79.5*    158       BMP:   Recent Labs     03/06/23  0729 03/08/23  1129   * 139   K 4.1 3.7    106   CO2 22 19*   BUN 11 6*   CREATININE 1.5* 1.1       Mag: No results for input(s): MAG in the last 72 hours. Phos:   Lab Results   Component Value Date    PHOS 3.5 12/29/2022     No results found for: GLU    LIVER PROFILE:   No results for input(s): AST, ALT, LIPASE, BILIDIR, BILITOT, ALKPHOS in the last 72 hours. Invalid input(s): AMYLASE,  ALB    PT/INR: No results for input(s): PROTIME, INR in the last 72 hours. APTT: No results for input(s): APTT in the last 72 hours. UA:  No results for input(s): NITRITE, COLORU, PHUR, LABCAST, WBCUA, RBCUA, MUCUS, TRICHOMONAS, YEAST, BACTERIA, CLARITYU, SPECGRAV, LEUKOCYTESUR, UROBILINOGEN, BILIRUBINUR, BLOODU, GLUCOSEU, AMORPHOUS in the last 72 hours. Invalid input(s): [place] input(s): ABG  Lab Results   Component Value Date    CALCIUM 8.8 03/08/2023    PHOS 3.5 12/29/2022       Assessment:    Principal Problem:    Community acquired bacterial pneumonia  Resolved Problems:    * No resolved hospital problems. [facility] course: 68 y.o. male with PMHx of CVA, DM2, HTN and HLD presented to [facility] with flu-like illness. Family reports symptoms began yesterday including sore throat, nausea and vomiting. He has a dry cough but no shortness of breath. He has had more than 15 episodes of vomiting today. No diarrhea. Denies dizziness, lightheadedness or headache.     Plan:     PNA  CXR shows worsening R lung opacity, will order CT chest for better lung evaluation  Continue IV Abx    Acute hypoxic respiratory failure, POA - with criteria: < 90% on RA, RR> 18  - supplemental 02 to keep SaO2 > 90%; not on o2 at baseline  - Rapid Influenza A&B negative. Covid +ve  - procalcitonin - WNL  - pna panel if able to get  - covid +ve  - Rocephin and Azithromycin.   - nebs  - is, mucinex    Chronic conditions - continue home meds unless otherwise stated  Dm  Htn      Worsening lung infiltrates - contiue to monitor inpt, start dexamethasone, home o2 eval, upto chair, spirometry, f/u on CT chest    Code status:  full  DVT prophylaxis: [x] Lovenox  [] SQ Heparin  [] SCDs  [] warfarin/oral direct thrombin inhibitor [] Encourage ambulation        Electronically signed by Lexi Chu MD on 3/8/2023 at 4:31 PM

## 2023-03-09 VITALS
DIASTOLIC BLOOD PRESSURE: 71 MMHG | RESPIRATION RATE: 16 BRPM | TEMPERATURE: 97.9 F | OXYGEN SATURATION: 95 % | HEART RATE: 80 BPM | WEIGHT: 139.99 LBS | SYSTOLIC BLOOD PRESSURE: 104 MMHG | HEIGHT: 62 IN | BODY MASS INDEX: 25.76 KG/M2

## 2023-03-09 LAB
GLUCOSE BLD-MCNC: 87 MG/DL (ref 70–99)
PERFORMED ON: NORMAL

## 2023-03-09 PROCEDURE — 6370000000 HC RX 637 (ALT 250 FOR IP): Performed by: INTERNAL MEDICINE

## 2023-03-09 PROCEDURE — 6360000002 HC RX W HCPCS: Performed by: INTERNAL MEDICINE

## 2023-03-09 PROCEDURE — 6370000000 HC RX 637 (ALT 250 FOR IP): Performed by: NURSE PRACTITIONER

## 2023-03-09 PROCEDURE — 2580000003 HC RX 258: Performed by: INTERNAL MEDICINE

## 2023-03-09 PROCEDURE — 94680 O2 UPTK RST&XERS DIR SIMPLE: CPT

## 2023-03-09 RX ORDER — PREDNISONE 20 MG/1
40 TABLET ORAL DAILY
Qty: 10 TABLET | Refills: 0 | Status: SHIPPED | OUTPATIENT
Start: 2023-03-09 | End: 2023-03-14

## 2023-03-09 RX ORDER — DOXYCYCLINE HYCLATE 100 MG
100 TABLET ORAL 2 TIMES DAILY
Qty: 10 TABLET | Refills: 0 | Status: SHIPPED | OUTPATIENT
Start: 2023-03-09 | End: 2023-03-14

## 2023-03-09 RX ADMIN — LOSARTAN POTASSIUM 50 MG: 50 TABLET, FILM COATED ORAL at 09:16

## 2023-03-09 RX ADMIN — ENOXAPARIN SODIUM 40 MG: 100 INJECTION SUBCUTANEOUS at 09:17

## 2023-03-09 RX ADMIN — ROSUVASTATIN CALCIUM 10 MG: 10 TABLET, FILM COATED ORAL at 09:16

## 2023-03-09 RX ADMIN — Medication 10 ML: at 09:26

## 2023-03-09 RX ADMIN — PANTOPRAZOLE SODIUM 40 MG: 40 TABLET, DELAYED RELEASE ORAL at 05:48

## 2023-03-09 RX ADMIN — BACLOFEN 5 MG: 10 TABLET ORAL at 09:16

## 2023-03-09 RX ADMIN — DULOXETINE HYDROCHLORIDE 60 MG: 60 CAPSULE, DELAYED RELEASE ORAL at 09:16

## 2023-03-09 ASSESSMENT — PAIN SCALES - GENERAL
PAINLEVEL_OUTOF10: 0
PAINLEVEL_OUTOF10: 0
PAINLEVEL_OUTOF10: 4
PAINLEVEL_OUTOF10: 0

## 2023-03-09 ASSESSMENT — PAIN SCALES - PAIN ASSESSMENT IN ADVANCED DEMENTIA (PAINAD)
CONSOLABILITY: 0
TOTALSCORE: 0
CONSOLABILITY: 0
TOTALSCORE: 0
NEGVOCALIZATION: 0
CONSOLABILITY: 0
BREATHING: 0
TOTALSCORE: 0
BREATHING: 0
NEGVOCALIZATION: 0
NEGVOCALIZATION: 0
BREATHING: 0
TOTALSCORE: 0
FACIALEXPRESSION: 0
FACIALEXPRESSION: 0
BODYLANGUAGE: 0
NEGVOCALIZATION: 0
NEGVOCALIZATION: 0
FACIALEXPRESSION: 0
BODYLANGUAGE: 0
TOTALSCORE: 0
BREATHING: 0
CONSOLABILITY: 0
FACIALEXPRESSION: 0
NEGVOCALIZATION: 0
BREATHING: 0
CONSOLABILITY: 0
BODYLANGUAGE: 0
CONSOLABILITY: 0
CONSOLABILITY: 0
NEGVOCALIZATION: 0
BREATHING: 0
TOTALSCORE: 0
CONSOLABILITY: 0
BODYLANGUAGE: 0
FACIALEXPRESSION: 0
BREATHING: 0
FACIALEXPRESSION: 0
BODYLANGUAGE: 0
TOTALSCORE: 0
BODYLANGUAGE: 0
BODYLANGUAGE: 0
TOTALSCORE: 0
FACIALEXPRESSION: 0
BREATHING: 0
NEGVOCALIZATION: 0
FACIALEXPRESSION: 0
BODYLANGUAGE: 0

## 2023-03-09 ASSESSMENT — PAIN DESCRIPTION - LOCATION: LOCATION: BACK

## 2023-03-09 ASSESSMENT — PAIN DESCRIPTION - DESCRIPTORS: DESCRIPTORS: DULL

## 2023-03-09 ASSESSMENT — PAIN DESCRIPTION - ORIENTATION: ORIENTATION: MID

## 2023-03-09 NOTE — PROGRESS NOTES
Pt is 96% SpO2 on room air at rest; pt is not safely able to be ambulated; Electronically signed by Mary Kay Coleman RCP on 3/9/2023 at 1:28 PM

## 2023-03-09 NOTE — CARE COORDINATION
[facility]    DC order noted, all docs needed have been faxed to [facility] for home care services.     Home care to see patient by 3/10/23-3/11/23    Darlin Currie RN, BSN CTN  [facility] [phone]

## 2023-03-09 NOTE — PLAN OF CARE
Problem: Discharge Planning  Goal: Discharge to home or other facility with appropriate resources  Outcome: Progressing  Flowsheets (Taken 3/8/2023 2210)  Discharge to home or other facility with appropriate resources:   Identify barriers to discharge with patient and caregiver   Arrange for needed discharge resources and transportation as appropriate   Identify discharge learning needs (meds, wound care, etc)   Arrange for interpreters to assist at discharge as needed   Refer to discharge planning if patient needs post-hospital services based on physician order or complex needs related to functional status, cognitive ability or social support system     Problem: Pain  Goal: Verbalizes/displays adequate comfort level or baseline comfort level  Outcome: Progressing     Problem: Safety - Adult  Goal: Free from fall injury  Outcome: Progressing     Problem: Skin/Tissue Integrity  Goal: Absence of new skin breakdown  Description: 1. Monitor for areas of redness and/or skin breakdown  2. Assess vascular access sites hourly  3. Every 4-6 hours minimum:  Change oxygen saturation probe site  4. Every 4-6 hours:  If on nasal continuous positive airway pressure, respiratory therapy assess nares and determine need for appliance change or resting period.   Outcome: Progressing     Problem: Chronic Conditions and Co-morbidities  Goal: Patient's chronic conditions and co-morbidity symptoms are monitored and maintained or improved  Outcome: Progressing  Flowsheets (Taken 3/8/2023 2210)  Care Plan - Patient's Chronic Conditions and Co-Morbidity Symptoms are Monitored and Maintained or Improved:   Monitor and assess patient's chronic conditions and comorbid symptoms for stability, deterioration, or improvement   Collaborate with multidisciplinary team to address chronic and comorbid conditions and prevent exacerbation or deterioration     Problem: Respiratory - Adult  Goal: Achieves optimal ventilation and oxygenation  Outcome: Progressing  Flowsheets (Taken 3/8/2023 2210)  Achieves optimal ventilation and oxygenation:   Assess for changes in respiratory status   Assess for changes in mentation and behavior   Position to facilitate oxygenation and minimize respiratory effort   Assess and instruct to report shortness of breath or any respiratory difficulty   Respiratory therapy support as indicated   Oxygen supplementation based on oxygen saturation or arterial blood gases     Problem: Cardiovascular - Adult  Goal: Maintains optimal cardiac output and hemodynamic stability  Outcome: Progressing  Flowsheets (Taken 3/8/2023 2210)  Maintains optimal cardiac output and hemodynamic stability:   Monitor blood pressure and heart rate   Monitor urine output and notify Licensed Independent Practitioner for values outside of normal range   Assess for signs of decreased cardiac output     Problem: Musculoskeletal - Adult  Goal: Return mobility to safest level of function  Outcome: Progressing  Flowsheets (Taken 3/8/2023 2210)  Return Mobility to Safest Level of Function:   Assess patient stability and activity tolerance for standing, transferring and ambulating with or without assistive devices   Assist with transfers and ambulation using safe patient handling equipment as needed   Obtain physical therapy/occupational therapy consults as needed   Instruct patient/family in ordered activity level     Problem: ABCDS Injury Assessment  Goal: Absence of physical injury  Outcome: Progressing

## 2023-03-09 NOTE — PROGRESS NOTES
Patient Discharged. Discharged instruction given; answer all questions; denies needs at this time. IV removed without complication. Transport patient to the car on a wheel chair.

## 2023-03-09 NOTE — PLAN OF CARE
Problem: Discharge Planning  Goal: Discharge to home or other facility with appropriate resources  3/9/2023 1215 by Nav Manzo RN  Outcome: Completed  3/9/2023 0145 by Pavan Yoon RN  Outcome: Progressing  Flowsheets (Taken 3/8/2023 2210)  Discharge to home or other facility with appropriate resources:   Identify barriers to discharge with patient and caregiver   Arrange for needed discharge resources and transportation as appropriate   Identify discharge learning needs (meds, wound care, etc)   Arrange for interpreters to assist at discharge as needed   Refer to discharge planning if patient needs post-hospital services based on physician order or complex needs related to functional status, cognitive ability or social support system     Problem: Pain  Goal: Verbalizes/displays adequate comfort level or baseline comfort level  3/9/2023 1215 by Nav Manzo RN  Outcome: Completed  3/9/2023 0145 by Pavan Yoon RN  Outcome: Progressing     Problem: Safety - Adult  Goal: Free from fall injury  3/9/2023 1215 by Nav Manzo RN  Outcome: Completed  Flowsheets (Taken 3/9/2023 0149 by Pavan Yoon RN)  Free From Fall Injury: Instruct family/caregiver on patient safety  3/9/2023 0145 by Pavan Yoon RN  Outcome: Progressing     Problem: Skin/Tissue Integrity  Goal: Absence of new skin breakdown  Description: 1. Monitor for areas of redness and/or skin breakdown  2. Assess vascular access sites hourly  3. Every 4-6 hours minimum:  Change oxygen saturation probe site  4. Every 4-6 hours:  If on nasal continuous positive airway pressure, respiratory therapy assess nares and determine need for appliance change or resting period.   3/9/2023 1215 by Nav Manzo RN  Outcome: Completed  3/9/2023 0145 by Pavan Yoon RN  Outcome: Progressing     Problem: Chronic Conditions and Co-morbidities  Goal: Patient's chronic conditions and co-morbidity symptoms are monitored and maintained or improved  3/9/2023 1215 by Annabel Mcdowell RN  Outcome: Completed  3/9/2023 0145 by Aric Marrufo RN  Outcome: Progressing  Flowsheets (Taken 3/8/2023 2210)  Care Plan - Patient's Chronic Conditions and Co-Morbidity Symptoms are Monitored and Maintained or Improved:   Monitor and assess patient's chronic conditions and comorbid symptoms for stability, deterioration, or improvement   Collaborate with multidisciplinary team to address chronic and comorbid conditions and prevent exacerbation or deterioration     Problem: Respiratory - Adult  Goal: Achieves optimal ventilation and oxygenation  3/9/2023 1215 by Annabel Mcdowell RN  Outcome: Completed  3/9/2023 0145 by Aric Marrufo RN  Outcome: Progressing  Flowsheets (Taken 3/8/2023 2210)  Achieves optimal ventilation and oxygenation:   Assess for changes in respiratory status   Assess for changes in mentation and behavior   Position to facilitate oxygenation and minimize respiratory effort   Assess and instruct to report shortness of breath or any respiratory difficulty   Respiratory therapy support as indicated   Oxygen supplementation based on oxygen saturation or arterial blood gases     Problem: Cardiovascular - Adult  Goal: Maintains optimal cardiac output and hemodynamic stability  3/9/2023 1215 by Annabel Mcdowell RN  Outcome: Completed  3/9/2023 0145 by Aric Marrufo RN  Outcome: Progressing  Flowsheets (Taken 3/8/2023 2210)  Maintains optimal cardiac output and hemodynamic stability:   Monitor blood pressure and heart rate   Monitor urine output and notify Licensed Independent Practitioner for values outside of normal range   Assess for signs of decreased cardiac output     Problem: Musculoskeletal - Adult  Goal: Return mobility to safest level of function  3/9/2023 1215 by Annabel Mcdowell RN  Outcome: Completed  3/9/2023 0145 by Aric Marrufo RN  Outcome: Progressing  Flowsheets (Taken 3/8/2023 2210)  Return Mobility to Safest Level of Function:   Assess patient stability and activity tolerance for standing, transferring and ambulating with or without assistive devices   Assist with transfers and ambulation using safe patient handling equipment as needed   Obtain physical therapy/occupational therapy consults as needed   Instruct patient/family in ordered activity level     Problem: ABCDS Injury Assessment  Goal: Absence of physical injury  3/9/2023 1215 by Evonne Villafuerte RN  Outcome: Completed  Flowsheets (Taken 3/9/2023 0149 by Julianne Cline RN)  Absence of Physical Injury: Implement safety measures based on patient assessment  3/9/2023 0145 by Julianne Cline RN  Outcome: Progressing

## 2023-03-09 NOTE — CARE COORDINATION
Case Management Discharge Note          Date / Time of Note: 3/9/2023 2:01 PM                  Patient Name: Maurice Hernandez   YOB: 1945  Diagnosis: Hypoxia [R09.02]  Community acquired bacterial pneumonia [J15.9]  Community acquired pneumonia, unspecified laterality [J18.9]  Lab test negative for COVID-19 virus [Z20.822]   Date / Time: 3/5/2023 11:18 AM    Financial:  Payor: MEDICAID [place] / Plan: MEDICAID [facility] DEPT OF JOB / Product Type: *No Product type* /      Pharmacy:    [facility]  #[id number] - [address], [address] [facility] [phone] - f [phone]  [address] [facility]  [address] [place] [id number]  Phone: [phone] Fax: Marcin rKishnan 6190 [address] [facility] [phone] - f [phone] [address] [place] [id number]  Phone: [phone] Fax: [phone] [address] [facility] [address], [address] [phone]  f [phone]  [address] [facility] [address]  Phone: [phone] Fax: [phone]      Assistance purchasing medications?: Potential Assistance Purchasing Medications: No  Assistance provided by Case Management: None at this time    DISCHARGE Disposition: Home with [address] [place] [address] Completed: Yes      Notification completed in HENS/PAS?:  Not Applicable    Home Care:  Home Care ordered at discharge: Yes  [address]: Alix Mariano  Phone: [phone]  Fax: [phone]  Orders faxed: Yes    Transportation:  Transportation PLAN for discharge: family   Mode of Transport: Private Car    IMM Completed:   N/a-medicaid    Additional CM Notes: son to take pt home, Alix Mariano to follow at home, [place] [facility]  notified of dc    The Plan for Transition of Care is related to the following treatment goals of Hypoxia [R09.02]  Community acquired bacterial pneumonia [J15.9]  Community acquired pneumonia, unspecified laterality [J18.9]  Lab test negative for COVID-19 virus [Z20.822]    The Patient and/or patient representative Walker Eller and his family were provided with a choice of provider and agrees with the discharge plan Yes    Freedom of choice list was provided with basic dialogue that supports the patient's individualized plan of care/goals and shares the quality data associated with the providers.  Yes    Mariela Muñiz RN, BSN,   [phone]  Electronically signed by Mariela Muñiz RN on 3/9/2023 at 2:05 PM

## 2023-03-14 NOTE — PROGRESS NOTES
Physician Progress Note      Yulia Kay  [facility] #:                  [id number]  :                       1945  ADMIT DATE:       3/5/2023 11:18 AM  DISCH DATE:        3/9/2023 2:45 PM  RESPONDING  PROVIDER #:        Leonie Vargas MD          QUERY TEXT:    Patient admitted with bacterial pneumonia. Pneumonia panel positive for   coronavirus by PCR. If possible, please document in progress notes and   discharge summary if you are evaluating and/or treating any of the following: The medical record reflects the following:  Risk Factors: DM, bacterial pneumonia  Clinical Indicators: Pneumonia panel positive for coronavirus by PCR  Treatment: Rocephin, Azithromycin    Thank you,  Chaya Chirinos, RN, BSN, CCDS  [email or web address]  Options provided:  -- Bacterial and viral pneumonia  -- Coronavirus detected by PCR not clinically significant  -- Other - I will add my own diagnosis  -- Disagree - Not applicable / Not valid  -- Disagree - Clinically unable to determine / Unknown  -- Refer to Clinical Documentation Reviewer    PROVIDER RESPONSE TEXT:    This patient has bacterial and viral pneumonia. Query created by: Hettie Cockayne on 3/8/2023 7:17 AM      QUERY TEXT:    Patient admitted with pneumonia. Noted documentation of acute hypoxic   respiratory failure in 3/6 & 3/7 progress notes. Per H&P \"Normal respiratory   effort. Clear to auscultation, bilaterally without accessory muscle use. \"  O2   sat 96% on RA on arrival.  On 3/8 dropped to 86% for 5 minutes and back up to   90%, placed on 1L and up to 94%. No documented respiratory distress. Resp   rate 16-24 since arrival.  In order to support the diagnosis of acute   respiratory failure, please include additional clinical indicators in your   documentation.   Or please document if the diagnosis of a    The medical record reflects the following:  Risk Factors: pneumonia  Clinical Indicators:  Per H&P \"Normal respiratory effort. Clear to   auscultation, bilaterally without accessory muscle use. \"  O2 sat 96% on RA on   arrival.  On 3/8 dropped to 86% for 5 minutes and back up to 90%, placed on 1L   and up to 94%. No documented respiratory distress. Resp rate 16-24 since   arrival.  Treatment: monitoring O2 sat, supplemental O2    Acute Respiratory Failure Clinical Indicators per [employer]-DR[employer] Training Guide and   Quick Reference Guide:  pO2 < 60 mmHg or SpO2 (pulse oximetry) < 91% breathing room air  pCO2 > 50 and pH < 7.35  P/F ratio (pO2 / FIO2) < 300  pO2 decrease or pCO2 increase by 10 mmHg from baseline (if known)  Supplemental oxygen of 40% or more  Presence of respiratory distress, tachypnea, dyspnea, shortness of breath,   wheezing  Unable to speak in complete sentences  Use of accessory muscles to breathe  Extreme anxiety and feeling of impending doom  Tripod position  Confusion/altered mental status/obtunded    Thank you,  Alfred Spangler RN, BSN, CCDS  [email or web address]  Options provided:  -- Hypoxia only. Acute respiratory failure ruled out after study  -- Acute respiratory failure as evidenced by, Please document evidence. -- Other - I will add my own diagnosis  -- Disagree - Not applicable / Not valid  -- Disagree - Clinically unable to determine / Unknown  -- Refer to Clinical Documentation Reviewer    PROVIDER RESPONSE TEXT:    This patient has hypoxia only. Acute respiratory failure has been ruled out   after study.     Query created by: Westley Forrest on 3/8/2023 7:17 AM      Electronically signed by:  Jan Yañez MD 3/14/2023 9:34 AM

## 2023-04-27 ENCOUNTER — NURSE ONLY (OUTPATIENT)
Dept: CARDIOLOGY CLINIC | Age: 78
End: 2023-04-27
Payer: MEDICAID

## 2023-04-27 DIAGNOSIS — I63.9 ACUTE CEREBROVASCULAR ACCIDENT (HCC): Primary | ICD-10-CM

## 2023-04-27 DIAGNOSIS — Z98.890 HISTORY OF LOOP RECORDER: ICD-10-CM

## 2023-04-27 PROCEDURE — G2066 INTER DEVC REMOTE 30D: HCPCS | Performed by: INTERNAL MEDICINE

## 2023-04-27 PROCEDURE — 93298 REM INTERROG DEV EVAL SCRMS: CPT | Performed by: INTERNAL MEDICINE

## 2023-05-08 NOTE — PROGRESS NOTES
Remote transmission received from patient's ILR monitor at home. Since 3/3/23  5 Pause events w available ECGs illustrating undersensing. 8 tachy events w available ECGs illustrating intermittent TWOS. 5 AF events w available ECGs illustrating intermittent undersensing and TWOS. EP physician will review. See PACEART report under Cardiology tab. Will continue to monitor remotely. (End of 31-day monitoring period 4/27/23).

## 2023-06-22 ENCOUNTER — NURSE ONLY (OUTPATIENT)
Dept: CARDIOLOGY CLINIC | Age: 78
End: 2023-06-22
Payer: MEDICAID

## 2023-06-22 DIAGNOSIS — Z45.09 ENCOUNTER FOR ELECTRONIC ANALYSIS OF REVEAL EVENT RECORDER: Primary | ICD-10-CM

## 2023-06-22 DIAGNOSIS — I63.9 CRYPTOGENIC STROKE (HCC): ICD-10-CM

## 2023-06-22 PROCEDURE — 93298 REM INTERROG DEV EVAL SCRMS: CPT | Performed by: INTERNAL MEDICINE

## 2023-06-22 PROCEDURE — G2066 INTER DEVC REMOTE 30D: HCPCS | Performed by: INTERNAL MEDICINE

## 2023-07-03 NOTE — PROGRESS NOTES
Remote transmission received from patient's ILR monitor at home. Since 4/28/23  12 Pause events w available ECGs illustrating undersensing. EP physician will review. See PACEART report under Cardiology tab. Will continue to monitor remotely.      (End of 31-day monitoring period 6/22/23)

## 2023-09-29 PROCEDURE — G2066 INTER DEVC REMOTE 30D: HCPCS | Performed by: INTERNAL MEDICINE

## 2023-09-29 PROCEDURE — 93298 REM INTERROG DEV EVAL SCRMS: CPT | Performed by: INTERNAL MEDICINE

## 2023-12-08 PROCEDURE — G2066 INTER DEVC REMOTE 30D: HCPCS | Performed by: INTERNAL MEDICINE

## 2023-12-08 PROCEDURE — 93298 REM INTERROG DEV EVAL SCRMS: CPT | Performed by: INTERNAL MEDICINE

## 2024-01-09 PROCEDURE — 93298 REM INTERROG DEV EVAL SCRMS: CPT | Performed by: INTERNAL MEDICINE

## 2024-02-15 PROCEDURE — 93298 REM INTERROG DEV EVAL SCRMS: CPT | Performed by: INTERNAL MEDICINE

## 2024-07-08 PROCEDURE — 93298 REM INTERROG DEV EVAL SCRMS: CPT | Performed by: INTERNAL MEDICINE

## 2024-08-29 PROCEDURE — 93298 REM INTERROG DEV EVAL SCRMS: CPT | Performed by: INTERNAL MEDICINE

## 2024-09-24 ENCOUNTER — TELEPHONE (OUTPATIENT)
Dept: CARDIOLOGY CLINIC | Age: 79
End: 2024-09-24

## 2024-09-24 NOTE — TELEPHONE ENCOUNTER
Please schedule a next available apptmnt with Dr Frank to discuss procedure. Please do not overbook schedule.

## 2024-09-24 NOTE — TELEPHONE ENCOUNTER
ILR reached RRT 08.20.24, suspect EOS since 09.15.24 (last monitor connection). No upcoming EP appts. Report sent to [facility] for review via Murj.

## (undated) DEVICE — SNARE VASC L240CM LOOP W10MM SHTH DIA2.4MM RND STIFF CLD

## (undated) DEVICE — BITE BLOCK ENDOSCP AD 60 FR W/ ADJ STRP PLAS GRN BLOX

## (undated) DEVICE — ENDOSCOPIC ULTRASOUND FINE NEEDLE BIOPSY (FNB) DEVICE: Brand: ACQUIRE

## (undated) DEVICE — ENDOSCOPY KIT: Brand: MEDLINE INDUSTRIES, INC.

## (undated) DEVICE — Device

## (undated) DEVICE — CONTAINER SPEC 480ML CLR POLYSTYR 10% NEUT BUFF FRMLN ZN

## (undated) DEVICE — CLIP LIG L235CM RESOL 360 BX/20

## (undated) DEVICE — BALLOON ENDO FOR CLR VISIBILITY OF EUS PROC FITS OLY PENTAX

## (undated) DEVICE — ENDO CARRY-ON PROCEDURE KIT: Brand: ENDO CARRY-ON PROCEDURE KIT

## (undated) DEVICE — SINGLE-USE BIOPSY FORCEPS: Brand: RADIAL JAW 4

## (undated) DEVICE — TRAP POLYP ETRAP

## (undated) DEVICE — MOUTHPIECE ENDOSCP L CTRL OPN AND SIDE PORTS DISP

## (undated) DEVICE — FORCEPS BX 240CM 2.4MM L NDL RAD JAW 4 M00513334